# Patient Record
Sex: FEMALE | Race: WHITE | NOT HISPANIC OR LATINO | Employment: OTHER | URBAN - NONMETROPOLITAN AREA
[De-identification: names, ages, dates, MRNs, and addresses within clinical notes are randomized per-mention and may not be internally consistent; named-entity substitution may affect disease eponyms.]

---

## 2017-07-28 ENCOUNTER — TRANSCRIBE ORDERS (OUTPATIENT)
Dept: ADMINISTRATIVE | Facility: HOSPITAL | Age: 57
End: 2017-07-28

## 2017-07-28 DIAGNOSIS — Z12.31 VISIT FOR SCREENING MAMMOGRAM: Primary | ICD-10-CM

## 2017-08-02 ENCOUNTER — TRANSCRIBE ORDERS (OUTPATIENT)
Dept: ADMINISTRATIVE | Facility: HOSPITAL | Age: 57
End: 2017-08-02

## 2017-08-02 DIAGNOSIS — M81.0 OSTEOPOROSIS: Primary | ICD-10-CM

## 2017-10-04 ENCOUNTER — TRANSCRIBE ORDERS (OUTPATIENT)
Dept: ADMINISTRATIVE | Facility: HOSPITAL | Age: 57
End: 2017-10-04

## 2017-10-04 DIAGNOSIS — R06.09 DOE (DYSPNEA ON EXERTION): Primary | ICD-10-CM

## 2017-12-04 ENCOUNTER — HOSPITAL ENCOUNTER (EMERGENCY)
Facility: HOSPITAL | Age: 57
Discharge: HOME | End: 2017-12-05
Attending: EMERGENCY MEDICINE | Admitting: EMERGENCY MEDICINE

## 2017-12-04 ENCOUNTER — APPOINTMENT (OUTPATIENT)
Dept: GENERAL RADIOLOGY | Facility: HOSPITAL | Age: 57
End: 2017-12-04

## 2017-12-04 DIAGNOSIS — J20.9 ACUTE BRONCHITIS, UNSPECIFIED ORGANISM: Primary | ICD-10-CM

## 2017-12-04 DIAGNOSIS — J38.5 LARYNGOSPASM: ICD-10-CM

## 2017-12-04 LAB
BASOPHILS # BLD AUTO: 0.04 10*3/MM3 (ref 0–0.3)
BASOPHILS NFR BLD AUTO: 0.5 % (ref 0–2)
DEPRECATED RDW RBC AUTO: 43.1 FL (ref 37–54)
EOSINOPHIL # BLD AUTO: 0.35 10*3/MM3 (ref 0–0.7)
EOSINOPHIL NFR BLD AUTO: 4.1 % (ref 0–5)
ERYTHROCYTE [DISTWIDTH] IN BLOOD BY AUTOMATED COUNT: 13.2 % (ref 11.5–14.5)
HCT VFR BLD AUTO: 38.6 % (ref 37–47)
HGB BLD-MCNC: 12.5 G/DL (ref 12–16)
IMM GRANULOCYTES # BLD: 0.04 10*3/MM3 (ref 0–0.03)
IMM GRANULOCYTES NFR BLD: 0.5 % (ref 0–0.5)
LYMPHOCYTES # BLD AUTO: 1.91 10*3/MM3 (ref 1–3)
LYMPHOCYTES NFR BLD AUTO: 22.3 % (ref 21–51)
MCH RBC QN AUTO: 30.3 PG (ref 27–33)
MCHC RBC AUTO-ENTMCNC: 32.4 G/DL (ref 33–37)
MCV RBC AUTO: 93.5 FL (ref 80–94)
MONOCYTES # BLD AUTO: 0.62 10*3/MM3 (ref 0.1–0.9)
MONOCYTES NFR BLD AUTO: 7.2 % (ref 0–10)
NEUTROPHILS # BLD AUTO: 5.61 10*3/MM3 (ref 1.4–6.5)
NEUTROPHILS NFR BLD AUTO: 65.4 % (ref 30–70)
PLATELET # BLD AUTO: 292 10*3/MM3 (ref 130–400)
PMV BLD AUTO: 10 FL (ref 6–10)
RBC # BLD AUTO: 4.13 10*6/MM3 (ref 4.2–5.4)
WBC NRBC COR # BLD: 8.57 10*3/MM3 (ref 4.5–12.5)

## 2017-12-04 PROCEDURE — 80053 COMPREHEN METABOLIC PANEL: CPT | Performed by: EMERGENCY MEDICINE

## 2017-12-04 PROCEDURE — 93010 ELECTROCARDIOGRAM REPORT: CPT | Performed by: INTERNAL MEDICINE

## 2017-12-04 PROCEDURE — 36415 COLL VENOUS BLD VENIPUNCTURE: CPT

## 2017-12-04 PROCEDURE — 71010 HC CHEST PA OR AP: CPT

## 2017-12-04 PROCEDURE — 93005 ELECTROCARDIOGRAM TRACING: CPT | Performed by: EMERGENCY MEDICINE

## 2017-12-04 PROCEDURE — 82550 ASSAY OF CK (CPK): CPT | Performed by: EMERGENCY MEDICINE

## 2017-12-04 PROCEDURE — 82553 CREATINE MB FRACTION: CPT | Performed by: EMERGENCY MEDICINE

## 2017-12-04 PROCEDURE — 99283 EMERGENCY DEPT VISIT LOW MDM: CPT

## 2017-12-04 PROCEDURE — 85025 COMPLETE CBC W/AUTO DIFF WBC: CPT | Performed by: EMERGENCY MEDICINE

## 2017-12-04 PROCEDURE — 84484 ASSAY OF TROPONIN QUANT: CPT | Performed by: EMERGENCY MEDICINE

## 2017-12-04 PROCEDURE — 71010 XR CHEST 1 VW: CPT | Performed by: RADIOLOGY

## 2017-12-04 RX ORDER — SODIUM CHLORIDE 9 MG/ML
125 INJECTION, SOLUTION INTRAVENOUS CONTINUOUS
Status: DISCONTINUED | OUTPATIENT
Start: 2017-12-05 | End: 2017-12-05 | Stop reason: HOSPADM

## 2017-12-04 RX ORDER — SODIUM CHLORIDE 0.9 % (FLUSH) 0.9 %
10 SYRINGE (ML) INJECTION AS NEEDED
Status: DISCONTINUED | OUTPATIENT
Start: 2017-12-04 | End: 2017-12-05 | Stop reason: HOSPADM

## 2017-12-05 ENCOUNTER — APPOINTMENT (OUTPATIENT)
Dept: GENERAL RADIOLOGY | Facility: HOSPITAL | Age: 57
End: 2017-12-05

## 2017-12-05 VITALS
HEART RATE: 100 BPM | SYSTOLIC BLOOD PRESSURE: 140 MMHG | BODY MASS INDEX: 47.09 KG/M2 | OXYGEN SATURATION: 99 % | DIASTOLIC BLOOD PRESSURE: 63 MMHG | HEIGHT: 66 IN | TEMPERATURE: 98.7 F | RESPIRATION RATE: 18 BRPM | WEIGHT: 293 LBS

## 2017-12-05 LAB
ALBUMIN SERPL-MCNC: 4 G/DL (ref 3.5–5)
ALBUMIN/GLOB SERPL: 1.5 G/DL (ref 1.5–2.5)
ALP SERPL-CCNC: 132 U/L (ref 35–104)
ALT SERPL W P-5'-P-CCNC: 26 U/L (ref 10–36)
ANION GAP SERPL CALCULATED.3IONS-SCNC: 8.9 MMOL/L (ref 3.6–11.2)
AST SERPL-CCNC: 21 U/L (ref 10–30)
BILIRUB SERPL-MCNC: 0.4 MG/DL (ref 0.2–1.8)
BUN BLD-MCNC: 15 MG/DL (ref 7–21)
BUN/CREAT SERPL: 16 (ref 7–25)
CALCIUM SPEC-SCNC: 9.1 MG/DL (ref 7.7–10)
CHLORIDE SERPL-SCNC: 103 MMOL/L (ref 99–112)
CK MB SERPL-CCNC: 1.02 NG/ML (ref 0–5)
CK MB SERPL-RTO: 1.2 % (ref 0–3)
CK SERPL-CCNC: 87 U/L (ref 24–173)
CO2 SERPL-SCNC: 25.1 MMOL/L (ref 24.3–31.9)
CREAT BLD-MCNC: 0.94 MG/DL (ref 0.43–1.29)
FLUAV AG NPH QL: NEGATIVE
FLUBV AG NPH QL IA: NEGATIVE
GFR SERPL CREATININE-BSD FRML MDRD: 62 ML/MIN/1.73
GLOBULIN UR ELPH-MCNC: 2.7 GM/DL
GLUCOSE BLD-MCNC: 417 MG/DL (ref 70–110)
HOLD SPECIMEN: NORMAL
HOLD SPECIMEN: NORMAL
OSMOLALITY SERPL CALC.SUM OF ELEC: 292.3 MOSM/KG (ref 273–305)
POTASSIUM BLD-SCNC: 4.3 MMOL/L (ref 3.5–5.3)
PROT SERPL-MCNC: 6.7 G/DL (ref 6–8)
SODIUM BLD-SCNC: 137 MMOL/L (ref 135–153)
TROPONIN I SERPL-MCNC: <0.006 NG/ML
WHOLE BLOOD HOLD SPECIMEN: NORMAL
WHOLE BLOOD HOLD SPECIMEN: NORMAL

## 2017-12-05 PROCEDURE — 96360 HYDRATION IV INFUSION INIT: CPT

## 2017-12-05 PROCEDURE — 87804 INFLUENZA ASSAY W/OPTIC: CPT | Performed by: EMERGENCY MEDICINE

## 2017-12-05 PROCEDURE — 71010 HC CHEST PA OR AP: CPT

## 2017-12-05 PROCEDURE — 71010 XR CHEST 1 VW: CPT | Performed by: RADIOLOGY

## 2017-12-05 RX ORDER — BENZONATATE 200 MG/1
200 CAPSULE ORAL 3 TIMES DAILY PRN
Qty: 30 CAPSULE | Refills: 0 | Status: SHIPPED | OUTPATIENT
Start: 2017-12-05 | End: 2019-05-09

## 2017-12-05 RX ORDER — AZITHROMYCIN 250 MG/1
TABLET, FILM COATED ORAL
Qty: 6 TABLET | Refills: 0 | Status: SHIPPED | OUTPATIENT
Start: 2017-12-05 | End: 2019-05-09

## 2017-12-05 RX ORDER — GUAIFENESIN AND DEXTROMETHORPHAN HYDROBROMIDE 600; 30 MG/1; MG/1
1-2 TABLET, EXTENDED RELEASE ORAL 2 TIMES DAILY PRN
Qty: 20 TABLET | Refills: 0 | Status: SHIPPED | OUTPATIENT
Start: 2017-12-05 | End: 2019-05-09

## 2017-12-05 RX ADMIN — SODIUM CHLORIDE 125 ML/HR: 9 INJECTION, SOLUTION INTRAVENOUS at 00:15

## 2017-12-05 NOTE — ED NOTES
Pt reports that she has been short of breath x 3 days. States that she has been taking breathing treatments that belongs to her grandchild for the past two days but it has not helped her. States that tonight after her shower it felt like she was unable to get air in. States that she is having pain with inspiration. Family remains at bedside with the pt. All monitoring remains in place. NADN. Will continue to monitor     Aliyah Lloyd RN  12/05/17 0053

## 2017-12-05 NOTE — ED PROVIDER NOTES
Subjective   HPI Comments: Pt comes in for episode of not being able to breathe.  Pt has had cough, congestion and sore throat for 2-3 days.  Pt felt like she was wheezing today.  She took 2 of her nieces home nebs.  With some relief  Pt took a shower.  After shower she had a brief episode of not being able to breathe.  Pt reports she was awake but no air would move.  She reports squeaking gasping sound.  Spontaneously resolved.    Patient is a 57 y.o. female presenting with cough.   History provided by:  Patient  Cough   Cough characteristics:  Non-productive  Severity:  Moderate  Onset quality:  Gradual  Duration:  2 days  Timing:  Constant  Progression:  Worsening  Chronicity:  New  Smoker: no    Context: upper respiratory infection and weather changes    Context: not animal exposure, not exposure to allergens, not fumes, not occupational exposure and not sick contacts    Relieved by:  Nothing  Exacerbated by: Shower.  Ineffective treatments:  Home nebulizer  Associated symptoms: shortness of breath, sinus congestion, sore throat and wheezing    Associated symptoms: no chest pain, no chills, no diaphoresis, no ear fullness, no ear pain, no eye discharge, no fever, no headaches, no myalgias, no rash, no rhinorrhea and no weight loss    Risk factors: no chemical exposure and no recent travel        Review of Systems   Constitutional: Negative.  Negative for chills, diaphoresis, fever and weight loss.   HENT: Positive for congestion, sinus pressure, sore throat, trouble swallowing and voice change. Negative for ear pain, nosebleeds and rhinorrhea.    Eyes: Positive for redness. Negative for discharge.   Respiratory: Positive for apnea, cough, shortness of breath and wheezing.    Cardiovascular: Negative.  Negative for chest pain.   Gastrointestinal: Negative.    Endocrine: Negative.    Genitourinary: Negative.    Musculoskeletal: Negative.  Negative for myalgias.   Skin: Negative.  Negative for rash.    Allergic/Immunologic: Negative.    Neurological: Negative.  Negative for headaches.   Hematological: Negative.    Psychiatric/Behavioral: Negative.    All other systems reviewed and are negative.      Past Medical History:   Diagnosis Date   • Diabetes mellitus    • Hypertension        Allergies   Allergen Reactions   • Lisinopril    • Mobic [Meloxicam]        Past Surgical History:   Procedure Laterality Date   • BLADDER SURGERY     • DILATATION AND CURETTAGE     • TONSILLECTOMY         History reviewed. No pertinent family history.    Social History     Social History   • Marital status:      Spouse name: N/A   • Number of children: N/A   • Years of education: N/A     Social History Main Topics   • Smoking status: Former Smoker     Years: 10.00   • Smokeless tobacco: None   • Alcohol use No   • Drug use: No   • Sexual activity: Defer     Other Topics Concern   • None     Social History Narrative           Objective   Physical Exam   Constitutional: She is oriented to person, place, and time. She appears well-developed and well-nourished. No distress.   HENT:   Head: Normocephalic and atraumatic.   Nose: Nose normal.   Moist mucus membranes  Airway widely patent  Erythema without edema, exudate   Eyes: EOM are normal. Right eye exhibits no discharge. Left eye exhibits no discharge. No scleral icterus.   Conjunctival injection   Neck: Normal range of motion. No tracheal deviation present.   Cardiovascular: Normal rate, regular rhythm, normal heart sounds and intact distal pulses.  Exam reveals no gallop and no friction rub.    No murmur heard.  Pulmonary/Chest: Effort normal and breath sounds normal. No stridor. No respiratory distress. She has no wheezes. She has no rales. She exhibits tenderness.   Abdominal: Soft. Bowel sounds are normal. She exhibits no distension and no mass. There is no tenderness. There is no guarding.   Genitourinary:   Genitourinary Comments: No CVAT   Musculoskeletal: Normal range  of motion. She exhibits no edema.   Lymphadenopathy:     She has no cervical adenopathy.   Neurological: She is alert and oriented to person, place, and time. She exhibits normal muscle tone. Coordination normal.   Skin: Skin is warm and dry. No pallor.   Psychiatric: She has a normal mood and affect. Her behavior is normal. Judgment and thought content normal.   Nursing note and vitals reviewed.      Procedures         ED Course  ED Course   Value Comment By Time   ECG 12 Lead 12-lead EKG performed at 2304 hrs.  Interpreted by me at 2307 hrs.  Sinus tachycardia.  Ventricular rate 108.  SC interval 156.  QRS duration 84.  .  QTc 466.  No pathologic blocks.  No sustained ectopy or dysrhythmia.  No apparent acute ST segment deviation.  No criteria for STEMI. Mirza English MD 12/05 0044      XR Chest 1 View   ED Interpretation   PA and lateral chest x-ray   My read   No apparent acute infiltrate or injury.      XR Chest 1 View    (Results Pending)     Labs Reviewed   COMPREHENSIVE METABOLIC PANEL - Abnormal; Notable for the following:        Result Value    Glucose 417 (*)     Alkaline Phosphatase 132 (*)     All other components within normal limits   CBC WITH AUTO DIFFERENTIAL - Abnormal; Notable for the following:     RBC 4.13 (*)     MCHC 32.4 (*)     Immature Grans, Absolute 0.04 (*)     All other components within normal limits   INFLUENZA ANTIGEN, RAPID - Normal   TROPONIN (IN-HOUSE) - Normal    Narrative:     Ultra Troponin I Reference Range:         <=0.039 ng/mL: Negative    0.04-0.779 ng/mL: Indeterminate Range. Suspicious of MI.  Clinical correlation required.       >=0.78  ng/mL: Consistent with myocardial injury.  Clinical correlation required.   CK - Normal   CK MB - Normal   OSMOLALITY, CALCULATED - Normal   CKMB INDEX CALCULATION - Normal   RAINBOW DRAW    Narrative:     The following orders were created for panel order [facility] Draw.  Procedure                               Abnormality          Status                     ---------                               -----------         ------                     Light Blue Top[[id number]]                                    Final result               Green Top (Gel)[[id number]]                                   Final result               Lavender Top[[id number]]                                      Final result               Gold Top - SST[[id number]]                                    Final result                 Please view results for these tests on the individual orders.   CBC AND DIFFERENTIAL    Narrative:     The following orders were created for panel order CBC & Differential.  Procedure                               Abnormality         Status                     ---------                               -----------         ------                     CBC Auto Differential[[id number]]         Abnormal            Final result                 Please view results for these tests on the individual orders.   LIGHT BLUE TOP   GREEN TOP   LAVENDER TOP   GOLD TOP - SST        Medication List      START taking these medications          azithromycin 250 MG tablet   Commonly known as:  ZITHROMAX   Take 2 tablets the first day, then 1 tablet daily for 4 days.       benzonatate 200 MG capsule   Commonly known as:  TESSALON   Take 1 capsule by mouth 3 (Three) Times a Day As Needed for Cough.       guaifenesin-dextromethorphan  MG tablet sustained-release 12 hour   tablet   Take 1-2 tablets by mouth 2 (Two) Times a Day As Needed   (Congestion/Cough).         CONTINUE taking these medications          amitriptyline 75 MG tablet   Commonly known as:  ELAVIL       buPROPion  MG 24 hr tablet   Commonly known as:  WELLBUTRIN XL       doxycycline 100 MG enteric coated tablet   Commonly known as:  DORYX   Take 1 tablet by mouth 2 (Two) Times a Day.       hydrochlorothiazide 25 MG tablet   Commonly known as:  HYDRODIURIL       HYDROcodone-acetaminophen 5-325 MG per tablet    Commonly known as:  NORCO   Take 1 tablet by mouth Every 6 (Six) Hours As Needed for moderate pain   (4-6).       metFORMIN  MG 24 hr tablet   Commonly known as:  GLUCOPHAGE-XR       pioglitazone 45 MG tablet   Commonly known as:  ACTOS       simvastatin 40 MG tablet   Commonly known as:  ZOCOR       TOUJEO SOLOSTAR 300 UNIT/ML solution pen-injector   Generic drug:  Insulin Glargine                     MDM  Number of Diagnoses or Management Options  Acute bronchitis, unspecified organism: new and requires workup  Laryngospasm: new and requires workup     Amount and/or Complexity of Data Reviewed  Clinical lab tests: ordered and reviewed  Tests in the radiology section of CPT®: ordered and reviewed  Independent visualization of images, tracings, or specimens: yes    Risk of Complications, Morbidity, and/or Mortality  Presenting problems: high  Diagnostic procedures: high  Management options: moderate    Patient Progress  Patient progress: improved      Final diagnoses:   Acute bronchitis, unspecified organism   Laryngospasm            Mirza English MD  12/05/17 024

## 2018-11-02 ENCOUNTER — TRANSCRIBE ORDERS (OUTPATIENT)
Dept: ADMINISTRATIVE | Facility: HOSPITAL | Age: 58
End: 2018-11-02

## 2018-11-02 DIAGNOSIS — R11.2 NON-INTRACTABLE VOMITING WITH NAUSEA, UNSPECIFIED VOMITING TYPE: Primary | ICD-10-CM

## 2018-11-07 ENCOUNTER — APPOINTMENT (OUTPATIENT)
Dept: ULTRASOUND IMAGING | Facility: HOSPITAL | Age: 58
End: 2018-11-07
Attending: INTERNAL MEDICINE

## 2018-11-08 ENCOUNTER — TRANSCRIBE ORDERS (OUTPATIENT)
Dept: ADMINISTRATIVE | Facility: HOSPITAL | Age: 58
End: 2018-11-08

## 2018-11-13 ENCOUNTER — HOSPITAL ENCOUNTER (OUTPATIENT)
Dept: ULTRASOUND IMAGING | Facility: HOSPITAL | Age: 58
Discharge: HOME | End: 2018-11-13
Attending: INTERNAL MEDICINE

## 2018-11-13 ENCOUNTER — HOSPITAL ENCOUNTER (OUTPATIENT)
Dept: ULTRASOUND IMAGING | Facility: HOSPITAL | Age: 58
Discharge: HOME | End: 2018-11-13
Attending: INTERNAL MEDICINE | Admitting: INTERNAL MEDICINE

## 2018-11-13 DIAGNOSIS — R11.2 NON-INTRACTABLE VOMITING WITH NAUSEA, UNSPECIFIED VOMITING TYPE: ICD-10-CM

## 2018-11-13 PROCEDURE — 76705 ECHO EXAM OF ABDOMEN: CPT | Performed by: RADIOLOGY

## 2018-11-13 PROCEDURE — 76705 ECHO EXAM OF ABDOMEN: CPT

## 2018-11-15 ENCOUNTER — TRANSCRIBE ORDERS (OUTPATIENT)
Dept: ADMINISTRATIVE | Facility: HOSPITAL | Age: 58
End: 2018-11-15

## 2018-11-15 DIAGNOSIS — R11.2 NAUSEA AND VOMITING, INTRACTABILITY OF VOMITING NOT SPECIFIED, UNSPECIFIED VOMITING TYPE: Primary | ICD-10-CM

## 2018-11-28 ENCOUNTER — APPOINTMENT (OUTPATIENT)
Dept: NUCLEAR MEDICINE | Facility: HOSPITAL | Age: 58
End: 2018-11-28
Attending: INTERNAL MEDICINE

## 2018-12-06 ENCOUNTER — HOSPITAL ENCOUNTER (OUTPATIENT)
Dept: NUCLEAR MEDICINE | Facility: HOSPITAL | Age: 58
Discharge: HOME | End: 2018-12-06
Attending: INTERNAL MEDICINE

## 2018-12-06 DIAGNOSIS — R11.2 NAUSEA AND VOMITING, INTRACTABILITY OF VOMITING NOT SPECIFIED, UNSPECIFIED VOMITING TYPE: ICD-10-CM

## 2018-12-06 PROCEDURE — A9537 TC99M MEBROFENIN: HCPCS | Performed by: INTERNAL MEDICINE

## 2018-12-06 PROCEDURE — 78226 HEPATOBILIARY SYSTEM IMAGING: CPT

## 2018-12-06 PROCEDURE — 78226 HEPATOBILIARY SYSTEM IMAGING: CPT | Performed by: RADIOLOGY

## 2018-12-06 PROCEDURE — 0 TECHNETIUM TC 99M MEBROFENIN KIT: Performed by: INTERNAL MEDICINE

## 2018-12-06 RX ORDER — KIT FOR THE PREPARATION OF TECHNETIUM TC 99M MEBROFENIN 45 MG/10ML
1 INJECTION, POWDER, LYOPHILIZED, FOR SOLUTION INTRAVENOUS
Status: COMPLETED | OUTPATIENT
Start: 2018-12-06 | End: 2018-12-06

## 2018-12-06 RX ADMIN — MEBROFENIN 1 DOSE: 45 INJECTION, POWDER, LYOPHILIZED, FOR SOLUTION INTRAVENOUS at 08:56

## 2018-12-21 ENCOUNTER — APPOINTMENT (OUTPATIENT)
Dept: BONE DENSITY | Facility: HOSPITAL | Age: 58
End: 2018-12-21

## 2018-12-21 ENCOUNTER — APPOINTMENT (OUTPATIENT)
Dept: MAMMOGRAPHY | Facility: HOSPITAL | Age: 58
End: 2018-12-21

## 2019-01-24 ENCOUNTER — APPOINTMENT (OUTPATIENT)
Dept: BONE DENSITY | Facility: HOSPITAL | Age: 59
End: 2019-01-24

## 2019-01-24 ENCOUNTER — APPOINTMENT (OUTPATIENT)
Dept: MAMMOGRAPHY | Facility: HOSPITAL | Age: 59
End: 2019-01-24

## 2019-01-28 ENCOUNTER — OFFICE VISIT (OUTPATIENT)
Dept: SURGERY | Facility: CLINIC | Age: 59
End: 2019-01-28

## 2019-01-28 VITALS
SYSTOLIC BLOOD PRESSURE: 136 MMHG | WEIGHT: 293 LBS | HEIGHT: 66 IN | DIASTOLIC BLOOD PRESSURE: 82 MMHG | HEART RATE: 85 BPM | BODY MASS INDEX: 47.09 KG/M2

## 2019-01-28 DIAGNOSIS — K82.8 BILIARY DYSKINESIA: Primary | ICD-10-CM

## 2019-01-28 DIAGNOSIS — E66.01 CLASS 3 SEVERE OBESITY DUE TO EXCESS CALORIES WITH SERIOUS COMORBIDITY AND BODY MASS INDEX (BMI) OF 50.0 TO 59.9 IN ADULT (HCC): ICD-10-CM

## 2019-01-28 PROBLEM — E66.813 CLASS 3 SEVERE OBESITY DUE TO EXCESS CALORIES WITH SERIOUS COMORBIDITY IN ADULT: Status: ACTIVE | Noted: 2019-01-28

## 2019-01-28 PROCEDURE — 99203 OFFICE O/P NEW LOW 30 MIN: CPT | Performed by: SURGERY

## 2019-01-28 NOTE — PROGRESS NOTES
Subjective   Es Olivier is a 59 y.o. female is being seen for consultation today at the request of Delilah Pizarro MD    Es Olivier is a 59 y.o. female With morbid obesity and intermittent right upper quadrant pain.  She has gallstones noted on ultrasound but her body mass index is 50.9.  Symptoms or not life limiting.  She is diabetic and has difficulty with weight loss.        Past Medical History:   Diagnosis Date   • Diabetes mellitus (CMS/HCC)    • Hypertension        History reviewed. No pertinent family history.    Social History     Socioeconomic History   • Marital status:      Spouse name: Not on file   • Number of children: Not on file   • Years of education: Not on file   • Highest education level: Not on file   Social Needs   • Financial resource strain: Not on file   • Food insecurity - worry: Not on file   • Food insecurity - inability: Not on file   • Transportation needs - medical: Not on file   • Transportation needs - non-medical: Not on file   Occupational History   • Not on file   Tobacco Use   • Smoking status: Former Smoker     Years: 10.00   • Smokeless tobacco: Never Used   Substance and Sexual Activity   • Alcohol use: No   • Drug use: No   • Sexual activity: Defer   Other Topics Concern   • Not on file   Social History Narrative   • Not on file       Past Surgical History:   Procedure Laterality Date   • BLADDER SURGERY     • DILATATION AND CURETTAGE     • TONSILLECTOMY         Review of Systems   Constitutional: Negative for activity change, appetite change, chills and fever.   HENT: Negative for sore throat and trouble swallowing.    Eyes: Negative for visual disturbance.   Respiratory: Negative for cough and shortness of breath.    Cardiovascular: Negative for chest pain and palpitations.   Gastrointestinal: Positive for abdominal pain. Negative for abdominal distention, blood in stool, constipation, diarrhea, nausea and vomiting.   Endocrine: Negative for cold  "intolerance and heat intolerance.   Genitourinary: Negative for dysuria.   Musculoskeletal: Negative for joint swelling.   Skin: Negative for color change, rash and wound.   Allergic/Immunologic: Negative for immunocompromised state.   Neurological: Negative for dizziness, seizures, weakness and headaches.   Hematological: Negative for adenopathy. Does not bruise/bleed easily.   Psychiatric/Behavioral: Negative for agitation and confusion.         /82   Pulse 85   Ht 167.6 cm (66\")   Wt (!) 143 kg (315 lb)   LMP  (LMP Unknown)   BMI 50.84 kg/m²   Objective   Physical Exam   Constitutional: She is oriented to person, place, and time. She appears well-developed.   HENT:   Head: Normocephalic and atraumatic.   Mouth/Throat: Mucous membranes are normal.   Eyes: Conjunctivae are normal. Pupils are equal, round, and reactive to light.   Neck: Neck supple. No JVD present. No tracheal deviation present. No thyromegaly present.   Cardiovascular: Normal rate and regular rhythm. Exam reveals no gallop and no friction rub.   No murmur heard.  Pulmonary/Chest: Effort normal and breath sounds normal.   Abdominal: Soft. She exhibits no distension. There is no splenomegaly or hepatomegaly. There is no tenderness. No hernia.   Musculoskeletal: Normal range of motion. She exhibits no deformity.   Neurological: She is alert and oriented to person, place, and time.   Skin: Skin is warm and dry.   Psychiatric: She has a normal mood and affect.             Assessment   Es was seen today for gallbladder dysfunction.    Diagnoses and all orders for this visit:    Biliary dyskinesia    Class 3 severe obesity due to excess calories with serious comorbidity and body mass index (BMI) of 50.0 to 59.9 in adult (CMS/[facility])      Es Olivier is a 59 y.o. female with symptomatically cholelithiasis and morbid obesity.  I think she would benefit from bariatric referral with possible bariatric procedure and concurrent " cholecystectomy.    Patient's Body mass index is 50.84 kg/m². BMI is above normal parameters. Recommendations include: educational material and referral to a weight management program.

## 2019-01-29 DIAGNOSIS — E66.01 MORBID OBESITY (HCC): Primary | ICD-10-CM

## 2019-03-25 ENCOUNTER — APPOINTMENT (OUTPATIENT)
Dept: BONE DENSITY | Facility: HOSPITAL | Age: 59
End: 2019-03-25

## 2019-03-25 ENCOUNTER — APPOINTMENT (OUTPATIENT)
Dept: MAMMOGRAPHY | Facility: HOSPITAL | Age: 59
End: 2019-03-25

## 2019-04-15 ENCOUNTER — APPOINTMENT (OUTPATIENT)
Dept: MAMMOGRAPHY | Facility: HOSPITAL | Age: 59
End: 2019-04-15

## 2019-04-15 ENCOUNTER — APPOINTMENT (OUTPATIENT)
Dept: BONE DENSITY | Facility: HOSPITAL | Age: 59
End: 2019-04-15

## 2019-05-06 ENCOUNTER — HOSPITAL ENCOUNTER (OUTPATIENT)
Dept: BONE DENSITY | Facility: HOSPITAL | Age: 59
Discharge: HOME | End: 2019-05-06

## 2019-05-06 ENCOUNTER — HOSPITAL ENCOUNTER (OUTPATIENT)
Dept: MAMMOGRAPHY | Facility: HOSPITAL | Age: 59
Discharge: HOME | End: 2019-05-06
Admitting: INTERNAL MEDICINE

## 2019-05-06 DIAGNOSIS — Z12.39 SCREENING BREAST EXAMINATION: ICD-10-CM

## 2019-05-06 DIAGNOSIS — M81.0 OSTEOPOROSIS, POST-MENOPAUSAL: ICD-10-CM

## 2019-05-06 PROCEDURE — 77080 DXA BONE DENSITY AXIAL: CPT

## 2019-05-06 PROCEDURE — 77080 DXA BONE DENSITY AXIAL: CPT | Performed by: RADIOLOGY

## 2019-05-06 PROCEDURE — 77063 BREAST TOMOSYNTHESIS BI: CPT

## 2019-05-06 PROCEDURE — 77067 SCR MAMMO BI INCL CAD: CPT

## 2019-05-06 PROCEDURE — 77067 SCR MAMMO BI INCL CAD: CPT | Performed by: RADIOLOGY

## 2019-05-06 PROCEDURE — 77063 BREAST TOMOSYNTHESIS BI: CPT | Performed by: RADIOLOGY

## 2019-05-09 ENCOUNTER — DOCUMENTATION (OUTPATIENT)
Dept: BARIATRICS/WEIGHT MGMT | Facility: CLINIC | Age: 59
End: 2019-05-09

## 2019-05-09 ENCOUNTER — OFFICE VISIT (OUTPATIENT)
Dept: BARIATRICS/WEIGHT MGMT | Facility: CLINIC | Age: 59
End: 2019-05-09

## 2019-05-09 VITALS
HEART RATE: 93 BPM | TEMPERATURE: 96.8 F | BODY MASS INDEX: 48.82 KG/M2 | HEIGHT: 65 IN | WEIGHT: 293 LBS | OXYGEN SATURATION: 98 % | DIASTOLIC BLOOD PRESSURE: 78 MMHG | SYSTOLIC BLOOD PRESSURE: 124 MMHG | RESPIRATION RATE: 18 BRPM

## 2019-05-09 DIAGNOSIS — R53.83 FATIGUE, UNSPECIFIED TYPE: ICD-10-CM

## 2019-05-09 DIAGNOSIS — E78.5 HYPERLIPIDEMIA, UNSPECIFIED HYPERLIPIDEMIA TYPE: ICD-10-CM

## 2019-05-09 DIAGNOSIS — E11.69 DIABETES MELLITUS TYPE 2 IN OBESE (HCC): ICD-10-CM

## 2019-05-09 DIAGNOSIS — E66.01 MORBID OBESITY WITH BMI OF 45.0-49.9, ADULT (HCC): ICD-10-CM

## 2019-05-09 DIAGNOSIS — K21.9 GASTROESOPHAGEAL REFLUX DISEASE, ESOPHAGITIS PRESENCE NOT SPECIFIED: Primary | ICD-10-CM

## 2019-05-09 DIAGNOSIS — E66.9 DIABETES MELLITUS TYPE 2 IN OBESE (HCC): ICD-10-CM

## 2019-05-09 DIAGNOSIS — I10 HYPERTENSION, UNSPECIFIED TYPE: ICD-10-CM

## 2019-05-09 PROCEDURE — 99215 OFFICE O/P EST HI 40 MIN: CPT | Performed by: PHYSICIAN ASSISTANT

## 2019-05-09 RX ORDER — PANTOPRAZOLE SODIUM 40 MG/1
40 TABLET, DELAYED RELEASE ORAL DAILY
COMMUNITY
Start: 2019-04-18 | End: 2021-08-11 | Stop reason: ALTCHOICE

## 2019-05-09 RX ORDER — ERGOCALCIFEROL 1.25 MG/1
50000 CAPSULE ORAL WEEKLY
COMMUNITY
End: 2021-08-11 | Stop reason: ALTCHOICE

## 2019-05-09 RX ORDER — BUPROPION HYDROCHLORIDE 300 MG/1
TABLET ORAL
COMMUNITY
Start: 2019-05-05 | End: 2022-02-25

## 2019-05-09 RX ORDER — DULAGLUTIDE 0.75 MG/.5ML
INJECTION, SOLUTION SUBCUTANEOUS
COMMUNITY
Start: 2019-05-06 | End: 2021-08-11 | Stop reason: ALTCHOICE

## 2019-05-09 RX ORDER — HYDROCHLOROTHIAZIDE 12.5 MG/1
12.5 TABLET ORAL DAILY
COMMUNITY
End: 2022-03-26 | Stop reason: HOSPADM

## 2019-05-09 RX ORDER — OLMESARTAN MEDOXOMIL AND HYDROCHLOROTHIAZIDE 40/25 40; 25 MG/1; MG/1
TABLET ORAL
COMMUNITY
Start: 2019-05-03 | End: 2021-08-11 | Stop reason: ALTCHOICE

## 2019-05-09 NOTE — PROGRESS NOTES
"Weight Loss Surgery  Presurgical Nutrition Assessment     Es Olivier  05/09/2019  [id number]  [id number]  1960  female    Surgery desired: Sleeve Gastrectomy  Ht 165.1 cm (65\"); Wt 135 kg (298 #); BMI 49.6  Past Medical History:   Diagnosis Date   • Biliary dyskinesia     abnormal HIDA 11/2018 w/ EF 31%, symptomatic w/ N/V   • Depression    • DM2 (diabetes mellitus, type 2) (CMS/[facility])     dx 1994, on insulin >5 years, A1c 7, associated neuropathy, no other complications   • Dyspepsia    • Dyspnea on exertion    • Fatigue    • Fatty liver     dx on CT 2016   • GERD (gastroesophageal reflux disease)     controlled w/ daily Protonix   • Heart disease     w/ cardiac clearance 4/2019, negative stress test 10/2017, EF 65%   • Hiatal hernia     \"small\" noted on UGI 2014   • History of Helicobacter pylori infection     treated remotely   • Hyperlipidemia    • Hypertension    • Joint pain    • Morbid obesity with BMI of 45.0-49.9, adult (CMS/[facility])    • Sleep apnea     formerly on a device, no longer using, says just doesn't need it   • Urinary incontinence     no meds   • Vitamin D deficiency      Past Surgical History:   Procedure Laterality Date   • BLADDER SURGERY  2013    \"tack\", uncomplicated, but unsuccessful   • COLONOSCOPY  2015    unremarkable   • DILATATION AND CURETTAGE  1987   • TONSILLECTOMY  1984     Allergies   Allergen Reactions   • Mobic [Meloxicam] GI Intolerance   • Lisinopril Cough       Current Outpatient Medications:   •  buPROPion XL (WELLBUTRIN XL) 300 MG 24 hr tablet, , Disp: , Rfl:   •  hydrochlorothiazide (HYDRODIURIL) 12.5 MG tablet, Take 12.5 mg by mouth Daily., Disp: , Rfl:   •  Insulin Glargine (TOUJEO SOLOSTAR) 300 UNIT/ML solution pen-injector, Inject  under the skin., Disp: , Rfl:   •  metFORMIN XR (GLUCOPHAGE-XR) 500 MG 24 hr tablet, Take 2,000 mg by mouth Every Night., Disp: , Rfl:   •  olmesartan-hydrochlorothiazide (BENICAR HCT) 40-25 MG per tablet, , Disp: , Rfl:   •  " pantoprazole (PROTONIX) 40 MG EC tablet, Take 40 mg by mouth Daily., Disp: , Rfl:   •  pioglitazone (ACTOS) 45 MG tablet, Take 45 mg by mouth Daily., Disp: , Rfl:   •  simvastatin (ZOCOR) 40 MG tablet, Take 40 mg by mouth Every Night., Disp: , Rfl:   •  TRULICITY 0.75 MG/0.5ML solution pen-injector, , Disp: , Rfl:   •  vitamin D (ERGOCALCIFEROL) 64542 units capsule capsule, Take 50,000 Units by mouth 1 (One) Time Per Week., Disp: , Rfl:       Nutrition Assessment    Estimated energy needs:  1930 kcal    Estimated calories for weight loss:  1400 kcal    IBW (Pounds):  150 #        Excess body weight (Pounds):  150 #       Nutrition Recall  24 Hour recall: (B) (L) (D) -  Reviewed and discussed with patient.  Brkfast @ 6:15 am = 1 slice pepper loaf lunchmeat,  More brkfast @ 7:30 am = 1 bowlful of rice krispies /c milk.  Lunch @ 11 am = 1 cheese slice (1 oz) on 2 slices bread.  Dinner @ 4:30 pm = mashed potatoes, gravy & 2 biscuits (no meat, but states she often eats meat for dinner).  Snack @ 9 pm = 2 cups mashed potatoes.  Ingesting minimal protein. Discussed need to eat adeq protein in 3 meals & 2-3 high prot snacks qd /c less processed CHO in diet. Pt agreed to focus on adjusting diet to this end.       Exercise  never      Education    Provided information packet re:  Sleeve Gastrectomy  1. Reviewed guidelines for higher protein, limited carbohydrate diet to promote weight loss.  Encouraged patient to incorporate these principles of healthy eating from now until approximately 2 weeks prior to bariatric surgery date, when an even lower carbohydrate “liver-shrinking” regimen will be followed. (Information sheet re pre-op diet given).  Explained that after recovery from surgery this diet will again be followed to ensure further loss and for weight maintenance.    2. Encouraged patient to choose an acceptable protein supplement powder or shake for post-surgery liquid diet.  Provided product guidelines and examples.     3. Explained importance of goal setting to help in changing eating behaviors that are not conducive to weight loss.  Targeted several on a worksheet which also included spaces for patient to work on issues specific to them.  4. Provided follow-up options for support, including contact information for dietitians here, if desired.  Web-based support information and apps for smart phones and computers given.  Noted that monthly support group is offered at this clinic, and that support is associated with successful weight loss.    Recommend that team proceed with surgery and follow per protocol.      Nutrition Goals   Dietary Guidelines per information packet as described above  Protein goal:  grams per day   Carbohydrate goal:  100-140 grams per day  Eliminate soda, sweet tea, etc.     Exercise Goals  Continue current exercise routine   Add 15-30 minutes of activity per day as tolerated      Deb Lockett RD  05/09/2019  4:39 PM

## 2019-05-09 NOTE — PROGRESS NOTES
[facility] GROUP BARIATRIC SURGERY  [address]  [facility] [address]  [phone]      Patient  Name:  Es Olivier  :  1960      Date of Visit: 2019      Chief Complaint:  weight gain; unable to maintain weight loss    History of Present Illness:  Es Olivier is a 59 y.o. female who presents today for evaluation, education and consultation regarding weight loss surgery. The patient is interested in sleeve gastrectomy with Dr. Hood.    Referred by General Surgeon, Dr. Shore.  Patient was pursuing cholecystectomy, but was advised to consider concommitant WLS.  Recent gallbladder eval reveals gallbladder dysfunction w/ decreased EF 31%.  Reports gallbladder attacks 2-3x/month for the past couple years - bilious vomiting, postprandial nausea, no abd.pain.    Es has been overweight for at least 53 years, has been 35 pounds or more overweight for at least 46 years, has been 100 pounds or more overweight for 46 or more years and started dieting as a child.  Previous diet attempts include: Herbal Life and Calorie Counting; Wellbutrin, Amphetamines and Tenuate.  The most weight Es lost was 83 pounds while on Byetta but was only able to maintain that weight loss for 1 year b/c Byetta was ultimately stopped d/t uncontrolled nausea.  Her maximum lifetime weight is 350 pounds.    As above, patient has been overweight for many years, with numerous failed dietary/weight loss attempts.  She now has obesity related comorbidities and as such has decided to pursue weight loss surgery.  All past medical, surgical, social and family history have been obtained and discussed today, as pertinent to bariatric surgery.     Past Medical History:   Diagnosis Date   • Biliary dyskinesia     abnormal HIDA 2018 w/ EF 31%, symptomatic w/ N/V   • Depression    • DM2 (diabetes mellitus, type 2) (CMS/[facility])     dx , on insulin >5 years, A1c 7, associated neuropathy, no other  "complications   • Dyspepsia    • Dyspnea on exertion    • Fatigue    • Fatty liver     dx on CT 2016   • GERD (gastroesophageal reflux disease)     controlled w/ daily Protonix   • Heart disease     w/ cardiac clearance 4/2019, negative stress test 10/2017, EF 65%   • Hiatal hernia     \"small\" noted on UGI 2014   • History of Helicobacter pylori infection     treated remotely   • Hyperlipidemia    • Hypertension    • Joint pain    • Morbid obesity with BMI of 45.0-49.9, adult (CMS/HCC)    • Sleep apnea     formerly on a device, no longer using, says just doesn't need it   • Urinary incontinence     no meds   • Vitamin D deficiency      Past Surgical History:   Procedure Laterality Date   • BLADDER SURGERY  2013    \"tack\", uncomplicated, but unsuccessful   • COLONOSCOPY  2015    unremarkable   • DILATATION AND CURETTAGE  1987   • TONSILLECTOMY  1984       Allergies   Allergen Reactions   • Mobic [Meloxicam] GI Intolerance   • Lisinopril Cough       Current Outpatient Medications:   •  buPROPion XL (WELLBUTRIN XL) 300 MG 24 hr tablet, , Disp: , Rfl:   •  hydrochlorothiazide (HYDRODIURIL) 12.5 MG tablet, Take 12.5 mg by mouth Daily., Disp: , Rfl:   •  Insulin Glargine (TOUJEO SOLOSTAR) 300 UNIT/ML solution pen-injector, Inject  under the skin., Disp: , Rfl:   •  metFORMIN XR (GLUCOPHAGE-XR) 500 MG 24 hr tablet, Take 2,000 mg by mouth Every Night., Disp: , Rfl:   •  olmesartan-hydrochlorothiazide (BENICAR HCT) 40-25 MG per tablet, , Disp: , Rfl:   •  pantoprazole (PROTONIX) 40 MG EC tablet, Take 40 mg by mouth Daily., Disp: , Rfl:   •  pioglitazone (ACTOS) 45 MG tablet, Take 45 mg by mouth Daily., Disp: , Rfl:   •  simvastatin (ZOCOR) 40 MG tablet, Take 40 mg by mouth Every Night., Disp: , Rfl:   •  TRULICITY 0.75 MG/0.5ML solution pen-injector, , Disp: , Rfl:   •  vitamin D (ERGOCALCIFEROL) 24266 units capsule capsule, Take 50,000 Units by mouth 1 (One) Time Per Week., Disp: , Rfl:     Social History     Socioeconomic " History   • Marital status:      Spouse name: Not on file   • Number of children: Not on file   • Years of education: Not on file   • Highest education level: Not on file   Tobacco Use   • Smoking status: Former Smoker     Years: 10.00     Last attempt to quit: 1984     Years since quittin.3   • Smokeless tobacco: Never Used   Substance and Sexual Activity   • Alcohol use: No   • Drug use: No   • Sexual activity: Defer   Social History Narrative    Living in [place] [place] w/  and 2 grandchildren w/ their parents (who both smoke).  Formerly worked in [employer].  Disabled since 2016 d/t arthritis.     Family History   Problem Relation Age of Onset   • Breast cancer Sister         20s   • Breast cancer Sister         60s   • Bone cancer Mother         60   • Diabetes Father    • Heart attack Father        Review of Systems:  Constitutional:  denies fevers, chills.  HEENT:  denies headache, ear pain or loss of hearing, blurred or double vision, nasal discharge or sore throat.  Cardiovascular: denies hx MI, chest pain, palpitations, hx DVT.  Respiratory:  denies cough , wheezing, asthma, hx PE.  Gastrointestinal:  denies dysphagia, IBS.  Genitourinary:  denies hematuria, dysuria, polyuria, renal insufficiency.    Musculoskeletal:  denies fibromyalgia and autoimmune disease.  Neurological:  denies dizziness, confusion, seizure, stroke.  Psychiatric:  denies bipolar disorder.  Endocrine:  denies thyroid disease.  Hematologic:  denies anemia, bleeding disorder, hx blood transfusion.  Skin: denies rashes, hx MRSA.    Physical Exam:  Vital Signs:  Weight: 135 kg (298 lb)   Body mass index is 49.59 kg/m².  Temp: 96.8 °F (36 °C)   Heart Rate: 93   BP: 124/78     Physical Exam   Constitutional: She is oriented to person, place, and time. She appears well-developed and well-nourished.   HENT:   Head: Normocephalic and atraumatic.   Eyes: Conjunctivae are normal. No scleral icterus.   Neck: Neck supple.  No thyromegaly present.   Cardiovascular: Normal rate and regular rhythm.   No murmur heard.  Pulmonary/Chest: Effort normal and breath sounds normal. No respiratory distress. She has no wheezes. She has no rales.   Abdominal: Soft. Bowel sounds are normal. She exhibits no distension and no mass. There is no tenderness. No hernia.   no abdominal scars   Musculoskeletal: Normal range of motion. She exhibits no edema.   Neurological: She is alert and oriented to person, place, and time. Gait normal.   Skin: Skin is warm and dry. No rash noted.   Psychiatric: She has a normal mood and affect. Judgment normal.   Vitals reviewed.      Patient Active Problem List   Diagnosis   • Biliary dyskinesia   • Heart disease   • Hypertension   • DM2 (diabetes mellitus, type 2) (CMS/[facility])   • Hyperlipidemia   • Fatigue   • Dyspepsia   • Dyspnea on exertion   • Morbid obesity with BMI of 45.0-49.9, adult (CMS/[facility])   • Urinary incontinence   • Depression   • GERD (gastroesophageal reflux disease)   • History of Helicobacter pylori infection   • Vitamin D deficiency   • Joint pain   • Sleep apnea   • Hiatal hernia   • Fatty liver       Assessment:    Es Olivier is a 59 y.o. female with medically complicated obesity pursuing sleeve gastrectomy.    Weight loss surgery is deemed medically necessary given the following obesity related comorbidities including sleep apnea, diabetes, hypertension, dyslipidemia, cardiovascular disease, GERD and fatty liver with current Weight: 135 kg (298 lb) and Body mass index is 49.59 kg/m².     Surgical risk is higher given her BMI + multiple medical problems.  Further input pending Dr. Palacios's eval.       Plan:  The consultation plan and program requirements were reviewed with the patient.  The patient has been advised that a letter of medical support must be obtained from her primary care physician or referring provider. A psychological evaluation will be arranged.  A nutritional evaluation will be  performed.  The patient was advised to start a high protein and low carbohydrate diet.  Necessary lifestyle modifications were discussed.  Instructions on how to access ISAAC was given to the patient.  ISAAC is an internet based educational video that explains the surgical procedure chosen and answers basic questions regarding that procedure.     Preoperative testing will include: CBC, CMP, Lipids, TSH, HgA1C, H.Pylori, CXR, EKG and EGD.    The risks and benefits of the upper endoscopy were discussed with the patient in detail and all questions were answered.  Possibility of perforation, bleeding, aspiration, and anesthesia reaction were reviewed.  Patient agrees to proceed.    Additional preop clearances required prior to surgery: Cardiac.   Prior gallbladder testing obtained/reviewed.     The patient has been educated on expected postoperative lifestyle changes, including commitment to high protein diet, vitamin regimen, and exercise program.  They are aware that support groups are encouraged for optimal weight loss results. Patient understands that bariatric surgery is not cosmetic surgery but rather a tool to help make a lifelong commitment to lifestyle changes including diet, exercise, behavior modifications, and healthy habits. The surgical procedure was discussed with the patient and all questions were answered. The importance of avoiding ASA/ NSAIDS/ steroids/ tobacco/ hormones/ immunomodulators perioperatively was discussed.       STACY Judge

## 2019-05-10 LAB
ALBUMIN SERPL-MCNC: 4.3 G/DL (ref 3.5–5.5)
ALBUMIN/GLOB SERPL: 1.7 {RATIO} (ref 1.2–2.2)
ALP SERPL-CCNC: 46 IU/L (ref 39–117)
ALT SERPL-CCNC: 20 IU/L (ref 0–32)
AST SERPL-CCNC: 19 IU/L (ref 0–40)
BASOPHILS # BLD AUTO: 0.1 X10E3/UL (ref 0–0.2)
BASOPHILS NFR BLD AUTO: 1 %
BILIRUB SERPL-MCNC: 0.4 MG/DL (ref 0–1.2)
BUN SERPL-MCNC: 19 MG/DL (ref 6–24)
BUN/CREAT SERPL: 22 (ref 9–23)
CALCIUM SERPL-MCNC: 9.7 MG/DL (ref 8.7–10.2)
CHLORIDE SERPL-SCNC: 101 MMOL/L (ref 96–106)
CHOLEST SERPL-MCNC: 131 MG/DL (ref 100–199)
CO2 SERPL-SCNC: 25 MMOL/L (ref 20–29)
CREAT SERPL-MCNC: 0.88 MG/DL (ref 0.57–1)
EOSINOPHIL # BLD AUTO: 0.4 X10E3/UL (ref 0–0.4)
EOSINOPHIL NFR BLD AUTO: 4 %
ERYTHROCYTE [DISTWIDTH] IN BLOOD BY AUTOMATED COUNT: 13.5 % (ref 12.3–15.4)
GLOBULIN SER CALC-MCNC: 2.5 G/DL (ref 1.5–4.5)
GLUCOSE SERPL-MCNC: 117 MG/DL (ref 65–99)
HBA1C MFR BLD: 6.9 % (ref 4.8–5.6)
HCT VFR BLD AUTO: 39.5 % (ref 34–46.6)
HDLC SERPL-MCNC: 47 MG/DL
HGB BLD-MCNC: 13 G/DL (ref 11.1–15.9)
IMM GRANULOCYTES # BLD AUTO: 0.1 X10E3/UL (ref 0–0.1)
IMM GRANULOCYTES NFR BLD AUTO: 1 %
LDLC SERPL CALC-MCNC: 58 MG/DL (ref 0–99)
LYMPHOCYTES # BLD AUTO: 2.7 X10E3/UL (ref 0.7–3.1)
LYMPHOCYTES NFR BLD AUTO: 30 %
MCH RBC QN AUTO: 30 PG (ref 26.6–33)
MCHC RBC AUTO-ENTMCNC: 32.9 G/DL (ref 31.5–35.7)
MCV RBC AUTO: 91 FL (ref 79–97)
MONOCYTES # BLD AUTO: 0.7 X10E3/UL (ref 0.1–0.9)
MONOCYTES NFR BLD AUTO: 7 %
NEUTROPHILS # BLD AUTO: 5.2 X10E3/UL (ref 1.4–7)
NEUTROPHILS NFR BLD AUTO: 57 %
PLATELET # BLD AUTO: 411 X10E3/UL (ref 150–379)
POTASSIUM SERPL-SCNC: 4.6 MMOL/L (ref 3.5–5.2)
PROT SERPL-MCNC: 6.8 G/DL (ref 6–8.5)
RBC # BLD AUTO: 4.34 X10E6/UL (ref 3.77–5.28)
SODIUM SERPL-SCNC: 142 MMOL/L (ref 134–144)
TRIGL SERPL-MCNC: 132 MG/DL (ref 0–149)
TSH SERPL DL<=0.005 MIU/L-ACNC: 3.37 UIU/ML (ref 0.45–4.5)
VLDLC SERPL CALC-MCNC: 26 MG/DL (ref 5–40)
WBC # BLD AUTO: 9 X10E3/UL (ref 3.4–10.8)

## 2020-06-09 ENCOUNTER — HOSPITAL ENCOUNTER (OUTPATIENT)
Dept: GENERAL RADIOLOGY | Facility: HOSPITAL | Age: 60
Discharge: HOME | End: 2020-06-09
Admitting: INTERNAL MEDICINE

## 2020-06-09 ENCOUNTER — HOSPITAL ENCOUNTER (OUTPATIENT)
Dept: GENERAL RADIOLOGY | Facility: HOSPITAL | Age: 60
Discharge: HOME | End: 2020-06-09

## 2020-06-09 ENCOUNTER — TRANSCRIBE ORDERS (OUTPATIENT)
Dept: ADMINISTRATIVE | Facility: HOSPITAL | Age: 60
End: 2020-06-09

## 2020-06-09 DIAGNOSIS — M54.2 CERVICALGIA: ICD-10-CM

## 2020-06-09 DIAGNOSIS — M25.511 RIGHT SHOULDER PAIN, UNSPECIFIED CHRONICITY: ICD-10-CM

## 2020-06-09 DIAGNOSIS — M25.511 RIGHT SHOULDER PAIN, UNSPECIFIED CHRONICITY: Primary | ICD-10-CM

## 2020-06-09 PROCEDURE — 73030 X-RAY EXAM OF SHOULDER: CPT

## 2020-06-09 PROCEDURE — 73030 X-RAY EXAM OF SHOULDER: CPT | Performed by: RADIOLOGY

## 2020-06-09 PROCEDURE — 72050 X-RAY EXAM NECK SPINE 4/5VWS: CPT

## 2020-06-09 PROCEDURE — 72052 X-RAY EXAM NECK SPINE 6/>VWS: CPT | Performed by: RADIOLOGY

## 2020-10-01 ENCOUNTER — HOSPITAL ENCOUNTER (OUTPATIENT)
Dept: CARDIOLOGY | Facility: HOSPITAL | Age: 60
Discharge: HOME | End: 2020-10-01
Admitting: INTERNAL MEDICINE

## 2020-10-01 ENCOUNTER — TRANSCRIBE ORDERS (OUTPATIENT)
Dept: ADMINISTRATIVE | Facility: HOSPITAL | Age: 60
End: 2020-10-01

## 2020-10-01 DIAGNOSIS — M79.662 PAIN OF LEFT LOWER LEG: Primary | ICD-10-CM

## 2020-10-01 DIAGNOSIS — M79.662 PAIN OF LEFT LOWER LEG: ICD-10-CM

## 2020-10-01 PROCEDURE — 93971 EXTREMITY STUDY: CPT | Performed by: RADIOLOGY

## 2020-10-01 PROCEDURE — 93971 EXTREMITY STUDY: CPT

## 2020-12-15 ENCOUNTER — TRANSCRIBE ORDERS (OUTPATIENT)
Dept: ADMINISTRATIVE | Facility: HOSPITAL | Age: 60
End: 2020-12-15

## 2020-12-15 DIAGNOSIS — Z01.818 OTHER SPECIFIED PRE-OPERATIVE EXAMINATION: Primary | ICD-10-CM

## 2020-12-18 ENCOUNTER — LAB (OUTPATIENT)
Dept: LAB | Facility: HOSPITAL | Age: 60
End: 2020-12-18

## 2020-12-18 DIAGNOSIS — Z01.818 OTHER SPECIFIED PRE-OPERATIVE EXAMINATION: ICD-10-CM

## 2020-12-18 PROCEDURE — U0004 COV-19 TEST NON-CDC HGH THRU: HCPCS | Performed by: OPHTHALMOLOGY

## 2020-12-18 PROCEDURE — C9803 HOPD COVID-19 SPEC COLLECT: HCPCS

## 2020-12-19 LAB — SARS-COV-2 RNA RESP QL NAA+PROBE: NOT DETECTED

## 2021-01-13 ENCOUNTER — TRANSCRIBE ORDERS (OUTPATIENT)
Dept: ADMINISTRATIVE | Facility: HOSPITAL | Age: 61
End: 2021-01-13

## 2021-01-13 DIAGNOSIS — Z01.818 PRE-OPERATIVE CLEARANCE: Primary | ICD-10-CM

## 2021-01-15 ENCOUNTER — LAB (OUTPATIENT)
Dept: LAB | Facility: HOSPITAL | Age: 61
End: 2021-01-15

## 2021-01-15 DIAGNOSIS — Z01.818 PRE-OPERATIVE CLEARANCE: ICD-10-CM

## 2021-01-15 PROCEDURE — C9803 HOPD COVID-19 SPEC COLLECT: HCPCS

## 2021-01-15 PROCEDURE — U0004 COV-19 TEST NON-CDC HGH THRU: HCPCS | Performed by: OPHTHALMOLOGY

## 2021-01-16 LAB — SARS-COV-2 RNA RESP QL NAA+PROBE: NOT DETECTED

## 2021-02-22 DIAGNOSIS — Z23 IMMUNIZATION DUE: ICD-10-CM

## 2021-03-04 ENCOUNTER — IMMUNIZATION (OUTPATIENT)
Dept: VACCINE CLINIC | Facility: HOSPITAL | Age: 61
End: 2021-03-04

## 2021-03-04 PROCEDURE — 91300 HC SARSCOV02 VAC 30MCG/0.3ML IM: CPT | Performed by: INTERNAL MEDICINE

## 2021-03-04 PROCEDURE — 0001A: CPT | Performed by: INTERNAL MEDICINE

## 2021-03-25 ENCOUNTER — IMMUNIZATION (OUTPATIENT)
Dept: VACCINE CLINIC | Facility: HOSPITAL | Age: 61
End: 2021-03-25

## 2021-03-25 PROCEDURE — 0002A: CPT | Performed by: INTERNAL MEDICINE

## 2021-03-25 PROCEDURE — 91300 HC SARSCOV02 VAC 30MCG/0.3ML IM: CPT | Performed by: INTERNAL MEDICINE

## 2021-06-30 ENCOUNTER — HOSPITAL ENCOUNTER (OUTPATIENT)
Dept: GENERAL RADIOLOGY | Facility: HOSPITAL | Age: 61
Discharge: HOME | End: 2021-06-30
Admitting: INTERNAL MEDICINE

## 2021-06-30 ENCOUNTER — TRANSCRIBE ORDERS (OUTPATIENT)
Dept: ADMINISTRATIVE | Facility: HOSPITAL | Age: 61
End: 2021-06-30

## 2021-06-30 DIAGNOSIS — M25.561 RIGHT KNEE PAIN, UNSPECIFIED CHRONICITY: Primary | ICD-10-CM

## 2021-06-30 DIAGNOSIS — M25.561 RIGHT KNEE PAIN, UNSPECIFIED CHRONICITY: ICD-10-CM

## 2021-06-30 PROCEDURE — 73560 X-RAY EXAM OF KNEE 1 OR 2: CPT

## 2021-06-30 PROCEDURE — 73560 X-RAY EXAM OF KNEE 1 OR 2: CPT | Performed by: RADIOLOGY

## 2021-08-11 ENCOUNTER — OFFICE VISIT (OUTPATIENT)
Dept: ORTHOPEDIC SURGERY | Facility: CLINIC | Age: 61
End: 2021-08-11

## 2021-08-11 VITALS
HEIGHT: 66 IN | SYSTOLIC BLOOD PRESSURE: 127 MMHG | DIASTOLIC BLOOD PRESSURE: 77 MMHG | BODY MASS INDEX: 47.09 KG/M2 | HEART RATE: 86 BPM | TEMPERATURE: 98.4 F | WEIGHT: 293 LBS

## 2021-08-11 DIAGNOSIS — M17.0 PRIMARY OSTEOARTHRITIS OF KNEES, BILATERAL: Primary | ICD-10-CM

## 2021-08-11 PROCEDURE — 99203 OFFICE O/P NEW LOW 30 MIN: CPT | Performed by: ORTHOPAEDIC SURGERY

## 2021-08-11 PROCEDURE — 20610 DRAIN/INJ JOINT/BURSA W/O US: CPT | Performed by: ORTHOPAEDIC SURGERY

## 2021-08-11 RX ORDER — LOSARTAN POTASSIUM 50 MG/1
50 TABLET ORAL DAILY
COMMUNITY
End: 2022-02-25

## 2021-08-11 RX ADMIN — LIDOCAINE HYDROCHLORIDE 5 ML: 10 INJECTION, SOLUTION EPIDURAL; INFILTRATION; INTRACAUDAL; PERINEURAL at 14:10

## 2021-08-11 RX ADMIN — METHYLPREDNISOLONE ACETATE 80 MG: 80 INJECTION, SUSPENSION INTRA-ARTICULAR; INTRALESIONAL; INTRAMUSCULAR; SOFT TISSUE at 14:07

## 2021-08-11 RX ADMIN — METHYLPREDNISOLONE ACETATE 80 MG: 80 INJECTION, SUSPENSION INTRA-ARTICULAR; INTRALESIONAL; INTRAMUSCULAR; SOFT TISSUE at 14:10

## 2021-08-11 RX ADMIN — LIDOCAINE HYDROCHLORIDE 5 ML: 10 INJECTION, SOLUTION EPIDURAL; INFILTRATION; INTRACAUDAL; PERINEURAL at 14:07

## 2021-08-11 NOTE — PROGRESS NOTES
"New Patient Visit      Patient: Es Olivier  YOB: 1960  Date of Encounter: 08/11/2021        Chief Complaint:   Chief Complaint   Patient presents with   • Right Knee - Initial Evaluation, Pain, Edema   • Left Knee - Initial Evaluation, Pain, Edema           HPI:   Es Olivier, 61 y.o. female, referred by Delilah Pizarro MD presents with ongoing bilateral knee pain of about 10 years.  She received intra-articular steroid injection hearts of 2015 with fairly good response.  Over time her knee pain has worsened she struggles with pain on a daily basis describes unsteadiness with her gait.  Currently her left knee is more symptomatic than her right.  She is known to have insulin dependent diabetes.  Her current weight is 297 she has lost significant weight down from 350.    Active Problem List:  Patient Active Problem List   Diagnosis   • Biliary dyskinesia   • Heart disease   • Hypertension   • DM2 (diabetes mellitus, type 2) (CMS/[facility])   • Hyperlipidemia   • Fatigue   • Dyspepsia   • Dyspnea on exertion   • Morbid obesity with BMI of 45.0-49.9, adult (CMS/[facility])   • Urinary incontinence   • Depression   • GERD (gastroesophageal reflux disease)   • History of Helicobacter pylori infection   • Vitamin D deficiency   • Joint pain   • Sleep apnea   • Hiatal hernia   • Fatty liver         Past Medical History:  Past Medical History:   Diagnosis Date   • Biliary dyskinesia     abnormal HIDA 11/2018 w/ EF 31%, symptomatic w/ N/V   • Depression    • DM2 (diabetes mellitus, type 2) (CMS/[facility])     dx 1994, on insulin >5 years, A1c 7, associated neuropathy, no other complications   • Dyspepsia    • Dyspnea on exertion    • Fatigue    • Fatty liver     dx on CT 2016   • GERD (gastroesophageal reflux disease)     controlled w/ daily Protonix   • Heart disease     w/ cardiac clearance 4/2019, negative stress test 10/2017, EF 65%   • Hiatal hernia     \"small\" noted on UGI 2014   • History of Helicobacter pylori " "infection     treated remotely   • Hyperlipidemia    • Hypertension    • Joint pain    • Morbid obesity with BMI of 45.0-49.9, adult (CMS/[facility])    • Sleep apnea     formerly on a device, no longer using, says just doesn't need it   • Urinary incontinence     no meds   • Vitamin D deficiency          Past Surgical History:  Past Surgical History:   Procedure Laterality Date   • BLADDER SURGERY      \"tack\", uncomplicated, but unsuccessful   • CATARACT EXTRACTION     • COLONOSCOPY      unremarkable   • DILATATION AND CURETTAGE     • TONSILLECTOMY           Family History:  Family History   Problem Relation Age of Onset   • Breast cancer Sister         20s   • Cancer Sister    • Breast cancer Sister         60s   • Bone cancer Mother         60   • Osteoarthritis Mother    • Diabetes Father    • Heart attack Father    • Heart disease Father    • Heart disease Brother          Social History:  Social History     Socioeconomic History   • Marital status:      Spouse name: Not on file   • Number of children: Not on file   • Years of education: Not on file   • Highest education level: Not on file   Tobacco Use   • Smoking status: Former Smoker     Years: 10.     Quit date:      Years since quittin.6   • Smokeless tobacco: Never Used   Vaping Use   • Vaping Use: Never used   Substance and Sexual Activity   • Alcohol use: Yes     Comment: SOCIALLY   • Drug use: No   • Sexual activity: Defer     Body mass index is 47.94 kg/m².      Medications:  Current Outpatient Medications   Medication Sig Dispense Refill   • buPROPion XL (WELLBUTRIN XL) 300 MG 24 hr tablet      • hydrochlorothiazide (HYDRODIURIL) 12.5 MG tablet Take 12.5 mg by mouth Daily.     • insulin regular (humuLIN R,novoLIN R) 100 UNIT/ML injection Inject  under the skin into the appropriate area as directed 3 (Three) Times a Day Before Meals.     • losartan (COZAAR) 50 MG tablet Take 50 mg by mouth Daily.     • metFORMIN XR " "(GLUCOPHAGE-XR) 500 MG 24 hr tablet Take 2,000 mg by mouth Every Night.     • pioglitazone (ACTOS) 45 MG tablet Take 45 mg by mouth Daily.     • simvastatin (ZOCOR) 40 MG tablet Take 40 mg by mouth Every Night.       No current facility-administered medications for this visit.         Allergies:  Allergies   Allergen Reactions   • Mobic [Meloxicam] GI Intolerance   • Lisinopril Cough         Review of Systems:   Review of Systems   HENT: Negative.    Eyes: Negative.    Respiratory: Positive for shortness of breath.    Cardiovascular: Negative.    Gastrointestinal: Negative.    Endocrine: Negative.    Genitourinary: Positive for frequency and urgency.   Musculoskeletal: Positive for arthralgias and joint swelling.   Skin: Negative.    Allergic/Immunologic: Negative.    Neurological: Positive for weakness.   Hematological: Bruises/bleeds easily.   Psychiatric/Behavioral: Positive for dysphoric mood. The patient is nervous/anxious.          Physical Exam:   Physical Exam  GENERAL: 61 y.o. female, alert and oriented X 3 in no acute distress.   Visit Vitals  /77   Pulse 86   Temp 98.4 °F (36.9 °C)   Ht 167.6 cm (66\")   Wt 135 kg (297 lb)   LMP  (LMP Unknown)   BMI 47.94 kg/m²         Musculoskeletal:   Examination right knee is identical in appearance to her left knee..  She has no localized swelling demonstrates no significant effusion has moderate medial joint line tenderness bilaterally demonstrates full extension with flexion to approximately 120 degrees.        Radiology/Labs:     EXAM:    XR Bilateral Knees, 1 or 2 Views     EXAM DATE:    6/30/2021 2:38 PM     CLINICAL HISTORY:    ; M25.561-Pain in right knee     TECHNIQUE:    Frontal and/or lateral views of the bilateral knees.     COMPARISON:    11/27/2016     FINDINGS:    BONES/JOINTS:  Advanced right lateral compartment osteoarthritic  change and joint space height loss. Moderate left lateral compartment  osteoarthritic change. Right greater than " left medial compartment  osteoarthritic change.  Patellofemoral joint bilateral osteoarthritic  change with osteophyte formation.  No acute fracture.  No dislocation.    SOFT TISSUES:  Unremarkable.     IMPRESSION:  1.  Advanced right lateral compartment osteoarthritic change and joint  space height loss. Moderate left lateral compartment osteoarthritic  change. Right greater than left medial compartment osteoarthritic  change.  2.  Patellofemoral joint bilateral osteoarthritic change with osteophyte  formation.     This report was finalized on 6/30/2021 3:12 PM by Dr. Abrahan Houser MD.         Radiographs reviewed bilateral knees shows tricompartmental osteoarthritis right mildly worse than the left with percent loss of joint space lateral compartment right knee compared to 20% loss of joint space on the left.    Assessment & Plan:   61 y.o. female presents with moderate osteoarthritis bilateral knees compared to previous radiographs from 2015 there has been only slight interval change.  Discussed her options and also discussed her need to reduce her weight to be a candidate for total knee replacement in the future.  Today after discussion she was provided Depo-Medrol 80 mg intra-articular with lidocaine block bilateral knees.  She will follow-up as needed.        ICD-10-CM ICD-9-CM   1. Primary osteoarthritis of knees, bilateral  M17.0 715.16         Large Joint Arthrocentesis: L knee  Date/Time: 8/11/2021 2:07 PM  Consent given by: patient  Site marked: site marked  Timeout: Immediately prior to procedure a time out was called to verify the correct patient, procedure, equipment, support staff and site/side marked as required   Supporting Documentation  Indications: pain   Procedure Details  Location: knee - L knee  Preparation: Patient was prepped and draped in the usual sterile fashion  Needle size: 25 G  Approach: anterolateral  Medications administered: 80 mg methylPREDNISolone acetate 80 MG/ML; 5 mL lidocaine  PF 1% 1 %  Patient tolerance: patient tolerated the procedure well with no immediate complications    Large Joint Arthrocentesis: R knee  Date/Time: 8/11/2021 2:10 PM  Consent given by: patient  Site marked: site marked  Timeout: Immediately prior to procedure a time out was called to verify the correct patient, procedure, equipment, support staff and site/side marked as required   Supporting Documentation  Indications: pain   Procedure Details  Location: knee - R knee  Preparation: Patient was prepped and draped in the usual sterile fashion  Needle size: 25 G  Approach: anterolateral  Medications administered: 80 mg methylPREDNISolone acetate 80 MG/ML; 5 mL lidocaine PF 1% 1 %  Patient tolerance: patient tolerated the procedure well with no immediate complications            Cc:   Delilah Pizarro MD                This document has been electronically signed by Tono Howell MD   August 13, 2021 10:07 EDT

## 2021-08-16 RX ORDER — METHYLPREDNISOLONE ACETATE 80 MG/ML
80 INJECTION, SUSPENSION INTRA-ARTICULAR; INTRALESIONAL; INTRAMUSCULAR; SOFT TISSUE
Status: COMPLETED | OUTPATIENT
Start: 2021-08-11 | End: 2021-08-11

## 2021-08-16 RX ORDER — LIDOCAINE HYDROCHLORIDE 10 MG/ML
5 INJECTION, SOLUTION EPIDURAL; INFILTRATION; INTRACAUDAL; PERINEURAL
Status: COMPLETED | OUTPATIENT
Start: 2021-08-11 | End: 2021-08-11

## 2022-02-25 ENCOUNTER — APPOINTMENT (OUTPATIENT)
Dept: GENERAL RADIOLOGY | Facility: HOSPITAL | Age: 62
End: 2022-02-25

## 2022-02-25 ENCOUNTER — HOSPITAL ENCOUNTER (INPATIENT)
Facility: HOSPITAL | Age: 62
LOS: 12 days | Discharge: NURSING, REHAB OR LONG-TERM CARE | End: 2022-03-09
Attending: EMERGENCY MEDICINE | Admitting: INTERNAL MEDICINE

## 2022-02-25 DIAGNOSIS — S72.002A CLOSED FRACTURE OF PROXIMAL END OF LEFT FEMUR, INITIAL ENCOUNTER: Primary | ICD-10-CM

## 2022-02-25 LAB
ABO GROUP BLD: NORMAL
ABO GROUP BLD: NORMAL
ALBUMIN SERPL-MCNC: 3.86 G/DL (ref 3.5–5.2)
ALBUMIN/GLOB SERPL: 1.6 G/DL
ALP SERPL-CCNC: 62 U/L (ref 39–117)
ALT SERPL W P-5'-P-CCNC: 12 U/L (ref 1–33)
ANION GAP SERPL CALCULATED.3IONS-SCNC: 10.2 MMOL/L (ref 5–15)
AST SERPL-CCNC: 14 U/L (ref 1–32)
BACTERIA UR QL AUTO: ABNORMAL /HPF
BASOPHILS # BLD AUTO: 0.05 10*3/MM3 (ref 0–0.2)
BASOPHILS NFR BLD AUTO: 0.6 % (ref 0–1.5)
BILIRUB SERPL-MCNC: 0.4 MG/DL (ref 0–1.2)
BILIRUB UR QL STRIP: NEGATIVE
BLD GP AB SCN SERPL QL: NEGATIVE
BUN SERPL-MCNC: 20 MG/DL (ref 8–23)
BUN/CREAT SERPL: 27.4 (ref 7–25)
CALCIUM SPEC-SCNC: 8.9 MG/DL (ref 8.6–10.5)
CHLORIDE SERPL-SCNC: 104 MMOL/L (ref 98–107)
CLARITY UR: CLEAR
CO2 SERPL-SCNC: 24.8 MMOL/L (ref 22–29)
COLOR UR: YELLOW
CREAT SERPL-MCNC: 0.73 MG/DL (ref 0.57–1)
DEPRECATED RDW RBC AUTO: 47.8 FL (ref 37–54)
EOSINOPHIL # BLD AUTO: 0.18 10*3/MM3 (ref 0–0.4)
EOSINOPHIL NFR BLD AUTO: 2 % (ref 0.3–6.2)
ERYTHROCYTE [DISTWIDTH] IN BLOOD BY AUTOMATED COUNT: 13.6 % (ref 12.3–15.4)
FERRITIN SERPL-MCNC: 43.82 NG/ML (ref 13–150)
FLUAV SUBTYP SPEC NAA+PROBE: NOT DETECTED
FLUBV RNA ISLT QL NAA+PROBE: NOT DETECTED
GFR SERPL CREATININE-BSD FRML MDRD: 81 ML/MIN/1.73
GLOBULIN UR ELPH-MCNC: 2.3 GM/DL
GLUCOSE SERPL-MCNC: 192 MG/DL (ref 65–99)
GLUCOSE UR STRIP-MCNC: NEGATIVE MG/DL
HBA1C MFR BLD: 6.8 % (ref 4.8–5.6)
HCT VFR BLD AUTO: 35.8 % (ref 34–46.6)
HGB BLD-MCNC: 11.4 G/DL (ref 12–15.9)
HGB UR QL STRIP.AUTO: ABNORMAL
HOLD SPECIMEN: NORMAL
HOLD SPECIMEN: NORMAL
HYALINE CASTS UR QL AUTO: ABNORMAL /LPF
IMM GRANULOCYTES # BLD AUTO: 0.06 10*3/MM3 (ref 0–0.05)
IMM GRANULOCYTES NFR BLD AUTO: 0.7 % (ref 0–0.5)
IRON 24H UR-MRATE: 72 MCG/DL (ref 37–145)
IRON SATN MFR SERPL: 17 % (ref 20–50)
KETONES UR QL STRIP: NEGATIVE
LEUKOCYTE ESTERASE UR QL STRIP.AUTO: NEGATIVE
LYMPHOCYTES # BLD AUTO: 1.64 10*3/MM3 (ref 0.7–3.1)
LYMPHOCYTES NFR BLD AUTO: 18.4 % (ref 19.6–45.3)
MCH RBC QN AUTO: 30.2 PG (ref 26.6–33)
MCHC RBC AUTO-ENTMCNC: 31.8 G/DL (ref 31.5–35.7)
MCV RBC AUTO: 95 FL (ref 79–97)
MONOCYTES # BLD AUTO: 0.64 10*3/MM3 (ref 0.1–0.9)
MONOCYTES NFR BLD AUTO: 7.2 % (ref 5–12)
NEUTROPHILS NFR BLD AUTO: 6.32 10*3/MM3 (ref 1.7–7)
NEUTROPHILS NFR BLD AUTO: 71.1 % (ref 42.7–76)
NITRITE UR QL STRIP: NEGATIVE
NRBC BLD AUTO-RTO: 0 /100 WBC (ref 0–0.2)
PH UR STRIP.AUTO: <=5 [PH] (ref 5–8)
PLATELET # BLD AUTO: 330 10*3/MM3 (ref 140–450)
PMV BLD AUTO: 10.1 FL (ref 6–12)
POTASSIUM SERPL-SCNC: 3.9 MMOL/L (ref 3.5–5.2)
PROT SERPL-MCNC: 6.2 G/DL (ref 6–8.5)
PROT UR QL STRIP: NEGATIVE
RBC # BLD AUTO: 3.77 10*6/MM3 (ref 3.77–5.28)
RBC # UR STRIP: ABNORMAL /HPF
REF LAB TEST METHOD: ABNORMAL
RH BLD: NEGATIVE
RH BLD: NEGATIVE
SARS-COV-2 RNA PNL SPEC NAA+PROBE: NOT DETECTED
SODIUM SERPL-SCNC: 139 MMOL/L (ref 136–145)
SP GR UR STRIP: 1.02 (ref 1–1.03)
SQUAMOUS #/AREA URNS HPF: ABNORMAL /HPF
T&S EXPIRATION DATE: NORMAL
T4 FREE SERPL-MCNC: 1.13 NG/DL (ref 0.93–1.7)
TIBC SERPL-MCNC: 426 MCG/DL (ref 298–536)
TRANSFERRIN SERPL-MCNC: 286 MG/DL (ref 200–360)
TSH SERPL DL<=0.05 MIU/L-ACNC: 8.59 UIU/ML (ref 0.27–4.2)
UROBILINOGEN UR QL STRIP: ABNORMAL
WBC # UR STRIP: ABNORMAL /HPF
WBC NRBC COR # BLD: 8.89 10*3/MM3 (ref 3.4–10.8)
WHOLE BLOOD HOLD SPECIMEN: NORMAL
WHOLE BLOOD HOLD SPECIMEN: NORMAL

## 2022-02-25 PROCEDURE — 25010000002 MORPHINE PER 10 MG

## 2022-02-25 PROCEDURE — 86901 BLOOD TYPING SEROLOGIC RH(D): CPT | Performed by: EMERGENCY MEDICINE

## 2022-02-25 PROCEDURE — 86900 BLOOD TYPING SEROLOGIC ABO: CPT | Performed by: EMERGENCY MEDICINE

## 2022-02-25 PROCEDURE — 81001 URINALYSIS AUTO W/SCOPE: CPT | Performed by: EMERGENCY MEDICINE

## 2022-02-25 PROCEDURE — 86850 RBC ANTIBODY SCREEN: CPT | Performed by: EMERGENCY MEDICINE

## 2022-02-25 PROCEDURE — 25010000002 MORPHINE PER 10 MG: Performed by: PHYSICIAN ASSISTANT

## 2022-02-25 PROCEDURE — 85025 COMPLETE CBC W/AUTO DIFF WBC: CPT | Performed by: EMERGENCY MEDICINE

## 2022-02-25 PROCEDURE — 83540 ASSAY OF IRON: CPT | Performed by: PHYSICIAN ASSISTANT

## 2022-02-25 PROCEDURE — 25010000002 MORPHINE PER 10 MG: Performed by: EMERGENCY MEDICINE

## 2022-02-25 PROCEDURE — 99285 EMERGENCY DEPT VISIT HI MDM: CPT

## 2022-02-25 PROCEDURE — 99223 1ST HOSP IP/OBS HIGH 75: CPT | Performed by: INTERNAL MEDICINE

## 2022-02-25 PROCEDURE — 87636 SARSCOV2 & INF A&B AMP PRB: CPT | Performed by: EMERGENCY MEDICINE

## 2022-02-25 PROCEDURE — 83036 HEMOGLOBIN GLYCOSYLATED A1C: CPT | Performed by: PHYSICIAN ASSISTANT

## 2022-02-25 PROCEDURE — 25010000002 ONDANSETRON PER 1 MG: Performed by: EMERGENCY MEDICINE

## 2022-02-25 PROCEDURE — 86901 BLOOD TYPING SEROLOGIC RH(D): CPT

## 2022-02-25 PROCEDURE — 93010 ELECTROCARDIOGRAM REPORT: CPT | Performed by: INTERNAL MEDICINE

## 2022-02-25 PROCEDURE — 71045 X-RAY EXAM CHEST 1 VIEW: CPT

## 2022-02-25 PROCEDURE — 25010000002 KETOROLAC TROMETHAMINE PER 15 MG: Performed by: PHYSICIAN ASSISTANT

## 2022-02-25 PROCEDURE — 86900 BLOOD TYPING SEROLOGIC ABO: CPT

## 2022-02-25 PROCEDURE — 71045 X-RAY EXAM CHEST 1 VIEW: CPT | Performed by: RADIOLOGY

## 2022-02-25 PROCEDURE — 82728 ASSAY OF FERRITIN: CPT | Performed by: PHYSICIAN ASSISTANT

## 2022-02-25 PROCEDURE — 84443 ASSAY THYROID STIM HORMONE: CPT | Performed by: PHYSICIAN ASSISTANT

## 2022-02-25 PROCEDURE — 80053 COMPREHEN METABOLIC PANEL: CPT | Performed by: EMERGENCY MEDICINE

## 2022-02-25 PROCEDURE — 73502 X-RAY EXAM HIP UNI 2-3 VIEWS: CPT

## 2022-02-25 PROCEDURE — 73552 X-RAY EXAM OF FEMUR 2/>: CPT | Performed by: RADIOLOGY

## 2022-02-25 PROCEDURE — 93005 ELECTROCARDIOGRAM TRACING: CPT | Performed by: EMERGENCY MEDICINE

## 2022-02-25 PROCEDURE — 84466 ASSAY OF TRANSFERRIN: CPT | Performed by: PHYSICIAN ASSISTANT

## 2022-02-25 PROCEDURE — 0T9B70Z DRAINAGE OF BLADDER WITH DRAINAGE DEVICE, VIA NATURAL OR ARTIFICIAL OPENING: ICD-10-PCS | Performed by: EMERGENCY MEDICINE

## 2022-02-25 PROCEDURE — 73552 X-RAY EXAM OF FEMUR 2/>: CPT

## 2022-02-25 PROCEDURE — 51702 INSERT TEMP BLADDER CATH: CPT

## 2022-02-25 PROCEDURE — 84439 ASSAY OF FREE THYROXINE: CPT | Performed by: PHYSICIAN ASSISTANT

## 2022-02-25 PROCEDURE — 73502 X-RAY EXAM HIP UNI 2-3 VIEWS: CPT | Performed by: RADIOLOGY

## 2022-02-25 PROCEDURE — 25010000002 ONDANSETRON PER 1 MG: Performed by: PHYSICIAN ASSISTANT

## 2022-02-25 RX ORDER — OXYCODONE AND ACETAMINOPHEN 10; 325 MG/1; MG/1
1 TABLET ORAL EVERY 4 HOURS
Status: DISCONTINUED | OUTPATIENT
Start: 2022-02-25 | End: 2022-02-26

## 2022-02-25 RX ORDER — NICOTINE POLACRILEX 4 MG
15 LOZENGE BUCCAL
Status: DISCONTINUED | OUTPATIENT
Start: 2022-02-25 | End: 2022-03-09 | Stop reason: HOSPADM

## 2022-02-25 RX ORDER — BUPROPION HYDROCHLORIDE 200 MG/1
400 TABLET, EXTENDED RELEASE ORAL
COMMUNITY

## 2022-02-25 RX ORDER — KETOROLAC TROMETHAMINE 30 MG/ML
15 INJECTION, SOLUTION INTRAMUSCULAR; INTRAVENOUS ONCE
Status: COMPLETED | OUTPATIENT
Start: 2022-02-25 | End: 2022-02-25

## 2022-02-25 RX ORDER — ONDANSETRON 2 MG/ML
4 INJECTION INTRAMUSCULAR; INTRAVENOUS ONCE
Status: COMPLETED | OUTPATIENT
Start: 2022-02-25 | End: 2022-02-25

## 2022-02-25 RX ORDER — LOSARTAN POTASSIUM AND HYDROCHLOROTHIAZIDE 12.5; 5 MG/1; MG/1
1 TABLET ORAL DAILY
COMMUNITY
End: 2022-03-26 | Stop reason: HOSPADM

## 2022-02-25 RX ORDER — HYDROXYZINE HYDROCHLORIDE 25 MG/1
25 TABLET, FILM COATED ORAL 3 TIMES DAILY PRN
Status: DISCONTINUED | OUTPATIENT
Start: 2022-02-25 | End: 2022-03-09 | Stop reason: HOSPADM

## 2022-02-25 RX ORDER — DEXTROSE MONOHYDRATE 25 G/50ML
25 INJECTION, SOLUTION INTRAVENOUS
Status: DISCONTINUED | OUTPATIENT
Start: 2022-02-25 | End: 2022-02-26

## 2022-02-25 RX ORDER — CHOLECALCIFEROL (VITAMIN D3) 125 MCG
10 CAPSULE ORAL NIGHTLY PRN
Status: DISCONTINUED | OUTPATIENT
Start: 2022-02-25 | End: 2022-03-09 | Stop reason: HOSPADM

## 2022-02-25 RX ORDER — ERGOCALCIFEROL 1.25 MG/1
50000 CAPSULE ORAL WEEKLY
COMMUNITY

## 2022-02-25 RX ORDER — PROCHLORPERAZINE EDISYLATE 5 MG/ML
5 INJECTION INTRAMUSCULAR; INTRAVENOUS EVERY 6 HOURS PRN
Status: DISCONTINUED | OUTPATIENT
Start: 2022-02-25 | End: 2022-03-09 | Stop reason: HOSPADM

## 2022-02-25 RX ORDER — CALCIUM CARBONATE 200(500)MG
2 TABLET,CHEWABLE ORAL 3 TIMES DAILY PRN
Status: DISCONTINUED | OUTPATIENT
Start: 2022-02-25 | End: 2022-02-26

## 2022-02-25 RX ORDER — ACETAMINOPHEN 325 MG/1
650 TABLET ORAL EVERY 6 HOURS PRN
Status: DISCONTINUED | OUTPATIENT
Start: 2022-02-25 | End: 2022-02-26 | Stop reason: SDUPTHER

## 2022-02-25 RX ORDER — SODIUM CHLORIDE 0.9 % (FLUSH) 0.9 %
10 SYRINGE (ML) INJECTION AS NEEDED
Status: DISCONTINUED | OUTPATIENT
Start: 2022-02-25 | End: 2022-03-09 | Stop reason: HOSPADM

## 2022-02-25 RX ORDER — NICOTINE POLACRILEX 4 MG
15 LOZENGE BUCCAL
Status: DISCONTINUED | OUTPATIENT
Start: 2022-02-25 | End: 2022-02-26

## 2022-02-25 RX ORDER — HEPARIN SODIUM 5000 [USP'U]/ML
5000 INJECTION, SOLUTION INTRAVENOUS; SUBCUTANEOUS EVERY 12 HOURS SCHEDULED
Status: DISCONTINUED | OUTPATIENT
Start: 2022-02-25 | End: 2022-02-26

## 2022-02-25 RX ORDER — DEXTROSE MONOHYDRATE 25 G/50ML
25 INJECTION, SOLUTION INTRAVENOUS
Status: DISCONTINUED | OUTPATIENT
Start: 2022-02-25 | End: 2022-03-09 | Stop reason: HOSPADM

## 2022-02-25 RX ORDER — HYDROCHLOROTHIAZIDE 25 MG/1
12.5 TABLET ORAL DAILY
Status: CANCELLED | OUTPATIENT
Start: 2022-02-25

## 2022-02-25 RX ORDER — FLUOXETINE 10 MG/1
10 CAPSULE ORAL DAILY
Status: DISCONTINUED | OUTPATIENT
Start: 2022-02-26 | End: 2022-03-09 | Stop reason: HOSPADM

## 2022-02-25 RX ORDER — PIOGLITAZONEHYDROCHLORIDE 15 MG/1
45 TABLET ORAL NIGHTLY
Status: CANCELLED | OUTPATIENT
Start: 2022-02-25

## 2022-02-25 RX ORDER — MORPHINE SULFATE 2 MG/ML
2 INJECTION, SOLUTION INTRAMUSCULAR; INTRAVENOUS
Status: DISCONTINUED | OUTPATIENT
Start: 2022-02-25 | End: 2022-02-26

## 2022-02-25 RX ORDER — FLUOXETINE 10 MG/1
10 CAPSULE ORAL DAILY
Status: ON HOLD | COMMUNITY
End: 2022-03-25 | Stop reason: SDUPTHER

## 2022-02-25 RX ORDER — PANTOPRAZOLE SODIUM 40 MG/1
40 TABLET, DELAYED RELEASE ORAL
Status: DISCONTINUED | OUTPATIENT
Start: 2022-02-26 | End: 2022-03-09 | Stop reason: HOSPADM

## 2022-02-25 RX ADMIN — MORPHINE SULFATE 4 MG: 4 INJECTION, SOLUTION INTRAMUSCULAR; INTRAVENOUS at 10:22

## 2022-02-25 RX ADMIN — MORPHINE SULFATE 2 MG: 2 INJECTION, SOLUTION INTRAMUSCULAR; INTRAVENOUS at 22:25

## 2022-02-25 RX ADMIN — MORPHINE SULFATE 4 MG: 4 INJECTION, SOLUTION INTRAMUSCULAR; INTRAVENOUS at 13:14

## 2022-02-25 RX ADMIN — ONDANSETRON 4 MG: 2 INJECTION INTRAMUSCULAR; INTRAVENOUS at 10:22

## 2022-02-25 RX ADMIN — KETOROLAC TROMETHAMINE 15 MG: 30 INJECTION, SOLUTION INTRAMUSCULAR; INTRAVENOUS at 23:44

## 2022-02-25 RX ADMIN — Medication 4 MG: at 11:39

## 2022-02-25 RX ADMIN — BUPROPION HYDROCHLORIDE 400 MG: 150 TABLET, EXTENDED RELEASE ORAL at 23:45

## 2022-02-25 RX ADMIN — ONDANSETRON 4 MG: 2 INJECTION INTRAMUSCULAR; INTRAVENOUS at 15:29

## 2022-02-25 RX ADMIN — MORPHINE SULFATE 4 MG: 4 INJECTION, SOLUTION INTRAMUSCULAR; INTRAVENOUS at 16:31

## 2022-02-25 RX ADMIN — OXYCODONE HYDROCHLORIDE AND ACETAMINOPHEN 1 TABLET: 10; 325 TABLET ORAL at 23:45

## 2022-02-25 RX ADMIN — MORPHINE SULFATE 4 MG: 4 INJECTION, SOLUTION INTRAMUSCULAR; INTRAVENOUS at 11:39

## 2022-02-25 RX ADMIN — MORPHINE SULFATE 4 MG: 4 INJECTION, SOLUTION INTRAMUSCULAR; INTRAVENOUS at 18:08

## 2022-02-26 ENCOUNTER — ANESTHESIA (OUTPATIENT)
Dept: PERIOP | Facility: HOSPITAL | Age: 62
End: 2022-02-26

## 2022-02-26 ENCOUNTER — ANESTHESIA EVENT (OUTPATIENT)
Dept: PERIOP | Facility: HOSPITAL | Age: 62
End: 2022-02-26

## 2022-02-26 ENCOUNTER — APPOINTMENT (OUTPATIENT)
Dept: GENERAL RADIOLOGY | Facility: HOSPITAL | Age: 62
End: 2022-02-26

## 2022-02-26 LAB
25(OH)D3 SERPL-MCNC: 26 NG/ML (ref 30–100)
ALBUMIN SERPL-MCNC: 3.51 G/DL (ref 3.5–5.2)
ALBUMIN/GLOB SERPL: 1.4 G/DL
ALP SERPL-CCNC: 53 U/L (ref 39–117)
ALT SERPL W P-5'-P-CCNC: 11 U/L (ref 1–33)
ANION GAP SERPL CALCULATED.3IONS-SCNC: 11.6 MMOL/L (ref 5–15)
AST SERPL-CCNC: 13 U/L (ref 1–32)
BACTERIA UR QL AUTO: ABNORMAL /HPF
BASOPHILS # BLD AUTO: 0.03 10*3/MM3 (ref 0–0.2)
BASOPHILS NFR BLD AUTO: 0.3 % (ref 0–1.5)
BILIRUB SERPL-MCNC: 0.5 MG/DL (ref 0–1.2)
BILIRUB UR QL STRIP: NEGATIVE
BUN SERPL-MCNC: 23 MG/DL (ref 8–23)
BUN/CREAT SERPL: 28.4 (ref 7–25)
CALCIUM SPEC-SCNC: 8.8 MG/DL (ref 8.6–10.5)
CHLORIDE SERPL-SCNC: 100 MMOL/L (ref 98–107)
CLARITY UR: CLEAR
CO2 SERPL-SCNC: 24.4 MMOL/L (ref 22–29)
COLOR UR: YELLOW
CREAT SERPL-MCNC: 0.81 MG/DL (ref 0.57–1)
DEPRECATED RDW RBC AUTO: 47.8 FL (ref 37–54)
EOSINOPHIL # BLD AUTO: 0.03 10*3/MM3 (ref 0–0.4)
EOSINOPHIL NFR BLD AUTO: 0.3 % (ref 0.3–6.2)
ERYTHROCYTE [DISTWIDTH] IN BLOOD BY AUTOMATED COUNT: 13.5 % (ref 12.3–15.4)
GFR SERPL CREATININE-BSD FRML MDRD: 72 ML/MIN/1.73
GLOBULIN UR ELPH-MCNC: 2.5 GM/DL
GLUCOSE BLDC GLUCOMTR-MCNC: 149 MG/DL (ref 70–130)
GLUCOSE BLDC GLUCOMTR-MCNC: 173 MG/DL (ref 70–130)
GLUCOSE BLDC GLUCOMTR-MCNC: 225 MG/DL (ref 70–130)
GLUCOSE BLDC GLUCOMTR-MCNC: 246 MG/DL (ref 70–130)
GLUCOSE SERPL-MCNC: 205 MG/DL (ref 65–99)
GLUCOSE UR STRIP-MCNC: ABNORMAL MG/DL
HCT VFR BLD AUTO: 34.7 % (ref 34–46.6)
HGB BLD-MCNC: 10.8 G/DL (ref 12–15.9)
HGB UR QL STRIP.AUTO: ABNORMAL
HYALINE CASTS UR QL AUTO: ABNORMAL /LPF
IMM GRANULOCYTES # BLD AUTO: 0.04 10*3/MM3 (ref 0–0.05)
IMM GRANULOCYTES NFR BLD AUTO: 0.5 % (ref 0–0.5)
KETONES UR QL STRIP: NEGATIVE
LEUKOCYTE ESTERASE UR QL STRIP.AUTO: NEGATIVE
LYMPHOCYTES # BLD AUTO: 0.95 10*3/MM3 (ref 0.7–3.1)
LYMPHOCYTES NFR BLD AUTO: 10.8 % (ref 19.6–45.3)
MAGNESIUM SERPL-MCNC: 2 MG/DL (ref 1.6–2.4)
MCH RBC QN AUTO: 29.9 PG (ref 26.6–33)
MCHC RBC AUTO-ENTMCNC: 31.1 G/DL (ref 31.5–35.7)
MCV RBC AUTO: 96.1 FL (ref 79–97)
MONOCYTES # BLD AUTO: 0.74 10*3/MM3 (ref 0.1–0.9)
MONOCYTES NFR BLD AUTO: 8.4 % (ref 5–12)
NEUTROPHILS NFR BLD AUTO: 7.04 10*3/MM3 (ref 1.7–7)
NEUTROPHILS NFR BLD AUTO: 79.7 % (ref 42.7–76)
NITRITE UR QL STRIP: NEGATIVE
NRBC BLD AUTO-RTO: 0 /100 WBC (ref 0–0.2)
PH UR STRIP.AUTO: <=5 [PH] (ref 5–8)
PLATELET # BLD AUTO: 298 10*3/MM3 (ref 140–450)
PMV BLD AUTO: 10 FL (ref 6–12)
POTASSIUM SERPL-SCNC: 4.4 MMOL/L (ref 3.5–5.2)
PROT SERPL-MCNC: 6 G/DL (ref 6–8.5)
PROT UR QL STRIP: NEGATIVE
QT INTERVAL: 430 MS
QTC INTERVAL: 467 MS
RBC # BLD AUTO: 3.61 10*6/MM3 (ref 3.77–5.28)
RBC # UR STRIP: ABNORMAL /HPF
REF LAB TEST METHOD: ABNORMAL
SODIUM SERPL-SCNC: 136 MMOL/L (ref 136–145)
SP GR UR STRIP: 1.02 (ref 1–1.03)
SQUAMOUS #/AREA URNS HPF: ABNORMAL /HPF
UROBILINOGEN UR QL STRIP: ABNORMAL
WBC # UR STRIP: ABNORMAL /HPF
WBC NRBC COR # BLD: 8.83 10*3/MM3 (ref 3.4–10.8)

## 2022-02-26 PROCEDURE — C1713 ANCHOR/SCREW BN/BN,TIS/BN: HCPCS | Performed by: ORTHOPAEDIC SURGERY

## 2022-02-26 PROCEDURE — 63710000001 INSULIN ASPART PER 5 UNITS: Performed by: PHYSICIAN ASSISTANT

## 2022-02-26 PROCEDURE — 25010000002 PROPOFOL 10 MG/ML EMULSION: Performed by: NURSE ANESTHETIST, CERTIFIED REGISTERED

## 2022-02-26 PROCEDURE — 81001 URINALYSIS AUTO W/SCOPE: CPT | Performed by: PHYSICIAN ASSISTANT

## 2022-02-26 PROCEDURE — 85025 COMPLETE CBC W/AUTO DIFF WBC: CPT | Performed by: PHYSICIAN ASSISTANT

## 2022-02-26 PROCEDURE — 82306 VITAMIN D 25 HYDROXY: CPT | Performed by: PHYSICIAN ASSISTANT

## 2022-02-26 PROCEDURE — 0QS706Z REPOSITION LEFT UPPER FEMUR WITH INTRAMEDULLARY INTERNAL FIXATION DEVICE, OPEN APPROACH: ICD-10-PCS | Performed by: ORTHOPAEDIC SURGERY

## 2022-02-26 PROCEDURE — 76000 FLUOROSCOPY <1 HR PHYS/QHP: CPT

## 2022-02-26 PROCEDURE — 25010000002 PHENYLEPHRINE 10 MG/ML SOLUTION: Performed by: NURSE ANESTHETIST, CERTIFIED REGISTERED

## 2022-02-26 PROCEDURE — 25010000002 MORPHINE PER 10 MG: Performed by: ORTHOPAEDIC SURGERY

## 2022-02-26 PROCEDURE — C1889 IMPLANT/INSERT DEVICE, NOC: HCPCS | Performed by: ORTHOPAEDIC SURGERY

## 2022-02-26 PROCEDURE — 25010000002 MORPHINE PER 10 MG: Performed by: PHYSICIAN ASSISTANT

## 2022-02-26 PROCEDURE — 63710000001 INSULIN ASPART PER 5 UNITS: Performed by: ORTHOPAEDIC SURGERY

## 2022-02-26 PROCEDURE — 25010000002 CEFAZOLIN PER 500 MG: Performed by: ORTHOPAEDIC SURGERY

## 2022-02-26 PROCEDURE — 25010000002 FENTANYL CITRATE (PF) 50 MCG/ML SOLUTION: Performed by: NURSE ANESTHETIST, CERTIFIED REGISTERED

## 2022-02-26 PROCEDURE — 76000 FLUOROSCOPY <1 HR PHYS/QHP: CPT | Performed by: RADIOLOGY

## 2022-02-26 PROCEDURE — 25010000002 HEPARIN (PORCINE) PER 1000 UNITS: Performed by: ORTHOPAEDIC SURGERY

## 2022-02-26 PROCEDURE — 99232 SBSQ HOSP IP/OBS MODERATE 35: CPT | Performed by: PHYSICIAN ASSISTANT

## 2022-02-26 PROCEDURE — 25010000002 ONDANSETRON PER 1 MG: Performed by: NURSE ANESTHETIST, CERTIFIED REGISTERED

## 2022-02-26 PROCEDURE — C1769 GUIDE WIRE: HCPCS | Performed by: ORTHOPAEDIC SURGERY

## 2022-02-26 PROCEDURE — 25010000002 HYDRALAZINE PER 20 MG: Performed by: NURSE ANESTHETIST, CERTIFIED REGISTERED

## 2022-02-26 PROCEDURE — 0 CEFAZOLIN PER 500 MG: Performed by: NURSE ANESTHETIST, CERTIFIED REGISTERED

## 2022-02-26 PROCEDURE — 83735 ASSAY OF MAGNESIUM: CPT | Performed by: PHYSICIAN ASSISTANT

## 2022-02-26 PROCEDURE — 82962 GLUCOSE BLOOD TEST: CPT

## 2022-02-26 PROCEDURE — 80053 COMPREHEN METABOLIC PANEL: CPT | Performed by: PHYSICIAN ASSISTANT

## 2022-02-26 PROCEDURE — 25010000002 MIDAZOLAM PER 1 MG: Performed by: NURSE ANESTHETIST, CERTIFIED REGISTERED

## 2022-02-26 DEVICE — BLD FEM FIX HELI TFN ADV PERF 90MM STRL: Type: IMPLANTABLE DEVICE | Site: HIP | Status: FUNCTIONAL

## 2022-02-26 DEVICE — SCRW LK STRDRV TI 5X44MM STRL: Type: IMPLANTABLE DEVICE | Site: HIP | Status: FUNCTIONAL

## 2022-02-26 DEVICE — IMPLANTABLE DEVICE: Type: IMPLANTABLE DEVICE | Site: HIP | Status: FUNCTIONAL

## 2022-02-26 DEVICE — SCRW LK STRDRV TI 5X42MM STRL: Type: IMPLANTABLE DEVICE | Site: HIP | Status: FUNCTIONAL

## 2022-02-26 DEVICE — DEV CONTRL TISS STRATAFIXSYMMPDSPLS OS6 SZ1 60CM 24IN VIL: Type: IMPLANTABLE DEVICE | Site: HIP | Status: FUNCTIONAL

## 2022-02-26 RX ORDER — OXYCODONE AND ACETAMINOPHEN 10; 325 MG/1; MG/1
1 TABLET ORAL EVERY 4 HOURS PRN
Status: DISCONTINUED | OUTPATIENT
Start: 2022-02-26 | End: 2022-02-27

## 2022-02-26 RX ORDER — IPRATROPIUM BROMIDE AND ALBUTEROL SULFATE 2.5; .5 MG/3ML; MG/3ML
3 SOLUTION RESPIRATORY (INHALATION) ONCE AS NEEDED
Status: DISCONTINUED | OUTPATIENT
Start: 2022-02-26 | End: 2022-02-26 | Stop reason: HOSPADM

## 2022-02-26 RX ORDER — CEFAZOLIN SODIUM IN 0.9 % NACL 3 G/100 ML
3 INTRAVENOUS SOLUTION, PIGGYBACK (ML) INTRAVENOUS EVERY 8 HOURS
Status: COMPLETED | OUTPATIENT
Start: 2022-02-26 | End: 2022-02-27

## 2022-02-26 RX ORDER — NITROGLYCERIN 0.4 MG/1
0.4 TABLET SUBLINGUAL
Status: DISCONTINUED | OUTPATIENT
Start: 2022-02-26 | End: 2022-03-09 | Stop reason: HOSPADM

## 2022-02-26 RX ORDER — LIDOCAINE HYDROCHLORIDE 20 MG/ML
INJECTION, SOLUTION INFILTRATION; PERINEURAL AS NEEDED
Status: DISCONTINUED | OUTPATIENT
Start: 2022-02-26 | End: 2022-02-26 | Stop reason: SURG

## 2022-02-26 RX ORDER — SODIUM CHLORIDE 9 MG/ML
50 INJECTION, SOLUTION INTRAVENOUS CONTINUOUS
Status: DISCONTINUED | OUTPATIENT
Start: 2022-02-26 | End: 2022-02-28

## 2022-02-26 RX ORDER — SODIUM CHLORIDE 0.9 % (FLUSH) 0.9 %
10 SYRINGE (ML) INJECTION AS NEEDED
Status: DISCONTINUED | OUTPATIENT
Start: 2022-02-26 | End: 2022-02-26 | Stop reason: HOSPADM

## 2022-02-26 RX ORDER — HYDRALAZINE HYDROCHLORIDE 20 MG/ML
INJECTION INTRAMUSCULAR; INTRAVENOUS AS NEEDED
Status: DISCONTINUED | OUTPATIENT
Start: 2022-02-26 | End: 2022-02-26 | Stop reason: SURG

## 2022-02-26 RX ORDER — MAGNESIUM HYDROXIDE 1200 MG/15ML
LIQUID ORAL AS NEEDED
Status: DISCONTINUED | OUTPATIENT
Start: 2022-02-26 | End: 2022-02-26 | Stop reason: HOSPADM

## 2022-02-26 RX ORDER — SODIUM CHLORIDE, SODIUM LACTATE, POTASSIUM CHLORIDE, CALCIUM CHLORIDE 600; 310; 30; 20 MG/100ML; MG/100ML; MG/100ML; MG/100ML
100 INJECTION, SOLUTION INTRAVENOUS ONCE AS NEEDED
Status: DISCONTINUED | OUTPATIENT
Start: 2022-02-26 | End: 2022-02-26 | Stop reason: HOSPADM

## 2022-02-26 RX ORDER — FENTANYL CITRATE 50 UG/ML
INJECTION, SOLUTION INTRAMUSCULAR; INTRAVENOUS AS NEEDED
Status: DISCONTINUED | OUTPATIENT
Start: 2022-02-26 | End: 2022-02-26 | Stop reason: SURG

## 2022-02-26 RX ORDER — OXYCODONE AND ACETAMINOPHEN 10; 325 MG/1; MG/1
1 TABLET ORAL EVERY 6 HOURS PRN
Qty: 40 TABLET | Refills: 0 | Status: ON HOLD | OUTPATIENT
Start: 2022-02-26 | End: 2022-03-09

## 2022-02-26 RX ORDER — FAMOTIDINE 10 MG/ML
INJECTION, SOLUTION INTRAVENOUS AS NEEDED
Status: DISCONTINUED | OUTPATIENT
Start: 2022-02-26 | End: 2022-02-26 | Stop reason: SURG

## 2022-02-26 RX ORDER — KETOROLAC TROMETHAMINE 30 MG/ML
30 INJECTION, SOLUTION INTRAMUSCULAR; INTRAVENOUS EVERY 6 HOURS PRN
Status: DISCONTINUED | OUTPATIENT
Start: 2022-02-26 | End: 2022-02-26 | Stop reason: HOSPADM

## 2022-02-26 RX ORDER — ONDANSETRON 2 MG/ML
4 INJECTION INTRAMUSCULAR; INTRAVENOUS AS NEEDED
Status: DISCONTINUED | OUTPATIENT
Start: 2022-02-26 | End: 2022-02-26 | Stop reason: HOSPADM

## 2022-02-26 RX ORDER — FENTANYL CITRATE 50 UG/ML
50 INJECTION, SOLUTION INTRAMUSCULAR; INTRAVENOUS
Status: DISCONTINUED | OUTPATIENT
Start: 2022-02-26 | End: 2022-02-26 | Stop reason: HOSPADM

## 2022-02-26 RX ORDER — MIDAZOLAM HYDROCHLORIDE 1 MG/ML
INJECTION INTRAMUSCULAR; INTRAVENOUS AS NEEDED
Status: DISCONTINUED | OUTPATIENT
Start: 2022-02-26 | End: 2022-02-26 | Stop reason: SURG

## 2022-02-26 RX ORDER — KETOROLAC TROMETHAMINE 30 MG/ML
30 INJECTION, SOLUTION INTRAMUSCULAR; INTRAVENOUS EVERY 6 HOURS PRN
Status: DISCONTINUED | OUTPATIENT
Start: 2022-02-26 | End: 2022-02-27

## 2022-02-26 RX ORDER — SODIUM CHLORIDE 9 MG/ML
INJECTION, SOLUTION INTRAVENOUS CONTINUOUS PRN
Status: DISCONTINUED | OUTPATIENT
Start: 2022-02-26 | End: 2022-02-26 | Stop reason: SURG

## 2022-02-26 RX ORDER — OXYCODONE HYDROCHLORIDE AND ACETAMINOPHEN 5; 325 MG/1; MG/1
1 TABLET ORAL ONCE AS NEEDED
Status: DISCONTINUED | OUTPATIENT
Start: 2022-02-26 | End: 2022-02-26 | Stop reason: HOSPADM

## 2022-02-26 RX ORDER — CEFAZOLIN SODIUM 1 G/3ML
INJECTION, POWDER, FOR SOLUTION INTRAMUSCULAR; INTRAVENOUS AS NEEDED
Status: DISCONTINUED | OUTPATIENT
Start: 2022-02-26 | End: 2022-02-26 | Stop reason: SURG

## 2022-02-26 RX ORDER — HYDROCODONE BITARTRATE AND ACETAMINOPHEN 7.5; 325 MG/1; MG/1
1 TABLET ORAL EVERY 4 HOURS PRN
Status: DISCONTINUED | OUTPATIENT
Start: 2022-02-26 | End: 2022-02-27

## 2022-02-26 RX ORDER — PROPOFOL 10 MG/ML
VIAL (ML) INTRAVENOUS AS NEEDED
Status: DISCONTINUED | OUTPATIENT
Start: 2022-02-26 | End: 2022-02-26 | Stop reason: SURG

## 2022-02-26 RX ORDER — MEPERIDINE HYDROCHLORIDE 25 MG/ML
12.5 INJECTION INTRAMUSCULAR; INTRAVENOUS; SUBCUTANEOUS
Status: DISCONTINUED | OUTPATIENT
Start: 2022-02-26 | End: 2022-02-26 | Stop reason: HOSPADM

## 2022-02-26 RX ORDER — SODIUM CHLORIDE, SODIUM LACTATE, POTASSIUM CHLORIDE, CALCIUM CHLORIDE 600; 310; 30; 20 MG/100ML; MG/100ML; MG/100ML; MG/100ML
125 INJECTION, SOLUTION INTRAVENOUS ONCE
Status: DISCONTINUED | OUTPATIENT
Start: 2022-02-26 | End: 2022-02-26 | Stop reason: HOSPADM

## 2022-02-26 RX ORDER — ONDANSETRON 2 MG/ML
INJECTION INTRAMUSCULAR; INTRAVENOUS AS NEEDED
Status: DISCONTINUED | OUTPATIENT
Start: 2022-02-26 | End: 2022-02-26 | Stop reason: SURG

## 2022-02-26 RX ORDER — ACETAMINOPHEN 325 MG/1
650 TABLET ORAL EVERY 4 HOURS PRN
Status: DISCONTINUED | OUTPATIENT
Start: 2022-02-26 | End: 2022-03-09 | Stop reason: HOSPADM

## 2022-02-26 RX ORDER — ONDANSETRON 2 MG/ML
4 INJECTION INTRAMUSCULAR; INTRAVENOUS EVERY 6 HOURS PRN
Status: DISCONTINUED | OUTPATIENT
Start: 2022-02-26 | End: 2022-03-09 | Stop reason: HOSPADM

## 2022-02-26 RX ORDER — PHENYLEPHRINE HYDROCHLORIDE 10 MG/ML
INJECTION INTRAVENOUS AS NEEDED
Status: DISCONTINUED | OUTPATIENT
Start: 2022-02-26 | End: 2022-02-26 | Stop reason: SURG

## 2022-02-26 RX ORDER — TRANEXAMIC ACID 100 MG/ML
INJECTION, SOLUTION INTRAVENOUS AS NEEDED
Status: DISCONTINUED | OUTPATIENT
Start: 2022-02-26 | End: 2022-02-26 | Stop reason: SURG

## 2022-02-26 RX ORDER — OXYCODONE AND ACETAMINOPHEN 10; 325 MG/1; MG/1
1 TABLET ORAL EVERY 6 HOURS PRN
Status: DISCONTINUED | OUTPATIENT
Start: 2022-02-26 | End: 2022-02-26

## 2022-02-26 RX ORDER — NALOXONE HCL 0.4 MG/ML
0.4 VIAL (ML) INJECTION
Status: DISCONTINUED | OUTPATIENT
Start: 2022-02-26 | End: 2022-03-09 | Stop reason: HOSPADM

## 2022-02-26 RX ORDER — MIDAZOLAM HYDROCHLORIDE 1 MG/ML
1 INJECTION INTRAMUSCULAR; INTRAVENOUS
Status: DISCONTINUED | OUTPATIENT
Start: 2022-02-26 | End: 2022-02-26 | Stop reason: HOSPADM

## 2022-02-26 RX ORDER — SODIUM CHLORIDE 0.9 % (FLUSH) 0.9 %
10 SYRINGE (ML) INJECTION EVERY 12 HOURS SCHEDULED
Status: DISCONTINUED | OUTPATIENT
Start: 2022-02-26 | End: 2022-02-26 | Stop reason: HOSPADM

## 2022-02-26 RX ORDER — ACETAMINOPHEN 650 MG/1
650 SUPPOSITORY RECTAL EVERY 4 HOURS PRN
Status: DISCONTINUED | OUTPATIENT
Start: 2022-02-26 | End: 2022-03-09 | Stop reason: HOSPADM

## 2022-02-26 RX ADMIN — PHENYLEPHRINE HYDROCHLORIDE 100 MCG: 10 INJECTION INTRAVENOUS at 14:15

## 2022-02-26 RX ADMIN — PHENYLEPHRINE HYDROCHLORIDE 100 MCG: 10 INJECTION INTRAVENOUS at 14:43

## 2022-02-26 RX ADMIN — PHENYLEPHRINE HYDROCHLORIDE 100 MCG: 10 INJECTION INTRAVENOUS at 14:04

## 2022-02-26 RX ADMIN — SODIUM CHLORIDE: 9 INJECTION, SOLUTION INTRAVENOUS at 12:02

## 2022-02-26 RX ADMIN — FENTANYL CITRATE 100 MCG: 50 INJECTION INTRAMUSCULAR; INTRAVENOUS at 12:02

## 2022-02-26 RX ADMIN — FENTANYL CITRATE 50 MCG: 50 INJECTION INTRAMUSCULAR; INTRAVENOUS at 12:36

## 2022-02-26 RX ADMIN — HYDROCODONE BITARTRATE AND ACETAMINOPHEN 1 TABLET: 7.5; 325 TABLET ORAL at 21:16

## 2022-02-26 RX ADMIN — PROPOFOL 200 MG: 10 INJECTION, EMULSION INTRAVENOUS at 12:09

## 2022-02-26 RX ADMIN — FAMOTIDINE 20 MG: 10 INJECTION INTRAVENOUS at 12:09

## 2022-02-26 RX ADMIN — PHENYLEPHRINE HYDROCHLORIDE 100 MCG: 10 INJECTION INTRAVENOUS at 13:48

## 2022-02-26 RX ADMIN — ONDANSETRON 4 MG: 2 INJECTION INTRAMUSCULAR; INTRAVENOUS at 15:21

## 2022-02-26 RX ADMIN — CEFAZOLIN 3 G: 10 INJECTION, POWDER, FOR SOLUTION INTRAVENOUS at 21:17

## 2022-02-26 RX ADMIN — TRANEXAMIC ACID 1000 MG: 100 INJECTION, SOLUTION INTRAVENOUS at 12:02

## 2022-02-26 RX ADMIN — PHENYLEPHRINE HYDROCHLORIDE 100 MCG: 10 INJECTION INTRAVENOUS at 13:51

## 2022-02-26 RX ADMIN — PHENYLEPHRINE HYDROCHLORIDE 100 MCG: 10 INJECTION INTRAVENOUS at 13:42

## 2022-02-26 RX ADMIN — Medication 10 ML: at 08:22

## 2022-02-26 RX ADMIN — SODIUM CHLORIDE: 9 INJECTION, SOLUTION INTRAVENOUS at 13:01

## 2022-02-26 RX ADMIN — SODIUM CHLORIDE 100 ML/HR: 9 INJECTION, SOLUTION INTRAVENOUS at 16:10

## 2022-02-26 RX ADMIN — INSULIN ASPART 2 UNITS: 100 INJECTION, SOLUTION INTRAVENOUS; SUBCUTANEOUS at 08:22

## 2022-02-26 RX ADMIN — PHENYLEPHRINE HYDROCHLORIDE 100 MCG: 10 INJECTION INTRAVENOUS at 14:02

## 2022-02-26 RX ADMIN — LIDOCAINE HYDROCHLORIDE 60 MG: 20 INJECTION, SOLUTION INFILTRATION; PERINEURAL at 12:09

## 2022-02-26 RX ADMIN — PHENYLEPHRINE HYDROCHLORIDE 100 MCG: 10 INJECTION INTRAVENOUS at 13:33

## 2022-02-26 RX ADMIN — PHENYLEPHRINE HYDROCHLORIDE 100 MCG: 10 INJECTION INTRAVENOUS at 14:28

## 2022-02-26 RX ADMIN — MORPHINE SULFATE 4 MG: 4 INJECTION, SOLUTION INTRAMUSCULAR; INTRAVENOUS at 16:10

## 2022-02-26 RX ADMIN — BUPROPION HYDROCHLORIDE 400 MG: 150 TABLET, EXTENDED RELEASE ORAL at 21:17

## 2022-02-26 RX ADMIN — ONDANSETRON 4 MG: 2 INJECTION INTRAMUSCULAR; INTRAVENOUS at 12:09

## 2022-02-26 RX ADMIN — TRANEXAMIC ACID 1000 MG: 100 INJECTION, SOLUTION INTRAVENOUS at 14:19

## 2022-02-26 RX ADMIN — CEFAZOLIN 3 G: 1 INJECTION, POWDER, FOR SOLUTION INTRAVENOUS at 12:02

## 2022-02-26 RX ADMIN — HYDRALAZINE HYDROCHLORIDE 5 MG: 20 INJECTION INTRAMUSCULAR; INTRAVENOUS at 12:27

## 2022-02-26 RX ADMIN — MORPHINE SULFATE 2 MG: 2 INJECTION, SOLUTION INTRAMUSCULAR; INTRAVENOUS at 06:51

## 2022-02-26 RX ADMIN — FENTANYL CITRATE 50 MCG: 50 INJECTION INTRAMUSCULAR; INTRAVENOUS at 13:10

## 2022-02-26 RX ADMIN — INSULIN ASPART 3 UNITS: 100 INJECTION, SOLUTION INTRAVENOUS; SUBCUTANEOUS at 17:10

## 2022-02-26 RX ADMIN — MIDAZOLAM 2 MG: 1 INJECTION INTRAMUSCULAR; INTRAVENOUS at 12:02

## 2022-02-26 NOTE — ANESTHESIA PROCEDURE NOTES
Airway  Urgency: elective    Date/Time: 2/26/2022 12:09 PM  End Time:2/26/2022 12:09 PM  Airway not difficult    General Information and Staff    Patient location during procedure: OR  Anesthesiologist: Manuel Montalvo MD  CRNA: Akash López CRNA    Indications and Patient Condition  Indications for airway management: airway protection    Preoxygenated: yes  MILS maintained throughout  Mask difficulty assessment: 0 - not attempted    Final Airway Details  Final airway type: supraglottic airway      Successful airway: unique  Size 4    Number of attempts at approach: 1  Assessment: lips, teeth, and gum same as pre-op and atraumatic intubation    Additional Comments  Dentition & lips unchanged from preop

## 2022-02-26 NOTE — ANESTHESIA POSTPROCEDURE EVALUATION
Patient: Es Olivier    Procedure Summary     Date: 02/26/22 Room / Location: [facility] OR 03 /  [facility] OR    Anesthesia Start: 1202 Anesthesia Stop: 1452    Procedure: Left hip Open reduction and internal fixation. (Left Hip) Diagnosis:       Closed fracture of proximal end of left femur, initial encounter ([facility])      (Closed fracture of proximal end of left femur, initial encounter ([facility]) [S72.002A])    Surgeons: Jarrod Leung MD Provider: Manuel Montalvo MD    Anesthesia Type: general ASA Status: 3          Anesthesia Type: general    Vitals  Vitals Value Taken Time   BP 98/60 02/26/22 1453   Temp 98 °F (36.7 °C) 02/26/22 1453   Pulse 89 02/26/22 1453   Resp 16 02/26/22 1453   SpO2 100 % 02/26/22 1453           Post Anesthesia Care and Evaluation    Patient location during evaluation: PACU  Patient participation: complete - patient participated  Level of consciousness: awake and alert  Pain score: 1  Pain management: adequate  Airway patency: patent  Anesthetic complications: No anesthetic complications  PONV Status: controlled  Cardiovascular status: acceptable  Respiratory status: acceptable  Hydration status: acceptable

## 2022-02-26 NOTE — ANESTHESIA PREPROCEDURE EVALUATION
Anesthesia Evaluation     no history of anesthetic complications:  NPO Solid Status: > 8 hours  NPO Liquid Status: > 8 hours           Airway   Mallampati: II  TM distance: >3 FB  Neck ROM: full  No difficulty expected  Dental    (+) poor dentition and partials    Pulmonary - normal exam   (+) shortness of breath, sleep apnea,   Cardiovascular - normal exam    (+) hypertension, hyperlipidemia,       Neuro/Psych  GI/Hepatic/Renal/Endo    (+) obesity,  hiatal hernia, GERD,  liver disease, diabetes mellitus,     Musculoskeletal     Abdominal  - normal exam   Substance History      OB/GYN          Other                        Anesthesia Plan    ASA 3     general       Anesthetic plan, all risks, benefits, and alternatives have been provided, discussed and informed consent has been obtained with: patient.        CODE STATUS:    Level Of Support Discussed With: Patient  Code Status (Patient has no pulse and is not breathing): CPR (Attempt to Resuscitate)  Medical Interventions (Patient has pulse or is breathing): Full Support

## 2022-02-27 LAB
ANION GAP SERPL CALCULATED.3IONS-SCNC: 12.7 MMOL/L (ref 5–15)
BUN SERPL-MCNC: 28 MG/DL (ref 8–23)
BUN/CREAT SERPL: 33.3 (ref 7–25)
CALCIUM SPEC-SCNC: 7.5 MG/DL (ref 8.6–10.5)
CHLORIDE SERPL-SCNC: 101 MMOL/L (ref 98–107)
CO2 SERPL-SCNC: 18.3 MMOL/L (ref 22–29)
CREAT SERPL-MCNC: 0.84 MG/DL (ref 0.57–1)
DEPRECATED RDW RBC AUTO: 47.9 FL (ref 37–54)
ERYTHROCYTE [DISTWIDTH] IN BLOOD BY AUTOMATED COUNT: 13.5 % (ref 12.3–15.4)
GFR SERPL CREATININE-BSD FRML MDRD: 69 ML/MIN/1.73
GLUCOSE BLDC GLUCOMTR-MCNC: 236 MG/DL (ref 70–130)
GLUCOSE BLDC GLUCOMTR-MCNC: 250 MG/DL (ref 70–130)
GLUCOSE BLDC GLUCOMTR-MCNC: 250 MG/DL (ref 70–130)
GLUCOSE SERPL-MCNC: 251 MG/DL (ref 65–99)
HCT VFR BLD AUTO: 27.8 % (ref 34–46.6)
HGB BLD-MCNC: 8.8 G/DL (ref 12–15.9)
MAGNESIUM SERPL-MCNC: 1.9 MG/DL (ref 1.6–2.4)
MCH RBC QN AUTO: 30.3 PG (ref 26.6–33)
MCHC RBC AUTO-ENTMCNC: 31.7 G/DL (ref 31.5–35.7)
MCV RBC AUTO: 95.9 FL (ref 79–97)
PHOSPHATE SERPL-MCNC: 3.9 MG/DL (ref 2.5–4.5)
PLATELET # BLD AUTO: 258 10*3/MM3 (ref 140–450)
PMV BLD AUTO: 10.3 FL (ref 6–12)
POTASSIUM SERPL-SCNC: 4 MMOL/L (ref 3.5–5.2)
RBC # BLD AUTO: 2.9 10*6/MM3 (ref 3.77–5.28)
SODIUM SERPL-SCNC: 132 MMOL/L (ref 136–145)
WBC NRBC COR # BLD: 12.22 10*3/MM3 (ref 3.4–10.8)

## 2022-02-27 PROCEDURE — 82962 GLUCOSE BLOOD TEST: CPT

## 2022-02-27 PROCEDURE — 63710000001 INSULIN ASPART PER 5 UNITS: Performed by: PHYSICIAN ASSISTANT

## 2022-02-27 PROCEDURE — 99233 SBSQ HOSP IP/OBS HIGH 50: CPT | Performed by: PHYSICIAN ASSISTANT

## 2022-02-27 PROCEDURE — 85027 COMPLETE CBC AUTOMATED: CPT | Performed by: ORTHOPAEDIC SURGERY

## 2022-02-27 PROCEDURE — 63710000001 INSULIN ASPART PER 5 UNITS: Performed by: ORTHOPAEDIC SURGERY

## 2022-02-27 PROCEDURE — 25010000002 HYDROMORPHONE PER 4 MG: Performed by: PHYSICIAN ASSISTANT

## 2022-02-27 PROCEDURE — 94799 UNLISTED PULMONARY SVC/PX: CPT

## 2022-02-27 PROCEDURE — 84100 ASSAY OF PHOSPHORUS: CPT | Performed by: ORTHOPAEDIC SURGERY

## 2022-02-27 PROCEDURE — 80048 BASIC METABOLIC PNL TOTAL CA: CPT | Performed by: ORTHOPAEDIC SURGERY

## 2022-02-27 PROCEDURE — 25010000002 MORPHINE PER 10 MG: Performed by: ORTHOPAEDIC SURGERY

## 2022-02-27 PROCEDURE — 25010000002 ENOXAPARIN PER 10 MG: Performed by: ORTHOPAEDIC SURGERY

## 2022-02-27 PROCEDURE — 25010000002 CEFAZOLIN PER 500 MG: Performed by: ORTHOPAEDIC SURGERY

## 2022-02-27 PROCEDURE — 83735 ASSAY OF MAGNESIUM: CPT | Performed by: ORTHOPAEDIC SURGERY

## 2022-02-27 RX ORDER — HYDROCODONE BITARTRATE AND ACETAMINOPHEN 7.5; 325 MG/1; MG/1
1 TABLET ORAL EVERY 6 HOURS
Status: DISCONTINUED | OUTPATIENT
Start: 2022-02-27 | End: 2022-02-27

## 2022-02-27 RX ORDER — OXYCODONE AND ACETAMINOPHEN 10; 325 MG/1; MG/1
1 TABLET ORAL EVERY 6 HOURS
Status: DISCONTINUED | OUTPATIENT
Start: 2022-02-27 | End: 2022-03-07

## 2022-02-27 RX ORDER — HYDROMORPHONE HYDROCHLORIDE 1 MG/ML
0.5 INJECTION, SOLUTION INTRAMUSCULAR; INTRAVENOUS; SUBCUTANEOUS
Status: DISPENSED | OUTPATIENT
Start: 2022-02-27 | End: 2022-03-06

## 2022-02-27 RX ORDER — IRON POLYSACCHARIDE COMPLEX 150 MG
150 CAPSULE ORAL DAILY
Status: DISCONTINUED | OUTPATIENT
Start: 2022-02-27 | End: 2022-03-01

## 2022-02-27 RX ADMIN — INSULIN ASPART 3 UNITS: 100 INJECTION, SOLUTION INTRAVENOUS; SUBCUTANEOUS at 08:01

## 2022-02-27 RX ADMIN — PANTOPRAZOLE SODIUM 40 MG: 40 TABLET, DELAYED RELEASE ORAL at 05:41

## 2022-02-27 RX ADMIN — OXYCODONE HYDROCHLORIDE AND ACETAMINOPHEN 1 TABLET: 10; 325 TABLET ORAL at 17:31

## 2022-02-27 RX ADMIN — ENOXAPARIN SODIUM 30 MG: 30 INJECTION SUBCUTANEOUS at 08:02

## 2022-02-27 RX ADMIN — CEFAZOLIN 3 G: 10 INJECTION, POWDER, FOR SOLUTION INTRAVENOUS at 05:41

## 2022-02-27 RX ADMIN — OFLOXACIN 50000 UNITS: 300 TABLET, COATED ORAL at 08:03

## 2022-02-27 RX ADMIN — OXYCODONE HYDROCHLORIDE AND ACETAMINOPHEN 1 TABLET: 10; 325 TABLET ORAL at 22:40

## 2022-02-27 RX ADMIN — MORPHINE SULFATE 4 MG: 4 INJECTION, SOLUTION INTRAMUSCULAR; INTRAVENOUS at 04:31

## 2022-02-27 RX ADMIN — OXYCODONE HYDROCHLORIDE AND ACETAMINOPHEN 1 TABLET: 10; 325 TABLET ORAL at 12:12

## 2022-02-27 RX ADMIN — Medication 150 MG: at 12:12

## 2022-02-27 RX ADMIN — INSULIN ASPART 8 UNITS: 100 INJECTION, SOLUTION INTRAVENOUS; SUBCUTANEOUS at 12:12

## 2022-02-27 RX ADMIN — BUPROPION HYDROCHLORIDE 400 MG: 150 TABLET, EXTENDED RELEASE ORAL at 21:17

## 2022-02-27 RX ADMIN — CEFAZOLIN 3 G: 10 INJECTION, POWDER, FOR SOLUTION INTRAVENOUS at 13:06

## 2022-02-27 RX ADMIN — HYDROMORPHONE HYDROCHLORIDE 0.5 MG: 1 INJECTION, SOLUTION INTRAMUSCULAR; INTRAVENOUS; SUBCUTANEOUS at 13:06

## 2022-02-27 RX ADMIN — ENOXAPARIN SODIUM 30 MG: 30 INJECTION SUBCUTANEOUS at 21:17

## 2022-02-27 RX ADMIN — FLUOXETINE HYDROCHLORIDE 10 MG: 10 CAPSULE ORAL at 08:03

## 2022-02-27 RX ADMIN — INSULIN ASPART 8 UNITS: 100 INJECTION, SOLUTION INTRAVENOUS; SUBCUTANEOUS at 17:31

## 2022-02-28 LAB
ANION GAP SERPL CALCULATED.3IONS-SCNC: 12.3 MMOL/L (ref 5–15)
BASOPHILS # BLD AUTO: 0.04 10*3/MM3 (ref 0–0.2)
BASOPHILS NFR BLD AUTO: 0.4 % (ref 0–1.5)
BUN SERPL-MCNC: 22 MG/DL (ref 8–23)
BUN/CREAT SERPL: 30.1 (ref 7–25)
CALCIUM SPEC-SCNC: 7.5 MG/DL (ref 8.6–10.5)
CHLORIDE SERPL-SCNC: 95 MMOL/L (ref 98–107)
CO2 SERPL-SCNC: 19.7 MMOL/L (ref 22–29)
CREAT SERPL-MCNC: 0.73 MG/DL (ref 0.57–1)
DEPRECATED RDW RBC AUTO: 47.8 FL (ref 37–54)
EOSINOPHIL # BLD AUTO: 0.13 10*3/MM3 (ref 0–0.4)
EOSINOPHIL NFR BLD AUTO: 1.4 % (ref 0.3–6.2)
ERYTHROCYTE [DISTWIDTH] IN BLOOD BY AUTOMATED COUNT: 13.6 % (ref 12.3–15.4)
GFR SERPL CREATININE-BSD FRML MDRD: 81 ML/MIN/1.73
GLUCOSE BLDC GLUCOMTR-MCNC: 226 MG/DL (ref 70–130)
GLUCOSE BLDC GLUCOMTR-MCNC: 236 MG/DL (ref 70–130)
GLUCOSE BLDC GLUCOMTR-MCNC: 301 MG/DL (ref 70–130)
GLUCOSE BLDC GLUCOMTR-MCNC: 322 MG/DL (ref 70–130)
GLUCOSE SERPL-MCNC: 211 MG/DL (ref 65–99)
HCT VFR BLD AUTO: 21.7 % (ref 34–46.6)
HGB BLD-MCNC: 6.8 G/DL (ref 12–15.9)
HGB BLD-MCNC: 7 G/DL (ref 12–15.9)
IMM GRANULOCYTES # BLD AUTO: 0.18 10*3/MM3 (ref 0–0.05)
IMM GRANULOCYTES NFR BLD AUTO: 1.9 % (ref 0–0.5)
LYMPHOCYTES # BLD AUTO: 1.49 10*3/MM3 (ref 0.7–3.1)
LYMPHOCYTES NFR BLD AUTO: 15.9 % (ref 19.6–45.3)
MAGNESIUM SERPL-MCNC: 1.8 MG/DL (ref 1.6–2.4)
MCH RBC QN AUTO: 30.2 PG (ref 26.6–33)
MCHC RBC AUTO-ENTMCNC: 31.3 G/DL (ref 31.5–35.7)
MCV RBC AUTO: 96.4 FL (ref 79–97)
MONOCYTES # BLD AUTO: 1.1 10*3/MM3 (ref 0.1–0.9)
MONOCYTES NFR BLD AUTO: 11.7 % (ref 5–12)
NEUTROPHILS NFR BLD AUTO: 6.44 10*3/MM3 (ref 1.7–7)
NEUTROPHILS NFR BLD AUTO: 68.7 % (ref 42.7–76)
NRBC BLD AUTO-RTO: 0 /100 WBC (ref 0–0.2)
PHOSPHATE SERPL-MCNC: 2.2 MG/DL (ref 2.5–4.5)
PLATELET # BLD AUTO: 231 10*3/MM3 (ref 140–450)
PMV BLD AUTO: 10.5 FL (ref 6–12)
POTASSIUM SERPL-SCNC: 3.7 MMOL/L (ref 3.5–5.2)
RBC # BLD AUTO: 2.25 10*6/MM3 (ref 3.77–5.28)
SODIUM SERPL-SCNC: 127 MMOL/L (ref 136–145)
SODIUM SERPL-SCNC: 127 MMOL/L (ref 136–145)
WBC NRBC COR # BLD: 9.38 10*3/MM3 (ref 3.4–10.8)

## 2022-02-28 PROCEDURE — 84100 ASSAY OF PHOSPHORUS: CPT | Performed by: PHYSICIAN ASSISTANT

## 2022-02-28 PROCEDURE — 97530 THERAPEUTIC ACTIVITIES: CPT

## 2022-02-28 PROCEDURE — 25010000002 HYDROMORPHONE PER 4 MG: Performed by: PHYSICIAN ASSISTANT

## 2022-02-28 PROCEDURE — 63710000001 INSULIN DETEMIR PER 5 UNITS: Performed by: PHYSICIAN ASSISTANT

## 2022-02-28 PROCEDURE — 82962 GLUCOSE BLOOD TEST: CPT

## 2022-02-28 PROCEDURE — 63710000001 INSULIN ASPART PER 5 UNITS: Performed by: PHYSICIAN ASSISTANT

## 2022-02-28 PROCEDURE — 85018 HEMOGLOBIN: CPT | Performed by: PHYSICIAN ASSISTANT

## 2022-02-28 PROCEDURE — 25010000002 ENOXAPARIN PER 10 MG: Performed by: ORTHOPAEDIC SURGERY

## 2022-02-28 PROCEDURE — 25010000002 MAGNESIUM SULFATE IN D5W 1G/100ML (PREMIX) 1-5 GM/100ML-% SOLUTION: Performed by: PHYSICIAN ASSISTANT

## 2022-02-28 PROCEDURE — 63710000001 INSULIN ASPART PER 5 UNITS: Performed by: INTERNAL MEDICINE

## 2022-02-28 PROCEDURE — 97167 OT EVAL HIGH COMPLEX 60 MIN: CPT

## 2022-02-28 PROCEDURE — 84295 ASSAY OF SERUM SODIUM: CPT | Performed by: PHYSICIAN ASSISTANT

## 2022-02-28 PROCEDURE — 85025 COMPLETE CBC W/AUTO DIFF WBC: CPT | Performed by: PHYSICIAN ASSISTANT

## 2022-02-28 PROCEDURE — 83735 ASSAY OF MAGNESIUM: CPT | Performed by: PHYSICIAN ASSISTANT

## 2022-02-28 PROCEDURE — 97162 PT EVAL MOD COMPLEX 30 MIN: CPT

## 2022-02-28 PROCEDURE — 80048 BASIC METABOLIC PNL TOTAL CA: CPT | Performed by: PHYSICIAN ASSISTANT

## 2022-02-28 PROCEDURE — 99232 SBSQ HOSP IP/OBS MODERATE 35: CPT | Performed by: PHYSICIAN ASSISTANT

## 2022-02-28 RX ORDER — MAGNESIUM SULFATE HEPTAHYDRATE 40 MG/ML
2 INJECTION, SOLUTION INTRAVENOUS AS NEEDED
Status: DISCONTINUED | OUTPATIENT
Start: 2022-02-28 | End: 2022-03-09 | Stop reason: HOSPADM

## 2022-02-28 RX ORDER — MAGNESIUM SULFATE 1 G/100ML
1 INJECTION INTRAVENOUS AS NEEDED
Status: DISCONTINUED | OUTPATIENT
Start: 2022-02-28 | End: 2022-03-09 | Stop reason: HOSPADM

## 2022-02-28 RX ORDER — DOCUSATE SODIUM 100 MG/1
100 CAPSULE, LIQUID FILLED ORAL 2 TIMES DAILY
Status: DISCONTINUED | OUTPATIENT
Start: 2022-02-28 | End: 2022-03-07

## 2022-02-28 RX ORDER — POLYETHYLENE GLYCOL 3350 17 G/17G
17 POWDER, FOR SOLUTION ORAL DAILY
Status: DISCONTINUED | OUTPATIENT
Start: 2022-02-28 | End: 2022-03-09 | Stop reason: HOSPADM

## 2022-02-28 RX ORDER — MAGNESIUM SULFATE 1 G/100ML
1 INJECTION INTRAVENOUS ONCE
Status: COMPLETED | OUTPATIENT
Start: 2022-02-28 | End: 2022-02-28

## 2022-02-28 RX ADMIN — INSULIN ASPART 10 UNITS: 100 INJECTION, SOLUTION INTRAVENOUS; SUBCUTANEOUS at 17:21

## 2022-02-28 RX ADMIN — INSULIN ASPART 5 UNITS: 100 INJECTION, SOLUTION INTRAVENOUS; SUBCUTANEOUS at 08:48

## 2022-02-28 RX ADMIN — MAGNESIUM SULFATE HEPTAHYDRATE 1 G: 1 INJECTION, SOLUTION INTRAVENOUS at 10:33

## 2022-02-28 RX ADMIN — OXYCODONE HYDROCHLORIDE AND ACETAMINOPHEN 1 TABLET: 10; 325 TABLET ORAL at 17:21

## 2022-02-28 RX ADMIN — DOCUSATE SODIUM 100 MG: 100 CAPSULE, LIQUID FILLED ORAL at 10:41

## 2022-02-28 RX ADMIN — POTASSIUM & SODIUM PHOSPHATES POWDER PACK 280-160-250 MG 2 PACKET: 280-160-250 PACK at 14:25

## 2022-02-28 RX ADMIN — OXYCODONE HYDROCHLORIDE AND ACETAMINOPHEN 1 TABLET: 10; 325 TABLET ORAL at 10:41

## 2022-02-28 RX ADMIN — OXYCODONE HYDROCHLORIDE AND ACETAMINOPHEN 1 TABLET: 10; 325 TABLET ORAL at 05:48

## 2022-02-28 RX ADMIN — HYDROMORPHONE HYDROCHLORIDE 0.5 MG: 1 INJECTION, SOLUTION INTRAMUSCULAR; INTRAVENOUS; SUBCUTANEOUS at 04:37

## 2022-02-28 RX ADMIN — PANTOPRAZOLE SODIUM 40 MG: 40 TABLET, DELAYED RELEASE ORAL at 05:48

## 2022-02-28 RX ADMIN — POLYETHYLENE GLYCOL 3350 17 G: 17 POWDER, FOR SOLUTION ORAL at 12:25

## 2022-02-28 RX ADMIN — ENOXAPARIN SODIUM 30 MG: 30 INJECTION SUBCUTANEOUS at 21:06

## 2022-02-28 RX ADMIN — FLUOXETINE HYDROCHLORIDE 10 MG: 10 CAPSULE ORAL at 08:45

## 2022-02-28 RX ADMIN — BUPROPION HYDROCHLORIDE 400 MG: 150 TABLET, EXTENDED RELEASE ORAL at 21:06

## 2022-02-28 RX ADMIN — INSULIN ASPART 5 UNITS: 100 INJECTION, SOLUTION INTRAVENOUS; SUBCUTANEOUS at 21:25

## 2022-02-28 RX ADMIN — INSULIN ASPART 10 UNITS: 100 INJECTION, SOLUTION INTRAVENOUS; SUBCUTANEOUS at 12:23

## 2022-02-28 RX ADMIN — INSULIN DETEMIR 10 UNITS: 100 INJECTION, SOLUTION SUBCUTANEOUS at 21:20

## 2022-02-28 RX ADMIN — ENOXAPARIN SODIUM 30 MG: 30 INJECTION SUBCUTANEOUS at 08:46

## 2022-02-28 RX ADMIN — Medication 150 MG: at 08:45

## 2022-02-28 RX ADMIN — METFORMIN HYDROCHLORIDE 500 MG: 500 TABLET ORAL at 17:22

## 2022-03-01 LAB
ANION GAP SERPL CALCULATED.3IONS-SCNC: 10.6 MMOL/L (ref 5–15)
BUN SERPL-MCNC: 17 MG/DL (ref 8–23)
BUN/CREAT SERPL: 26.2 (ref 7–25)
CALCIUM SPEC-SCNC: 7.6 MG/DL (ref 8.6–10.5)
CHLORIDE SERPL-SCNC: 96 MMOL/L (ref 98–107)
CHLORIDE UR-SCNC: 23 MMOL/L
CO2 SERPL-SCNC: 21.4 MMOL/L (ref 22–29)
CREAT SERPL-MCNC: 0.65 MG/DL (ref 0.57–1)
DEPRECATED RDW RBC AUTO: 46.8 FL (ref 37–54)
EGFRCR SERPLBLD CKD-EPI 2021: 100.3 ML/MIN/1.73
ERYTHROCYTE [DISTWIDTH] IN BLOOD BY AUTOMATED COUNT: 13.6 % (ref 12.3–15.4)
GLUCOSE BLDC GLUCOMTR-MCNC: 202 MG/DL (ref 70–130)
GLUCOSE BLDC GLUCOMTR-MCNC: 212 MG/DL (ref 70–130)
GLUCOSE BLDC GLUCOMTR-MCNC: 233 MG/DL (ref 70–130)
GLUCOSE BLDC GLUCOMTR-MCNC: 296 MG/DL (ref 70–130)
GLUCOSE SERPL-MCNC: 189 MG/DL (ref 65–99)
HCT VFR BLD AUTO: 19 % (ref 34–46.6)
HCT VFR BLD AUTO: 19.4 % (ref 34–46.6)
HGB BLD-MCNC: 6.1 G/DL (ref 12–15.9)
HGB BLD-MCNC: 6.3 G/DL (ref 12–15.9)
MAGNESIUM SERPL-MCNC: 2.2 MG/DL (ref 1.6–2.4)
MCH RBC QN AUTO: 30.7 PG (ref 26.6–33)
MCHC RBC AUTO-ENTMCNC: 32.1 G/DL (ref 31.5–35.7)
MCV RBC AUTO: 95.5 FL (ref 79–97)
PHOSPHATE SERPL-MCNC: 2.3 MG/DL (ref 2.5–4.5)
PLATELET # BLD AUTO: 278 10*3/MM3 (ref 140–450)
PMV BLD AUTO: 9.6 FL (ref 6–12)
POTASSIUM SERPL-SCNC: 4.1 MMOL/L (ref 3.5–5.2)
RBC # BLD AUTO: 1.99 10*6/MM3 (ref 3.77–5.28)
SODIUM SERPL-SCNC: 128 MMOL/L (ref 136–145)
SODIUM UR-SCNC: <20 MMOL/L
WBC NRBC COR # BLD: 8.98 10*3/MM3 (ref 3.4–10.8)

## 2022-03-01 PROCEDURE — 84100 ASSAY OF PHOSPHORUS: CPT | Performed by: PHYSICIAN ASSISTANT

## 2022-03-01 PROCEDURE — 97530 THERAPEUTIC ACTIVITIES: CPT

## 2022-03-01 PROCEDURE — 84300 ASSAY OF URINE SODIUM: CPT | Performed by: PHYSICIAN ASSISTANT

## 2022-03-01 PROCEDURE — 63710000001 INSULIN DETEMIR PER 5 UNITS: Performed by: PHYSICIAN ASSISTANT

## 2022-03-01 PROCEDURE — 97110 THERAPEUTIC EXERCISES: CPT

## 2022-03-01 PROCEDURE — 85018 HEMOGLOBIN: CPT | Performed by: PHYSICIAN ASSISTANT

## 2022-03-01 PROCEDURE — 63710000001 INSULIN ASPART PER 5 UNITS: Performed by: PHYSICIAN ASSISTANT

## 2022-03-01 PROCEDURE — 85027 COMPLETE CBC AUTOMATED: CPT | Performed by: PHYSICIAN ASSISTANT

## 2022-03-01 PROCEDURE — 82962 GLUCOSE BLOOD TEST: CPT

## 2022-03-01 PROCEDURE — 85014 HEMATOCRIT: CPT | Performed by: PHYSICIAN ASSISTANT

## 2022-03-01 PROCEDURE — 83735 ASSAY OF MAGNESIUM: CPT | Performed by: PHYSICIAN ASSISTANT

## 2022-03-01 PROCEDURE — 82436 ASSAY OF URINE CHLORIDE: CPT | Performed by: PHYSICIAN ASSISTANT

## 2022-03-01 PROCEDURE — 80048 BASIC METABOLIC PNL TOTAL CA: CPT | Performed by: PHYSICIAN ASSISTANT

## 2022-03-01 PROCEDURE — 99232 SBSQ HOSP IP/OBS MODERATE 35: CPT | Performed by: INTERNAL MEDICINE

## 2022-03-01 RX ORDER — IRON POLYSACCHARIDE COMPLEX 150 MG
150 CAPSULE ORAL 2 TIMES DAILY
Status: DISCONTINUED | OUTPATIENT
Start: 2022-03-01 | End: 2022-03-09 | Stop reason: HOSPADM

## 2022-03-01 RX ADMIN — INSULIN ASPART 8 UNITS: 100 INJECTION, SOLUTION INTRAVENOUS; SUBCUTANEOUS at 21:30

## 2022-03-01 RX ADMIN — PANTOPRAZOLE SODIUM 40 MG: 40 TABLET, DELAYED RELEASE ORAL at 05:25

## 2022-03-01 RX ADMIN — POLYETHYLENE GLYCOL 3350 17 G: 17 POWDER, FOR SOLUTION ORAL at 08:39

## 2022-03-01 RX ADMIN — OXYCODONE HYDROCHLORIDE AND ACETAMINOPHEN 1 TABLET: 10; 325 TABLET ORAL at 05:25

## 2022-03-01 RX ADMIN — OXYCODONE HYDROCHLORIDE AND ACETAMINOPHEN 1 TABLET: 10; 325 TABLET ORAL at 00:05

## 2022-03-01 RX ADMIN — Medication 10 ML: at 21:15

## 2022-03-01 RX ADMIN — FLUOXETINE HYDROCHLORIDE 10 MG: 10 CAPSULE ORAL at 08:39

## 2022-03-01 RX ADMIN — METFORMIN HYDROCHLORIDE 500 MG: 500 TABLET ORAL at 08:39

## 2022-03-01 RX ADMIN — POTASSIUM & SODIUM PHOSPHATES POWDER PACK 280-160-250 MG 2 PACKET: 280-160-250 PACK at 08:39

## 2022-03-01 RX ADMIN — OXYCODONE HYDROCHLORIDE AND ACETAMINOPHEN 1 TABLET: 10; 325 TABLET ORAL at 23:38

## 2022-03-01 RX ADMIN — Medication 150 MG: at 08:39

## 2022-03-01 RX ADMIN — OXYCODONE HYDROCHLORIDE AND ACETAMINOPHEN 1 TABLET: 10; 325 TABLET ORAL at 17:27

## 2022-03-01 RX ADMIN — DOCUSATE SODIUM 100 MG: 100 CAPSULE, LIQUID FILLED ORAL at 08:39

## 2022-03-01 RX ADMIN — INSULIN DETEMIR 10 UNITS: 100 INJECTION, SOLUTION SUBCUTANEOUS at 21:30

## 2022-03-01 RX ADMIN — METFORMIN HYDROCHLORIDE 500 MG: 500 TABLET ORAL at 17:26

## 2022-03-01 RX ADMIN — OXYCODONE HYDROCHLORIDE AND ACETAMINOPHEN 1 TABLET: 10; 325 TABLET ORAL at 10:42

## 2022-03-01 RX ADMIN — Medication 150 MG: at 23:22

## 2022-03-01 RX ADMIN — INSULIN ASPART 5 UNITS: 100 INJECTION, SOLUTION INTRAVENOUS; SUBCUTANEOUS at 11:34

## 2022-03-01 RX ADMIN — INSULIN ASPART 5 UNITS: 100 INJECTION, SOLUTION INTRAVENOUS; SUBCUTANEOUS at 08:39

## 2022-03-01 RX ADMIN — INSULIN ASPART 5 UNITS: 100 INJECTION, SOLUTION INTRAVENOUS; SUBCUTANEOUS at 17:26

## 2022-03-01 RX ADMIN — BUPROPION HYDROCHLORIDE 400 MG: 150 TABLET, EXTENDED RELEASE ORAL at 21:14

## 2022-03-01 NOTE — NURSING NOTE
Rehab consult noted, will see how she does with therapy services today before making rehab recommendations.

## 2022-03-01 NOTE — CASE MANAGEMENT/SOCIAL WORK
Discharge Planning Assessment   Ji     Patient Name: Es Olivier  MRN: [id number]  Today's Date: 3/1/2022    Admit Date: 2/25/2022       Discharge Plan     Row Name 03/01/22 1507       Plan    Plan Pt has an inpatient rehab consult. SS discussed pt with inpatient rehab per Sonya. SS to follow up tomorrow. SS to follow.              Continued Care and Services - Admitted Since 2/25/2022     Destination     Service Provider Request Status Selected Services Address Phone Fax Patient Preferred     [facility]  Pending - No Request Sent N/A [address] [place] [address] [phone] --              ARIEL Bradford

## 2022-03-01 NOTE — THERAPY TREATMENT NOTE
"Acute Care - Physical Therapy Treatment Note   Ji     Patient Name: Es Olivier  : 1960  MRN: [id number]  Today's Date: 3/1/2022      Visit Dx:     ICD-10-CM ICD-9-CM   1. Closed fracture of proximal end of left femur, initial encounter ([facility])  S72.002A 820.8     Patient Active Problem List   Diagnosis   • Biliary dyskinesia   • Heart disease   • Hypertension   • DM2 (diabetes mellitus, type 2) ([facility])   • Hyperlipidemia   • Fatigue   • Dyspepsia   • Dyspnea on exertion   • Morbid obesity with BMI of 45.0-49.9, adult ([facility])   • Urinary incontinence   • Depression   • GERD (gastroesophageal reflux disease)   • History of Helicobacter pylori infection   • Vitamin D deficiency   • Joint pain   • Sleep apnea   • Hiatal hernia   • Fatty liver   • Closed fracture of proximal end of left femur ([facility])     Past Medical History:   Diagnosis Date   • Biliary dyskinesia     abnormal HIDA 2018 w/ EF 31%, symptomatic w/ N/V   • Closed fracture of proximal end of left femur ([facility]) 2022   • Depression    • DM2 (diabetes mellitus, type 2) ([facility])     dx , on insulin >5 years, A1c 7, associated neuropathy, no other complications   • Dyspepsia    • Dyspnea on exertion    • Fatigue    • Fatty liver     dx on CT    • GERD (gastroesophageal reflux disease)     controlled w/ daily Protonix   • Heart disease     w/ cardiac clearance 2019, negative stress test 10/2017, EF 65%   • Hiatal hernia     \"small\" noted on UGI    • History of Helicobacter pylori infection     treated remotely   • Hyperlipidemia    • Hypertension    • Joint pain    • Morbid obesity with BMI of 45.0-49.9, adult ([facility])    • Sleep apnea     formerly on a device, no longer using, says just doesn't need it   • Urinary incontinence     no meds   • Vitamin D deficiency      Past Surgical History:   Procedure Laterality Date   • BLADDER SURGERY      \"tack\", uncomplicated, but unsuccessful   • CATARACT EXTRACTION     • COLONOSCOPY      " unremarkable   • DILATATION AND CURETTAGE  1987   • ORIF HIP FRACTURE Left 2/26/2022    Procedure: Left hip Open reduction and internal fixation.;  Surgeon: Jarrod Leung MD;  Location: [facility];  Service: Orthopedics;  Laterality: Left;   • TONSILLECTOMY  1984     PT Assessment (last 12 hours)     PT Evaluation and Treatment     Row Name 03/01/22 1348          Physical Therapy Time and Intention    Document Type therapy note (daily note)  -     Mode of Treatment physical therapy  -     Patient Effort fair  -     Comment Pt tolerated supine LE TE and attempt to sit EOB depx1 A without complete success.  -     Row Name 03/01/22 1348          Bed Mobility    Bed Mobility supine-sit; sit-supine; scooting/bridging  -     Scooting/Bridging [place] (Bed Mobility) dependent (less than 25% patient effort); 2 person assist  -     Supine-Sit [place] (Bed Mobility) dependent (less than 25% patient effort); 1 person assist  -     Sit-Supine [place] (Bed Mobility) dependent (less than 25% patient effort); 1 person assist; 2 person assist  -     Assistive Device (Bed Mobility) head of bed elevated; bed rails  -     Comment (Bed Mobility) Pt attempted sitting EOB with depA x1, unable to perform completely upright.  -     Row Name 03/01/22 1348          Motor Skills    Therapeutic Exercise hip; knee  L LE AA/PROM SLR, hip abd, quad sets, knee bending.  -     Row Name             Wound 02/26/22 1245 Left anterior hip Incision    Wound - Properties Group Placement Date: 02/26/22  - Placement Time: 1245  -MC Side: Left  -MC Orientation: anterior  -MC Location: hip  -MC Primary Wound Type: Incision  -MC     Retired Wound - Properties Group Date first assessed: 02/26/22  - Time first assessed: 1245  -MC Side: Left  -MC Location: hip  -MC Primary Wound Type: Incision  -MC     Row Name 03/01/22 1348          Positioning and Restraints    Pre-Treatment Position in bed  -     Post Treatment  Position bed  -KH     In Bed supine; with other staff; with family/caregiver  -           User Key  (r) = Recorded By, (t) = Taken By, (c) = Cosigned By    Initials Name Provider Type    Edelmira King, RN Registered Nurse    Jessica Rodriguez, PT Physical Therapist                  PT Recommendation and Plan  Anticipated Discharge Disposition (PT): skilled nursing facility  Planned Therapy Interventions (PT): bed mobility training, gait training, strengthening, transfer training  Therapy Frequency (PT): 6 times/wk (update)  Outcome Summary: Pt evaluated this date with grossly dependent assist with bed mobility this date. D/C rec for SNF for rehabilitation as pt was independent with PLOF.       Time Calculation:    PT Charges     Row Name 03/01/22 1353             Time Calculation    PT Received On 03/01/22  -ALEKSANDRA      PT Goal Re-Cert Due Date 03/14/22  -ALEKSANDRA              Time Calculation- PT    Total Timed Code Minutes- PT 23 minute(s)  -            User Key  (r) = Recorded By, (t) = Taken By, (c) = Cosigned By    Initials Name Provider Type    Jessica Rodriguez PT Physical Therapist              Therapy Charges for Today     Code Description Service Date Service Provider Modifiers Qty    34622620520 HC PT EVAL MOD COMPLEXITY 4 2/28/2022 Jessica Aquino, PT GP 1    71970846957 HC PT THERAPEUTIC ACT EA 15 MIN 3/1/2022 Jessica Aquino, PT GP 1    70244015204 HC PT THER PROC EA 15 MIN 3/1/2022 Jessica Aquino PT GP 1               Jessica Aquino PT  3/1/2022

## 2022-03-01 NOTE — PROGRESS NOTES
"    [facility] Medicine Services  CROSS COVER NOTE    AM CBC with hemoglobin 6.1, previous 7.0. Vitals are unremarkable. Patient is a Mosque and refusing blood products. RN reports \"brown tinged\" drainage from the wound. I evaluated the patient at beside and she appears to be in no acute distress; skin is some what pale. There is a significant amount serosanguinous drainage from the left hip but no overt signs of bleeding. She is currently being anticoagulated with Lovenox for DVT prophylaxis. Instructed RN to hold AM dose. I have also ordered serial hemoglobins q4 hours.       Charlene Finney PA-C  Hospitalist Service -- [facility]   Pager: [phone]    03/01/22  06:37 EST      "

## 2022-03-01 NOTE — PROGRESS NOTES
Inpatient Progress Note  Es Olivier  Date: 03/01/22  MRN: [id number]      Subjective:   Patient is postop day #3 status post left hip open reduction internal fixation.  The patient's hemoglobin did trend down to 6.3.  Lovenox has been held temporarily by hospitalist service, due to hemoglobin trending down.  Patient refuses blood products.  Patient has had limited mobility postoperatively.  No chest pain or shortness of breath is noted.  She is afebrile on exam.        Objective:    Vitals:    02/28/22 1904 03/01/22 0219 03/01/22 0615 03/01/22 1400   BP: 115/52 112/53 128/64 111/53   BP Location: Left arm Left arm Left arm    Patient Position: Lying Lying Lying    Pulse: 113 99 110 102   Resp: 18 18 18 18   Temp: 98.1 °F (36.7 °C) 98 °F (36.7 °C) 97.6 °F (36.4 °C) 97.9 °F (36.6 °C)   TempSrc: Oral Oral Oral    SpO2: 96% 92% 93% 96%   Weight:       Height:              Physical Exam:  Constitutional: Morbidly obese female.  No respiratory distress.        Musculoskeletal: Upon examination of the surgical incision there is no active drainage noted.  There is no active bleed noted.  Neurovascularly intact to the left lower extremity.  Dorsiflexion plantarflexion intact left ankle.  No foot drop abnormality is noted.  Positive pedal pulses are noted.  There is no cellulitis or erythema of the left hip.  No infectious process noted.      Labs:    Results from last 7 days   Lab Units 03/01/22  0952 03/01/22  0533 02/28/22  0447 02/28/22  0330 02/28/22  0330 02/27/22  0137 02/27/22  0137   WBC 10*3/mm3  --  8.98  --   --  9.38  --  12.22*   HEMOGLOBIN g/dL 6.3* 6.1* 7.0*   < > 6.8*   < > 8.8*   HEMATOCRIT % 19.4* 19.0*  --   --  21.7*   < > 27.8*   MCV fL  --  95.5  --   --  96.4  --  95.9   MCHC g/dL  --  32.1  --   --  31.3*  --  31.7   PLATELETS 10*3/mm3  --  278  --   --  231  --  258    < > = values in this interval not displayed.         Results from last 7 days   Lab Units 03/01/22  0533 02/28/22  1603  02/28/22  0330 02/27/22  0137 02/27/22  0137 02/26/22  0122 02/26/22  0122 02/25/22  1016 02/25/22  1016   SODIUM mmol/L 128* 127* 127*   < > 132*   < > 136   < > 139   POTASSIUM mmol/L 4.1  --  3.7  --  4.0   < > 4.4   < > 3.9   MAGNESIUM mg/dL 2.2  --  1.8  --  1.9   < > 2.0  --   --    CHLORIDE mmol/L 96*  --  95*  --  101   < > 100   < > 104   CO2 mmol/L 21.4*  --  19.7*  --  18.3*   < > 24.4   < > 24.8   BUN mg/dL 17  --  22  --  28*   < > 23   < > 20   CREATININE mg/dL 0.65  --  0.73  --  0.84   < > 0.81   < > 0.73   EGFR IF NONAFRICN AM mL/min/1.73  --   --  81  --  69  --  72   < > 81   CALCIUM mg/dL 7.6*  --  7.5*  --  7.5*   < > 8.8   < > 8.9   GLUCOSE mg/dL 189*  --  211*  --  251*   < > 205*   < > 192*   ALBUMIN g/dL  --   --   --   --   --   --  3.51  --  3.86   BILIRUBIN mg/dL  --   --   --   --   --   --  0.5  --  0.4   ALK PHOS U/L  --   --   --   --   --   --  53  --  62   AST (SGOT) U/L  --   --   --   --   --   --  13  --  14   ALT (SGPT) U/L  --   --   --   --   --   --  11  --  12    < > = values in this interval not displayed.   Estimated Creatinine Clearance: 132.6 mL/min (by C-G formula based on SCr of 0.65 mg/dL).  No results found for: AMMONIA          No results found for: HGBA1C  Lab Results   Component Value Date    TSH 8.590 (H) 02/25/2022    FREET4 1.13 02/25/2022         Pain Management Panel     Pain Management Panel Latest Ref Rng & Units 4/6/2016 4/7/2014    AMPHETAMINES SCREEN, URINE Negative Negative Negative    BARBITURATES SCREEN Negative Negative Negative    BENZODIAZEPINE SCREEN, URINE Negative Negative Negative    COCAINE SCREEN, URINE Negative Negative Negative    METHADONE SCREEN, URINE Negative Negative Negative          No results found for: BLOODCX  No results found for: URINECX  No results found for: WOUNDCX  No results found for: STOOLCX              Radiology:  Imaging Results (Last 72 Hours)     ** No results found for the last 72 hours. **            Assessment:    #1 status post left hip open reduction internal fixation          Plan:   The patient's Lovenox has been held due to continuing trending down of hemoglobin.  There is no active bleed noted at the surgical incision.  The patient refuses blood products.  Close trending H&H recommended, and resuming the Lovenox when okay with hospitalist service and hemoglobin stabilizes.  Recommend continuation of nonweightbearing to left lower extremity.  She has had limited mobility postoperatively.  Physical therapy for mobilization when the patient can tolerate.  Orthopedic surgery will continue to follow.        Ariel Frazier, APRN March 1, 2022 17:14 EST

## 2022-03-01 NOTE — PROGRESS NOTES
[facility] Medicine Services  PROGRESS NOTE     Patient Identification:  Name:  Es Olivier  Age:  62 y.o.  Sex:  female  :  1960  MRN:  [id number]  Visit Number:  [id number]  Primary Care Provider:  Delilah Pizarro MD    Length of stay:  4    ----------------------------------------------------------------------------------------------------------------------  Subjective     Chief Complaint:  Follow up for left femur fracture, DM/HTN    History of Presenting Illness/Hospital Course:  Patient is a 63 yo female with past medical history significant for essential hypertension, insulin dependent type II diabetes mellitus, FRANCINE not on CPAP, GERD, anxiety/depression, and morbid obesity that presented to the [facility] emergency department on 2022 for evaluation of left hip pain s/p mechanical fall prior to presentation. Please see admitting history and physical for further and complete details. Patient was found to have acute, traumatic, closed comminuted left proximal femur fracture. Patient was admitted to the medical surgical floor for further evaluation and treatment. Patient held NPO.  Orthopedic surgery has evaluated patient, patient was ultimately taken to the OR on 2022 where she underwent ORIF with dynamic hip screw by Dr. Leung. Patient tolerated procedure well. It is of note that the patient is a Mosque, no blood or blood products to be utilized during hospitalization/surgical intervention. She did have EBL of 1700 cc per operative note, cell saver was utilized with patient's consent, with estimated 750 cc blood return to patient. PO iron initiated. PRN pain medications available. Supportive care provided. PT/OT on board, patient is to be NWB to LLE for min 6 weeks per ortho recommendations. Pending possible inpatient rehab admission for prolonged therapy.     Subjective:  Today, the patient was lying in bed per my  evaluation this morning.  No acute distress noted.  No family present at bedside.  No issues reported over night.  Patient's hemoglobin did drop to 6.1 this morning, no significant bleeding noted.  Her postoperative dressing is currently clean and dry, overnight did have some serosanguineous drainage but no bloody drainage.  No bleeding noted.  Patient states that overall, she has no complaints other than pain on movement of her left lower extremity.  She does ask if we use blood expanders at our facility.    Present during exam: N/A   ----------------------------------------------------------------------------------------------------------------------  Objective     Consults:  Orthopedic surgery     Procedures:  2/25/2022: Meyer catheter insertion d/t activity restriction in setting of acute left hip fracture-- inserted in ED  2/26/2022: Left hip ORIF with dynamic hip screw -- Dr. Leung -- orthopedic surgery     [facility] Meds:  buPROPion SR, 400 mg, Oral, Nightly  cholecalciferol, 50,000 Units, Oral, Weekly  docusate sodium, 100 mg, Oral, BID  FLUoxetine, 10 mg, Oral, Daily  insulin aspart, 0-14 Units, Subcutaneous, 4x Daily AC & at Bedtime  insulin detemir, 10 Units, Subcutaneous, Nightly  iron polysaccharides, 150 mg, Oral, Daily  metFORMIN, 500 mg, Oral, BID With Meals  oxyCODONE-acetaminophen, 1 tablet, Oral, Q6H  pantoprazole, 40 mg, Oral, Q AM  polyethylene glycol, 17 g, Oral, Daily  potassium & sodium phosphates, 2 packet, Oral, Once       ----------------------------------------------------------------------------------------------------------------------  Vital Signs:  Temp:  [97.6 °F (36.4 °C)-98.3 °F (36.8 °C)] 97.6 °F (36.4 °C)  Heart Rate:  [] 110  Resp:  [18] 18  BP: (112-128)/(46-64) 128/64  Mean Arterial Pressure (Non-Invasive) for the past 24 hrs (Last 3 readings):   Noninvasive MAP (mmHg)   03/01/22 0615 86     SpO2 Percentage    02/28/22 1904 03/01/22 0219 03/01/22 0615   SpO2:  96% 92% 93%     SpO2:  [92 %-96 %] 93 %  on   ;   Device (Oxygen Therapy): room air    Body mass index is 50.68 kg/m².  Wt Readings from Last 3 Encounters:   02/25/22 (!) 142 kg (314 lb)   08/11/21 135 kg (297 lb)   05/09/19 135 kg (298 lb)        Intake/Output Summary (Last 24 hours) at 3/1/2022 0829  Last data filed at 3/1/2022 0630  Gross per 24 hour   Intake 1400 ml   Output 1650 ml   Net -250 ml     Diet Regular; Consistent Carbohydrate  ----------------------------------------------------------------------------------------------------------------------  Physical exam:  Physical Exam  Nursing note reviewed.   Constitutional:       General: She is awake. She is not in acute distress.     Appearance: She is well-developed. She is morbidly obese. She is not toxic-appearing.      Comments: Lying in bed.  No acute distress noted.  On room air.  No family present at bedside.   HENT:      Head: Normocephalic and atraumatic.      Mouth/Throat:      Mouth: Mucous membranes are moist.   Eyes:      Conjunctiva/sclera: Conjunctivae normal.      Pupils: Pupils are equal, round, and reactive to light.   Cardiovascular:      Rate and Rhythm: Regular rhythm. Tachycardia present.      Pulses:           Dorsalis pedis pulses are 2+ on the right side and 2+ on the left side.      Heart sounds: Normal heart sounds. No murmur heard.  No friction rub. No gallop.       Comments: Mild tachycardia noted, patient is not currently telemetry monitoring  Pulmonary:      Effort: Pulmonary effort is normal.      Breath sounds: Normal breath sounds and air entry. No wheezing, rhonchi or rales.      Comments: On room air, speaks in full sentences without dyspnea.  Abdominal:      General: Bowel sounds are normal. There is no distension.      Palpations: Abdomen is soft.      Tenderness: There is no abdominal tenderness. There is no guarding or rebound.   Genitourinary:     Comments: Meyer catheter in place. Yellow urine noted in collection.  No hematuria or sediment appreciated.  Musculoskeletal:      Cervical back: Neck supple.      Left hip: Deformity (Large occlusive post operative dressing noted to lateral proximal LLE. No sig saturation of dressing appreciated.) and tenderness present. Decreased range of motion (2/2 pain).      Right lower leg: No edema.      Left lower leg: No edema.      Comments: Distal pulses intact. Cap refill brisk. Sensation intact. Patient with active plantar/dorsiflexion.    Skin:     General: Skin is warm and dry.      Capillary Refill: Capillary refill takes less than 2 seconds.      Coloration: Skin is pale.   Neurological:      General: No focal deficit present.      Mental Status: She is alert and oriented to person, place, and time.      GCS: GCS eye subscore is 4. GCS verbal subscore is 5. GCS motor subscore is 6.      Cranial Nerves: Cranial nerves are intact.      Sensory: Sensation is intact.      Motor: Motor function is intact.      Comments: Awake and alert. Follows commands. Answers questions appropriately. Moves all extremities equally. Strength and sensation intact. No focal neuro deficit on exam.   Psychiatric:         Mood and Affect: Mood and affect normal.         Speech: Speech normal.         Behavior: Behavior is cooperative.        ----------------------------------------------------------------------------------------------------------------------  Tele:    Patient is not currently on telemetry monitoring    I have personally reviewed the EKG/Telemetry strips   ----------------------------------------------------------------------------------------------------------------------            Results from last 7 days   Lab Units 03/01/22  0533 02/28/22  0447 02/28/22  0330 02/27/22  0137 02/27/22 0137   WBC 10*3/mm3 8.98  --  9.38  --  12.22*   HEMOGLOBIN g/dL 6.1* 7.0* 6.8*   < > 8.8*   HEMATOCRIT % 19.0*  --  21.7*  --  27.8*   MCV fL 95.5  --  96.4  --  95.9   MCHC g/dL 32.1  --  31.3*  --  31.7    PLATELETS 10*3/mm3 278  --  231  --  258    < > = values in this interval not displayed.         Results from last 7 days   Lab Units 03/01/22  0533 02/28/22  1604 02/28/22  0330 02/27/22  0137 02/27/22  0137 02/26/22  0122 02/26/22  0122 02/25/22  1016 02/25/22  1016   SODIUM mmol/L 128* 127* 127*   < > 132*   < > 136   < > 139   POTASSIUM mmol/L 4.1  --  3.7  --  4.0   < > 4.4   < > 3.9   MAGNESIUM mg/dL 2.2  --  1.8  --  1.9   < > 2.0  --   --    CHLORIDE mmol/L 96*  --  95*  --  101   < > 100   < > 104   CO2 mmol/L 21.4*  --  19.7*  --  18.3*   < > 24.4   < > 24.8   BUN mg/dL 17  --  22  --  28*   < > 23   < > 20   CREATININE mg/dL 0.65  --  0.73  --  0.84   < > 0.81   < > 0.73   EGFR IF NONAFRICN AM mL/min/1.73  --   --  81  --  69  --  72   < > 81   CALCIUM mg/dL 7.6*  --  7.5*  --  7.5*   < > 8.8   < > 8.9   GLUCOSE mg/dL 189*  --  211*  --  251*   < > 205*   < > 192*   ALBUMIN g/dL  --   --   --   --   --   --  3.51  --  3.86   BILIRUBIN mg/dL  --   --   --   --   --   --  0.5  --  0.4   ALK PHOS U/L  --   --   --   --   --   --  53  --  62   AST (SGOT) U/L  --   --   --   --   --   --  13  --  14   ALT (SGPT) U/L  --   --   --   --   --   --  11  --  12    < > = values in this interval not displayed.   Estimated Creatinine Clearance: 132.6 mL/min (by C-G formula based on SCr of 0.65 mg/dL).    Glucose   Date/Time Value Ref Range Status   03/01/2022 0618 202 (H) 70 - 130 mg/dL Final     Comment:     Meter: [id number] : [id number] YASH ESCOBEDO   02/28/2022 2119 226 (H) 70 - 130 mg/dL Final     Comment:     Meter: [id number] : [id number] rachele tyler   02/28/2022 1650 301 (H) 70 - 130 mg/dL Final     Comment:     Meter: [id number] : [id number] Corey GROSS   02/28/2022 1121 322 (H) 70 - 130 mg/dL Final     Comment:     Meter: [id number] : [id number] Corey GROSS   02/28/2022 0612 236 (H) 70 - 130 mg/dL Final     Comment:     Meter: [id number] : [id number] YASH ESCOBEDO    02/27/2022 1645 250 (H) 70 - 130 mg/dL Final     Comment:     Meter: [id number] : [id number] Corey GROSS   02/27/2022 1132 250 (H) 70 - 130 mg/dL Final     Comment:     Meter: [id number] : [id number] Corey GROSS   02/27/2022 0623 236 (H) 70 - 130 mg/dL Final     Comment:     Meter: [id number] : [id number] ARIANNA LO     Lab Results   Component Value Date    TSH 8.590 (H) 02/25/2022    FREET4 1.13 02/25/2022     Microbiology Results (last 10 days)     Procedure Component Value - Date/Time    COVID-19 and FLU A/B PCR - Swab, Nasopharynx [[id number]]  (Normal) Collected: 02/25/22 1053    Lab Status: Final result Specimen: Swab from Nasopharynx Updated: 02/25/22 1134     COVID19 Not Detected     Influenza A PCR Not Detected     Influenza B PCR Not Detected    Narrative:      Fact sheet for providers: https://www.fda.gov/media/092791/download    Fact sheet for patients: https://www.fda.gov/media/983303/download    Test performed by PCR.         I have personally reviewed the above laboratory results.   ----------------------------------------------------------------------------------------------------------------------  Imaging Results (Last 24 Hours)     ** No results found for the last 24 hours. **        I have personally reviewed the above radiology results.   ----------------------------------------------------------------------------------------------------------------------  Assessment/Plan     ACUTE HOSPITAL PROBLEMS    -Acute, traumatic, closed comminuted left proximal femur fracture s/p mechanical fall present on admission   Imaging reviewed  Continue to provide supportive care  Neurovascular checks  As needed pain control  Orthopedic surgery on board, s/p ORIF 2/26/2022.  NWB to LLE per ortho recommendations, min 6 weeks    Lovenox on hold due to anemia   PT/OT operatively per protocol  DC calero catheter, utilize external catheter if necessary. Bladder training. Bladder scan as  necessary.   Vitamin D level low, continue cholecalciferol supplementation   Continue as needed IV pain medication, continued scheduled oral pain medication regimen   No BM documented, continue bowel regimen     -Mild normocytic anemia with superimposed acute blood loss anemia post operatively   She had EBL 1700 cc intraoperatively, cell saver was utilized with patient consent and estimated 750 cc returned to patient in surgery per operative report.   Hemoglobin previously stable on admit preop, was 8.8 post op. Hemoglobin did drop further on AM labs yesterday to 6.8 but redraw was 7.0. This AM, hgb dropped to 6.1, redraw pending. Lovenox held.   No bleeding reported or noted, post operative dressing without significant saturation. Serosanguinous drainage noted overnight, dressing dry this AM without signs of bleeding.    Cotninue oral iron polysaccharide, consider IV iron if hemoglobin not improved.  Monitor H&H closely   Patient is [facility], she declines any blood product or blood transfusions  Repeat CBC in AM    -Hyponatremia   Likely dilutional d/t IV fluid administration   IV fluids discontinued yesterday.   Sodium stable but remains low   Obtain urine sodium and chloride  Cont to hold home HCTZ and Losartan at this time, she has not received a dose since hospitalization.  Repeat chemistry panel in AM   Monitor closely     -Hypophosphatemia, resolved with supplementation   -Borderline hypomagnesemia   Replace electrolytes per protocol   Telemetry monitoring   Repeat mag and phos levels in AM     CHRONIC MEDICAL PROBLEMS    -Essential hypertension: Blood pressure appears well controlled.  Her home antihypertensive regimen was held on admission due to coadministration with narcotic analgesia.  Plan to resume home antihypertensive regimen as BP allows. BP meds remain on hold at this time. Closely monitor blood pressure Yanick protocol and titrate medication as necessary  -FRANCINE, not on CPAP: Continuous  pulse oximetry monitoring.  -Type II diabetes mellitus, insulin dependent  HgbA1C 6.80 indicating overall well controlled  Blood glucose levels with some improvement overnight.  Continue increased dose of SSI for now, titrate dosage as necessary   Continue 10 units nightly levemir   Home metformin resumed yesterday per attending  Closely monitor blood glucose levels with accuchecks QAC and QHS  Hypoglycemia protocol in place should it be necessary  -GERD: PPI  -Anxiety/depression: Supportive care. Cont home Wellbutrin and Prozac.  -Morbid obesity by BMI, Body mass index is 50.68 kg/m².: Affecting all aspects of care  --------------------------------------------------  DVT Prophylaxis: SCD to RLE  GI Prophylaxis: PPI  FEN: Gentle IV fluids. Replace electrolytes per protocol as necessary. Consistent carbohydrate diet.   Activity: Up with assist, NWB to LLE, turn Q2H, fall precautions   --------------------------------------------------  Disposition:  Patient agreeable to inpatient rehab, consult pending  SS/CM on board aiding in disposition planning    --------------------------------------------------  I have discussed the patient's assessment and plan with the patient, Doreen MONTILLA, and attending physician Abner Mays MD Allyson O'Kuma, PA-C  Hospitalist Service -- [facility]   Pager: [phone]    03/01/22  08:29 EST    Attending Physician: Abner Gomez MD    ----------------------------------------------------------------------------------------------------------------------

## 2022-03-01 NOTE — PLAN OF CARE
Goal Outcome Evaluation:  Plan of Care Reviewed With: patient        Progress: no change  Outcome Summary: pt c/o pain in left leg, gave prn meds, changed isl dsg, pt refused to take docusate or wear SCUD on right leg, pt has been tachycardic, will continue to monitor

## 2022-03-01 NOTE — PLAN OF CARE
Goal Outcome Evaluation:           Progress: no change  Outcome Summary: Patient resting in bed.  Purewick in placed, patient unable to ambulate to bedside commode. Dressing changed to hip.

## 2022-03-02 LAB
ALBUMIN SERPL-MCNC: 2.12 G/DL (ref 3.5–5.2)
ALBUMIN/GLOB SERPL: 0.7 G/DL
ALP SERPL-CCNC: 46 U/L (ref 39–117)
ALT SERPL W P-5'-P-CCNC: 7 U/L (ref 1–33)
ANION GAP SERPL CALCULATED.3IONS-SCNC: 8.1 MMOL/L (ref 5–15)
AST SERPL-CCNC: 18 U/L (ref 1–32)
BILIRUB SERPL-MCNC: 0.4 MG/DL (ref 0–1.2)
BUN SERPL-MCNC: 18 MG/DL (ref 8–23)
BUN/CREAT SERPL: 22 (ref 7–25)
CALCIUM SPEC-SCNC: 7.6 MG/DL (ref 8.6–10.5)
CHLORIDE SERPL-SCNC: 97 MMOL/L (ref 98–107)
CO2 SERPL-SCNC: 22.9 MMOL/L (ref 22–29)
CREAT SERPL-MCNC: 0.82 MG/DL (ref 0.57–1)
DEPRECATED RDW RBC AUTO: 48.7 FL (ref 37–54)
EGFRCR SERPLBLD CKD-EPI 2021: 81.5 ML/MIN/1.73
EOSINOPHIL # BLD MANUAL: 0.4 10*3/MM3 (ref 0–0.4)
EOSINOPHIL NFR BLD MANUAL: 4 % (ref 0.3–6.2)
ERYTHROCYTE [DISTWIDTH] IN BLOOD BY AUTOMATED COUNT: 13.9 % (ref 12.3–15.4)
FOLATE SERPL-MCNC: 8.27 NG/ML (ref 4.78–24.2)
GLOBULIN UR ELPH-MCNC: 3 GM/DL
GLUCOSE BLDC GLUCOMTR-MCNC: 210 MG/DL (ref 70–130)
GLUCOSE BLDC GLUCOMTR-MCNC: 220 MG/DL (ref 70–130)
GLUCOSE BLDC GLUCOMTR-MCNC: 224 MG/DL (ref 70–130)
GLUCOSE BLDC GLUCOMTR-MCNC: 236 MG/DL (ref 70–130)
GLUCOSE SERPL-MCNC: 182 MG/DL (ref 65–99)
HCT VFR BLD AUTO: 18.4 % (ref 34–46.6)
HGB BLD-MCNC: 5.8 G/DL (ref 12–15.9)
HYPOCHROMIA BLD QL: ABNORMAL
LYMPHOCYTES # BLD MANUAL: 0.89 10*3/MM3 (ref 0.7–3.1)
LYMPHOCYTES NFR BLD MANUAL: 14 % (ref 5–12)
MACROCYTES BLD QL SMEAR: ABNORMAL
MAGNESIUM SERPL-MCNC: 2.2 MG/DL (ref 1.6–2.4)
MCH RBC QN AUTO: 30.4 PG (ref 26.6–33)
MCHC RBC AUTO-ENTMCNC: 31.5 G/DL (ref 31.5–35.7)
MCV RBC AUTO: 96.3 FL (ref 79–97)
METAMYELOCYTES NFR BLD MANUAL: 3 % (ref 0–0)
MONOCYTES # BLD: 1.39 10*3/MM3 (ref 0.1–0.9)
MYELOCYTES NFR BLD MANUAL: 1 % (ref 0–0)
NEUTROPHILS # BLD AUTO: 6.86 10*3/MM3 (ref 1.7–7)
NEUTROPHILS NFR BLD MANUAL: 64 % (ref 42.7–76)
NEUTS BAND NFR BLD MANUAL: 5 % (ref 0–5)
PHOSPHATE SERPL-MCNC: 2.3 MG/DL (ref 2.5–4.5)
PHOSPHATE SERPL-MCNC: 2.6 MG/DL (ref 2.5–4.5)
PLAT MORPH BLD: NORMAL
PLATELET # BLD AUTO: 359 10*3/MM3 (ref 140–450)
PMV BLD AUTO: 9.5 FL (ref 6–12)
POTASSIUM SERPL-SCNC: 4.4 MMOL/L (ref 3.5–5.2)
PROT SERPL-MCNC: 5.1 G/DL (ref 6–8.5)
RBC # BLD AUTO: 1.91 10*6/MM3 (ref 3.77–5.28)
SCAN SLIDE: NORMAL
SODIUM SERPL-SCNC: 128 MMOL/L (ref 136–145)
VARIANT LYMPHS NFR BLD MANUAL: 9 % (ref 19.6–45.3)
VIT B12 BLD-MCNC: 192 PG/ML (ref 211–946)
WBC NRBC COR # BLD: 9.94 10*3/MM3 (ref 3.4–10.8)

## 2022-03-02 PROCEDURE — 97530 THERAPEUTIC ACTIVITIES: CPT

## 2022-03-02 PROCEDURE — 85007 BL SMEAR W/DIFF WBC COUNT: CPT | Performed by: INTERNAL MEDICINE

## 2022-03-02 PROCEDURE — 25010000002 EPOETIN ALFA-EPBX 20000 UNIT/ML SOLUTION: Performed by: INTERNAL MEDICINE

## 2022-03-02 PROCEDURE — 83735 ASSAY OF MAGNESIUM: CPT | Performed by: PHYSICIAN ASSISTANT

## 2022-03-02 PROCEDURE — 80053 COMPREHEN METABOLIC PANEL: CPT | Performed by: INTERNAL MEDICINE

## 2022-03-02 PROCEDURE — 63710000001 INSULIN DETEMIR PER 5 UNITS: Performed by: INTERNAL MEDICINE

## 2022-03-02 PROCEDURE — 82962 GLUCOSE BLOOD TEST: CPT

## 2022-03-02 PROCEDURE — 84100 ASSAY OF PHOSPHORUS: CPT | Performed by: PHYSICIAN ASSISTANT

## 2022-03-02 PROCEDURE — 85025 COMPLETE CBC W/AUTO DIFF WBC: CPT | Performed by: INTERNAL MEDICINE

## 2022-03-02 PROCEDURE — 63710000001 INSULIN ASPART PER 5 UNITS: Performed by: PHYSICIAN ASSISTANT

## 2022-03-02 PROCEDURE — 84100 ASSAY OF PHOSPHORUS: CPT | Performed by: INTERNAL MEDICINE

## 2022-03-02 PROCEDURE — 97535 SELF CARE MNGMENT TRAINING: CPT

## 2022-03-02 PROCEDURE — 99232 SBSQ HOSP IP/OBS MODERATE 35: CPT | Performed by: INTERNAL MEDICINE

## 2022-03-02 PROCEDURE — 82746 ASSAY OF FOLIC ACID SERUM: CPT | Performed by: INTERNAL MEDICINE

## 2022-03-02 PROCEDURE — 82607 VITAMIN B-12: CPT | Performed by: INTERNAL MEDICINE

## 2022-03-02 RX ORDER — SODIUM CHLORIDE 9 MG/ML
75 INJECTION, SOLUTION INTRAVENOUS CONTINUOUS
Status: DISCONTINUED | OUTPATIENT
Start: 2022-03-02 | End: 2022-03-03

## 2022-03-02 RX ADMIN — FLUOXETINE HYDROCHLORIDE 10 MG: 10 CAPSULE ORAL at 08:59

## 2022-03-02 RX ADMIN — OXYCODONE HYDROCHLORIDE AND ACETAMINOPHEN 1 TABLET: 10; 325 TABLET ORAL at 05:42

## 2022-03-02 RX ADMIN — SODIUM CHLORIDE 75 ML/HR: 9 INJECTION, SOLUTION INTRAVENOUS at 12:03

## 2022-03-02 RX ADMIN — BUPROPION HYDROCHLORIDE 400 MG: 150 TABLET, EXTENDED RELEASE ORAL at 22:48

## 2022-03-02 RX ADMIN — OXYCODONE HYDROCHLORIDE AND ACETAMINOPHEN 1 TABLET: 10; 325 TABLET ORAL at 17:05

## 2022-03-02 RX ADMIN — Medication 150 MG: at 22:48

## 2022-03-02 RX ADMIN — Medication 150 MG: at 09:00

## 2022-03-02 RX ADMIN — METFORMIN HYDROCHLORIDE 500 MG: 500 TABLET ORAL at 08:59

## 2022-03-02 RX ADMIN — EPOETIN ALFA-EPBX 40000 UNITS: 20000 INJECTION, SOLUTION INTRAVENOUS; SUBCUTANEOUS at 12:04

## 2022-03-02 RX ADMIN — INSULIN ASPART 5 UNITS: 100 INJECTION, SOLUTION INTRAVENOUS; SUBCUTANEOUS at 22:50

## 2022-03-02 RX ADMIN — METFORMIN HYDROCHLORIDE 500 MG: 500 TABLET ORAL at 17:09

## 2022-03-02 RX ADMIN — PANTOPRAZOLE SODIUM 40 MG: 40 TABLET, DELAYED RELEASE ORAL at 05:42

## 2022-03-02 RX ADMIN — Medication 10 ML: at 09:00

## 2022-03-02 RX ADMIN — INSULIN ASPART 5 UNITS: 100 INJECTION, SOLUTION INTRAVENOUS; SUBCUTANEOUS at 17:05

## 2022-03-02 RX ADMIN — OXYCODONE HYDROCHLORIDE AND ACETAMINOPHEN 1 TABLET: 10; 325 TABLET ORAL at 22:49

## 2022-03-02 RX ADMIN — DOCUSATE SODIUM 100 MG: 100 CAPSULE, LIQUID FILLED ORAL at 22:48

## 2022-03-02 RX ADMIN — DOCUSATE SODIUM 100 MG: 100 CAPSULE, LIQUID FILLED ORAL at 09:00

## 2022-03-02 RX ADMIN — POTASSIUM & SODIUM PHOSPHATES POWDER PACK 280-160-250 MG 2 PACKET: 280-160-250 PACK at 05:48

## 2022-03-02 RX ADMIN — INSULIN ASPART 5 UNITS: 100 INJECTION, SOLUTION INTRAVENOUS; SUBCUTANEOUS at 12:03

## 2022-03-02 RX ADMIN — INSULIN DETEMIR 15 UNITS: 100 INJECTION, SOLUTION SUBCUTANEOUS at 22:51

## 2022-03-02 RX ADMIN — INSULIN ASPART 5 UNITS: 100 INJECTION, SOLUTION INTRAVENOUS; SUBCUTANEOUS at 08:59

## 2022-03-02 RX ADMIN — OXYCODONE HYDROCHLORIDE AND ACETAMINOPHEN 1 TABLET: 10; 325 TABLET ORAL at 12:03

## 2022-03-02 NOTE — THERAPY TREATMENT NOTE
"Acute Care - Physical Therapy Treatment Note   Ji     Patient Name: Es Oliiver  : 1960  MRN: [id number]  Today's Date: 3/2/2022      Visit Dx:     ICD-10-CM ICD-9-CM   1. Closed fracture of proximal end of left femur, initial encounter ([facility])  S72.002A 820.8     Patient Active Problem List   Diagnosis   • Biliary dyskinesia   • Heart disease   • Hypertension   • DM2 (diabetes mellitus, type 2) ([facility])   • Hyperlipidemia   • Fatigue   • Dyspepsia   • Dyspnea on exertion   • Morbid obesity with BMI of 45.0-49.9, adult ([facility])   • Urinary incontinence   • Depression   • GERD (gastroesophageal reflux disease)   • History of Helicobacter pylori infection   • Vitamin D deficiency   • Joint pain   • Sleep apnea   • Hiatal hernia   • Fatty liver   • Closed fracture of proximal end of left femur ([facility])     Past Medical History:   Diagnosis Date   • Biliary dyskinesia     abnormal HIDA 2018 w/ EF 31%, symptomatic w/ N/V   • Closed fracture of proximal end of left femur ([facility]) 2022   • Depression    • DM2 (diabetes mellitus, type 2) ([facility])     dx , on insulin >5 years, A1c 7, associated neuropathy, no other complications   • Dyspepsia    • Dyspnea on exertion    • Fatigue    • Fatty liver     dx on CT    • GERD (gastroesophageal reflux disease)     controlled w/ daily Protonix   • Heart disease     w/ cardiac clearance 2019, negative stress test 10/2017, EF 65%   • Hiatal hernia     \"small\" noted on UGI    • History of Helicobacter pylori infection     treated remotely   • Hyperlipidemia    • Hypertension    • Joint pain    • Morbid obesity with BMI of 45.0-49.9, adult ([facility])    • Sleep apnea     formerly on a device, no longer using, says just doesn't need it   • Urinary incontinence     no meds   • Vitamin D deficiency      Past Surgical History:   Procedure Laterality Date   • BLADDER SURGERY      \"tack\", uncomplicated, but unsuccessful   • CATARACT EXTRACTION     • COLONOSCOPY      " unremarkable   • DILATATION AND CURETTAGE  1987   • ORIF HIP FRACTURE Left 2/26/2022    Procedure: Left hip Open reduction and internal fixation.;  Surgeon: Jarrod Leung MD;  Location: [facility];  Service: Orthopedics;  Laterality: Left;   • TONSILLECTOMY  1984     PT Assessment (last 12 hours)     PT Evaluation and Treatment     Row Name 03/02/22 1550          Physical Therapy Time and Intention    Document Type therapy note (daily note)  -     Mode of Treatment physical therapy  -     Patient Effort adequate  -     Comment Pt able to sit EOB this date, attempt to stand however unsuccessful ultimately. Pt also had supine P/AA ROM with knee flexion/extension and hip abd/add  -     Row Name 03/02/22 1550          Bed Mobility    Bed Mobility supine-sit; sit-supine; scooting/bridging  -     Scooting/Bridging Logan (Bed Mobility) 2 person assist; maximum assist (25% patient effort); dependent (less than 25% patient effort)  -     Supine-Sit Logan (Bed Mobility) maximum assist (25% patient effort); dependent (less than 25% patient effort); 2 person assist  -     Sit-Supine Logan (Bed Mobility) dependent (less than 25% patient effort); 1 person assist; 2 person assist  -     Assistive Device (Bed Mobility) draw sheet  -     Row Name 03/02/22 1550          Transfers    Comment (Transfers) STS attempted, however unsuccessful this date.  -     Row Name 03/02/22 1550          Motor Skills    Therapeutic Exercise hip; knee  heel slides, hip abd/add AA/PROM  -     Row Name             Wound 02/26/22 1245 Left anterior hip Incision    Wound - Properties Group Placement Date: 02/26/22  - Placement Time: 1245  -MC Side: Left  -MC Orientation: anterior  -MC Location: hip  -MC Primary Wound Type: Incision  -MC     Retired Wound - Properties Group Date first assessed: 02/26/22  - Time first assessed: 1245  -MC Side: Left  -MC Location: hip  -MC Primary Wound Type: Incision  -MC      Row Name 03/02/22 1550          Positioning and Restraints    Pre-Treatment Position in bed  -     Post Treatment Position bed  -KH     In Bed supine  -           User Key  (r) = Recorded By, (t) = Taken By, (c) = Cosigned By    Initials Name Provider Type    Edelmira King, RN Registered Nurse    Jessica Rodriguez PT Physical Therapist                  PT Recommendation and Plan  Anticipated Discharge Disposition (PT): skilled nursing facility  Planned Therapy Interventions (PT): bed mobility training, gait training, strengthening, transfer training  Therapy Frequency (PT): 6 times/wk (update)  Outcome Summary: Pt evaluated this date with grossly dependent assist with bed mobility this date. D/C rec for SNF for rehabilitation as pt was independent with PLOF.       Time Calculation:    PT Charges     Row Name 03/02/22 1557             Time Calculation    PT Received On 03/02/22  -KH      PT Goal Re-Cert Due Date 03/14/22 -KH            User Key  (r) = Recorded By, (t) = Taken By, (c) = Cosigned By    Initials Name Provider Type    Jessica Rodriguez PT Physical Therapist              Therapy Charges for Today     Code Description Service Date Service Provider Modifiers Qty    81265660338 HC PT THERAPEUTIC ACT EA 15 MIN 3/1/2022 Jessica Aquino, PT GP 1    59499710887 HC PT THER PROC EA 15 MIN 3/1/2022 Jessica Aquino, PT GP 1    90088112227 HC PT THERAPEUTIC ACT EA 15 MIN 3/2/2022 Jessica Aquino, PT GP 1               Jessica Aquino PT  3/2/2022

## 2022-03-02 NOTE — PLAN OF CARE
Goal Outcome Evaluation:  Plan of Care Reviewed With: patient        Progress: no change  Outcome Summary: pt resting, pt has refused docusate and has not had a BM since surgery (per pt), also has refused to ambulate, pt states that has no pain unless her left leg is moved, will continue to monitor

## 2022-03-02 NOTE — PROGRESS NOTES
Inpatient Progress Note  Es Olivier  Date: 03/02/22  MRN: [id number]      Subjective:   Patient is postop day #4 status post left hip open reduction internal fixation.  Patient's hemoglobin continues to trend down.  Hemoglobin is noted at 5.8 today.  No bleeding is noted from the surgical incision by nursing staff.  There has been some serous drainage noted.  The patient is afebrile.  Patient has had limited mobility postoperatively.  Notes no chest pain or shortness of breath.  Does feel fatigued.  Patient refuses blood products.  Hospitalist has ordered Procrit.  DVT prophylaxis currently on hold secondary to a decrease in hemoglobin.        Objective:    Vitals:    03/01/22 1811 03/02/22 0256 03/02/22 0600 03/02/22 1100   BP: 124/57 128/76 120/60 125/58   BP Location: Left arm Left arm Left arm Left arm   Patient Position: Lying Lying Lying Lying   Pulse: 103 98 97 96   Resp: 20 20 18 18   Temp: 97.8 °F (36.6 °C) 98 °F (36.7 °C) 97.8 °F (36.6 °C) 98 °F (36.7 °C)   TempSrc: Oral Oral Oral Oral   SpO2: 97% 96%  94%   Weight:       Height:              Physical Exam:  Constitutional:  Well-developed and well-nourished.  No respiratory distress.        Musculoskeletal: Upon examination left hip the surgical incision is healing well.  There is no active bleeding noted.  Neurovascularly intact left lower extremity.  Dorsiflexion plantarflexion intact left ankle.  Positive pedal pulses are noted.  There is some serous drainage noted to the surgical dressing.  No dehiscence of the surgical incision noted.      Labs:    Results from last 7 days   Lab Units 03/02/22  0754 03/01/22  0952 03/01/22  0533 02/28/22  0447 02/28/22  0330   WBC 10*3/mm3 9.94  --  8.98  --  9.38   HEMOGLOBIN g/dL 5.8* 6.3* 6.1*   < > 6.8*   HEMATOCRIT % 18.4* 19.4* 19.0*   < > 21.7*   MCV fL 96.3  --  95.5  --  96.4   MCHC g/dL 31.5  --  32.1  --  31.3*   PLATELETS 10*3/mm3 359  --  278  --  231    < > = values in this interval not displayed.          Results from last 7 days   Lab Units 03/02/22  0252 03/01/22  0533 02/28/22  1604 02/28/22  0330 02/28/22  0330 02/27/22  0137 02/27/22  0137 02/26/22  0122 02/26/22  0122 02/25/22  1016 02/25/22  1016   SODIUM mmol/L 128* 128* 127*   < > 127*   < > 132*   < > 136   < > 139   POTASSIUM mmol/L 4.4 4.1  --   --  3.7   < > 4.0   < > 4.4   < > 3.9   MAGNESIUM mg/dL 2.2 2.2  --   --  1.8   < > 1.9   < > 2.0  --   --    CHLORIDE mmol/L 97* 96*  --   --  95*   < > 101   < > 100   < > 104   CO2 mmol/L 22.9 21.4*  --   --  19.7*   < > 18.3*   < > 24.4   < > 24.8   BUN mg/dL 18 17  --   --  22   < > 28*   < > 23   < > 20   CREATININE mg/dL 0.82 0.65  --   --  0.73   < > 0.84   < > 0.81   < > 0.73   EGFR IF NONAFRICN AM mL/min/1.73  --   --   --   --  81  --  69  --  72   < > 81   CALCIUM mg/dL 7.6* 7.6*  --   --  7.5*   < > 7.5*   < > 8.8   < > 8.9   GLUCOSE mg/dL 182* 189*  --   --  211*   < > 251*   < > 205*   < > 192*   ALBUMIN g/dL 2.12*  --   --   --   --   --   --   --  3.51  --  3.86   BILIRUBIN mg/dL 0.4  --   --   --   --   --   --   --  0.5  --  0.4   ALK PHOS U/L 46  --   --   --   --   --   --   --  53  --  62   AST (SGOT) U/L 18  --   --   --   --   --   --   --  13  --  14   ALT (SGPT) U/L 7  --   --   --   --   --   --   --  11  --  12    < > = values in this interval not displayed.   Estimated Creatinine Clearance: 105.1 mL/min (by C-G formula based on SCr of 0.82 mg/dL).  No results found for: AMMONIA          No results found for: HGBA1C  Lab Results   Component Value Date    TSH 8.590 (H) 02/25/2022    FREET4 1.13 02/25/2022         Pain Management Panel     Pain Management Panel Latest Ref Rng & Units 4/6/2016 4/7/2014    AMPHETAMINES SCREEN, URINE Negative Negative Negative    BARBITURATES SCREEN Negative Negative Negative    BENZODIAZEPINE SCREEN, URINE Negative Negative Negative    COCAINE SCREEN, URINE Negative Negative Negative    METHADONE SCREEN, URINE Negative Negative Negative          No  results found for: BLOODCX  No results found for: URINECX  No results found for: WOUNDCX  No results found for: STOOLCX              Radiology:  Imaging Results (Last 72 Hours)     ** No results found for the last 72 hours. **            Assessment:   #1 status post left hip open reduction internal fixation.          Plan:   Patient's hemoglobin continues to trend down.  Hemoglobin is 5.8.  Certainly will respect her wishes.  She continues to refuse blood transfusion.  Anemia does present problems with her recovery.  She has had limited mobility.  Hospitalist has ordered Procrit.  DVT prophylaxis currently on hold secondary to hemoglobin trending down.  There is no active bleeding noted from the surgical incision.  Orthopedic surgery will continue to follow the patient.        Ariel Frazier, APRN March 2, 2022 17:45 EST

## 2022-03-02 NOTE — THERAPY TREATMENT NOTE
"Acute Care - Occupational Therapy Treatment Note  RICKEY Workman     Patient Name: Es Olivier  : 1960  MRN: [id number]  Today's Date: 3/2/2022             Admit Date: 2022       ICD-10-CM ICD-9-CM   1. Closed fracture of proximal end of left femur, initial encounter (Formerly [facility])  S72.002A 820.8     Patient Active Problem List   Diagnosis   • Biliary dyskinesia   • Heart disease   • Hypertension   • DM2 (diabetes mellitus, type 2) (Formerly [facility])   • Hyperlipidemia   • Fatigue   • Dyspepsia   • Dyspnea on exertion   • Morbid obesity with BMI of 45.0-49.9, adult (Formerly [facility])   • Urinary incontinence   • Depression   • GERD (gastroesophageal reflux disease)   • History of Helicobacter pylori infection   • Vitamin D deficiency   • Joint pain   • Sleep apnea   • Hiatal hernia   • Fatty liver   • Closed fracture of proximal end of left femur (Formerly [facility])     Past Medical History:   Diagnosis Date   • Biliary dyskinesia     abnormal HIDA 2018 w/ EF 31%, symptomatic w/ N/V   • Closed fracture of proximal end of left femur (Formerly [facility]) 2022   • Depression    • DM2 (diabetes mellitus, type 2) (Formerly [facility])     dx , on insulin >5 years, A1c 7, associated neuropathy, no other complications   • Dyspepsia    • Dyspnea on exertion    • Fatigue    • Fatty liver     dx on CT    • GERD (gastroesophageal reflux disease)     controlled w/ daily Protonix   • Heart disease     w/ cardiac clearance 2019, negative stress test 10/2017, EF 65%   • Hiatal hernia     \"small\" noted on UGI    • History of Helicobacter pylori infection     treated remotely   • Hyperlipidemia    • Hypertension    • Joint pain    • Morbid obesity with BMI of 45.0-49.9, adult (Formerly [facility])    • Sleep apnea     formerly on a device, no longer using, says just doesn't need it   • Urinary incontinence     no meds   • Vitamin D deficiency      Past Surgical History:   Procedure Laterality Date   • BLADDER SURGERY      \"tack\", uncomplicated, but unsuccessful   • CATARACT EXTRACTION   "   • COLONOSCOPY  2015    unremarkable   • DILATATION AND CURETTAGE  1987   • ORIF HIP FRACTURE Left 2/26/2022    Procedure: Left hip Open reduction and internal fixation.;  Surgeon: Jarrod Leung MD;  Location: [facility];  Service: Orthopedics;  Laterality: Left;   • TONSILLECTOMY  1984         OT ASSESSMENT FLOWSHEET (last 12 hours)     OT Evaluation and Treatment     Row Name 03/02/22 1628                   OT Time and Intention    Document Type therapy note (daily note)  -KR        Mode of Treatment occupational therapy  -KR        Patient Effort adequate  -KR                  Bed Mobility    Bed Mobility bed mobility (all) activities  -KR        All Activities, [place] (Bed Mobility) dependent (less than 25% patient effort)  -KR        Comment (Bed Mobility) Pt continues to be dependent with all bed mobility at this time but has improved from initial evaluation, tolerating movement, able to achieve EOB sitting, reporting less pain  -KR                  Balance    Balance Assessment sitting static balance  -KR        Static Sitting Balance unsupported; mild impairment  -KR                  Wound 02/26/22 1245 Left anterior hip Incision    Wound - Properties Group Placement Date: 02/26/22  - Placement Time: 1245  -MC Side: Left  - Orientation: anterior  -MC Location: hip  -MC Primary Wound Type: Incision  -MC        Retired Wound - Properties Group Date first assessed: 02/26/22  - Time first assessed: 1245  -MC Side: Left  -MC Location: hip  -MC Primary Wound Type: Incision  -MC                  Progress Summary (OT)    Progress Toward Functional Goals (OT) progress toward functional goals as expected  -KR              User Key  (r) = Recorded By, (t) = Taken By, (c) = Cosigned By    Initials Name Effective Dates    Adarsh Jimenez OT 06/16/21 -     [facility], Edelmira Clark, RN 06/16/21 -                  Occupational Therapy Education                 Title: PT OT SLP Therapies (Done)     Topic:  Occupational Therapy (Done)     Point: ADL training (Done)     Description:   Instruct learner(s) on proper safety adaptation and remediation techniques during self care or transfers.   Instruct in proper use of assistive devices.              Learning Progress Summary           Patient Acceptance, E,TB, VU by DM at 2/28/2022 1649      Show all documentation for this point (1)                 Point: Precautions (Done)     Description:   Instruct learner(s) on prescribed precautions during self-care and functional transfers.              Learning Progress Summary           Patient Acceptance, E,TB, VU by DM at 2/28/2022 1649      Show all documentation for this point (1)                             User Key     Initials Effective Dates Name Provider Type Discipline    TROY 06/16/21 -  Rachael Nicole, RN Registered Nurse Nurse                  OT Recommendation and Plan     Progress Toward Functional Goals (OT): progress toward functional goals as expected        Time Calculation:     Therapy Charges for Today     Code Description Service Date Service Provider Modifiers Qty    01271905461 HC OT SELF CARE/MGMT/TRAIN EA 15 MIN 3/2/2022 Adarsh Palacios OT GO 1    65829616901 HC OT THERAPEUTIC ACT EA 15 MIN 3/2/2022 Adarsh Palacios OT GO 1               Adarsh Palacios OT  3/2/2022

## 2022-03-02 NOTE — PROGRESS NOTES
Assisted By: Natalie RHOADES and     CC: Follow-up on left fractured hip    Interview History/HPI: Patient states the pain is less in the hip, still worse when she tries to move, she states she is breathing okay, she just feels little tired.  No chest pain.  Dressing has been having to be changed intermittently for drainage but this really has not been bloody drainage.    ROS:     Vitals:    03/02/22 0600   BP: 120/60   Pulse: 97   Resp: 18   Temp: 97.8 °F (36.6 °C)   SpO2:          Intake/Output Summary (Last 24 hours) at 3/2/2022 1029  Last data filed at 3/2/2022 0900  Gross per 24 hour   Intake 960 ml   Output 300 ml   Net 660 ml       EXAM: Mucous membranes appear pale, she is morbidly obese by BMI of 51, she is in no distress however, lungs are clear, heart regular rate and rhythm without murmur gallop, abdomen is soft, benign, there is some yellowish drainage on the dressing but there is no bloody drainage noted, extremities are without edema      EKG:     Tele: Sinus and sinus tachycardia    LABS:     Lab Results (last 48 hours)     Procedure Component Value Units Date/Time    Manual Differential [[id number]]  (Abnormal) Collected: 03/02/22 0754    Specimen: Blood Updated: 03/02/22 0843     Neutrophil % 64.0 %      Lymphocyte % 9.0 %      Monocyte % 14.0 %      Eosinophil % 4.0 %      Bands %  5.0 %      Metamyelocyte % 3.0 %      Myelocyte % 1.0 %      Neutrophils Absolute 6.86 10*3/mm3      Lymphocytes Absolute 0.89 10*3/mm3      Monocytes Absolute 1.39 10*3/mm3      Eosinophils Absolute 0.40 10*3/mm3      Hypochromia Slight/1+     Macrocytes Slight/1+     Platelet Morphology Normal    CBC & Differential [[id number]]  (Abnormal) Collected: 03/02/22 0754    Specimen: Blood Updated: 03/02/22 0843    Narrative:      The following orders were created for panel order CBC & Differential.  Procedure                               Abnormality         Status                     ---------                                -----------         ------                     CBC Auto Differential[[id number]]        Abnormal            Final result               Scan Slide[[id number]]                                       Final result                 Please view results for these tests on the individual orders.    Scan Slide [[id number]] Collected: 03/02/22 0754    Specimen: Blood Updated: 03/02/22 0843     Scan Slide --     Comment: See Manual Differential Results       CBC Auto Differential [[id number]]  (Abnormal) Collected: 03/02/22 0754    Specimen: Blood Updated: 03/02/22 0819     WBC 9.94 10*3/mm3      RBC 1.91 10*6/mm3      Hemoglobin 5.8 g/dL      Hematocrit 18.4 %      MCV 96.3 fL      MCH 30.4 pg      MCHC 31.5 g/dL      RDW 13.9 %      RDW-SD 48.7 fl      MPV 9.5 fL      Platelets 359 10*3/mm3     POC Glucose Once [[id number]]  (Abnormal) Collected: 03/02/22 0647    Specimen: Blood Updated: 03/02/22 0653     Glucose 210 mg/dL      Comment: Meter: [id number] : [id number] RIN GONZALEZ       Comprehensive Metabolic Panel [[id number]]  (Abnormal) Collected: 03/02/22 0252    Specimen: Blood Updated: 03/02/22 0332     Glucose 182 mg/dL      BUN 18 mg/dL      Creatinine 0.82 mg/dL      Sodium 128 mmol/L      Potassium 4.4 mmol/L      Chloride 97 mmol/L      CO2 22.9 mmol/L      Calcium 7.6 mg/dL      Total Protein 5.1 g/dL      Albumin 2.12 g/dL      ALT (SGPT) 7 U/L      AST (SGOT) 18 U/L      Alkaline Phosphatase 46 U/L      Total Bilirubin 0.4 mg/dL      Globulin 3.0 gm/dL      A/G Ratio 0.7 g/dL      BUN/Creatinine Ratio 22.0     Anion Gap 8.1 mmol/L      eGFR 81.5 mL/min/1.73      Comment: National Kidney Foundation and American Society of Nephrology (ASN) Task Force recommended calculation based on the Chronic Kidney Disease Epidemiology Collaboration (CKD-EPI) equation refit without adjustment for race.       Narrative:      GFR Normal >60  Chronic Kidney Disease <60  Kidney Failure <15      Phosphorus [[id number]]  (Abnormal)  Collected: 03/02/22 0252    Specimen: Blood Updated: 03/02/22 0332     Phosphorus 2.3 mg/dL     Magnesium [[id number]]  (Normal) Collected: 03/02/22 0252    Specimen: Blood Updated: 03/02/22 0332     Magnesium 2.2 mg/dL     Vitamin B12 [[id number]] Collected: 03/02/22 0252    Specimen: Blood Updated: 03/02/22 0304    Folate [[id number]] Collected: 03/02/22 0252    Specimen: Blood Updated: 03/02/22 0304    POC Glucose Once [[id number]]  (Abnormal) Collected: 03/01/22 2117    Specimen: Blood Updated: 03/01/22 2142     Glucose 296 mg/dL      Comment: Meter: [id number] : [id number] rachele tyler       POC Glucose Once [[id number]]  (Abnormal) Collected: 03/01/22 1607    Specimen: Blood Updated: 03/01/22 1615     Glucose 233 mg/dL      Comment: Meter: [id number] : [id number] [employer]       Chloride, Urine, Random - Urine, Clean Catch [[id number]] Collected: 03/01/22 1047    Specimen: Urine, Clean Catch Updated: 03/01/22 1122     Chloride, Urine 23 mmol/L     Narrative:      Reference intervals for random urine have not been established.  Clinical usage is dependent upon physician's interpretation in combination with other laboratory tests.       Sodium, Urine, Random - Urine, Clean Catch [[id number]] Collected: 03/01/22 1047    Specimen: Urine, Clean Catch Updated: 03/01/22 1121     Sodium, Urine <20 mmol/L     Narrative:      Reference intervals for random urine have not been established.  Clinical usage is dependent upon physician's interpretation in combination with other laboratory tests.       Hemoglobin & Hematocrit, Blood [[id number]]  (Abnormal) Collected: 03/01/22 0952    Specimen: Blood Updated: 03/01/22 1030     Hemoglobin 6.3 g/dL      Hematocrit 19.4 %     POC Glucose Once [[id number]]  (Abnormal) Collected: 03/01/22 1007    Specimen: Blood Updated: 03/01/22 1013     Glucose 212 mg/dL      Comment: Meter: [id number] : [id number] [employer]       POC Glucose Once [[id number]]  (Abnormal)  Collected: 03/01/22 0618    Specimen: Blood Updated: 03/01/22 0624     Glucose 202 mg/dL      Comment: Meter: [id number] : [id number] YASH ESCOBEDO       Basic Metabolic Panel [[id number]]  (Abnormal) Collected: 03/01/22 0533    Specimen: Blood Updated: 03/01/22 0608     Glucose 189 mg/dL      BUN 17 mg/dL      Creatinine 0.65 mg/dL      Sodium 128 mmol/L      Potassium 4.1 mmol/L      Chloride 96 mmol/L      CO2 21.4 mmol/L      Calcium 7.6 mg/dL      BUN/Creatinine Ratio 26.2     Anion Gap 10.6 mmol/L      eGFR 100.3 mL/min/1.73      Comment: National Kidney Foundation and American Society of Nephrology (ASN) Task Force recommended calculation based on the Chronic Kidney Disease Epidemiology Collaboration (CKD-EPI) equation refit without adjustment for race.       Narrative:      GFR Normal >60  Chronic Kidney Disease <60  Kidney Failure <15      Phosphorus [[id number]]  (Abnormal) Collected: 03/01/22 0533    Specimen: Blood Updated: 03/01/22 0608     Phosphorus 2.3 mg/dL     Magnesium [[id number]]  (Normal) Collected: 03/01/22 0533    Specimen: Blood Updated: 03/01/22 0608     Magnesium 2.2 mg/dL     CBC (No Diff) [[id number]]  (Abnormal) Collected: 03/01/22 0533    Specimen: Blood Updated: 03/01/22 0558     WBC 8.98 10*3/mm3      RBC 1.99 10*6/mm3      Hemoglobin 6.1 g/dL      Hematocrit 19.0 %      MCV 95.5 fL      MCH 30.7 pg      MCHC 32.1 g/dL      RDW 13.6 %      RDW-SD 46.8 fl      MPV 9.6 fL      Platelets 278 10*3/mm3     POC Glucose Once [[id number]]  (Abnormal) Collected: 02/28/22 2119    Specimen: Blood Updated: 02/28/22 2135     Glucose 226 mg/dL      Comment: Meter: [id number] : [id number] rachele tyler       POC Glucose Once [[id number]]  (Abnormal) Collected: 02/28/22 1650    Specimen: Blood Updated: 02/28/22 1657     Glucose 301 mg/dL      Comment: Meter: [id number] : [id number] Corey GROSS       Sodium [[id number]]  (Abnormal) Collected: 02/28/22 1606    Specimen: Blood Updated:  02/28/22 1647     Sodium 127 mmol/L     POC Glucose Once [[id number]]  (Abnormal) Collected: 02/28/22 1121    Specimen: Blood Updated: 02/28/22 1134     Glucose 322 mg/dL      Comment: Meter: [id number] : [id number] Nicole Rachael GINNY                  Radiology:    Imaging Results (Last 72 Hours)     ** No results found for the last 72 hours. **        Chest x-ray negative on admission      Assessment/Plan:   Acute blood loss anemia, patient came in with a hemoglobin of 11.4, she is now at 5.3 and refuses blood products.  Certainly we respect her wishes but somewhat problematic, she is on iron twice daily.  I had discussed with hematology yesterday, they recommended we could try Procrit, they recommended pharmacy to dose this and I have contacted Maria L Pharm.D. and she is going to research this and dose accordingly however I did tell the patient would take probably 4 to 7 days for this to take any effect.  Because of the continued hemoglobin drift I am going to continue to hold Lovenox.  She is at high risk for DVT but higher risk of problems from her anemia at this time, SCUDs are in place for DVT prophylaxis.  She is still poorly mobile from her hip fracture.  Her blood pressure appears to be at least at this point tolerating her low hemoglobin although the tachycardia I feel is related to this.    Hip fracture, poorly mobile, therapy as tolerated.  Her and her  are in agreement with SNF.    Diabetes, appears to have improving control, Metformin added yesterday, increase basal insulin, continue sliding scale and follow.  She is on a constant carbohydrate diet    DVT prophylaxis, SCUDs as above    Hyponatremia, urine studies noted, more suggestive possibly of hypovolemia, I am going to hydrate for 12 hours, recheck in the a.m.    High risk due to the acute blood loss anemia that we cannot transfuse    Disposition SNF    Abner Gomez MD

## 2022-03-02 NOTE — CASE MANAGEMENT/SOCIAL WORK
Discharge Planning Assessment   Ji     Patient Name: Es Olivier  MRN: [id number]  Today's Date: 3/2/2022    Admit Date: 2/25/2022       Discharge Plan     Row Name 03/02/22 1651       Plan    Plan SS discussed pt with Dr. Gomez and he recommends SNF placement. Dr. Gomez spoke to pt and pt's spouse about SNF placement and they are agreeable. SS notified inpatient rehab per Sonya. SS to follow.            ARIEL Bradford

## 2022-03-02 NOTE — PLAN OF CARE
Goal Outcome Evaluation:  Plan of Care Reviewed With: patient        Progress: improving  Outcome Summary: Pt has been resting in bed. Pt worked with PT. Pt has no new complaints. Hb is low and tx ordered by MD. Will continue to monitor.

## 2022-03-02 NOTE — CASE MANAGEMENT/SOCIAL WORK
Discharge Planning Assessment   Ji     Patient Name: Es Olivier  MRN: [id number]  Today's Date: 3/2/2022    Admit Date: 2/25/2022     Discharge Needs Assessment    No documentation.                Discharge Plan     Row Name 03/02/22 1715       Plan    Plan SS spoke with Pt on this date. Pt is agreeable to SNF placement and requested SS to send referrals to [facility] to review. SS faxed referrals to both facilities. SS to follow.                                 Karlee Borjas     ---

## 2022-03-02 NOTE — DISCHARGE PLACEMENT REQUEST
"Charline Jenn GINNY (62 y.o. Female)             Date of Birth Social Security Number Address Home Phone MRN    1960  [address] [address] [phone] [id number]    Yarsani Marital Status             Gnosticism        Admission Date Admission Type Admitting Provider Attending Provider Department, Room/Bed    2/25/22 Emergency Mima Randall MD Heinss, Karl F, MD [address], 3346/2S    Discharge Date Discharge Disposition Discharge Destination                         Attending Provider: Abner Gomez MD    Allergies: Mobic [Meloxicam], Lisinopril    Isolation: None   Infection: None   Code Status: CPR   Advance Care Planning Activity    Ht: 167.6 cm (66\")   Wt: 142 kg (314 lb)    Admission Cmt: None   Principal Problem: Closed fracture of proximal end of left femur (HCC) [S72.002A]                 Active Insurance as of 2/25/2022     Primary Coverage     Payor Plan Insurance Group Employer/Plan Group    [facility] WELL[employer] MEDICARE REPLACEMENT      Payor Plan Address Payor Plan Phone Number Payor Plan Fax Number Effective Dates    [address] [address] [phone]  10/1/2020 - None Entered    [facility] [id number]       Subscriber Name Subscriber Birth Date Member ID       JENN MURO 1960 [id number]                 Emergency Contacts      (Rel.) Home Phone Work Phone Mobile Phone    Brynn Mane (Daughter) [phone] -- --    CharlineMikki (Daughter) [phone] -- [phone]            Emergency Contact Information     Name Relation Home Work Mobile    Brynn Mane Daughter [phone]      CharlineMikki Daughter [phone]          Insurance Information                WELLCARE [facility] MEDICARE REPLACEMENT/WELLCARE MEDICARE REPLACEMENT Phone: [phone]    Subscriber: Jenn Muro Subscriber#: [id number]    Group#: -- Precert#: --             History & Physical      Karey Finney PA-C " at 22     Attestation signed by Marley Hoffmann DO at 22 0713    I have reviewed this documentation and agree.  Patient also briefly seen and examined at bedside.  She continues to report some intermittent left hip pain with radiation to the groin.  Patient states she has been in her usual state of health recently and denies any dyspnea on exertion or chest pains.  Patient denies any previous adverse effect with sedation.    On exam, his heart is regular rate and rhythm without obvious murmur.  Lungs are slightly diminished likely due to body habitus without any wheezing, rhonchi and/or rales.  Left lower extremity is mildly externally rotated.    Continue nothing by mouth status while awaiting orthopedic surgery evaluation.  Continue with as needed pain control.  At this point, patient felt acceptable risk to proceed with hip repair.  Patient's postoperative respiratory failure risk calculator is estimated to be between 1 and 4%.                         [facility] Medicine Services  HISTORY & PHYSICAL    Patient Identification:  Name:  Es Olivier  Age:  62 y.o.  Sex:  female  :  1960  MRN:  [id number]   Visit Number:  [id number]  Admit Date: 2022   Primary Care Physician:  Delilah Pizarro MD     Subjective     Chief complaint:   Chief Complaint   Patient presents with   • Hip Injury     pt reports was walking and tripped and fell landing on left hip. pt now reports left hip pain     History of presenting illness:   Patient is a 62 y.o. female with past medical history significant for insulin-dependent type 2 diabetes mellitus, essential hypertension, obstructive sleep apnea, GERD, depression, that presented to the [facility] emergency department for evaluation of fall.     The patient states that she tripped over her grandchild's feet and fell earlier this evening.  She reports landing on her left side and hitting the left temporal region of  "her head on the baseboard.  She denies any loss of consciousness, bruising, or wounds.  She reports she immediately felt pain in the left hip and was unable to get up without assistance.  She admits to frequent falls here recently and states she feels \"unsteady on her feet.\"  She denies any dizziness, lightheadedness, or numbness/tingling in her bilateral lower extremities.  She denies using a cane or walker at home she also denies any recent illness, fever, chills, diaphoresis, coughing, wheezing, shortness of breath, chest pain, abdominal pain, nausea, vomiting, diarrhea, dysuria, or malodorous urine.  She denies any current smoking tobacco use but admits to smoking in the past (quit in 1984).  She admits to having surgery in the past and denies any reactions to anesthesia.    Upon arrival to the ED, vitals were temperature 97.2 °F, pulse 62, respirations 18, /59, SPO2 97% on room air.  CMP with glucose 192, BUN/creatinine ratio 27.4, otherwise unremarkable.  CBC with hemoglobin 11.4.  UA with 1+ blood, 6-12 RBC, otherwise unremarkable.  COVID-19 negative.  Chest x-ray with no acute cardiopulmonary findings.  X-ray of left femur shows comminuted fracture of the proximal left femur.  X-ray of hip with and without pelvis shows same findings.  EKG with normal sinus rhythm, poor R wave progression, possible left atrial enlargement, heart rate 71, QTc 467 MS.    In the emergency department the patient received IV morphine 8 mg, IV Zofran 8 mg.    Patient has been admitted to the medical/surgical floor for further evaluation and treatment  ---------------------------------------------------------------------------------------------------------------------   Review of Systems   Constitutional: Negative for chills, diaphoresis and fever.   HENT: Negative for congestion and sore throat.    Respiratory: Negative for cough, shortness of breath and wheezing.    Cardiovascular: Negative for chest pain, palpitations and " "leg swelling.   Gastrointestinal: Negative for abdominal pain, constipation, diarrhea, nausea and vomiting.   Genitourinary: Negative for dysuria and frequency.   Musculoskeletal: Positive for arthralgias (Left hip) and gait problem (Frequent falls).   Skin: Negative for wound.   Neurological: Positive for weakness (Occasionally feels weak in bilateral lower extremities). Negative for dizziness, syncope, light-headedness and numbness.   Psychiatric/Behavioral: Negative for confusion.      ---------------------------------------------------------------------------------------------------------------------   Past Medical History:   Diagnosis Date   • Biliary dyskinesia     abnormal HIDA 11/2018 w/ EF 31%, symptomatic w/ N/V   • Depression    • DM2 (diabetes mellitus, type 2) ([facility])     dx 1994, on insulin >5 years, A1c 7, associated neuropathy, no other complications   • Dyspepsia    • Dyspnea on exertion    • Fatigue    • Fatty liver     dx on CT 2016   • GERD (gastroesophageal reflux disease)     controlled w/ daily Protonix   • Heart disease     w/ cardiac clearance 4/2019, negative stress test 10/2017, EF 65%   • Hiatal hernia     \"small\" noted on UGI 2014   • History of Helicobacter pylori infection     treated remotely   • Hyperlipidemia    • Hypertension    • Joint pain    • Morbid obesity with BMI of 45.0-49.9, adult ([facility])    • Sleep apnea     formerly on a device, no longer using, says just doesn't need it   • Urinary incontinence     no meds   • Vitamin D deficiency      Past Surgical History:   Procedure Laterality Date   • BLADDER SURGERY  2013    \"tack\", uncomplicated, but unsuccessful   • CATARACT EXTRACTION     • COLONOSCOPY  2015    unremarkable   • DILATATION AND CURETTAGE  1987   • TONSILLECTOMY  1984     Family History   Problem Relation Age of Onset   • Breast cancer Sister         20s   • Cancer Sister    • Breast cancer Sister         60s   • Bone cancer Mother         60   • Osteoarthritis Mother "    • Diabetes Father    • Heart attack Father    • Heart disease Father    • Heart disease Brother      Social History     Socioeconomic History   • Marital status:    Tobacco Use   • Smoking status: Former Smoker     Years: 10.00     Quit date:      Years since quittin.1   • Smokeless tobacco: Never Used   Vaping Use   • Vaping Use: Never used   Substance and Sexual Activity   • Alcohol use: Yes     Comment: SOCIALLY   • Drug use: No   • Sexual activity: Defer     ---------------------------------------------------------------------------------------------------------------------   Allergies:  Mobic [meloxicam] and Lisinopril  ---------------------------------------------------------------------------------------------------------------------   Medications below are reported home medications pulling from within the system; at this time, these medications have not been reconciled unless otherwise specified and are in the verification process for further verifcation as current home medications.    Prior to Admission Medications     Prescriptions Last Dose Informant Patient Reported? Taking?    buPROPion SR (WELLBUTRIN SR) 200 MG 12 hr tablet 2022 Medication Bottle Yes Yes    Take 400 mg by mouth every night at bedtime. Prior to [facility] Admission, Patient was on:   Script is wrote 1 BID but pt takes 2 QHS    FLUoxetine (PROzac) 10 MG capsule 2022 Medication Bottle Yes Yes    Take 10 mg by mouth Daily.    hydrochlorothiazide (HYDRODIURIL) 12.5 MG tablet 2022 Medication Bottle Yes Yes    Take 12.5 mg by mouth Daily.    insulin regular (humuLIN R,novoLIN R) 100 UNIT/ML injection 2022 Medication Bottle Yes Yes    Inject 4 Units under the skin into the appropriate area as directed 3 (Three) Times a Day Before Meals.    losartan-hydrochlorothiazide (HYZAAR) 50-12.5 MG per tablet 2022 Medication Bottle Yes Yes    Take 1 tablet by mouth Daily.    metFORMIN XR (GLUCOPHAGE-XR) 500 MG 24  hr tablet 2/24/2022 Pharmacy Yes Yes    Take 2,000 mg by mouth Every Night.    pioglitazone (ACTOS) 45 MG tablet 2/24/2022 Medication Bottle Yes Yes    Take 45 mg by mouth every night at bedtime.    vitamin D (ERGOCALCIFEROL) 1.25 MG (58308 UT) capsule capsule 2/20/2022 Medication Bottle Yes Yes    Take 50,000 Units by mouth 1 (One) Time Per Week.        ---------------------------------------------------------------------------------------------------------------------    Objective     Hospital Scheduled Meds:          Current listed hospital scheduled medications may not yet reflect those currently placed in orders that are signed and held, awaiting patient's arrival to floor/unit.    ---------------------------------------------------------------------------------------------------------------------   Vital Signs:  Temp:  [97.2 °F (36.2 °C)] 97.2 °F (36.2 °C)  Heart Rate:  [61-83] 79  Resp:  [18] 18  BP: (105-151)/(58-97) 105/89  Mean Arterial Pressure (Non-Invasive) for the past 24 hrs (Last 3 readings):   Noninvasive MAP (mmHg)   02/25/22 1833 95   02/25/22 1803 75   02/25/22 1703 97     SpO2 Percentage    02/25/22 1803 02/25/22 1833 02/25/22 1853   SpO2: 99% 98% 94%     SpO2:  [93 %-100 %] 94 %  on   ;   Device (Oxygen Therapy): room air    Body mass index is 50.84 kg/m².  Wt Readings from Last 3 Encounters:   02/25/22 (!) 143 kg (315 lb)   08/11/21 135 kg (297 lb)   05/09/19 135 kg (298 lb)     ---------------------------------------------------------------------------------------------------------------------   Physical Exam:  Physical Exam  Constitutional:       General: She is awake.      Appearance: Normal appearance. She is well-developed. She is morbidly obese.   HENT:      Head: Normocephalic and atraumatic.   Eyes:      General: Lids are normal.   Cardiovascular:      Rate and Rhythm: Normal rate and regular rhythm.      Pulses: Normal pulses.           Dorsalis pedis pulses are 2+ on the right side  and 2+ on the left side.        Posterior tibial pulses are 2+ on the right side and 2+ on the left side.      Heart sounds: Normal heart sounds. No murmur heard.  No friction rub. No gallop.    Pulmonary:      Effort: Pulmonary effort is normal. No tachypnea.      Breath sounds: Normal breath sounds. No decreased breath sounds or wheezing.   Abdominal:      Palpations: Abdomen is soft.      Tenderness: There is no abdominal tenderness.   Musculoskeletal:      Right hip: No tenderness. Normal range of motion.      Left hip: No tenderness. Decreased range of motion.      Right lower leg: No edema.      Left lower leg: No edema.   Feet:      Right foot:      Skin integrity: Skin integrity normal.      Left foot:      Skin integrity: Skin integrity normal.   Skin:     Findings: No ecchymosis or erythema.   Neurological:      General: No focal deficit present.      Mental Status: She is alert and oriented to person, place, and time. Mental status is at baseline.      Sensory: Sensation is intact. No sensory deficit.   Psychiatric:         Speech: Speech normal.         Behavior: Behavior is cooperative.         Cognition and Memory: Cognition normal.       ---------------------------------------------------------------------------------------------------------------------  EKG:    Pending cardiology read.  Per my evaluation, normal sinus rhythm with poor R wave progression and possible left atrial enlargement, heart rate 71, QTc 467 MS.    Telemetry:    Normal sinus rhythm, heart rate 89, SPO2 91% on room air    I have personally reviewed the EKG/Telemetry strip  ---------------------------------------------------------------------------------------------------------------------             Results from last 7 days   Lab Units 02/25/22  1016   WBC 10*3/mm3 8.89   HEMOGLOBIN g/dL 11.4*   HEMATOCRIT % 35.8   MCV fL 95.0   MCHC g/dL 31.8   PLATELETS 10*3/mm3 330     Results from last 7 days   Lab Units 02/25/22  1016    SODIUM mmol/L 139   POTASSIUM mmol/L 3.9   CHLORIDE mmol/L 104   CO2 mmol/L 24.8   BUN mg/dL 20   CREATININE mg/dL 0.73   EGFR IF NONAFRICN AM mL/min/1.73 81   CALCIUM mg/dL 8.9   GLUCOSE mg/dL 192*   ALBUMIN g/dL 3.86   BILIRUBIN mg/dL 0.4   ALK PHOS U/L 62   AST (SGOT) U/L 14   ALT (SGPT) U/L 12   Estimated Creatinine Clearance: 118.6 mL/min (by C-G formula based on SCr of 0.73 mg/dL).  No results found for: AMMONIA    No results found for: HGBA1C, POCGLU  Lab Results   Component Value Date    HGBA1C 6.9 (H) 05/09/2019     Lab Results   Component Value Date    TSH 3.370 05/09/2019         Pain Management Panel     Pain Management Panel Latest Ref Rng & Units 4/6/2016 4/7/2014    AMPHETAMINES SCREEN, URINE Negative Negative Negative    BARBITURATES SCREEN Negative Negative Negative    BENZODIAZEPINE SCREEN, URINE Negative Negative Negative    COCAINE SCREEN, URINE Negative Negative Negative    METHADONE SCREEN, URINE Negative Negative Negative        I have personally reviewed the above laboratory results.   ---------------------------------------------------------------------------------------------------------------------  Imaging Results (Last 7 Days)     Procedure Component Value Units Date/Time    XR Hip With or Without Pelvis 2 - 3 View Left [[id number]] Collected: 02/25/22 1056     Updated: 02/25/22 1159    Narrative:      EXAMINATION: XR HIP W OR WO PELVIS 2-3 VIEW LEFT-      CLINICAL INDICATION: fall with hip pain        COMPARISON: None available     FINDINGS:  One view of the pelvis and 2 views of the left hip show comminuted  fracture of the proximal left femur     No other fracture       Impression:      Comminuted fracture of the proximal left femur      This report was finalized on 2/25/2022 10:56 AM by Dr. Denis Wright MD.       XR Femur 2 View Left [[id number]] Collected: 02/25/22 1056     Updated: 02/25/22 1158    Narrative:      EXAMINATION: XR FEMUR 2 VW LEFT-      CLINICAL INDICATION: fall  with hip pain        COMPARISON: None available     FINDINGS: 4 views of the left femur show comminuted fracture of the  proximal left femur extending from the intertrochanteric region.       Impression:      Comminuted fracture of the proximal left femur      This report was finalized on 2/25/2022 10:56 AM by Dr. Denis Wright MD.       XR Chest 1 View [[id number]] Collected: 02/25/22 1055     Updated: 02/25/22 1158    Narrative:      XR CHEST 1 VW-     CLINICAL INDICATION: fall with hip pain        COMPARISON: 12/05/2017      TECHNIQUE: Single frontal view of the chest.     FINDINGS:     There is no focal alveolar infiltrate or effusion.  The cardiac silhouette is normal. The pulmonary vasculature is  unremarkable.  There is no evidence of an acute osseous abnormality.   There are no suspicious-appearing parenchymal soft tissue nodules.          Impression:      No evidence of active or acute cardiopulmonary disease on today's chest  radiograph.     This report was finalized on 2/25/2022 10:56 AM by Dr. Denis Wright MD.           I have personally reviewed the above radiology results.     ---------------------------------------------------------------------------------------------------------------------    Assessment & Plan      Active Hospital Problems    Diagnosis  POA   • **Closed fracture of proximal end of left femur (HCC) [S72.002A]  Yes     #Acute comminuted fx of the left proximal femur 2/2 to mechanical   -Imaging reviewed; x-ray of left femur shows a comminuted fracture of the proximal femur  -Orthopedic surgery has been consulted, input/assistance is much appreciated  -IV morphine 2mg q 2h PRN for pain; PO Percocet 10 mg q4h; Tylenol PRN  -Neurovascular checks every 4 hours  -Subcutaneous heparin for VTE prophylaxis  -Obtain vitamin D level   -Meyer catheter in place   -PT/OT consulted for assistance with rehabilitation in the postoperative state    #Mild acute normocytic anemia   -Baseline hemoglobin  appears to be around 12-13; hemoglobin on admission 11.4  -Obtain vitamin B12, folate, ferritin, iron, iron panel; replace as necessary  -If hemoglobin less than 7 or platelets less than 50,000 with active bleeding, or less than 20,000, will transfuse    #Essential hypertension   -BP has been within normal limits  -Review home medications once available; plan to continue antihypertensive agents with appropriate holding parameters    #GERD  -PPI    #FRANCINE, non compliant with CPAP  -Supplemental oxygen as needed, titrate to maintain SPO2 greater than or equal to 90%    #Insulin dependent type II diabetes mellitus   -Obtain hemoglobin A1c  -Hold home Metformin  -Sliding scale insulin ordered; Accu-Cheks before every meal and nightly; titrate insulin therapy as necessary    #Depression   -Review home medications once available    #Morbid obesity   -BMI 50.84 kg/m²  -Complicates all aspects of patient care      F/E/N: No IV fluids. Replace electrolytes as necessary. Consistent carb diet, NPO after midnight   ---------------------------------------------------  DVT Prophylaxis: Heparin   GI Prophylaxis: Protonix   Activity: Strict bed rest     The patient is considered to be a high risk patient due to: acute left femur fx 2/2 to mechanical fall     INPATIENT status due to the need for care which can only be reasonably provided in an hospital setting such as aggressive/expedited ancillary services and/or consultation services, the necessity for IV medications, close physician monitoring and/or the possible need for procedures.  In such, I feel patient’s risk for adverse outcomes and need for care warrant INPATIENT evaluation and predict the patient’s care encounter to likely last beyond 2 midnights.    Code Status: FULL CODE     Disposition/Discharge planning: Plan for home at discharge. Pending clinical course     I have discussed the patient's assessment and plan with the patient, nursing staff, and attending physician        Karey Finney PA-C  Hospitalist Service -- [facility]       02/25/22  20:43 EST    Attending Physician: Marley Hoffmann DO       Electronically signed by Marley Hoffmann DO at 02/26/22 0713       Vital Signs (last day)     Date/Time Temp Temp src Pulse Resp BP Patient Position SpO2    03/02/22 1100 98 (36.7) Oral 96 18 125/58 Lying 94    03/02/22 0600 97.8 (36.6) Oral 97 18 120/60 Lying --    03/02/22 0256 98 (36.7) Oral 98 20 128/76 Lying 96    03/01/22 1811 97.8 (36.6) Oral 103 20 124/57 Lying 97    03/01/22 1400 97.9 (36.6) -- 102 18 111/53 -- 96    03/01/22 0615 97.6 (36.4) Oral 110 18 128/64 Lying 93    03/01/22 0219 98 (36.7) Oral 99 18 112/53 Lying 92          Lines, Drains & Airways     Active LDAs     Name Placement date Placement time Site Days    Peripheral IV 02/25/22 1022 Left Antecubital 02/25/22  1022  Antecubital  5    Peripheral IV 02/26/22 1110 Posterior; Right Forearm 02/26/22  1110  Forearm  4    External Urinary Catheter 03/01/22  1800  --  less than 1                  Current Facility-Administered Medications   Medication Dose Route Frequency Provider Last Rate Last Admin   • acetaminophen (TYLENOL) tablet 650 mg  650 mg Oral Q4H PRN Jarrod Leung MD        Or   • acetaminophen (TYLENOL) suppository 650 mg  650 mg Rectal Q4H PRN Jarrod Leung MD       • buPROPion SR (WELLBUTRIN SR) 12 hr tablet 400 mg  400 mg Oral Nightly Jarrod Leung MD   400 mg at 03/01/22 2114   • cholecalciferol (VITAMIN D3) capsule 50,000 Units  50,000 Units Oral Weekly Jarrod Leung MD   50,000 Units at 02/27/22 0803   • dextrose (D50W) (25 g/50 mL) IV injection 25 g  25 g Intravenous Q15 Min PRN Jarrod Leung MD       • dextrose (GLUTOSE) oral gel 15 g  15 g Oral Q15 Min Jarrod Juarez MD       • docusate sodium (COLACE) capsule 100 mg  100 mg Oral BID Yvette Cruz PA   100 mg at 03/02/22 0900   • Epoetin Fadi-epbx (RETACRIT) injection 40,000 Units   40,000 Units Subcutaneous Q48H Abner Gomez MD   40,000 Units at 03/02/22 1204   • FLUoxetine (PROzac) capsule 10 mg  10 mg Oral Daily Jarrod Leung MD   10 mg at 03/02/22 0859   • glucagon (human recombinant) (GLUCAGEN DIAGNOSTIC) injection 1 mg  1 mg Intramuscular Q15 Min PRN Jarrod Leung MD       • HYDROmorphone (DILAUDID) injection 0.5 mg  0.5 mg Intravenous Q3H PRN Yvette Cruz PA   0.5 mg at 02/28/22 0437   • hydrOXYzine (ATARAX) tablet 25 mg  25 mg Oral TID PRN Jarrod Leung MD       • insulin aspart (novoLOG) injection 0-14 Units  0-14 Units Subcutaneous 4x Daily AC & at Bedtime Yvette Cruz PA   5 Units at 03/02/22 1705   • insulin detemir (LEVEMIR) injection 15 Units  15 Units Subcutaneous Nightly Abner Gomez MD       • iron polysaccharides (NIFEREX) capsule 150 mg  150 mg Oral BID Abner Gomez MD   150 mg at 03/02/22 0900   • Magnesium Sulfate 2 gram infusion - Mg less than or equal to 1.5 mg/dL  2 g Intravenous PRN Yvette Cruz PA        Or   • Magnesium Sulfate 1 gram infusion - Mg 1.6-1.9 mg/dL  1 g Intravenous PRN Yvette Cruz PA       • melatonin tablet 10 mg  10 mg Oral Nightly PRN Jarrod Leung MD       • metFORMIN (GLUCOPHAGE) tablet 500 mg  500 mg Oral BID With Meals Abner Gomez MD   500 mg at 03/02/22 1709   • naloxone (NARCAN) injection 0.4 mg  0.4 mg Intravenous Q5 Min PRN Jarrod Leung MD       • nitroglycerin (NITROSTAT) SL tablet 0.4 mg  0.4 mg Sublingual Q5 Min PRN Jarrod Leung MD       • ondansetron (ZOFRAN) injection 4 mg  4 mg Intravenous Q6H PRN Jarrod Leung MD       • oxyCODONE-acetaminophen (PERCOCET)  MG per tablet 1 tablet  1 tablet Oral Q6H OYvette Truong PA   1 tablet at 03/02/22 1705   • pantoprazole (PROTONIX) EC tablet 40 mg  40 mg Oral Q AM Jarrod Leung MD   40 mg at 03/02/22 0542   • Pharmacy Consult - Pharmacy to dose   Does not apply Continuous JASVIRN Abner Gomez MD       •  polyethylene glycol (MIRALAX) packet 17 g  17 g Oral Daily Yvette Cruz PA   17 g at 03/01/22 0839   • potassium & sodium phosphates (PHOS-NAK) 280-160-250 MG packet - for Phosphorus less than 1.25 mg/dL  2 packet Oral Q6H PRN Yvette Cruz PA        Or   • potassium & sodium phosphates (PHOS-NAK) 280-160-250 MG packet - for Phosphorus 1.25 - 2.5 mg/dL  2 packet Oral Q6H PRN Yvette Cruz PA       • prochlorperazine (COMPAZINE) injection 5 mg  5 mg Intravenous Q6H PRN Jarrod Leung MD       • sodium chloride 0.9 % flush 10 mL  10 mL Intravenous PRN Jarrod Leung MD   10 mL at 03/02/22 0900   • sodium chloride 0.9 % infusion  75 mL/hr Intravenous Continuous Abner Gomez MD 75 mL/hr at 03/02/22 1203 75 mL/hr at 03/02/22 1203        Operative/Procedure Notes (most recent note)      Jarrod Leung MD at 02/26/22 1245        Operative report  Preoperative diagnosis left femur subtrochanteric fracture  And morbid obesity BMI 56  Surgical procedure left femur open reduction internal fixation using Synthes intramedullary nail TFN a size of 360 x 11 mm 125 degree proximal locking with blade and 90 mm distal locking with 2 screws  Increased difficulty because of morbid obesity  After proper patient and limb identification bring to the operating room with general anesthesia dorsal decubitus on the Cantil table  Overall increased difficulty because of bodily habitus BMI 56 just for preoperatively positioning on the table during the case for retracting tissue and positioning of instruments for nailing as well as overall closing.  Patient is BMI 56 overall procedure was increased time of surgery by double.  Just positioning on the table need special traction for the large abdominal pannus positioning on the table with a very large leg.  So under C arm longitudinal incision regarding the lateral portion of the femur proximal hip and proximally.  Large fatty layer as deep as mid forearm.  Then  incision of fascia of the gluteus and fascia michael insertion of guidepin on the trochanteric area had to open the fracture site put to large clamp to mobilize the fragment into good general alignment passing the trocar through fracture site and then the guidepin.  The overall handle had to be completely buried under the fat under the incision.  Guidepin was successfully passed through fracture site then reaming with the different reamers and then insertion of the fletcher progressively in good positioning on both AP and lateral view.  Insertion of guidepin along the femoral neck with good positioning on the AP and lateral view reaming and then insertion of the blade which fingertip locking proximally.  Then under C arm locking the fletcher the distally with 2 screw from lateral to medial.  Irrigating thoroughly with sterile fluid the closing fascia with a strata fix and fatty layer with Vicryl 1 skin Vicryl 1 Monocryl 2 oh and metal clip.  Large dressing was applied and patient returned to recovery in stable condition blood loss overall 1700 cc Cell Saver was used during the case and were able to return to 750 cc of blood.  The blood loss though was measured with some air irrigation.  No specimen    Electronically signed by Jarrod Leung MD at 22 1446          Physician Progress Notes (most recent note)      Abner Gomez MD at 22 1207        Discussed with daughter and entertained questions.    Electronically signed by Abner Gomez MD at 22 1207          Consult Notes (most recent note)      Abdiaziz Eldridge APRN at 22 0835     Attestation signed by Jarrod Leung MD at 22 1207    I have reviewed this documentation and agree.               Summary:Left hip subtrochanteric fracture.                 Consults    Patient Identification:  Name:  Es Olivier  Age:  62 y.o.  Sex:  female  :  1960  MRN:  [id number]  Visit Number:  [id number]  Primary care provider:  Moira  Delilah JUAREZ MD    History of presenting illness:  This is a 62 year old female who presented to the emergency room yesterday after sustaining a fall at home, landing on her left side.  Patient notes she tripped over her grandchilds feet, lost her balance, and fell.  She does note a recent history of frequent falls.  She denies episodes of dizziness and loss of consciousness.  She has been very active and does not use an assist device with mobility.  Today, the patient notes pain to the hip with movement.  She notes the pain is fairly controlled with her pain medication when resting.  Patient also notes that she is a Confucianist and does not want any blood products, if her injury requires surgical intervention.  ---------------------------------------------------------------------------------------------------------------------    Review of Systems   Constitutional: Negative.    HENT: Negative.    Eyes: Negative.    Respiratory: Negative.    Cardiovascular: Negative.    Gastrointestinal: Negative.    Endocrine: Negative.    Genitourinary: Negative.    Musculoskeletal: Positive for arthralgias and gait problem.        Left hip, since fall.  Frequent falls at home.   Skin: Negative.    Allergic/Immunologic: Negative.    Hematological: Negative.    Psychiatric/Behavioral: Negative.       ---------------------------------------------------------------------------------------------------------------------   Past History:  Family History   Problem Relation Age of Onset   • Breast cancer Sister         20s   • Cancer Sister    • Breast cancer Sister         60s   • Bone cancer Mother         60   • Osteoarthritis Mother    • Diabetes Father    • Heart attack Father    • Heart disease Father    • Heart disease Brother      Past Medical History:   Diagnosis Date   • Biliary dyskinesia     abnormal HIDA 11/2018 w/ EF 31%, symptomatic w/ N/V   • Depression    • DM2 (diabetes mellitus, type 2) (HCC)     dx 1994, on insulin  ">5 years, A1c 7, associated neuropathy, no other complications   • Dyspepsia    • Dyspnea on exertion    • Fatigue    • Fatty liver     dx on CT    • GERD (gastroesophageal reflux disease)     controlled w/ daily Protonix   • Heart disease     w/ cardiac clearance 2019, negative stress test 10/2017, EF 65%   • Hiatal hernia     \"small\" noted on UGI    • History of Helicobacter pylori infection     treated remotely   • Hyperlipidemia    • Hypertension    • Joint pain    • Morbid obesity with BMI of 45.0-49.9, adult (HCC)    • Sleep apnea     formerly on a device, no longer using, says just doesn't need it   • Urinary incontinence     no meds   • Vitamin D deficiency      Past Surgical History:   Procedure Laterality Date   • BLADDER SURGERY      \"tack\", uncomplicated, but unsuccessful   • CATARACT EXTRACTION     • COLONOSCOPY      unremarkable   • DILATATION AND CURETTAGE     • TONSILLECTOMY       Social History     Socioeconomic History   • Marital status:    Tobacco Use   • Smoking status: Former Smoker     Years: 10.00     Quit date:      Years since quittin.1   • Smokeless tobacco: Never Used   Vaping Use   • Vaping Use: Never used   Substance and Sexual Activity   • Alcohol use: Not Currently   • Drug use: No   • Sexual activity: Defer     ---------------------------------------------------------------------------------------------------------------------   Allergies:  Mobic [meloxicam] and Lisinopril  ---------------------------------------------------------------------------------------------------------------------   Prior to Admission Medications     Prescriptions Last Dose Informant Patient Reported? Taking?    buPROPion SR (WELLBUTRIN SR) 200 MG 12 hr tablet 2022 Medication Bottle Yes Yes    Take 400 mg by mouth every night at bedtime. Prior to [facility] Admission, Patient was on:   Script is wrote 1 BID but pt takes 2 QHS    FLUoxetine (PROzac) 10 MG capsule " 2/24/2022 Medication Bottle Yes Yes    Take 10 mg by mouth Daily.    hydrochlorothiazide (HYDRODIURIL) 12.5 MG tablet 2/24/2022 Medication Bottle Yes Yes    Take 12.5 mg by mouth Daily.    insulin regular (humuLIN R,novoLIN R) 100 UNIT/ML injection 2/25/2022 Medication Bottle Yes Yes    Inject 4 Units under the skin into the appropriate area as directed 3 (Three) Times a Day Before Meals.    losartan-hydrochlorothiazide (HYZAAR) 50-12.5 MG per tablet 2/24/2022 Medication Bottle Yes Yes    Take 1 tablet by mouth Daily.    metFORMIN XR (GLUCOPHAGE-XR) 500 MG 24 hr tablet 2/24/2022 Pharmacy Yes Yes    Take 2,000 mg by mouth Every Night.    pioglitazone (ACTOS) 45 MG tablet 2/24/2022 Medication Bottle Yes Yes    Take 45 mg by mouth every night at bedtime.    vitamin D (ERGOCALCIFEROL) 1.25 MG (21382 UT) capsule capsule 2/20/2022 Medication Bottle Yes Yes    Take 50,000 Units by mouth 1 (One) Time Per Week.        Hospital Meds:  buPROPion SR, 400 mg, Oral, Nightly  [START ON 2/27/2022] cholecalciferol, 50,000 Units, Oral, Weekly  FLUoxetine, 10 mg, Oral, Daily  heparin (porcine), 5,000 Units, Subcutaneous, Q12H  insulin aspart, 0-7 Units, Subcutaneous, TID AC  pantoprazole, 40 mg, Oral, Q AM         ---------------------------------------------------------------------------------------------------------------------   Vital Signs:  Temp:  [97.2 °F (36.2 °C)-98.2 °F (36.8 °C)] 98.2 °F (36.8 °C)  Heart Rate:  [61-94] 90  Resp:  [14-18] 18  BP: (105-151)/(57-97) 123/59      02/25/22  0951 02/25/22  2130   Weight: (!) 143 kg (315 lb) (!) 142 kg (314 lb)     Body mass index is 50.68 kg/m².  ---------------------------------------------------------------------------------------------------------------------     Physical exam:  Constitutional:  Well-developed and well-nourished.  No respiratory distress.  Morbidly obese.    HENT:  Head: Normocephalic and atraumatic.  Mouth:  Moist mucous membranes.    Eyes:  Conjunctivae and  EOM are normal.  No scleral icterus.  Neck:  Neck supple.    Cardiovascular:  Normal rate, regular rhythm.  Pulmonary/Chest:  No respiratory distress, no wheezes, and good air movement.  Abdominal:  Soft.  No distension and no tenderness.   Musculoskeletal: Upon examination of the left hip and lower extremity: There is no edema, erythema, ecchymosis, or open wounds.  Femoral pulse is 3/4.  Skin is pink, warm, and dry.  Able to dorsiflex and plantar flex the ankle.  Able to flex and extend the digits.  DP pulse 2/4.  Capillary refill brisk and light touch sensation is intact, distally.    Neurological:  Alert and oriented to person, place, and time.  No facial droop.  No slurred speech.   Skin:  Skin is warm and dry.  No rash noted.  No pallor.   Psychiatric:  Normal mood and affect.  Behavior is normal.  Judgment and thought content normal.   Peripheral vascular:  Strong pulses on all 4 extremities.    ---------------------------------------------------------------------------------------------------------------------   .  ---------------------------------------------------------------------------------------------------------------------   Results from last 7 days   Lab Units 02/26/22  0122 02/25/22  1016   WBC 10*3/mm3 8.83 8.89   HEMOGLOBIN g/dL 10.8* 11.4*   HEMATOCRIT % 34.7 35.8   MCV fL 96.1 95.0   MCHC g/dL 31.1* 31.8   PLATELETS 10*3/mm3 298 330         Results from last 7 days   Lab Units 02/26/22  0122 02/25/22  1016   SODIUM mmol/L 136 139   POTASSIUM mmol/L 4.4 3.9   MAGNESIUM mg/dL 2.0  --    CHLORIDE mmol/L 100 104   CO2 mmol/L 24.4 24.8   BUN mg/dL 23 20   CREATININE mg/dL 0.81 0.73   EGFR IF NONAFRICN AM mL/min/1.73 72 81   CALCIUM mg/dL 8.8 8.9   GLUCOSE mg/dL 205* 192*   ALBUMIN g/dL 3.51 3.86   BILIRUBIN mg/dL 0.5 0.4   ALK PHOS U/L 53 62   AST (SGOT) U/L 13 14   ALT (SGPT) U/L 11 12   Estimated Creatinine Clearance: 106.4 mL/min (by C-G formula based on SCr of 0.81 mg/dL).  No results found for:  AMMONIA          Lab Results   Component Value Date    HGBA1C 6.80 (H) 02/25/2022     Lab Results   Component Value Date    TSH 8.590 (H) 02/25/2022    FREET4 1.13 02/25/2022     No results found for: PREGTESTUR, PREGSERUM, HCG, HCGQUANT  Pain Management Panel     Pain Management Panel Latest Ref Rng & Units 4/6/2016 4/7/2014    AMPHETAMINES SCREEN, URINE Negative Negative Negative    BARBITURATES SCREEN Negative Negative Negative    BENZODIAZEPINE SCREEN, URINE Negative Negative Negative    COCAINE SCREEN, URINE Negative Negative Negative    METHADONE SCREEN, URINE Negative Negative Negative        No results found for: BLOODCX  No results found for: URINECX  No results found for: WOUNDCX  No results found for: STOOLCX      ---------------------------------------------------------------------------------------------------------------------   Imaging Results (Last 7 Days)     Procedure Component Value Units Date/Time    XR Hip With or Without Pelvis 2 - 3 View Left [[id number]] Collected: 02/25/22 1056     Updated: 02/25/22 1159    Narrative:      EXAMINATION: XR HIP W OR WO PELVIS 2-3 VIEW LEFT-      CLINICAL INDICATION: fall with hip pain        COMPARISON: None available     FINDINGS:  One view of the pelvis and 2 views of the left hip show comminuted  fracture of the proximal left femur     No other fracture       Impression:      Comminuted fracture of the proximal left femur      This report was finalized on 2/25/2022 10:56 AM by Dr. Denis Wright MD.       XR Femur 2 View Left [[id number]] Collected: 02/25/22 1056     Updated: 02/25/22 1158    Narrative:      EXAMINATION: XR FEMUR 2 VW LEFT-      CLINICAL INDICATION: fall with hip pain        COMPARISON: None available     FINDINGS: 4 views of the left femur show comminuted fracture of the  proximal left femur extending from the intertrochanteric region.       Impression:      Comminuted fracture of the proximal left femur      This report was finalized on  2022 10:56 AM by Dr. Denis Wright MD.       XR Chest 1 View [[id number]] Collected: 22 1055     Updated: 22 1158    Narrative:      XR CHEST 1 VW-     CLINICAL INDICATION: fall with hip pain        COMPARISON: 2017      TECHNIQUE: Single frontal view of the chest.     FINDINGS:     There is no focal alveolar infiltrate or effusion.  The cardiac silhouette is normal. The pulmonary vasculature is  unremarkable.  There is no evidence of an acute osseous abnormality.   There are no suspicious-appearing parenchymal soft tissue nodules.          Impression:      No evidence of active or acute cardiopulmonary disease on today's chest  radiograph.     This report was finalized on 2022 10:56 AM by Dr. Denis Wright MD.           ----------------------------------------------------------------------------------------------------------------------  Assessment and Plan:   Left hip, comminuted, subtrochanteric fracture.    Discussed the patient with Dr. Leung.  He has reviewed the xrays.  He recommends a left hip open reduction and internal fixation today.  The patient is a Mosque and does not want to have a blood transfusion.  We are able to have a cell saver arranged to use during surgery.  This has been discussed with the patient as well as the high risk of bleeding during surgery with this type of fracture repair.  The patient is agreeable to using the cell saver only.  NPO for surgery.  Consent for left hip open reduction and internal fixation.  Patient is COVID 19 negative and has had a type and screen.        Thank you for the consult.    JAMMIE Nguyen  22  08:36 EST    Electronically signed by Jarrod Leung MD at 22 6095          Physical Therapy Notes (most recent note)      Jessica Aquino, PT at 22 7972  Version 1 of 1         Acute Care - Physical Therapy Treatment Note  RICKEY Workman     Patient Name: Es Olivier  : 1960  MRN:  [id number]  Today's Date: 3/2/2022      Visit Dx:     ICD-10-CM ICD-9-CM   1. Closed fracture of proximal end of left femur, initial encounter ([facility])  S72.002A 820.8     Patient Active Problem List   Diagnosis   • Biliary dyskinesia   • Heart disease   • Hypertension   • DM2 (diabetes mellitus, type 2) ([facility])   • Hyperlipidemia   • Fatigue   • Dyspepsia   • Dyspnea on exertion   • Morbid obesity with BMI of 45.0-49.9, adult ([facility])   • Urinary incontinence   • Depression   • GERD (gastroesophageal reflux disease)   • History of Helicobacter pylori infection   • Vitamin D deficiency   • Joint pain   • Sleep apnea   • Hiatal hernia   • Fatty liver   • Closed fracture of proximal end of left femur ([facility])     Past Medical History:   Diagnosis Date   • Biliary dyskinesia     abnormal HIDA 11/2018 w/ EF 31%, symptomatic w/ N/V   • Closed fracture of proximal end of left femur ([facility]) 2/25/2022   • Depression    • DM2 (diabetes mellitus, type 2) ([facility])     dx 1994, on insulin >5 years, A1c 7, associated neuropathy, no other complications   • Dyspepsia    • Dyspnea on exertion    • Fatigue    • Fatty liver     dx on CT 2016   • GERD (gastroesophageal reflux disease)     controlled w/ daily Protonix   • Heart disease     w/ cardiac clearance 4/2019, negative stress test 10/2017, EF 65%   • Hiatal hernia     \"small\" noted on UGI 2014   • History of Helicobacter pylori infection     treated remotely   • Hyperlipidemia    • Hypertension    • Joint pain    • Morbid obesity with BMI of 45.0-49.9, adult ([facility])    • Sleep apnea     formerly on a device, no longer using, says just doesn't need it   • Urinary incontinence     no meds   • Vitamin D deficiency      Past Surgical History:   Procedure Laterality Date   • BLADDER SURGERY  2013    \"tack\", uncomplicated, but unsuccessful   • CATARACT EXTRACTION     • COLONOSCOPY  2015    unremarkable   • DILATATION AND CURETTAGE  1987   • ORIF HIP FRACTURE Left 2/26/2022    Procedure: Left hip Open " reduction and internal fixation.;  Surgeon: Jarrod Leung MD;  Location: [facility];  Service: Orthopedics;  Laterality: Left;   • TONSILLECTOMY  1984     PT Assessment (last 12 hours)     PT Evaluation and Treatment     Row Name 03/02/22 1550          Physical Therapy Time and Intention    Document Type therapy note (daily note)  -     Mode of Treatment physical therapy  -     Patient Effort adequate  -     Comment Pt able to sit EOB this date, attempt to stand however unsuccessful ultimately. Pt also had supine P/AA ROM with knee flexion/extension and hip abd/add  -     Row Name 03/02/22 1550          Bed Mobility    Bed Mobility supine-sit; sit-supine; scooting/bridging  -     Scooting/Bridging [place] (Bed Mobility) 2 person assist; maximum assist (25% patient effort); dependent (less than 25% patient effort)  -     Supine-Sit [place] (Bed Mobility) maximum assist (25% patient effort); dependent (less than 25% patient effort); 2 person assist  -     Sit-Supine [place] (Bed Mobility) dependent (less than 25% patient effort); 1 person assist; 2 person assist  -     Assistive Device (Bed Mobility) draw sheet  -     Row Name 03/02/22 1550          Transfers    Comment (Transfers) STS attempted, however unsuccessful this date.  -     Row Name 03/02/22 1550          Motor Skills    Therapeutic Exercise hip; knee  heel slides, hip abd/add AA/PROM  -     Row Name             Wound 02/26/22 1245 Left anterior hip Incision    Wound - Properties Group Placement Date: 02/26/22  - Placement Time: 1245  -MC Side: Left  -MC Orientation: anterior  -MC Location: hip  -MC Primary Wound Type: Incision  -MC     Retired Wound - Properties Group Date first assessed: 02/26/22  - Time first assessed: 1245  -MC Side: Left  -MC Location: hip  -MC Primary Wound Type: Incision  -MC     Row Name 03/02/22 1550          Positioning and Restraints    Pre-Treatment Position in bed  -     Post  Treatment Position bed  -     In Bed supine  -           User Key  (r) = Recorded By, (t) = Taken By, (c) = Cosigned By    Initials Name Provider Type    Edelmira King RN Registered Nurse    Jessica Rodriguez PT Physical Therapist                  PT Recommendation and Plan  Anticipated Discharge Disposition (PT): skilled nursing facility  Planned Therapy Interventions (PT): bed mobility training, gait training, strengthening, transfer training  Therapy Frequency (PT): 6 times/wk (update)  Outcome Summary: Pt evaluated this date with grossly dependent assist with bed mobility this date. D/C rec for SNF for rehabilitation as pt was independent with PLOF.       Time Calculation:    PT Charges     Row Name 22 1557             Time Calculation    PT Received On 22  -      PT Goal Re-Cert Due Date 22  -            User Key  (r) = Recorded By, (t) = Taken By, (c) = Cosigned By    Initials Name Provider Type    Jessica Rodriguez PT Physical Therapist              Therapy Charges for Today     Code Description Service Date Service Provider Modifiers Qty    26363159970 HC PT THERAPEUTIC ACT EA 15 MIN 3/1/2022 Jessica Aquino, PT GP 1    15724288971 HC PT THER PROC EA 15 MIN 3/1/2022 Jessica Aquino, PT GP 1    20898393933 HC PT THERAPEUTIC ACT EA 15 MIN 3/2/2022 Jessica Aquino, PT GP 1               Jessica Aquino PT  3/2/2022      Electronically signed by Jessica Aquino PT at 22 1554          Occupational Therapy Notes (most recent note)      Adarsh Palacios, OT at 22 1633          Acute Care - Occupational Therapy Treatment Note  RICKEY Workman     Patient Name: Es Olivier  : 1960  MRN: [id number]  Today's Date: 3/2/2022             Admit Date: 2022       ICD-10-CM ICD-9-CM   1. Closed fracture of proximal end of left femur, initial encounter ([facility])  S72.002A 820.8     Patient Active Problem List   Diagnosis   • Biliary dyskinesia   • Heart disease  "  • Hypertension   • DM2 (diabetes mellitus, type 2) ([facility])   • Hyperlipidemia   • Fatigue   • Dyspepsia   • Dyspnea on exertion   • Morbid obesity with BMI of 45.0-49.9, adult ([facility])   • Urinary incontinence   • Depression   • GERD (gastroesophageal reflux disease)   • History of Helicobacter pylori infection   • Vitamin D deficiency   • Joint pain   • Sleep apnea   • Hiatal hernia   • Fatty liver   • Closed fracture of proximal end of left femur ([facility])     Past Medical History:   Diagnosis Date   • Biliary dyskinesia     abnormal HIDA 11/2018 w/ EF 31%, symptomatic w/ N/V   • Closed fracture of proximal end of left femur ([facility]) 2/25/2022   • Depression    • DM2 (diabetes mellitus, type 2) ([facility])     dx 1994, on insulin >5 years, A1c 7, associated neuropathy, no other complications   • Dyspepsia    • Dyspnea on exertion    • Fatigue    • Fatty liver     dx on CT 2016   • GERD (gastroesophageal reflux disease)     controlled w/ daily Protonix   • Heart disease     w/ cardiac clearance 4/2019, negative stress test 10/2017, EF 65%   • Hiatal hernia     \"small\" noted on UGI 2014   • History of Helicobacter pylori infection     treated remotely   • Hyperlipidemia    • Hypertension    • Joint pain    • Morbid obesity with BMI of 45.0-49.9, adult ([facility])    • Sleep apnea     formerly on a device, no longer using, says just doesn't need it   • Urinary incontinence     no meds   • Vitamin D deficiency      Past Surgical History:   Procedure Laterality Date   • BLADDER SURGERY  2013    \"tack\", uncomplicated, but unsuccessful   • CATARACT EXTRACTION     • COLONOSCOPY  2015    unremarkable   • DILATATION AND CURETTAGE  1987   • ORIF HIP FRACTURE Left 2/26/2022    Procedure: Left hip Open reduction and internal fixation.;  Surgeon: Jarrod Leung MD;  Location: [facility];  Service: Orthopedics;  Laterality: Left;   • TONSILLECTOMY  1984         OT ASSESSMENT FLOWSHEET (last 12 hours)     OT Evaluation and Treatment     Row Name " 03/02/22 1628                   OT Time and Intention    Document Type therapy note (daily note)  -KR        Mode of Treatment occupational therapy  -KR        Patient Effort adequate  -KR                  Bed Mobility    Bed Mobility bed mobility (all) activities  -KR        All Activities, [place] (Bed Mobility) dependent (less than 25% patient effort)  -KR        Comment (Bed Mobility) Pt continues to be dependent with all bed mobility at this time but has improved from initial evaluation, tolerating movement, able to achieve EOB sitting, reporting less pain  -KR                  Balance    Balance Assessment sitting static balance  -KR        Static Sitting Balance unsupported; mild impairment  -KR                  Wound 02/26/22 1245 Left anterior hip Incision    Wound - Properties Group Placement Date: 02/26/22  - Placement Time: 1245  -MC Side: Left  - Orientation: anterior  -MC Location: hip  -MC Primary Wound Type: Incision  -MC        Retired Wound - Properties Group Date first assessed: 02/26/22  - Time first assessed: 1245  -MC Side: Left  - Location: hip  -MC Primary Wound Type: Incision  -MC                  Progress Summary (OT)    Progress Toward Functional Goals (OT) progress toward functional goals as expected  -KR              User Key  (r) = Recorded By, (t) = Taken By, (c) = Cosigned By    Initials Name Effective Dates    Adarsh Jimenez OT 06/16/21 -     [facility], Edelmira Clark RN 06/16/21 -                  Occupational Therapy Education                 Title: PT OT SLP Therapies (Done)     Topic: Occupational Therapy (Done)     Point: ADL training (Done)     Description:   Instruct learner(s) on proper safety adaptation and remediation techniques during self care or transfers.   Instruct in proper use of assistive devices.              Learning Progress Summary           Patient Acceptance, E,TB, VU by DM at 2/28/2022 0831      Show all documentation for this point (1)                  Point: Precautions (Done)     Description:   Instruct learner(s) on prescribed precautions during self-care and functional transfers.              Learning Progress Summary           Patient Acceptance, E,TB, VU by DM at 2/28/2022 1649      Show all documentation for this point (1)                             User Key     Initials Effective Dates Name Provider Type Discipline    TROY 06/16/21 -  Rachael Nicole, RN Registered Nurse Nurse                  OT Recommendation and Plan     Progress Toward Functional Goals (OT): progress toward functional goals as expected        Time Calculation:     Therapy Charges for Today     Code Description Service Date Service Provider Modifiers Qty    45598067380 HC OT SELF CARE/MGMT/TRAIN EA 15 MIN 3/2/2022 Adarsh Palacios OT GO 1    85911357174 HC OT THERAPEUTIC ACT EA 15 MIN 3/2/2022 Adarsh Palacios OT GO 1               Adarsh Palacios OT  3/2/2022    Electronically signed by Adarsh Palacios OT at 03/02/22 9644

## 2022-03-03 LAB
ALBUMIN SERPL-MCNC: 2.17 G/DL (ref 3.5–5.2)
ALBUMIN/GLOB SERPL: 0.7 G/DL
ALP SERPL-CCNC: 59 U/L (ref 39–117)
ALT SERPL W P-5'-P-CCNC: 9 U/L (ref 1–33)
ANION GAP SERPL CALCULATED.3IONS-SCNC: 10 MMOL/L (ref 5–15)
ANION GAP SERPL CALCULATED.3IONS-SCNC: 9.6 MMOL/L (ref 5–15)
AST SERPL-CCNC: 18 U/L (ref 1–32)
BASOPHILS # BLD MANUAL: 0.1 10*3/MM3 (ref 0–0.2)
BASOPHILS NFR BLD MANUAL: 1 % (ref 0–1.5)
BILIRUB SERPL-MCNC: 0.4 MG/DL (ref 0–1.2)
BUN SERPL-MCNC: 16 MG/DL (ref 8–23)
BUN SERPL-MCNC: 18 MG/DL (ref 8–23)
BUN/CREAT SERPL: 25 (ref 7–25)
BUN/CREAT SERPL: 27.3 (ref 7–25)
CALCIUM SPEC-SCNC: 7.8 MG/DL (ref 8.6–10.5)
CALCIUM SPEC-SCNC: 8.1 MG/DL (ref 8.6–10.5)
CHLORIDE SERPL-SCNC: 96 MMOL/L (ref 98–107)
CHLORIDE SERPL-SCNC: 97 MMOL/L (ref 98–107)
CO2 SERPL-SCNC: 23 MMOL/L (ref 22–29)
CO2 SERPL-SCNC: 24.4 MMOL/L (ref 22–29)
CREAT SERPL-MCNC: 0.64 MG/DL (ref 0.57–1)
CREAT SERPL-MCNC: 0.66 MG/DL (ref 0.57–1)
DEPRECATED RDW RBC AUTO: 52 FL (ref 37–54)
EGFRCR SERPLBLD CKD-EPI 2021: 100.7 ML/MIN/1.73
EGFRCR SERPLBLD CKD-EPI 2021: 99.9 ML/MIN/1.73
EOSINOPHIL # BLD MANUAL: 0.19 10*3/MM3 (ref 0–0.4)
EOSINOPHIL NFR BLD MANUAL: 2 % (ref 0.3–6.2)
ERYTHROCYTE [DISTWIDTH] IN BLOOD BY AUTOMATED COUNT: 14.2 % (ref 12.3–15.4)
GLOBULIN UR ELPH-MCNC: 3.1 GM/DL
GLUCOSE BLDC GLUCOMTR-MCNC: 186 MG/DL (ref 70–130)
GLUCOSE BLDC GLUCOMTR-MCNC: 190 MG/DL (ref 70–130)
GLUCOSE BLDC GLUCOMTR-MCNC: 203 MG/DL (ref 70–130)
GLUCOSE BLDC GLUCOMTR-MCNC: 251 MG/DL (ref 70–130)
GLUCOSE SERPL-MCNC: 165 MG/DL (ref 65–99)
GLUCOSE SERPL-MCNC: 190 MG/DL (ref 65–99)
HCT VFR BLD AUTO: 20.2 % (ref 34–46.6)
HGB BLD-MCNC: 6.1 G/DL (ref 12–15.9)
HYPOCHROMIA BLD QL: ABNORMAL
LYMPHOCYTES # BLD MANUAL: 2.1 10*3/MM3 (ref 0.7–3.1)
LYMPHOCYTES NFR BLD MANUAL: 8 % (ref 5–12)
MACROCYTES BLD QL SMEAR: ABNORMAL
MAGNESIUM SERPL-MCNC: 2.2 MG/DL (ref 1.6–2.4)
MCH RBC QN AUTO: 30.7 PG (ref 26.6–33)
MCHC RBC AUTO-ENTMCNC: 30.2 G/DL (ref 31.5–35.7)
MCV RBC AUTO: 101.5 FL (ref 79–97)
METAMYELOCYTES NFR BLD MANUAL: 2 % (ref 0–0)
MONOCYTES # BLD: 0.76 10*3/MM3 (ref 0.1–0.9)
MYELOCYTES NFR BLD MANUAL: 2 % (ref 0–0)
NEUTROPHILS # BLD AUTO: 6.01 10*3/MM3 (ref 1.7–7)
NEUTROPHILS NFR BLD MANUAL: 60 % (ref 42.7–76)
NEUTS BAND NFR BLD MANUAL: 3 % (ref 0–5)
NRBC SPEC MANUAL: 2 /100 WBC (ref 0–0.2)
PHOSPHATE SERPL-MCNC: 2.6 MG/DL (ref 2.5–4.5)
PLAT MORPH BLD: NORMAL
PLATELET # BLD AUTO: 377 10*3/MM3 (ref 140–450)
PMV BLD AUTO: 9.4 FL (ref 6–12)
POTASSIUM SERPL-SCNC: 4.6 MMOL/L (ref 3.5–5.2)
POTASSIUM SERPL-SCNC: 4.9 MMOL/L (ref 3.5–5.2)
PROT SERPL-MCNC: 5.3 G/DL (ref 6–8.5)
RBC # BLD AUTO: 1.99 10*6/MM3 (ref 3.77–5.28)
SCAN SLIDE: NORMAL
SODIUM SERPL-SCNC: 129 MMOL/L (ref 136–145)
SODIUM SERPL-SCNC: 131 MMOL/L (ref 136–145)
VARIANT LYMPHS NFR BLD MANUAL: 22 % (ref 19.6–45.3)
WBC NRBC COR # BLD: 9.54 10*3/MM3 (ref 3.4–10.8)

## 2022-03-03 PROCEDURE — 80053 COMPREHEN METABOLIC PANEL: CPT | Performed by: INTERNAL MEDICINE

## 2022-03-03 PROCEDURE — 25010000002 ENOXAPARIN PER 10 MG: Performed by: INTERNAL MEDICINE

## 2022-03-03 PROCEDURE — 63710000001 INSULIN ASPART PER 5 UNITS: Performed by: PHYSICIAN ASSISTANT

## 2022-03-03 PROCEDURE — 82962 GLUCOSE BLOOD TEST: CPT

## 2022-03-03 PROCEDURE — 83735 ASSAY OF MAGNESIUM: CPT | Performed by: INTERNAL MEDICINE

## 2022-03-03 PROCEDURE — 63710000001 INSULIN DETEMIR PER 5 UNITS: Performed by: INTERNAL MEDICINE

## 2022-03-03 PROCEDURE — 85025 COMPLETE CBC W/AUTO DIFF WBC: CPT | Performed by: INTERNAL MEDICINE

## 2022-03-03 PROCEDURE — 99232 SBSQ HOSP IP/OBS MODERATE 35: CPT | Performed by: INTERNAL MEDICINE

## 2022-03-03 PROCEDURE — 84100 ASSAY OF PHOSPHORUS: CPT | Performed by: INTERNAL MEDICINE

## 2022-03-03 PROCEDURE — 85007 BL SMEAR W/DIFF WBC COUNT: CPT | Performed by: INTERNAL MEDICINE

## 2022-03-03 RX ADMIN — METFORMIN HYDROCHLORIDE 500 MG: 500 TABLET ORAL at 09:07

## 2022-03-03 RX ADMIN — INSULIN ASPART 3 UNITS: 100 INJECTION, SOLUTION INTRAVENOUS; SUBCUTANEOUS at 17:06

## 2022-03-03 RX ADMIN — DOCUSATE SODIUM 100 MG: 100 CAPSULE, LIQUID FILLED ORAL at 20:41

## 2022-03-03 RX ADMIN — OXYCODONE HYDROCHLORIDE AND ACETAMINOPHEN 1 TABLET: 10; 325 TABLET ORAL at 05:35

## 2022-03-03 RX ADMIN — METFORMIN HYDROCHLORIDE 1000 MG: 500 TABLET ORAL at 17:06

## 2022-03-03 RX ADMIN — DOCUSATE SODIUM 100 MG: 100 CAPSULE, LIQUID FILLED ORAL at 09:07

## 2022-03-03 RX ADMIN — OXYCODONE HYDROCHLORIDE AND ACETAMINOPHEN 1 TABLET: 10; 325 TABLET ORAL at 17:06

## 2022-03-03 RX ADMIN — Medication 150 MG: at 09:08

## 2022-03-03 RX ADMIN — BUPROPION HYDROCHLORIDE 400 MG: 150 TABLET, EXTENDED RELEASE ORAL at 20:41

## 2022-03-03 RX ADMIN — INSULIN ASPART 8 UNITS: 100 INJECTION, SOLUTION INTRAVENOUS; SUBCUTANEOUS at 20:42

## 2022-03-03 RX ADMIN — POLYETHYLENE GLYCOL 3350 17 G: 17 POWDER, FOR SOLUTION ORAL at 09:08

## 2022-03-03 RX ADMIN — OXYCODONE HYDROCHLORIDE AND ACETAMINOPHEN 1 TABLET: 10; 325 TABLET ORAL at 23:44

## 2022-03-03 RX ADMIN — INSULIN ASPART 5 UNITS: 100 INJECTION, SOLUTION INTRAVENOUS; SUBCUTANEOUS at 11:29

## 2022-03-03 RX ADMIN — FLUOXETINE HYDROCHLORIDE 10 MG: 10 CAPSULE ORAL at 09:07

## 2022-03-03 RX ADMIN — INSULIN ASPART 3 UNITS: 100 INJECTION, SOLUTION INTRAVENOUS; SUBCUTANEOUS at 09:07

## 2022-03-03 RX ADMIN — ENOXAPARIN SODIUM 30 MG: 30 INJECTION SUBCUTANEOUS at 17:06

## 2022-03-03 RX ADMIN — PANTOPRAZOLE SODIUM 40 MG: 40 TABLET, DELAYED RELEASE ORAL at 05:35

## 2022-03-03 RX ADMIN — INSULIN DETEMIR 15 UNITS: 100 INJECTION, SOLUTION SUBCUTANEOUS at 20:43

## 2022-03-03 RX ADMIN — OXYCODONE HYDROCHLORIDE AND ACETAMINOPHEN 1 TABLET: 10; 325 TABLET ORAL at 11:29

## 2022-03-03 RX ADMIN — Medication 150 MG: at 20:41

## 2022-03-03 NOTE — CASE MANAGEMENT/SOCIAL WORK
Discharge Planning Assessment  [facility]     Patient Name: Es Olivier  MRN: [id number]  Today's Date: 3/3/2022    Admit Date: 2/25/2022       Discharge Plan     Row Name 03/03/22 1102       Plan    Plan SS contacted [facility] and [facility] per Karlee regarding referrals. SS to follow.    11:25: The [facility] per [facility] does not have a bed available. SS to follow.    17:39: SS was notified by [facility] per Karlee that they do not have a bed available. SS contacted pt's daughter, Brynn. Pt's daughter request SNF rehab placement at [facility] and [facility]. Pt's daughter voices plans to discuss this with pt. [facility]. SS to follow up with pt's daughter tomorrow. SS to follow.              Continued Care and Services - Admitted Since 2/25/2022     Destination     Service Provider Request Status Selected Services Address Phone Fax Patient Preferred    [facility]  Pending - Request Sent N/A [address] [place] [address] [phone] --    [facility]  Pending - Request Sent N/A [address] [place] [address] [phone] --    [facility]  Declined N/A [address] [facility] [place] [address] [phone] --              ARIEL Bradford

## 2022-03-03 NOTE — PLAN OF CARE
Goal Outcome Evaluation:  Plan of Care Reviewed With: patient        Progress: improving  Outcome Summary: Pt has been resting in bed. Pt worked with PT and has no new complaints. IV fluid stopped per MD, drs changed and incision looks good. Will continue to monitor.

## 2022-03-03 NOTE — PROGRESS NOTES
Inpatient Progress Note  Es Olivier  Date: 03/03/22  MRN: [id number]      Subjective:   She is postop day #5 status post left hip open reduction internal fixation.  Hemoglobin slightly improved on today's exam at 6.1.  Patient's pain controlled.  Has not ambulated with physical therapy.  Afebrile today.  Anticoagulation continued to be held by hospitalist service due to low hemoglobin, patient refuses blood products.  No Chest pain or shortness of breath is noted today.        Objective:    Vitals:    03/02/22 1100 03/02/22 1812 03/03/22 0100 03/03/22 0604   BP: 125/58 125/56 131/55 150/59   BP Location: Left arm Left arm Left arm Right arm   Patient Position: Lying Lying Lying Lying   Pulse: 96 105 94 98   Resp: 18 18 20 18   Temp: 98 °F (36.7 °C) 98.2 °F (36.8 °C) 98 °F (36.7 °C) 98.3 °F (36.8 °C)   TempSrc: Oral Oral Oral Oral   SpO2: 94% 95% 96% 92%   Weight:       Height:              Physical Exam:  Constitutional: Morbidly obese female.  No respiratory distress.        Musculoskeletal: Upon examination of the left hip the surgical incision is healing well.  Serous drainage noted.  No bloody drainage is noted.  Neurovascularly intact left lower extremity.  Dorsiflexion plantarflexion intact left ankle.  No foot drop abnormality is noted.      Labs:    Results from last 7 days   Lab Units 03/03/22  0136 03/02/22  0754 03/01/22  0952 03/01/22  0533 03/01/22  0533   WBC 10*3/mm3 9.54 9.94  --   --  8.98   HEMOGLOBIN g/dL 6.1* 5.8* 6.3*   < > 6.1*   HEMATOCRIT % 20.2* 18.4* 19.4*   < > 19.0*   MCV fL 101.5* 96.3  --   --  95.5   MCHC g/dL 30.2* 31.5  --   --  32.1   PLATELETS 10*3/mm3 377 359  --   --  278    < > = values in this interval not displayed.         Results from last 7 days   Lab Units 03/03/22  0136 03/02/22  0252 03/01/22  0533 02/28/22  1604 02/28/22  0330 02/27/22  0137 02/27/22  0137 02/26/22  0122 02/26/22  0122   SODIUM mmol/L 129* 128* 128*   < > 127*   < > 132*   < > 136   POTASSIUM mmol/L  4.9 4.4 4.1  --  3.7   < > 4.0   < > 4.4   MAGNESIUM mg/dL 2.2 2.2 2.2  --  1.8   < > 1.9   < > 2.0   CHLORIDE mmol/L 96* 97* 96*  --  95*   < > 101   < > 100   CO2 mmol/L 23.0 22.9 21.4*  --  19.7*   < > 18.3*   < > 24.4   BUN mg/dL 18 18 17  --  22   < > 28*   < > 23   CREATININE mg/dL 0.66 0.82 0.65  --  0.73   < > 0.84   < > 0.81   EGFR IF NONAFRICN AM mL/min/1.73  --   --   --   --  81  --  69  --  72   CALCIUM mg/dL 7.8* 7.6* 7.6*  --  7.5*   < > 7.5*   < > 8.8   GLUCOSE mg/dL 190* 182* 189*  --  211*   < > 251*   < > 205*   ALBUMIN g/dL 2.17* 2.12*  --   --   --   --   --   --  3.51   BILIRUBIN mg/dL 0.4 0.4  --   --   --   --   --   --  0.5   ALK PHOS U/L 59 46  --   --   --   --   --   --  53   AST (SGOT) U/L 18 18  --   --   --   --   --   --  13   ALT (SGPT) U/L 9 7  --   --   --   --   --   --  11    < > = values in this interval not displayed.   Estimated Creatinine Clearance: 130.6 mL/min (by C-G formula based on SCr of 0.66 mg/dL).  No results found for: AMMONIA          No results found for: HGBA1C  Lab Results   Component Value Date    TSH 8.590 (H) 02/25/2022    FREET4 1.13 02/25/2022         Pain Management Panel     Pain Management Panel Latest Ref Rng & Units 4/6/2016 4/7/2014    AMPHETAMINES SCREEN, URINE Negative Negative Negative    BARBITURATES SCREEN Negative Negative Negative    BENZODIAZEPINE SCREEN, URINE Negative Negative Negative    COCAINE SCREEN, URINE Negative Negative Negative    METHADONE SCREEN, URINE Negative Negative Negative          No results found for: BLOODCX  No results found for: URINECX  No results found for: WOUNDCX  No results found for: STOOLCX              Radiology:  Imaging Results (Last 72 Hours)     ** No results found for the last 72 hours. **            Assessment:  #1 status post left hip open reduction internal fixation          Plan:   Anticoagulation on hold by hospitalist due to low hemoglobin.  Patient does refuse blood products.  Patient is pending  rehab placement.  Orthopedic surgery will continue to follow the patient.  Recommend continued evaluation by physical therapy for mobilization.  No bloody drainage is noted from the incision.  Change dressing as needed saturation left hip.        Ariel Frazier, APRN March 3, 2022 13:55 EST

## 2022-03-03 NOTE — SIGNIFICANT NOTE
03/03/22 1638   OTHER   Discipline physical therapist   Rehab Time/Intention   Session Not Performed patient/family declined treatment   Recommendation   PT - Next Appointment   (Pt reports feeling sick, declined treatment.)

## 2022-03-03 NOTE — PROGRESS NOTES
Assisted By: Carol RHOADES    CC: Follow-up on fractured hip and acute blood loss anemia    Interview History/HPI: Patient states she is feeling better less pain in the hip but still some pain with mobilization.  No chest pain no shortness of breath she is tolerating her diet.    ROS:     Vitals:    03/03/22 0604   BP: 150/59   Pulse: 98   Resp: 18   Temp: 98.3 °F (36.8 °C)   SpO2: 92%         Intake/Output Summary (Last 24 hours) at 3/3/2022 1644  Last data filed at 3/3/2022 1500  Gross per 24 hour   Intake 3743.92 ml   Output 1575 ml   Net 2168.92 ml       EXAM: Morbidly obese by BMI of 51, no distress mood good mucous membranes appear somewhat pale heart is a regular rate and rhythm without murmur rub or gallop, abdomen soft benign bowel sounds active lungs are clear hip wound just some serous type drainage on the dressing      EKG:     Tele: Sinus rhythm    LABS:     Lab Results (last 48 hours)     Procedure Component Value Units Date/Time    Basic Metabolic Panel [[id number]]  (Abnormal) Collected: 03/03/22 1530    Specimen: Blood Updated: 03/03/22 1606     Glucose 165 mg/dL      BUN 16 mg/dL      Creatinine 0.64 mg/dL      Sodium 131 mmol/L      Potassium 4.6 mmol/L      Chloride 97 mmol/L      CO2 24.4 mmol/L      Calcium 8.1 mg/dL      BUN/Creatinine Ratio 25.0     Anion Gap 9.6 mmol/L      eGFR 100.7 mL/min/1.73      Comment: National Kidney Foundation and American Society of Nephrology (ASN) Task Force recommended calculation based on the Chronic Kidney Disease Epidemiology Collaboration (CKD-EPI) equation refit without adjustment for race.       Narrative:      GFR Normal >60  Chronic Kidney Disease <60  Kidney Failure <15      POC Glucose Once [[id number]]  (Abnormal) Collected: 03/03/22 1116    Specimen: Blood Updated: 03/03/22 1124     Glucose 203 mg/dL      Comment: Meter: [id number] : [id number] akosua matamoros       POC Glucose Once [[id number]]  (Abnormal) Collected: 03/03/22 0607    Specimen: Blood  Updated: 03/03/22 0613     Glucose 190 mg/dL      Comment: Meter: [id number] : [id number] RIN GONZALEZ       Manual Differential [[id number]]  (Abnormal) Collected: 03/03/22 0136    Specimen: Blood Updated: 03/03/22 0301     Neutrophil % 60.0 %      Lymphocyte % 22.0 %      Monocyte % 8.0 %      Eosinophil % 2.0 %      Basophil % 1.0 %      Bands %  3.0 %      Metamyelocyte % 2.0 %      Myelocyte % 2.0 %      Neutrophils Absolute 6.01 10*3/mm3      Lymphocytes Absolute 2.10 10*3/mm3      Monocytes Absolute 0.76 10*3/mm3      Eosinophils Absolute 0.19 10*3/mm3      Basophils Absolute 0.10 10*3/mm3      nRBC 2.0 /100 WBC      Hypochromia Slight/1+     Macrocytes Slight/1+     Platelet Morphology Normal    CBC & Differential [[id number]]  (Abnormal) Collected: 03/03/22 0136    Specimen: Blood Updated: 03/03/22 0301    Narrative:      The following orders were created for panel order CBC & Differential.  Procedure                               Abnormality         Status                     ---------                               -----------         ------                     CBC Auto Differential[[id number]]        Abnormal            Final result               Scan Slide[[id number]]                                       Final result                 Please view results for these tests on the individual orders.    Scan Slide [[id number]] Collected: 03/03/22 0136    Specimen: Blood Updated: 03/03/22 0301     Scan Slide --     Comment: See Manual Differential Results       CBC Auto Differential [[id number]]  (Abnormal) Collected: 03/03/22 0136    Specimen: Blood Updated: 03/03/22 0301     WBC 9.54 10*3/mm3      RBC 1.99 10*6/mm3      Hemoglobin 6.1 g/dL      Hematocrit 20.2 %      .5 fL      MCH 30.7 pg      MCHC 30.2 g/dL      RDW 14.2 %      RDW-SD 52.0 fl      MPV 9.4 fL      Platelets 377 10*3/mm3     Comprehensive Metabolic Panel [[id number]]  (Abnormal) Collected: 03/03/22 0136    Specimen: Blood Updated:  03/03/22 0256     Glucose 190 mg/dL      BUN 18 mg/dL      Creatinine 0.66 mg/dL      Sodium 129 mmol/L      Potassium 4.9 mmol/L      Chloride 96 mmol/L      CO2 23.0 mmol/L      Calcium 7.8 mg/dL      Total Protein 5.3 g/dL      Albumin 2.17 g/dL      ALT (SGPT) 9 U/L      AST (SGOT) 18 U/L      Alkaline Phosphatase 59 U/L      Total Bilirubin 0.4 mg/dL      Globulin 3.1 gm/dL      A/G Ratio 0.7 g/dL      BUN/Creatinine Ratio 27.3     Anion Gap 10.0 mmol/L      eGFR 99.9 mL/min/1.73      Comment: National Kidney Foundation and American Society of Nephrology (ASN) Task Force recommended calculation based on the Chronic Kidney Disease Epidemiology Collaboration (CKD-EPI) equation refit without adjustment for race.       Narrative:      GFR Normal >60  Chronic Kidney Disease <60  Kidney Failure <15      Phosphorus [[id number]]  (Normal) Collected: 03/03/22 0136    Specimen: Blood Updated: 03/03/22 0256     Phosphorus 2.6 mg/dL     Magnesium [[id number]]  (Normal) Collected: 03/03/22 0136    Specimen: Blood Updated: 03/03/22 0256     Magnesium 2.2 mg/dL     POC Glucose Once [[id number]]  (Abnormal) Collected: 03/02/22 2236    Specimen: Blood Updated: 03/02/22 2301     Glucose 236 mg/dL      Comment: Meter: [id number] : [id number] rachele tyler       POC Glucose Once [[id number]]  (Abnormal) Collected: 03/02/22 1632    Specimen: Blood Updated: 03/02/22 1640     Glucose 224 mg/dL      Comment: Meter: [id number] : [id number] akosua matamoros       Folate [[id number]]  (Normal) Collected: 03/02/22 0252    Specimen: Blood Updated: 03/02/22 1246     Folate 8.27 ng/mL     Narrative:      Results may be falsely increased if patient taking Biotin.      Vitamin B12 [[id number]]  (Abnormal) Collected: 03/02/22 0252    Specimen: Blood Updated: 03/02/22 1246     Vitamin B-12 192 pg/mL     Narrative:      Results may be falsely increased if patient taking Biotin.      Phosphorus [[id number]]  (Normal) Collected: 03/02/22 1109     Specimen: Blood Updated: 03/02/22 1145     Phosphorus 2.6 mg/dL     POC Glucose Once [[id number]]  (Abnormal) Collected: 03/02/22 1115    Specimen: Blood Updated: 03/02/22 1122     Glucose 220 mg/dL      Comment: Meter: [id number] : [id number] REJI ESTELLA       Manual Differential [[id number]]  (Abnormal) Collected: 03/02/22 0754    Specimen: Blood Updated: 03/02/22 0843     Neutrophil % 64.0 %      Lymphocyte % 9.0 %      Monocyte % 14.0 %      Eosinophil % 4.0 %      Bands %  5.0 %      Metamyelocyte % 3.0 %      Myelocyte % 1.0 %      Neutrophils Absolute 6.86 10*3/mm3      Lymphocytes Absolute 0.89 10*3/mm3      Monocytes Absolute 1.39 10*3/mm3      Eosinophils Absolute 0.40 10*3/mm3      Hypochromia Slight/1+     Macrocytes Slight/1+     Platelet Morphology Normal    CBC & Differential [[id number]]  (Abnormal) Collected: 03/02/22 0754    Specimen: Blood Updated: 03/02/22 0843    Narrative:      The following orders were created for panel order CBC & Differential.  Procedure                               Abnormality         Status                     ---------                               -----------         ------                     CBC Auto Differential[[id number]]        Abnormal            Final result               Scan Slide[[id number]]                                       Final result                 Please view results for these tests on the individual orders.    Scan Slide [[id number]] Collected: 03/02/22 0754    Specimen: Blood Updated: 03/02/22 0843     Scan Slide --     Comment: See Manual Differential Results       CBC Auto Differential [[id number]]  (Abnormal) Collected: 03/02/22 0754    Specimen: Blood Updated: 03/02/22 0819     WBC 9.94 10*3/mm3      RBC 1.91 10*6/mm3      Hemoglobin 5.8 g/dL      Hematocrit 18.4 %      MCV 96.3 fL      MCH 30.4 pg      MCHC 31.5 g/dL      RDW 13.9 %      RDW-SD 48.7 fl      MPV 9.5 fL      Platelets 359 10*3/mm3     POC Glucose Once [[id number]]  (Abnormal)  Collected: 03/02/22 0647    Specimen: Blood Updated: 03/02/22 0653     Glucose 210 mg/dL      Comment: Meter: [id number] : [id number] RIN GONZALEZ       Comprehensive Metabolic Panel [[id number]]  (Abnormal) Collected: 03/02/22 0252    Specimen: Blood Updated: 03/02/22 0332     Glucose 182 mg/dL      BUN 18 mg/dL      Creatinine 0.82 mg/dL      Sodium 128 mmol/L      Potassium 4.4 mmol/L      Chloride 97 mmol/L      CO2 22.9 mmol/L      Calcium 7.6 mg/dL      Total Protein 5.1 g/dL      Albumin 2.12 g/dL      ALT (SGPT) 7 U/L      AST (SGOT) 18 U/L      Alkaline Phosphatase 46 U/L      Total Bilirubin 0.4 mg/dL      Globulin 3.0 gm/dL      A/G Ratio 0.7 g/dL      BUN/Creatinine Ratio 22.0     Anion Gap 8.1 mmol/L      eGFR 81.5 mL/min/1.73      Comment: National Kidney Foundation and American Society of Nephrology (ASN) Task Force recommended calculation based on the Chronic Kidney Disease Epidemiology Collaboration (CKD-EPI) equation refit without adjustment for race.       Narrative:      GFR Normal >60  Chronic Kidney Disease <60  Kidney Failure <15      Phosphorus [[id number]]  (Abnormal) Collected: 03/02/22 0252    Specimen: Blood Updated: 03/02/22 0332     Phosphorus 2.3 mg/dL     Magnesium [[id number]]  (Normal) Collected: 03/02/22 0252    Specimen: Blood Updated: 03/02/22 0332     Magnesium 2.2 mg/dL     POC Glucose Once [[id number]]  (Abnormal) Collected: 03/01/22 2117    Specimen: Blood Updated: 03/01/22 2142     Glucose 296 mg/dL      Comment: Meter: [id number] : [id number] rachele tyler                  Radiology:    Imaging Results (Last 72 Hours)     ** No results found for the last 72 hours. **        Femur x-ray on admission showed comminuted fracture of the proximal left femur      Assessment/Plan:   Acute blood loss anemia, patient came in with hemoglobin 11.4, it dropped as low as 5.3, she is refused transfusion due to [employer] convictions therefore she had been started on iron once a  day, I doubled this and now she has been started on Procrit.  Hemoglobin this morning is up to 6.1, I do not think she is actively bleeding at the current time.  We will continue to monitor closely, hemodynamically she is at this time stable.  I did give her IV fluids, heart rate has come down.    Hyponatremia, she appeared hypovolemic by urine studies therefore she was given saline, sodium is rising now, stopped IV fluids, I have encouraged her p.o. intake and will recheck in a.m.    DVT prophylaxis, she is at very high risk with risk fracture and poor mobilization, since I do not think she is actively bleeding, I am going to restart Lovenox at previous dosing, if hemoglobin drop again could rethink this but I do not think she is actively bleeding.      Diabetes, coming under better control, continue basal bolus insulin with Metformin, I have increased Metformin to 1000 twice daily, follow.    Remains at high risk of acute blood loss anemia with inability to transfuse.    Hip fracture, she has been very hesitant to mobilize complicating her recovery course, definitely will need some kind of SNF for rehabilitation.      Disposition SNF    Abner Gomez MD

## 2022-03-04 ENCOUNTER — APPOINTMENT (OUTPATIENT)
Dept: CARDIOLOGY | Facility: HOSPITAL | Age: 62
End: 2022-03-04

## 2022-03-04 LAB
ANION GAP SERPL CALCULATED.3IONS-SCNC: 9.8 MMOL/L (ref 5–15)
ANISOCYTOSIS BLD QL: ABNORMAL
BASOPHILS # BLD MANUAL: 0.09 10*3/MM3 (ref 0–0.2)
BASOPHILS NFR BLD MANUAL: 1 % (ref 0–1.5)
BH CV ECHO MEAS - AO MAX PG: 19.7 MMHG
BH CV ECHO MEAS - AO MEAN PG: 12 MMHG
BH CV ECHO MEAS - AO ROOT AREA (BSA CORRECTED): 1.2
BH CV ECHO MEAS - AO ROOT AREA: 7.1 CM^2
BH CV ECHO MEAS - AO ROOT DIAM: 3 CM
BH CV ECHO MEAS - AO V2 MAX: 222 CM/SEC
BH CV ECHO MEAS - AO V2 MEAN: 162 CM/SEC
BH CV ECHO MEAS - AO V2 VTI: 47.3 CM
BH CV ECHO MEAS - BSA(HAYCOCK): 2.7 M^2
BH CV ECHO MEAS - BSA: 2.4 M^2
BH CV ECHO MEAS - BZI_BMI: 50.7 KILOGRAMS/M^2
BH CV ECHO MEAS - BZI_METRIC_HEIGHT: 167.6 CM
BH CV ECHO MEAS - BZI_METRIC_WEIGHT: 142.4 KG
BH CV ECHO MEAS - EDV(CUBED): 34 ML
BH CV ECHO MEAS - EDV(MOD-SP4): 71 ML
BH CV ECHO MEAS - EDV(TEICH): 42.2 ML
BH CV ECHO MEAS - EF(CUBED): 82.3 %
BH CV ECHO MEAS - EF(MOD-SP4): 70.7 %
BH CV ECHO MEAS - EF(TEICH): 76.3 %
BH CV ECHO MEAS - ESV(CUBED): 6 ML
BH CV ECHO MEAS - ESV(MOD-SP4): 20.8 ML
BH CV ECHO MEAS - ESV(TEICH): 10 ML
BH CV ECHO MEAS - FS: 43.8 %
BH CV ECHO MEAS - IVS/LVPW: 0.83
BH CV ECHO MEAS - IVSD: 1.1 CM
BH CV ECHO MEAS - LA DIMENSION: 3.6 CM
BH CV ECHO MEAS - LA/AO: 1.2
BH CV ECHO MEAS - LV DIASTOLIC VOL/BSA (35-75): 29.3 ML/M^2
BH CV ECHO MEAS - LV MASS(C)D: 122.4 GRAMS
BH CV ECHO MEAS - LV MASS(C)DI: 50.5 GRAMS/M^2
BH CV ECHO MEAS - LV SYSTOLIC VOL/BSA (12-30): 8.6 ML/M^2
BH CV ECHO MEAS - LVIDD: 3.2 CM
BH CV ECHO MEAS - LVIDS: 1.8 CM
BH CV ECHO MEAS - LVLD AP4: 7.8 CM
BH CV ECHO MEAS - LVLS AP4: 6.1 CM
BH CV ECHO MEAS - LVOT AREA (M): 3.1 CM^2
BH CV ECHO MEAS - LVOT AREA: 3.1 CM^2
BH CV ECHO MEAS - LVOT DIAM: 2 CM
BH CV ECHO MEAS - LVPWD: 1.3 CM
BH CV ECHO MEAS - MV A MAX VEL: 125 CM/SEC
BH CV ECHO MEAS - MV E MAX VEL: 125 CM/SEC
BH CV ECHO MEAS - MV E/A: 1
BH CV ECHO MEAS - PA ACC TIME: 0.1 SEC
BH CV ECHO MEAS - PA PR(ACCEL): 34.5 MMHG
BH CV ECHO MEAS - RAP SYSTOLE: 10 MMHG
BH CV ECHO MEAS - RVSP: 33.4 MMHG
BH CV ECHO MEAS - SI(AO): 138 ML/M^2
BH CV ECHO MEAS - SI(CUBED): 11.6 ML/M^2
BH CV ECHO MEAS - SI(MOD-SP4): 20.7 ML/M^2
BH CV ECHO MEAS - SI(TEICH): 13.3 ML/M^2
BH CV ECHO MEAS - SV(AO): 334.3 ML
BH CV ECHO MEAS - SV(CUBED): 28 ML
BH CV ECHO MEAS - SV(MOD-SP4): 50.2 ML
BH CV ECHO MEAS - SV(TEICH): 32.2 ML
BH CV ECHO MEAS - TR MAX VEL: 242 CM/SEC
BUN SERPL-MCNC: 16 MG/DL (ref 8–23)
BUN/CREAT SERPL: 24.2 (ref 7–25)
CALCIUM SPEC-SCNC: 8 MG/DL (ref 8.6–10.5)
CHLORIDE SERPL-SCNC: 96 MMOL/L (ref 98–107)
CO2 SERPL-SCNC: 24.2 MMOL/L (ref 22–29)
CREAT SERPL-MCNC: 0.66 MG/DL (ref 0.57–1)
DEPRECATED RDW RBC AUTO: 49.6 FL (ref 37–54)
EGFRCR SERPLBLD CKD-EPI 2021: 99.9 ML/MIN/1.73
EOSINOPHIL # BLD MANUAL: 0.38 10*3/MM3 (ref 0–0.4)
EOSINOPHIL NFR BLD MANUAL: 4 % (ref 0.3–6.2)
ERYTHROCYTE [DISTWIDTH] IN BLOOD BY AUTOMATED COUNT: 14.4 % (ref 12.3–15.4)
GLUCOSE BLDC GLUCOMTR-MCNC: 194 MG/DL (ref 70–130)
GLUCOSE BLDC GLUCOMTR-MCNC: 203 MG/DL (ref 70–130)
GLUCOSE BLDC GLUCOMTR-MCNC: 214 MG/DL (ref 70–130)
GLUCOSE BLDC GLUCOMTR-MCNC: 223 MG/DL (ref 70–130)
GLUCOSE SERPL-MCNC: 162 MG/DL (ref 65–99)
HCT VFR BLD AUTO: 18.9 % (ref 34–46.6)
HGB BLD-MCNC: 5.9 G/DL (ref 12–15.9)
HYPOCHROMIA BLD QL: ABNORMAL
LV EF 2D ECHO EST: 65 %
LYMPHOCYTES # BLD MANUAL: 1.33 10*3/MM3 (ref 0.7–3.1)
LYMPHOCYTES NFR BLD MANUAL: 11 % (ref 5–12)
MAXIMAL PREDICTED HEART RATE: 159 BPM
MCH RBC QN AUTO: 29.9 PG (ref 26.6–33)
MCHC RBC AUTO-ENTMCNC: 31.2 G/DL (ref 31.5–35.7)
MCV RBC AUTO: 95.9 FL (ref 79–97)
MONOCYTES # BLD: 1.04 10*3/MM3 (ref 0.1–0.9)
NEUTROPHILS # BLD AUTO: 6.63 10*3/MM3 (ref 1.7–7)
NEUTROPHILS NFR BLD MANUAL: 69 % (ref 42.7–76)
NEUTS BAND NFR BLD MANUAL: 1 % (ref 0–5)
NRBC SPEC MANUAL: 1 /100 WBC (ref 0–0.2)
PLAT MORPH BLD: NORMAL
PLATELET # BLD AUTO: 468 10*3/MM3 (ref 140–450)
PMV BLD AUTO: 9.5 FL (ref 6–12)
POLYCHROMASIA BLD QL SMEAR: ABNORMAL
POTASSIUM SERPL-SCNC: 4.7 MMOL/L (ref 3.5–5.2)
RBC # BLD AUTO: 1.97 10*6/MM3 (ref 3.77–5.28)
SODIUM SERPL-SCNC: 130 MMOL/L (ref 136–145)
STRESS TARGET HR: 135 BPM
VARIANT LYMPHS NFR BLD MANUAL: 14 % (ref 19.6–45.3)
WBC NRBC COR # BLD: 9.47 10*3/MM3 (ref 3.4–10.8)

## 2022-03-04 PROCEDURE — 63710000001 INSULIN DETEMIR PER 5 UNITS: Performed by: INTERNAL MEDICINE

## 2022-03-04 PROCEDURE — 85007 BL SMEAR W/DIFF WBC COUNT: CPT | Performed by: INTERNAL MEDICINE

## 2022-03-04 PROCEDURE — 25010000002 ENOXAPARIN PER 10 MG: Performed by: INTERNAL MEDICINE

## 2022-03-04 PROCEDURE — 82962 GLUCOSE BLOOD TEST: CPT

## 2022-03-04 PROCEDURE — 63710000001 INSULIN ASPART PER 5 UNITS: Performed by: PHYSICIAN ASSISTANT

## 2022-03-04 PROCEDURE — 93306 TTE W/DOPPLER COMPLETE: CPT

## 2022-03-04 PROCEDURE — 93306 TTE W/DOPPLER COMPLETE: CPT | Performed by: INTERNAL MEDICINE

## 2022-03-04 PROCEDURE — 25010000002 EPOETIN ALFA-EPBX 20000 UNIT/ML SOLUTION: Performed by: INTERNAL MEDICINE

## 2022-03-04 PROCEDURE — 99231 SBSQ HOSP IP/OBS SF/LOW 25: CPT | Performed by: INTERNAL MEDICINE

## 2022-03-04 PROCEDURE — 80048 BASIC METABOLIC PNL TOTAL CA: CPT | Performed by: INTERNAL MEDICINE

## 2022-03-04 PROCEDURE — 85025 COMPLETE CBC W/AUTO DIFF WBC: CPT | Performed by: INTERNAL MEDICINE

## 2022-03-04 PROCEDURE — 97530 THERAPEUTIC ACTIVITIES: CPT

## 2022-03-04 RX ORDER — LACTULOSE 10 G/15ML
20 SOLUTION ORAL ONCE
Status: COMPLETED | OUTPATIENT
Start: 2022-03-04 | End: 2022-03-04

## 2022-03-04 RX ADMIN — INSULIN ASPART 5 UNITS: 100 INJECTION, SOLUTION INTRAVENOUS; SUBCUTANEOUS at 17:36

## 2022-03-04 RX ADMIN — DOCUSATE SODIUM 100 MG: 100 CAPSULE, LIQUID FILLED ORAL at 08:46

## 2022-03-04 RX ADMIN — METFORMIN HYDROCHLORIDE 1000 MG: 500 TABLET ORAL at 08:46

## 2022-03-04 RX ADMIN — ENOXAPARIN SODIUM 30 MG: 30 INJECTION SUBCUTANEOUS at 05:04

## 2022-03-04 RX ADMIN — LACTULOSE 20 G: 10 SOLUTION ORAL at 17:37

## 2022-03-04 RX ADMIN — INSULIN ASPART 5 UNITS: 100 INJECTION, SOLUTION INTRAVENOUS; SUBCUTANEOUS at 20:28

## 2022-03-04 RX ADMIN — INSULIN ASPART 5 UNITS: 100 INJECTION, SOLUTION INTRAVENOUS; SUBCUTANEOUS at 08:45

## 2022-03-04 RX ADMIN — POLYETHYLENE GLYCOL 3350 17 G: 17 POWDER, FOR SOLUTION ORAL at 08:52

## 2022-03-04 RX ADMIN — OXYCODONE HYDROCHLORIDE AND ACETAMINOPHEN 1 TABLET: 10; 325 TABLET ORAL at 23:45

## 2022-03-04 RX ADMIN — EPOETIN ALFA-EPBX 40000 UNITS: 20000 INJECTION, SOLUTION INTRAVENOUS; SUBCUTANEOUS at 11:34

## 2022-03-04 RX ADMIN — METFORMIN HYDROCHLORIDE 1000 MG: 500 TABLET ORAL at 17:36

## 2022-03-04 RX ADMIN — INSULIN DETEMIR 20 UNITS: 100 INJECTION, SOLUTION SUBCUTANEOUS at 20:27

## 2022-03-04 RX ADMIN — ENOXAPARIN SODIUM 30 MG: 30 INJECTION SUBCUTANEOUS at 17:36

## 2022-03-04 RX ADMIN — FLUOXETINE HYDROCHLORIDE 10 MG: 10 CAPSULE ORAL at 08:46

## 2022-03-04 RX ADMIN — Medication 150 MG: at 20:14

## 2022-03-04 RX ADMIN — Medication 10 ML: at 08:46

## 2022-03-04 RX ADMIN — OXYCODONE HYDROCHLORIDE AND ACETAMINOPHEN 1 TABLET: 10; 325 TABLET ORAL at 05:04

## 2022-03-04 RX ADMIN — OXYCODONE HYDROCHLORIDE AND ACETAMINOPHEN 1 TABLET: 10; 325 TABLET ORAL at 11:34

## 2022-03-04 RX ADMIN — DOCUSATE SODIUM 100 MG: 100 CAPSULE, LIQUID FILLED ORAL at 20:14

## 2022-03-04 RX ADMIN — Medication 150 MG: at 08:46

## 2022-03-04 RX ADMIN — PANTOPRAZOLE SODIUM 40 MG: 40 TABLET, DELAYED RELEASE ORAL at 05:04

## 2022-03-04 RX ADMIN — OXYCODONE HYDROCHLORIDE AND ACETAMINOPHEN 1 TABLET: 10; 325 TABLET ORAL at 17:36

## 2022-03-04 RX ADMIN — BUPROPION HYDROCHLORIDE 400 MG: 150 TABLET, EXTENDED RELEASE ORAL at 20:14

## 2022-03-04 RX ADMIN — Medication 10 ML: at 20:14

## 2022-03-04 RX ADMIN — INSULIN ASPART 3 UNITS: 100 INJECTION, SOLUTION INTRAVENOUS; SUBCUTANEOUS at 11:34

## 2022-03-04 NOTE — THERAPY TREATMENT NOTE
"Acute Care - Physical Therapy Treatment Note   iJ     Patient Name: Es Olivier  : 1960  MRN: [id number]  Today's Date: 3/4/2022      Visit Dx:     ICD-10-CM ICD-9-CM   1. Closed fracture of proximal end of left femur, initial encounter ([facility])  S72.002A 820.8     Patient Active Problem List   Diagnosis   • Biliary dyskinesia   • Heart disease   • Hypertension   • DM2 (diabetes mellitus, type 2) ([facility])   • Hyperlipidemia   • Fatigue   • Dyspepsia   • Dyspnea on exertion   • Morbid obesity with BMI of 45.0-49.9, adult ([facility])   • Urinary incontinence   • Depression   • GERD (gastroesophageal reflux disease)   • History of Helicobacter pylori infection   • Vitamin D deficiency   • Joint pain   • Sleep apnea   • Hiatal hernia   • Fatty liver   • Closed fracture of proximal end of left femur ([facility])     Past Medical History:   Diagnosis Date   • Biliary dyskinesia     abnormal HIDA 2018 w/ EF 31%, symptomatic w/ N/V   • Closed fracture of proximal end of left femur ([facility]) 2022   • Depression    • DM2 (diabetes mellitus, type 2) ([facility])     dx , on insulin >5 years, A1c 7, associated neuropathy, no other complications   • Dyspepsia    • Dyspnea on exertion    • Fatigue    • Fatty liver     dx on CT    • GERD (gastroesophageal reflux disease)     controlled w/ daily Protonix   • Heart disease     w/ cardiac clearance 2019, negative stress test 10/2017, EF 65%   • Hiatal hernia     \"small\" noted on UGI    • History of Helicobacter pylori infection     treated remotely   • Hyperlipidemia    • Hypertension    • Joint pain    • Morbid obesity with BMI of 45.0-49.9, adult ([facility])    • Sleep apnea     formerly on a device, no longer using, says just doesn't need it   • Urinary incontinence     no meds   • Vitamin D deficiency      Past Surgical History:   Procedure Laterality Date   • BLADDER SURGERY      \"tack\", uncomplicated, but unsuccessful   • CATARACT EXTRACTION     • COLONOSCOPY      " unremarkable   • DILATATION AND CURETTAGE  1987   • ORIF HIP FRACTURE Left 2/26/2022    Procedure: Left hip Open reduction and internal fixation.;  Surgeon: Jarrod Leung MD;  Location: [facility];  Service: Orthopedics;  Laterality: Left;   • TONSILLECTOMY  1984     PT Assessment (last 12 hours)     PT Evaluation and Treatment     Row Name 03/04/22 1723          Physical Therapy Time and Intention    Document Type therapy note (daily note)  -     Mode of Treatment physical therapy  -     Patient Effort fair  -     Comment Pt able to sit EOB again this date, attempted stand transfer, ultimately unable with assist x3  -     Row Name 03/04/22 1723          Bed Mobility    Bed Mobility supine-sit; sit-supine; scooting/bridging  -     Scooting/Bridging [place] (Bed Mobility) moderate assist (50% patient effort); maximum assist (25% patient effort); 1 person assist; 2 person assist  -     Supine-Sit [place] (Bed Mobility) maximum assist (25% patient effort); dependent (less than 25% patient effort); 2 person assist  HOB elevated,  -     Sit-Supine [place] (Bed Mobility) dependent (less than 25% patient effort); 2 person assist  3 person assist  -     Assistive Device (Bed Mobility) draw sheet  -     Row Name 03/04/22 1723          Transfers    Comment (Transfers) STS attempted with education on how to better perform transfer, modifications with bed height made for easier transfer. Pt unable with max/depAx3  -     Row Name             Wound 02/26/22 1245 Left anterior hip Incision    Wound - Properties Group Placement Date: 02/26/22  - Placement Time: 1245  -MC Side: Left  -MC Orientation: anterior  -MC Location: hip  -MC Primary Wound Type: Incision  -MC     Retired Wound - Properties Group Date first assessed: 02/26/22  - Time first assessed: 1245  -MC Side: Left  -MC Location: hip  -MC Primary Wound Type: Incision  -MC     Row Name 03/04/22 1723          Coping    Observed  Emotional State other (see comments)  Pt emotional, upset, in pain, frustrated with herself per report  -     Row Name 03/04/22 1721          Positioning and Restraints    Pre-Treatment Position in bed  -     Post Treatment Position bed  -     In Bed supine; call light within reach  -           User Key  (r) = Recorded By, (t) = Taken By, (c) = Cosigned By    Initials Name Provider Type    Edelmira King, RN Registered Nurse    Jessica Rodriguez, PT Physical Therapist                  PT Recommendation and Plan  Anticipated Discharge Disposition (PT): skilled nursing facility  Planned Therapy Interventions (PT): bed mobility training, gait training, strengthening, transfer training  Therapy Frequency (PT): 6 times/wk (update)  Outcome Summary: Pt evaluated this date with grossly dependent assist with bed mobility this date. D/C rec for SNF for rehabilitation as pt was independent with PLOF.       Time Calculation:    PT Charges     Row Name 03/04/22 1727             Time Calculation    PT Received On 03/04/22  -      PT Goal Re-Cert Due Date 03/14/22  -              Time Calculation- PT    Total Timed Code Minutes- PT 25 minute(s)  -            User Key  (r) = Recorded By, (t) = Taken By, (c) = Cosigned By    Initials Name Provider Type    Jessica Rodriguez, PT Physical Therapist              Therapy Charges for Today     Code Description Service Date Service Provider Modifiers Qty    [id number]  PT THERAPEUTIC ACT EA 15 MIN 3/4/2022 Jessica Aquino, PT GP 2               Jessica Aquino, PT  3/4/2022

## 2022-03-04 NOTE — PLAN OF CARE
Goal Outcome Evaluation:  Plan of Care Reviewed With: patient        Progress: improving  Outcome Summary: Pt has been resting in bed. Pt worked with PT, set at bedside. Drs changed and incision looks good. Con't pain control/BS check. Will continue to monitor.

## 2022-03-04 NOTE — PROGRESS NOTES
Inpatient Progress Note  Es Olivier  Date: 03/04/22  MRN: [id number]      Subjective:   Patient is postop day #6 status post left hip open reduction internal fixation.  Patient's hemoglobin is 5.9 today.  The patient has been restarted on anticoagulation secondary to stability of hemoglobin.  The patient notes no chest pain or shortness of breath.  Is slow to mobilize.  The patient does note fatigue today.  The patient has not ambulated with physical therapy.  She is nonweightbearing left lower extremity.  Afebrile today.  Patient refuses blood products.        Objective:    Vitals:    03/03/22 0604 03/03/22 1900 03/04/22 0300 03/04/22 0608   BP: 150/59 135/65 123/55 137/59   BP Location: Right arm Right arm Right arm Right arm   Patient Position: Lying Lying Lying Lying   Pulse: 98 106 100 95   Resp: 18 22 14 16   Temp: 98.3 °F (36.8 °C) 98.1 °F (36.7 °C) 97.8 °F (36.6 °C) 98.3 °F (36.8 °C)   TempSrc: Oral Oral Oral Oral   SpO2: 92% 94%  92%   Weight:       Height:              Physical Exam:  Constitutional: Morbidly obese female.  No respiratory distress.        Musculoskeletal: Upon examination of the left hip the surgical incision is healing well.  Serous drainage noted.  No bloody drainage is noted.  Neurovascularly intact left lower extremity.  No purulent drainage noted.  No infectious process noted. Dorsiflexion plantarflexion intact left ankle.  No foot drop abnormality is noted.         Labs:    Results from last 7 days   Lab Units 03/04/22  0240 03/03/22  0136 03/02/22  0754   WBC 10*3/mm3 9.47 9.54 9.94   HEMOGLOBIN g/dL 5.9* 6.1* 5.8*   HEMATOCRIT % 18.9* 20.2* 18.4*   MCV fL 95.9 101.5* 96.3   MCHC g/dL 31.2* 30.2* 31.5   PLATELETS 10*3/mm3 468* 377 359         Results from last 7 days   Lab Units 03/04/22  0240 03/03/22  1530 03/03/22  0136 03/02/22  0252 03/02/22  0252 03/01/22  0533 03/01/22  0533 02/28/22  1604 02/28/22  0330 02/27/22  0137 02/27/22  0137 02/26/22  0122 02/26/22  0122   SODIUM  mmol/L 130* 131* 129*   < > 128*   < > 128*   < > 127*   < > 132*   < > 136   POTASSIUM mmol/L 4.7 4.6 4.9   < > 4.4   < > 4.1  --  3.7   < > 4.0   < > 4.4   MAGNESIUM mg/dL  --   --  2.2  --  2.2  --  2.2  --  1.8   < > 1.9   < > 2.0   CHLORIDE mmol/L 96* 97* 96*   < > 97*   < > 96*  --  95*   < > 101   < > 100   CO2 mmol/L 24.2 24.4 23.0   < > 22.9   < > 21.4*  --  19.7*   < > 18.3*   < > 24.4   BUN mg/dL 16 16 18   < > 18   < > 17  --  22   < > 28*   < > 23   CREATININE mg/dL 0.66 0.64 0.66   < > 0.82   < > 0.65  --  0.73   < > 0.84   < > 0.81   EGFR IF NONAFRICN AM mL/min/1.73  --   --   --   --   --   --   --   --  81  --  69  --  72   CALCIUM mg/dL 8.0* 8.1* 7.8*   < > 7.6*   < > 7.6*  --  7.5*   < > 7.5*   < > 8.8   GLUCOSE mg/dL 162* 165* 190*   < > 182*   < > 189*  --  211*   < > 251*   < > 205*   ALBUMIN g/dL  --   --  2.17*  --  2.12*  --   --   --   --   --   --   --  3.51   BILIRUBIN mg/dL  --   --  0.4  --  0.4  --   --   --   --   --   --   --  0.5   ALK PHOS U/L  --   --  59  --  46  --   --   --   --   --   --   --  53   AST (SGOT) U/L  --   --  18  --  18  --   --   --   --   --   --   --  13   ALT (SGPT) U/L  --   --  9  --  7  --   --   --   --   --   --   --  11    < > = values in this interval not displayed.   Estimated Creatinine Clearance: 130.6 mL/min (by C-G formula based on SCr of 0.66 mg/dL).  No results found for: AMMONIA          No results found for: HGBA1C  Lab Results   Component Value Date    TSH 8.590 (H) 02/25/2022    FREET4 1.13 02/25/2022         Pain Management Panel     Pain Management Panel Latest Ref Rng & Units 4/6/2016 4/7/2014    AMPHETAMINES SCREEN, URINE Negative Negative Negative    BARBITURATES SCREEN Negative Negative Negative    BENZODIAZEPINE SCREEN, URINE Negative Negative Negative    COCAINE SCREEN, URINE Negative Negative Negative    METHADONE SCREEN, URINE Negative Negative Negative          No results found for: BLOODCX  No results found for: URINECX  No  results found for: WOUNDCX  No results found for: STOOLCX              Radiology:  Imaging Results (Last 72 Hours)     ** No results found for the last 72 hours. **            Assessment:   #1 status post left hip open reduction internal fixation          Plan:   #1 nonweightbearing left lower extremity  #2 DVT prophylaxis per hospitalist recommendations  #3 trend H&H closely  #4 physical therapy for mobilization  #5 change dressing as needed saturation, utilize Betadine to clean incision.  #6 follow-up with Dr. Leung in 10 days for repeat x-rays and possible staple removal.            Ariel Frazier, APRN March 4, 2022 09:26 EST

## 2022-03-04 NOTE — DISCHARGE PLACEMENT REQUEST
"Jenn Muro (62 y.o. Female)             Date of Birth Social Security Number Address Home Phone MRN    1960  [address] [place] [address] [phone] [id number]    Episcopalian Marital Status             Moravian        Admission Date Admission Type Admitting Provider Attending Provider Department, Room/Bed    2/25/22 Emergency Mima Randall MD Heinss, Karl F, MD [address], 3346/2S    Discharge Date Discharge Disposition Discharge Destination                         Attending Provider: Abner Gomez MD    Allergies: Mobic [Meloxicam], Lisinopril    Isolation: None   Infection: None   Code Status: CPR   Advance Care Planning Activity    Ht: 167.6 cm (66\")   Wt: 142 kg (314 lb)    Admission Cmt: None   Principal Problem: Closed fracture of proximal end of left femur (HCC) [S72.002A]                 Active Insurance as of 2/25/2022     Primary Coverage     Payor Plan Insurance Group Employer/Plan Group    [facility] MEDICARE REPLACEMENT [employer] MEDICARE REPLACEMENT      Payor Plan Address Payor Plan Phone Number Payor Plan Fax Number Effective Dates    [address] [address] [phone]  10/1/2020 - None Entered    [facility] [id number]       Subscriber Name Subscriber Birth Date Member ID       JENN MURO 1960 [id number]                 Emergency Contacts      (Rel.) Home Phone Work Phone Mobile Phone    Brynn Mane (Daughter) [phone] -- --    Mikki Muro (Daughter) [phone] -- [phone]               History & Physical      Karey Finney PA-C at 02/25/22 2043     Attestation signed by Marley Hoffmann DO at 02/26/22 0713    I have reviewed this documentation and agree.  Patient also briefly seen and examined at bedside.  She continues to report some intermittent left hip pain with radiation to the groin.  Patient states she has been in her usual state of health recently and denies any dyspnea on exertion or " "chest pains.  Patient denies any previous adverse effect with sedation.    On exam, his heart is regular rate and rhythm without obvious murmur.  Lungs are slightly diminished likely due to body habitus without any wheezing, rhonchi and/or rales.  Left lower extremity is mildly externally rotated.    Continue nothing by mouth status while awaiting orthopedic surgery evaluation.  Continue with as needed pain control.  At this point, patient felt acceptable risk to proceed with hip repair.  Patient's postoperative respiratory failure risk calculator is estimated to be between 1 and 4%.                         [facility] Medicine Services  HISTORY & PHYSICAL    Patient Identification:  Name:  Es Olivier  Age:  62 y.o.  Sex:  female  :  1960  MRN:  [id number]   Visit Number:  [id number]  Admit Date: 2022   Primary Care Physician:  Delilah Pizarro MD     Subjective     Chief complaint:   Chief Complaint   Patient presents with   • Hip Injury     pt reports was walking and tripped and fell landing on left hip. pt now reports left hip pain     History of presenting illness:   Patient is a 62 y.o. female with past medical history significant for insulin-dependent type 2 diabetes mellitus, essential hypertension, obstructive sleep apnea, GERD, depression, that presented to the [facility] emergency department for evaluation of fall.     The patient states that she tripped over her grandchild's feet and fell earlier this evening.  She reports landing on her left side and hitting the left temporal region of her head on the baseboard.  She denies any loss of consciousness, bruising, or wounds.  She reports she immediately felt pain in the left hip and was unable to get up without assistance.  She admits to frequent falls here recently and states she feels \"unsteady on her feet.\"  She denies any dizziness, lightheadedness, or numbness/tingling in her bilateral lower extremities. "  She denies using a cane or walker at home she also denies any recent illness, fever, chills, diaphoresis, coughing, wheezing, shortness of breath, chest pain, abdominal pain, nausea, vomiting, diarrhea, dysuria, or malodorous urine.  She denies any current smoking tobacco use but admits to smoking in the past (quit in 1984).  She admits to having surgery in the past and denies any reactions to anesthesia.    Upon arrival to the ED, vitals were temperature 97.2 °F, pulse 62, respirations 18, /59, SPO2 97% on room air.  CMP with glucose 192, BUN/creatinine ratio 27.4, otherwise unremarkable.  CBC with hemoglobin 11.4.  UA with 1+ blood, 6-12 RBC, otherwise unremarkable.  COVID-19 negative.  Chest x-ray with no acute cardiopulmonary findings.  X-ray of left femur shows comminuted fracture of the proximal left femur.  X-ray of hip with and without pelvis shows same findings.  EKG with normal sinus rhythm, poor R wave progression, possible left atrial enlargement, heart rate 71, QTc 467 MS.    In the emergency department the patient received IV morphine 8 mg, IV Zofran 8 mg.    Patient has been admitted to the medical/surgical floor for further evaluation and treatment  ---------------------------------------------------------------------------------------------------------------------   Review of Systems   Constitutional: Negative for chills, diaphoresis and fever.   HENT: Negative for congestion and sore throat.    Respiratory: Negative for cough, shortness of breath and wheezing.    Cardiovascular: Negative for chest pain, palpitations and leg swelling.   Gastrointestinal: Negative for abdominal pain, constipation, diarrhea, nausea and vomiting.   Genitourinary: Negative for dysuria and frequency.   Musculoskeletal: Positive for arthralgias (Left hip) and gait problem (Frequent falls).   Skin: Negative for wound.   Neurological: Positive for weakness (Occasionally feels weak in bilateral lower extremities).  "Negative for dizziness, syncope, light-headedness and numbness.   Psychiatric/Behavioral: Negative for confusion.      ---------------------------------------------------------------------------------------------------------------------   Past Medical History:   Diagnosis Date   • Biliary dyskinesia     abnormal HIDA 2018 w/ EF 31%, symptomatic w/ N/V   • Depression    • DM2 (diabetes mellitus, type 2) ([facility])     dx , on insulin >5 years, A1c 7, associated neuropathy, no other complications   • Dyspepsia    • Dyspnea on exertion    • Fatigue    • Fatty liver     dx on CT    • GERD (gastroesophageal reflux disease)     controlled w/ daily Protonix   • Heart disease     w/ cardiac clearance 2019, negative stress test 10/2017, EF 65%   • Hiatal hernia     \"small\" noted on UGI    • History of Helicobacter pylori infection     treated remotely   • Hyperlipidemia    • Hypertension    • Joint pain    • Morbid obesity with BMI of 45.0-49.9, adult ([facility])    • Sleep apnea     formerly on a device, no longer using, says just doesn't need it   • Urinary incontinence     no meds   • Vitamin D deficiency      Past Surgical History:   Procedure Laterality Date   • BLADDER SURGERY      \"tack\", uncomplicated, but unsuccessful   • CATARACT EXTRACTION     • COLONOSCOPY      unremarkable   • DILATATION AND CURETTAGE     • TONSILLECTOMY       Family History   Problem Relation Age of Onset   • Breast cancer Sister         20s   • Cancer Sister    • Breast cancer Sister         60s   • Bone cancer Mother         60   • Osteoarthritis Mother    • Diabetes Father    • Heart attack Father    • Heart disease Father    • Heart disease Brother      Social History     Socioeconomic History   • Marital status:    Tobacco Use   • Smoking status: Former Smoker     Years: 10.00     Quit date:      Years since quittin.1   • Smokeless tobacco: Never Used   Vaping Use   • Vaping Use: Never used   Substance " and Sexual Activity   • Alcohol use: Yes     Comment: SOCIALLY   • Drug use: No   • Sexual activity: Defer     ---------------------------------------------------------------------------------------------------------------------   Allergies:  Mobic [meloxicam] and Lisinopril  ---------------------------------------------------------------------------------------------------------------------   Medications below are reported home medications pulling from within the system; at this time, these medications have not been reconciled unless otherwise specified and are in the verification process for further verifcation as current home medications.    Prior to Admission Medications     Prescriptions Last Dose Informant Patient Reported? Taking?    buPROPion SR (WELLBUTRIN SR) 200 MG 12 hr tablet 2/24/2022 Medication Bottle Yes Yes    Take 400 mg by mouth every night at bedtime. Prior to [facility] Admission, Patient was on:   Script is wrote 1 BID but pt takes 2 QHS    FLUoxetine (PROzac) 10 MG capsule 2/24/2022 Medication Bottle Yes Yes    Take 10 mg by mouth Daily.    hydrochlorothiazide (HYDRODIURIL) 12.5 MG tablet 2/24/2022 Medication Bottle Yes Yes    Take 12.5 mg by mouth Daily.    insulin regular (humuLIN R,novoLIN R) 100 UNIT/ML injection 2/25/2022 Medication Bottle Yes Yes    Inject 4 Units under the skin into the appropriate area as directed 3 (Three) Times a Day Before Meals.    losartan-hydrochlorothiazide (HYZAAR) 50-12.5 MG per tablet 2/24/2022 Medication Bottle Yes Yes    Take 1 tablet by mouth Daily.    metFORMIN XR (GLUCOPHAGE-XR) 500 MG 24 hr tablet 2/24/2022 Pharmacy Yes Yes    Take 2,000 mg by mouth Every Night.    pioglitazone (ACTOS) 45 MG tablet 2/24/2022 Medication Bottle Yes Yes    Take 45 mg by mouth every night at bedtime.    vitamin D (ERGOCALCIFEROL) 1.25 MG (72575 UT) capsule capsule 2/20/2022 Medication Bottle Yes Yes    Take 50,000 Units by mouth 1 (One) Time Per Week.         ---------------------------------------------------------------------------------------------------------------------    Objective     Hospital Scheduled Meds:          Current listed hospital scheduled medications may not yet reflect those currently placed in orders that are signed and held, awaiting patient's arrival to floor/unit.    ---------------------------------------------------------------------------------------------------------------------   Vital Signs:  Temp:  [97.2 °F (36.2 °C)] 97.2 °F (36.2 °C)  Heart Rate:  [61-83] 79  Resp:  [18] 18  BP: (105-151)/(58-97) 105/89  Mean Arterial Pressure (Non-Invasive) for the past 24 hrs (Last 3 readings):   Noninvasive MAP (mmHg)   02/25/22 1833 95   02/25/22 1803 75   02/25/22 1703 97     SpO2 Percentage    02/25/22 1803 02/25/22 1833 02/25/22 1853   SpO2: 99% 98% 94%     SpO2:  [93 %-100 %] 94 %  on   ;   Device (Oxygen Therapy): room air    Body mass index is 50.84 kg/m².  Wt Readings from Last 3 Encounters:   02/25/22 (!) 143 kg (315 lb)   08/11/21 135 kg (297 lb)   05/09/19 135 kg (298 lb)     ---------------------------------------------------------------------------------------------------------------------   Physical Exam:  Physical Exam  Constitutional:       General: She is awake.      Appearance: Normal appearance. She is well-developed. She is morbidly obese.   HENT:      Head: Normocephalic and atraumatic.   Eyes:      General: Lids are normal.   Cardiovascular:      Rate and Rhythm: Normal rate and regular rhythm.      Pulses: Normal pulses.           Dorsalis pedis pulses are 2+ on the right side and 2+ on the left side.        Posterior tibial pulses are 2+ on the right side and 2+ on the left side.      Heart sounds: Normal heart sounds. No murmur heard.  No friction rub. No gallop.    Pulmonary:      Effort: Pulmonary effort is normal. No tachypnea.      Breath sounds: Normal breath sounds. No decreased breath sounds or wheezing.    Abdominal:      Palpations: Abdomen is soft.      Tenderness: There is no abdominal tenderness.   Musculoskeletal:      Right hip: No tenderness. Normal range of motion.      Left hip: No tenderness. Decreased range of motion.      Right lower leg: No edema.      Left lower leg: No edema.   Feet:      Right foot:      Skin integrity: Skin integrity normal.      Left foot:      Skin integrity: Skin integrity normal.   Skin:     Findings: No ecchymosis or erythema.   Neurological:      General: No focal deficit present.      Mental Status: She is alert and oriented to person, place, and time. Mental status is at baseline.      Sensory: Sensation is intact. No sensory deficit.   Psychiatric:         Speech: Speech normal.         Behavior: Behavior is cooperative.         Cognition and Memory: Cognition normal.       ---------------------------------------------------------------------------------------------------------------------  EKG:    Pending cardiology read.  Per my evaluation, normal sinus rhythm with poor R wave progression and possible left atrial enlargement, heart rate 71, QTc 467 MS.    Telemetry:    Normal sinus rhythm, heart rate 89, SPO2 91% on room air    I have personally reviewed the EKG/Telemetry strip  ---------------------------------------------------------------------------------------------------------------------             Results from last 7 days   Lab Units 02/25/22  1016   WBC 10*3/mm3 8.89   HEMOGLOBIN g/dL 11.4*   HEMATOCRIT % 35.8   MCV fL 95.0   MCHC g/dL 31.8   PLATELETS 10*3/mm3 330     Results from last 7 days   Lab Units 02/25/22  1016   SODIUM mmol/L 139   POTASSIUM mmol/L 3.9   CHLORIDE mmol/L 104   CO2 mmol/L 24.8   BUN mg/dL 20   CREATININE mg/dL 0.73   EGFR IF NONAFRICN AM mL/min/1.73 81   CALCIUM mg/dL 8.9   GLUCOSE mg/dL 192*   ALBUMIN g/dL 3.86   BILIRUBIN mg/dL 0.4   ALK PHOS U/L 62   AST (SGOT) U/L 14   ALT (SGPT) U/L 12   Estimated Creatinine Clearance: 118.6 mL/min  (by C-G formula based on SCr of 0.73 mg/dL).  No results found for: AMMONIA    No results found for: HGBA1C, POCGLU  Lab Results   Component Value Date    HGBA1C 6.9 (H) 05/09/2019     Lab Results   Component Value Date    TSH 3.370 05/09/2019         Pain Management Panel     Pain Management Panel Latest Ref Rng & Units 4/6/2016 4/7/2014    AMPHETAMINES SCREEN, URINE Negative Negative Negative    BARBITURATES SCREEN Negative Negative Negative    BENZODIAZEPINE SCREEN, URINE Negative Negative Negative    COCAINE SCREEN, URINE Negative Negative Negative    METHADONE SCREEN, URINE Negative Negative Negative        I have personally reviewed the above laboratory results.   ---------------------------------------------------------------------------------------------------------------------  Imaging Results (Last 7 Days)     Procedure Component Value Units Date/Time    XR Hip With or Without Pelvis 2 - 3 View Left [[id number]] Collected: 02/25/22 1056     Updated: 02/25/22 1159    Narrative:      EXAMINATION: XR HIP W OR WO PELVIS 2-3 VIEW LEFT-      CLINICAL INDICATION: fall with hip pain        COMPARISON: None available     FINDINGS:  One view of the pelvis and 2 views of the left hip show comminuted  fracture of the proximal left femur     No other fracture       Impression:      Comminuted fracture of the proximal left femur      This report was finalized on 2/25/2022 10:56 AM by Dr. Denis Wright MD.       XR Femur 2 View Left [[id number]] Collected: 02/25/22 1056     Updated: 02/25/22 1158    Narrative:      EXAMINATION: XR FEMUR 2 VW LEFT-      CLINICAL INDICATION: fall with hip pain        COMPARISON: None available     FINDINGS: 4 views of the left femur show comminuted fracture of the  proximal left femur extending from the intertrochanteric region.       Impression:      Comminuted fracture of the proximal left femur      This report was finalized on 2/25/2022 10:56 AM by Dr. Denis Wright MD.       XR Chest 1  View [[id number]] Collected: 02/25/22 1055     Updated: 02/25/22 1158    Narrative:      XR CHEST 1 VW-     CLINICAL INDICATION: fall with hip pain        COMPARISON: 12/05/2017      TECHNIQUE: Single frontal view of the chest.     FINDINGS:     There is no focal alveolar infiltrate or effusion.  The cardiac silhouette is normal. The pulmonary vasculature is  unremarkable.  There is no evidence of an acute osseous abnormality.   There are no suspicious-appearing parenchymal soft tissue nodules.          Impression:      No evidence of active or acute cardiopulmonary disease on today's chest  radiograph.     This report was finalized on 2/25/2022 10:56 AM by Dr. Denis Wright MD.           I have personally reviewed the above radiology results.     ---------------------------------------------------------------------------------------------------------------------    Assessment & Plan      Active Hospital Problems    Diagnosis  POA   • **Closed fracture of proximal end of left femur (HCC) [S72.002A]  Yes     #Acute comminuted fx of the left proximal femur 2/2 to mechanical   -Imaging reviewed; x-ray of left femur shows a comminuted fracture of the proximal femur  -Orthopedic surgery has been consulted, input/assistance is much appreciated  -IV morphine 2mg q 2h PRN for pain; PO Percocet 10 mg q4h; Tylenol PRN  -Neurovascular checks every 4 hours  -Subcutaneous heparin for VTE prophylaxis  -Obtain vitamin D level   -Meyer catheter in place   -PT/OT consulted for assistance with rehabilitation in the postoperative state    #Mild acute normocytic anemia   -Baseline hemoglobin appears to be around 12-13; hemoglobin on admission 11.4  -Obtain vitamin B12, folate, ferritin, iron, iron panel; replace as necessary  -If hemoglobin less than 7 or platelets less than 50,000 with active bleeding, or less than 20,000, will transfuse    #Essential hypertension   -BP has been within normal limits  -Review home medications once  available; plan to continue antihypertensive agents with appropriate holding parameters    #GERD  -PPI    #FRANCINE, non compliant with CPAP  -Supplemental oxygen as needed, titrate to maintain SPO2 greater than or equal to 90%    #Insulin dependent type II diabetes mellitus   -Obtain hemoglobin A1c  -Hold home Metformin  -Sliding scale insulin ordered; Accu-Cheks before every meal and nightly; titrate insulin therapy as necessary    #Depression   -Review home medications once available    #Morbid obesity   -BMI 50.84 kg/m²  -Complicates all aspects of patient care      F/E/N: No IV fluids. Replace electrolytes as necessary. Consistent carb diet, NPO after midnight   ---------------------------------------------------  DVT Prophylaxis: Heparin   GI Prophylaxis: Protonix   Activity: Strict bed rest     The patient is considered to be a high risk patient due to: acute left femur fx 2/2 to mechanical fall     INPATIENT status due to the need for care which can only be reasonably provided in an hospital setting such as aggressive/expedited ancillary services and/or consultation services, the necessity for IV medications, close physician monitoring and/or the possible need for procedures.  In such, I feel patient’s risk for adverse outcomes and need for care warrant INPATIENT evaluation and predict the patient’s care encounter to likely last beyond 2 midnights.    Code Status: FULL CODE     Disposition/Discharge planning: Plan for home at discharge. Pending clinical course     I have discussed the patient's assessment and plan with the patient, nursing staff, and attending physician       Karey Finney PA-C  Hospitalist Service -- [facility]       02/25/22  20:43 EST    Attending Physician: Marley Hoffmann DO       Electronically signed by Marley Hoffmann DO at 02/26/22 0713       Vital Signs (last day)     Date/Time Temp Temp src Pulse Resp BP Patient Position SpO2    03/04/22 0608 98.3 (36.8) Oral 95 16  137/59 Lying 92    03/04/22 0300 97.8 (36.6) Oral 100 14 123/55 Lying --    03/03/22 1900 98.1 (36.7) Oral 106 22 135/65 Lying 94    03/03/22 0604 98.3 (36.8) Oral 98 18 150/59 Lying 92    03/03/22 0100 98 (36.7) Oral 94 20 131/55 Lying 96          Intake & Output (last day)       03/03 0701  03/04 0700 03/04 0701  03/05 0700    P.O. 1080     I.V. (mL/kg)      Total Intake(mL/kg) 1080 (7.6)     Urine (mL/kg/hr) 1125 (0.3)     Stool 0     Total Output 1125     Net -45           Stool Unmeasured Occurrence 0 x         Lines, Drains & Airways     Active LDAs     Name Placement date Placement time Site Days    Peripheral IV 03/03/22 0716 Distal; Left Wrist 03/03/22  0716  Wrist  1    External Urinary Catheter 03/01/22  1800  --  2                  Current Facility-Administered Medications   Medication Dose Route Frequency Provider Last Rate Last Admin   • acetaminophen (TYLENOL) tablet 650 mg  650 mg Oral Q4H PRN Jarrod Leung MD        Or   • acetaminophen (TYLENOL) suppository 650 mg  650 mg Rectal Q4H PRN Jarrod Leung MD       • buPROPion SR (WELLBUTRIN SR) 12 hr tablet 400 mg  400 mg Oral Nightly Jarrod Leung MD   400 mg at 03/03/22 2041   • cholecalciferol (VITAMIN D3) capsule 50,000 Units  50,000 Units Oral Weekly Jarrod Leung MD   50,000 Units at 02/27/22 0803   • dextrose (D50W) (25 g/50 mL) IV injection 25 g  25 g Intravenous Q15 Min PRN Jarrod Leung MD       • dextrose (GLUTOSE) oral gel 15 g  15 g Oral Q15 Min PRN Jarrod Leung MD       • docusate sodium (COLACE) capsule 100 mg  100 mg Oral BID Yvette Cruz PA   100 mg at 03/04/22 0846   • enoxaparin (LOVENOX) syringe 30 mg  30 mg Subcutaneous Q12H Abner Gomez MD   30 mg at 03/04/22 0504   • Epoetin Fadi-epbx (RETACRIT) injection 40,000 Units  40,000 Units Subcutaneous Q48H Abner Gomez MD   40,000 Units at 03/02/22 1204   • FLUoxetine (PROzac) capsule 10 mg  10 mg Oral Daily Jarrod Leung MD   10 mg at  03/04/22 0846   • glucagon (human recombinant) (GLUCAGEN DIAGNOSTIC) injection 1 mg  1 mg Intramuscular Q15 Min PRN Jarrod Leung MD       • HYDROmorphone (DILAUDID) injection 0.5 mg  0.5 mg Intravenous Q3H PRN Yvette Cruz PA   0.5 mg at 02/28/22 0437   • hydrOXYzine (ATARAX) tablet 25 mg  25 mg Oral TID PRN Jarrod Leung MD       • insulin aspart (novoLOG) injection 0-14 Units  0-14 Units Subcutaneous 4x Daily AC & at Bedtime Yvette Cruz PA   5 Units at 03/04/22 0845   • insulin detemir (LEVEMIR) injection 15 Units  15 Units Subcutaneous Nightly Abner Gomez MD   15 Units at 03/03/22 2043   • iron polysaccharides (NIFEREX) capsule 150 mg  150 mg Oral BID Abner Gomez MD   150 mg at 03/04/22 0846   • Magnesium Sulfate 2 gram infusion - Mg less than or equal to 1.5 mg/dL  2 g Intravenous PRN Yvette Cruz PA        Or   • Magnesium Sulfate 1 gram infusion - Mg 1.6-1.9 mg/dL  1 g Intravenous PRN Yvette Cruz PA       • melatonin tablet 10 mg  10 mg Oral Nightly PRN Jarrod Leung MD       • metFORMIN (GLUCOPHAGE) tablet 1,000 mg  1,000 mg Oral BID With Meals Abner Gomez MD   1,000 mg at 03/04/22 0846   • naloxone (NARCAN) injection 0.4 mg  0.4 mg Intravenous Q5 Min PRN Jarrod Leung MD       • nitroglycerin (NITROSTAT) SL tablet 0.4 mg  0.4 mg Sublingual Q5 Min PRN Jarrod Leung MD       • ondansetron (ZOFRAN) injection 4 mg  4 mg Intravenous Q6H PRN Jarrod Leung MD       • oxyCODONE-acetaminophen (PERCOCET)  MG per tablet 1 tablet  1 tablet Oral Q6H Yvette Cruz PA   1 tablet at 03/04/22 0504   • pantoprazole (PROTONIX) EC tablet 40 mg  40 mg Oral Q AM Jarrod Leung MD   40 mg at 03/04/22 0504   • polyethylene glycol (MIRALAX) packet 17 g  17 g Oral Daily Yvette Cruz PA   17 g at 03/04/22 0852   • potassium & sodium phosphates (PHOS-NAK) 280-160-250 MG packet - for Phosphorus less than 1.25 mg/dL  2 packet Oral Q6H PRN Anthony,  STACY Mata        Or   • potassium & sodium phosphates (PHOS-NAK) 280-160-250 MG packet - for Phosphorus 1.25 - 2.5 mg/dL  2 packet Oral Q6H PRN Yvette Cruz PA       • prochlorperazine (COMPAZINE) injection 5 mg  5 mg Intravenous Q6H PRN Jarrod Leung MD       • sodium chloride 0.9 % flush 10 mL  10 mL Intravenous PRN Jarrod Leung MD   10 mL at 03/04/22 0846        Operative/Procedure Notes (most recent note)      Jarrod Leung MD at 02/26/22 1245        Operative report  Preoperative diagnosis left femur subtrochanteric fracture  And morbid obesity BMI 56  Surgical procedure left femur open reduction internal fixation using Synthes intramedullary nail TFN a size of 360 x 11 mm 125 degree proximal locking with blade and 90 mm distal locking with 2 screws  Increased difficulty because of morbid obesity  After proper patient and limb identification bring to the operating room with general anesthesia dorsal decubitus on the [place] table  Overall increased difficulty because of bodily habitus BMI 56 just for preoperatively positioning on the table during the case for retracting tissue and positioning of instruments for nailing as well as overall closing.  Patient is BMI 56 overall procedure was increased time of surgery by double.  Just positioning on the table need special traction for the large abdominal pannus positioning on the table with a very large leg.  So under C arm longitudinal incision regarding the lateral portion of the femur proximal hip and proximally.  Large fatty layer as deep as mid forearm.  Then incision of fascia of the gluteus and fascia michael insertion of guidepin on the trochanteric area had to open the fracture site put to large clamp to mobilize the fragment into good general alignment passing the trocar through fracture site and then the guidepin.  The overall handle had to be completely buried under the fat under the incision.  Guidepin was successfully passed through  fracture site then reaming with the different reamers and then insertion of the fletcher progressively in good positioning on both AP and lateral view.  Insertion of guidepin along the femoral neck with good positioning on the AP and lateral view reaming and then insertion of the blade which fingertip locking proximally.  Then under C arm locking the lfetcher the distally with 2 screw from lateral to medial.  Irrigating thoroughly with sterile fluid the closing fascia with a strata fix and fatty layer with Vicryl 1 skin Vicryl 1 Monocryl 2 oh and metal clip.  Large dressing was applied and patient returned to recovery in stable condition blood loss overall 1700 cc Cell Saver was used during the case and were able to return to 750 cc of blood.  The blood loss though was measured with some air irrigation.  No specimen    Electronically signed by Jarrod Leung MD at 02/26/22 1446          Physician Progress Notes (most recent note)      Ariel Frazier APRN at 03/04/22 0926          Inpatient Progress Note  Es GROSS Charline  Date: 03/04/22  MRN: [id number]      Subjective:   Patient is postop day #6 status post left hip open reduction internal fixation.  Patient's hemoglobin is 5.9 today.  The patient has been restarted on anticoagulation secondary to stability of hemoglobin.  The patient notes no chest pain or shortness of breath.  Is slow to mobilize.  The patient does note fatigue today.  The patient has not ambulated with physical therapy.  She is nonweightbearing left lower extremity.  Afebrile today.  Patient refuses blood products.        Objective:    Vitals:    03/03/22 0604 03/03/22 1900 03/04/22 0300 03/04/22 0608   BP: 150/59 135/65 123/55 137/59   BP Location: Right arm Right arm Right arm Right arm   Patient Position: Lying Lying Lying Lying   Pulse: 98 106 100 95   Resp: 18 22 14 16   Temp: 98.3 °F (36.8 °C) 98.1 °F (36.7 °C) 97.8 °F (36.6 °C) 98.3 °F (36.8 °C)   TempSrc: Oral Oral Oral Oral   SpO2: 92% 94%   92%   Weight:       Height:              Physical Exam:  Constitutional: Morbidly obese female.  No respiratory distress.        Musculoskeletal: Upon examination of the left hip the surgical incision is healing well.  Serous drainage noted.  No bloody drainage is noted.  Neurovascularly intact left lower extremity.  No purulent drainage noted.  No infectious process noted. Dorsiflexion plantarflexion intact left ankle.  No foot drop abnormality is noted.         Labs:    Results from last 7 days   Lab Units 03/04/22  0240 03/03/22  0136 03/02/22  0754   WBC 10*3/mm3 9.47 9.54 9.94   HEMOGLOBIN g/dL 5.9* 6.1* 5.8*   HEMATOCRIT % 18.9* 20.2* 18.4*   MCV fL 95.9 101.5* 96.3   MCHC g/dL 31.2* 30.2* 31.5   PLATELETS 10*3/mm3 468* 377 359         Results from last 7 days   Lab Units 03/04/22  0240 03/03/22  1530 03/03/22  0136 03/02/22  0252 03/02/22  0252 03/01/22  0533 03/01/22  0533 02/28/22  1604 02/28/22  0330 02/27/22  0137 02/27/22  0137 02/26/22  0122 02/26/22  0122   SODIUM mmol/L 130* 131* 129*   < > 128*   < > 128*   < > 127*   < > 132*   < > 136   POTASSIUM mmol/L 4.7 4.6 4.9   < > 4.4   < > 4.1  --  3.7   < > 4.0   < > 4.4   MAGNESIUM mg/dL  --   --  2.2  --  2.2  --  2.2  --  1.8   < > 1.9   < > 2.0   CHLORIDE mmol/L 96* 97* 96*   < > 97*   < > 96*  --  95*   < > 101   < > 100   CO2 mmol/L 24.2 24.4 23.0   < > 22.9   < > 21.4*  --  19.7*   < > 18.3*   < > 24.4   BUN mg/dL 16 16 18   < > 18   < > 17  --  22   < > 28*   < > 23   CREATININE mg/dL 0.66 0.64 0.66   < > 0.82   < > 0.65  --  0.73   < > 0.84   < > 0.81   EGFR IF NONAFRICN AM mL/min/1.73  --   --   --   --   --   --   --   --  81  --  69  --  72   CALCIUM mg/dL 8.0* 8.1* 7.8*   < > 7.6*   < > 7.6*  --  7.5*   < > 7.5*   < > 8.8   GLUCOSE mg/dL 162* 165* 190*   < > 182*   < > 189*  --  211*   < > 251*   < > 205*   ALBUMIN g/dL  --   --  2.17*  --  2.12*  --   --   --   --   --   --   --  3.51   BILIRUBIN mg/dL  --   --  0.4  --  0.4  --   --   --    --   --   --   --  0.5   ALK PHOS U/L  --   --  59  --  46  --   --   --   --   --   --   --  53   AST (SGOT) U/L  --   --  18  --  18  --   --   --   --   --   --   --  13   ALT (SGPT) U/L  --   --  9  --  7  --   --   --   --   --   --   --  11    < > = values in this interval not displayed.   Estimated Creatinine Clearance: 130.6 mL/min (by C-G formula based on SCr of 0.66 mg/dL).  No results found for: AMMONIA          No results found for: HGBA1C  Lab Results   Component Value Date    TSH 8.590 (H) 02/25/2022    FREET4 1.13 02/25/2022         Pain Management Panel     Pain Management Panel Latest Ref Rng & Units 4/6/2016 4/7/2014    AMPHETAMINES SCREEN, URINE Negative Negative Negative    BARBITURATES SCREEN Negative Negative Negative    BENZODIAZEPINE SCREEN, URINE Negative Negative Negative    COCAINE SCREEN, URINE Negative Negative Negative    METHADONE SCREEN, URINE Negative Negative Negative          No results found for: BLOODCX  No results found for: URINECX  No results found for: WOUNDCX  No results found for: STOOLCX              Radiology:  Imaging Results (Last 72 Hours)     ** No results found for the last 72 hours. **            Assessment:   #1 status post left hip open reduction internal fixation          Plan:   #1 nonweightbearing left lower extremity  #2 DVT prophylaxis per hospitalist recommendations  #3 trend H&H closely  #4 physical therapy for mobilization  #5 change dressing as needed saturation, utilize Betadine to clean incision.  #6 follow-up with Dr. Leung in 10 days for repeat x-rays and possible staple removal.            JAMMIE Craft March 4, 2022 09:26 EST    Electronically signed by Ariel Frazier APRN at 03/04/22 0931          Consult Notes (most recent note)      Abdiaziz Eldridge APRN at 02/26/22 0835     Attestation signed by Jarrod Leung MD at 02/26/22 1207    I have reviewed this documentation and agree.               Summary:Left hip subtrochanteric  fracture.                 Consults    Patient Identification:  Name:  Es Olivier  Age:  62 y.o.  Sex:  female  :  1960  MRN:  [id number]  Visit Number:  [id number]  Primary care provider:  Delilah Pizarro MD    History of presenting illness:  This is a 62 year old female who presented to the emergency room yesterday after sustaining a fall at home, landing on her left side.  Patient notes she tripped over her grandchilds feet, lost her balance, and fell.  She does note a recent history of frequent falls.  She denies episodes of dizziness and loss of consciousness.  She has been very active and does not use an assist device with mobility.  Today, the patient notes pain to the hip with movement.  She notes the pain is fairly controlled with her pain medication when resting.  Patient also notes that she is a [employer] and does not want any blood products, if her injury requires surgical intervention.  ---------------------------------------------------------------------------------------------------------------------    Review of Systems   Constitutional: Negative.    HENT: Negative.    Eyes: Negative.    Respiratory: Negative.    Cardiovascular: Negative.    Gastrointestinal: Negative.    Endocrine: Negative.    Genitourinary: Negative.    Musculoskeletal: Positive for arthralgias and gait problem.        Left hip, since fall.  Frequent falls at home.   Skin: Negative.    Allergic/Immunologic: Negative.    Hematological: Negative.    Psychiatric/Behavioral: Negative.       ---------------------------------------------------------------------------------------------------------------------   Past History:  Family History   Problem Relation Age of Onset   • Breast cancer Sister         20s   • Cancer Sister    • Breast cancer Sister         60s   • Bone cancer Mother         60   • Osteoarthritis Mother    • Diabetes Father    • Heart attack Father    • Heart disease Father    • Heart disease  "Brother      Past Medical History:   Diagnosis Date   • Biliary dyskinesia     abnormal HIDA 2018 w/ EF 31%, symptomatic w/ N/V   • Depression    • DM2 (diabetes mellitus, type 2) ([facility])     dx , on insulin >5 years, A1c 7, associated neuropathy, no other complications   • Dyspepsia    • Dyspnea on exertion    • Fatigue    • Fatty liver     dx on CT    • GERD (gastroesophageal reflux disease)     controlled w/ daily Protonix   • Heart disease     w/ cardiac clearance 2019, negative stress test 10/2017, EF 65%   • Hiatal hernia     \"small\" noted on UGI    • History of Helicobacter pylori infection     treated remotely   • Hyperlipidemia    • Hypertension    • Joint pain    • Morbid obesity with BMI of 45.0-49.9, adult ([facility])    • Sleep apnea     formerly on a device, no longer using, says just doesn't need it   • Urinary incontinence     no meds   • Vitamin D deficiency      Past Surgical History:   Procedure Laterality Date   • BLADDER SURGERY      \"tack\", uncomplicated, but unsuccessful   • CATARACT EXTRACTION     • COLONOSCOPY      unremarkable   • DILATATION AND CURETTAGE     • TONSILLECTOMY       Social History     Socioeconomic History   • Marital status:    Tobacco Use   • Smoking status: Former Smoker     Years: 10.00     Quit date:      Years since quittin.1   • Smokeless tobacco: Never Used   Vaping Use   • Vaping Use: Never used   Substance and Sexual Activity   • Alcohol use: Not Currently   • Drug use: No   • Sexual activity: Defer     ---------------------------------------------------------------------------------------------------------------------   Allergies:  Mobic [meloxicam] and Lisinopril  ---------------------------------------------------------------------------------------------------------------------   Prior to Admission Medications     Prescriptions Last Dose Informant Patient Reported? Taking?    buPROPion SR (WELLBUTRIN SR) 200 MG 12 hr " tablet 2/24/2022 Medication Bottle Yes Yes    Take 400 mg by mouth every night at bedtime. Prior to [facility] Admission, Patient was on:   Script is wrote 1 BID but pt takes 2 QHS    FLUoxetine (PROzac) 10 MG capsule 2/24/2022 Medication Bottle Yes Yes    Take 10 mg by mouth Daily.    hydrochlorothiazide (HYDRODIURIL) 12.5 MG tablet 2/24/2022 Medication Bottle Yes Yes    Take 12.5 mg by mouth Daily.    insulin regular (humuLIN R,novoLIN R) 100 UNIT/ML injection 2/25/2022 Medication Bottle Yes Yes    Inject 4 Units under the skin into the appropriate area as directed 3 (Three) Times a Day Before Meals.    losartan-hydrochlorothiazide (HYZAAR) 50-12.5 MG per tablet 2/24/2022 Medication Bottle Yes Yes    Take 1 tablet by mouth Daily.    metFORMIN XR (GLUCOPHAGE-XR) 500 MG 24 hr tablet 2/24/2022 Pharmacy Yes Yes    Take 2,000 mg by mouth Every Night.    pioglitazone (ACTOS) 45 MG tablet 2/24/2022 Medication Bottle Yes Yes    Take 45 mg by mouth every night at bedtime.    vitamin D (ERGOCALCIFEROL) 1.25 MG (22053 UT) capsule capsule 2/20/2022 Medication Bottle Yes Yes    Take 50,000 Units by mouth 1 (One) Time Per Week.        Hospital Meds:  buPROPion SR, 400 mg, Oral, Nightly  [START ON 2/27/2022] cholecalciferol, 50,000 Units, Oral, Weekly  FLUoxetine, 10 mg, Oral, Daily  heparin (porcine), 5,000 Units, Subcutaneous, Q12H  insulin aspart, 0-7 Units, Subcutaneous, TID AC  pantoprazole, 40 mg, Oral, Q AM         ---------------------------------------------------------------------------------------------------------------------   Vital Signs:  Temp:  [97.2 °F (36.2 °C)-98.2 °F (36.8 °C)] 98.2 °F (36.8 °C)  Heart Rate:  [61-94] 90  Resp:  [14-18] 18  BP: (105-151)/(57-97) 123/59      02/25/22  0951 02/25/22  2130   Weight: (!) 143 kg (315 lb) (!) 142 kg (314 lb)     Body mass index is 50.68 kg/m².  ---------------------------------------------------------------------------------------------------------------------      Physical exam:  Constitutional:  Well-developed and well-nourished.  No respiratory distress.  Morbidly obese.    HENT:  Head: Normocephalic and atraumatic.  Mouth:  Moist mucous membranes.    Eyes:  Conjunctivae and EOM are normal.  No scleral icterus.  Neck:  Neck supple.    Cardiovascular:  Normal rate, regular rhythm.  Pulmonary/Chest:  No respiratory distress, no wheezes, and good air movement.  Abdominal:  Soft.  No distension and no tenderness.   Musculoskeletal: Upon examination of the left hip and lower extremity: There is no edema, erythema, ecchymosis, or open wounds.  Femoral pulse is 3/4.  Skin is pink, warm, and dry.  Able to dorsiflex and plantar flex the ankle.  Able to flex and extend the digits.  DP pulse 2/4.  Capillary refill brisk and light touch sensation is intact, distally.    Neurological:  Alert and oriented to person, place, and time.  No facial droop.  No slurred speech.   Skin:  Skin is warm and dry.  No rash noted.  No pallor.   Psychiatric:  Normal mood and affect.  Behavior is normal.  Judgment and thought content normal.   Peripheral vascular:  Strong pulses on all 4 extremities.    ---------------------------------------------------------------------------------------------------------------------   .  ---------------------------------------------------------------------------------------------------------------------   Results from last 7 days   Lab Units 02/26/22  0122 02/25/22  1016   WBC 10*3/mm3 8.83 8.89   HEMOGLOBIN g/dL 10.8* 11.4*   HEMATOCRIT % 34.7 35.8   MCV fL 96.1 95.0   MCHC g/dL 31.1* 31.8   PLATELETS 10*3/mm3 298 330         Results from last 7 days   Lab Units 02/26/22  0122 02/25/22  1016   SODIUM mmol/L 136 139   POTASSIUM mmol/L 4.4 3.9   MAGNESIUM mg/dL 2.0  --    CHLORIDE mmol/L 100 104   CO2 mmol/L 24.4 24.8   BUN mg/dL 23 20   CREATININE mg/dL 0.81 0.73   EGFR IF NONAFRICN AM mL/min/1.73 72 81   CALCIUM mg/dL 8.8 8.9   GLUCOSE mg/dL 205* 192*   ALBUMIN  g/dL 3.51 3.86   BILIRUBIN mg/dL 0.5 0.4   ALK PHOS U/L 53 62   AST (SGOT) U/L 13 14   ALT (SGPT) U/L 11 12   Estimated Creatinine Clearance: 106.4 mL/min (by C-G formula based on SCr of 0.81 mg/dL).  No results found for: AMMONIA          Lab Results   Component Value Date    HGBA1C 6.80 (H) 02/25/2022     Lab Results   Component Value Date    TSH 8.590 (H) 02/25/2022    FREET4 1.13 02/25/2022     No results found for: PREGTESTUR, PREGSERUM, HCG, HCGQUANT  Pain Management Panel     Pain Management Panel Latest Ref Rng & Units 4/6/2016 4/7/2014    AMPHETAMINES SCREEN, URINE Negative Negative Negative    BARBITURATES SCREEN Negative Negative Negative    BENZODIAZEPINE SCREEN, URINE Negative Negative Negative    COCAINE SCREEN, URINE Negative Negative Negative    METHADONE SCREEN, URINE Negative Negative Negative        No results found for: BLOODCX  No results found for: URINECX  No results found for: WOUNDCX  No results found for: STOOLCX      ---------------------------------------------------------------------------------------------------------------------   Imaging Results (Last 7 Days)     Procedure Component Value Units Date/Time    XR Hip With or Without Pelvis 2 - 3 View Left [[id number]] Collected: 02/25/22 1056     Updated: 02/25/22 1159    Narrative:      EXAMINATION: XR HIP W OR WO PELVIS 2-3 VIEW LEFT-      CLINICAL INDICATION: fall with hip pain        COMPARISON: None available     FINDINGS:  One view of the pelvis and 2 views of the left hip show comminuted  fracture of the proximal left femur     No other fracture       Impression:      Comminuted fracture of the proximal left femur      This report was finalized on 2/25/2022 10:56 AM by Dr. Denis Wright MD.       XR Femur 2 View Left [[id number]] Collected: 02/25/22 1056     Updated: 02/25/22 1158    Narrative:      EXAMINATION: XR FEMUR 2 VW LEFT-      CLINICAL INDICATION: fall with hip pain        COMPARISON: None available     FINDINGS: 4 views  of the left femur show comminuted fracture of the  proximal left femur extending from the intertrochanteric region.       Impression:      Comminuted fracture of the proximal left femur      This report was finalized on 2/25/2022 10:56 AM by Dr. Denis Wright MD.       XR Chest 1 View [[id number]] Collected: 02/25/22 1055     Updated: 02/25/22 1158    Narrative:      XR CHEST 1 VW-     CLINICAL INDICATION: fall with hip pain        COMPARISON: 12/05/2017      TECHNIQUE: Single frontal view of the chest.     FINDINGS:     There is no focal alveolar infiltrate or effusion.  The cardiac silhouette is normal. The pulmonary vasculature is  unremarkable.  There is no evidence of an acute osseous abnormality.   There are no suspicious-appearing parenchymal soft tissue nodules.          Impression:      No evidence of active or acute cardiopulmonary disease on today's chest  radiograph.     This report was finalized on 2/25/2022 10:56 AM by Dr. Denis Wright MD.           ----------------------------------------------------------------------------------------------------------------------  Assessment and Plan:   Left hip, comminuted, subtrochanteric fracture.    Discussed the patient with Dr. Leung.  He has reviewed the xrays.  He recommends a left hip open reduction and internal fixation today.  The patient is a [place]anda[place] and does not want to have a blood transfusion.  We are able to have a cell saver arranged to use during surgery.  This has been discussed with the patient as well as the high risk of bleeding during surgery with this type of fracture repair.  The patient is agreeable to using the cell saver only.  NPO for surgery.  Consent for left hip open reduction and internal fixation.  Patient is COVID 19 negative and has had a type and screen.        Thank you for the consult.    JAMMIE Nguyen  02/26/22  08:36 EST    Electronically signed by Jarrod Leung MD at 02/26/22 120       Nutrition  Notes (most recent note)    No notes exist for this encounter.            Physical Therapy Notes (most recent note)      Jessica Aquino, PT at 22 1638  Version 1 of 22 1638   OTHER   Discipline physical therapist   Rehab Time/Intention   Session Not Performed patient/family declined treatment   Recommendation   PT - Next Appointment   (Pt reports feeling sick, declined treatment.)       Electronically signed by Jessica Aquino, PT at 22 1638          Occupational Therapy Notes (most recent note)      Adarsh Palacios, OT at 22 1633          Acute Care - Occupational Therapy Treatment Note  RICKEY Workman     Patient Name: Es Olivier  : 1960  MRN: [id number]  Today's Date: 3/2/2022             Admit Date: 2022       ICD-10-CM ICD-9-CM   1. Closed fracture of proximal end of left femur, initial encounter ([facility])  S72.002A 820.8     Patient Active Problem List   Diagnosis   • Biliary dyskinesia   • Heart disease   • Hypertension   • DM2 (diabetes mellitus, type 2) ([facility])   • Hyperlipidemia   • Fatigue   • Dyspepsia   • Dyspnea on exertion   • Morbid obesity with BMI of 45.0-49.9, adult ([facility])   • Urinary incontinence   • Depression   • GERD (gastroesophageal reflux disease)   • History of Helicobacter pylori infection   • Vitamin D deficiency   • Joint pain   • Sleep apnea   • Hiatal hernia   • Fatty liver   • Closed fracture of proximal end of left femur ([facility])     Past Medical History:   Diagnosis Date   • Biliary dyskinesia     abnormal HIDA 2018 w/ EF 31%, symptomatic w/ N/V   • Closed fracture of proximal end of left femur ([facility]) 2022   • Depression    • DM2 (diabetes mellitus, type 2) ([facility])     dx , on insulin >5 years, A1c 7, associated neuropathy, no other complications   • Dyspepsia    • Dyspnea on exertion    • Fatigue    • Fatty liver     dx on CT    • GERD (gastroesophageal reflux disease)     controlled w/ daily Protonix   • Heart disease     w/  "cardiac clearance 4/2019, negative stress test 10/2017, EF 65%   • Hiatal hernia     \"small\" noted on UGI 2014   • History of Helicobacter pylori infection     treated remotely   • Hyperlipidemia    • Hypertension    • Joint pain    • Morbid obesity with BMI of 45.0-49.9, adult ([facility])    • Sleep apnea     formerly on a device, no longer using, says just doesn't need it   • Urinary incontinence     no meds   • Vitamin D deficiency      Past Surgical History:   Procedure Laterality Date   • BLADDER SURGERY  2013    \"tack\", uncomplicated, but unsuccessful   • CATARACT EXTRACTION     • COLONOSCOPY  2015    unremarkable   • DILATATION AND CURETTAGE  1987   • ORIF HIP FRACTURE Left 2/26/2022    Procedure: Left hip Open reduction and internal fixation.;  Surgeon: Jarrod Leung MD;  Location: [facility];  Service: Orthopedics;  Laterality: Left;   • TONSILLECTOMY  1984         OT ASSESSMENT FLOWSHEET (last 12 hours)     OT Evaluation and Treatment     Row Name 03/02/22 1628                   OT Time and Intention    Document Type therapy note (daily note)  -KR        Mode of Treatment occupational therapy  -KR        Patient Effort adequate  -KR                  Bed Mobility    Bed Mobility bed mobility (all) activities  -KR        All Activities, [place] (Bed Mobility) dependent (less than 25% patient effort)  -KR        Comment (Bed Mobility) Pt continues to be dependent with all bed mobility at this time but has improved from initial evaluation, tolerating movement, able to achieve EOB sitting, reporting less pain  -KR                  Balance    Balance Assessment sitting static balance  -KR        Static Sitting Balance unsupported; mild impairment  -KR                  Wound 02/26/22 1245 Left anterior hip Incision    Wound - Properties Group Placement Date: 02/26/22  - Placement Time: 1245  -MC Side: Left  - Orientation: anterior  - Location: hip  -MC Primary Wound Type: Incision  -MC        Retired " Wound - Properties Group Date first assessed: 02/26/22  - Time first assessed: 1245  -MC Side: Left  - Location: hip  -MC Primary Wound Type: Incision  -MC                  Progress Summary (OT)    Progress Toward Functional Goals (OT) progress toward functional goals as expected  -KR              User Key  (r) = Recorded By, (t) = Taken By, (c) = Cosigned By    Initials Name Effective Dates    KR Adarsh Palacios OT 06/16/21 -      Edelmira Carrillo RN 06/16/21 -                  Occupational Therapy Education                 Title: PT OT SLP Therapies (Done)     Topic: Occupational Therapy (Done)     Point: ADL training (Done)     Description:   Instruct learner(s) on proper safety adaptation and remediation techniques during self care or transfers.   Instruct in proper use of assistive devices.              Learning Progress Summary           Patient Acceptance, E,TB, VU by DM at 2/28/2022 1649      Show all documentation for this point (1)                 Point: Precautions (Done)     Description:   Instruct learner(s) on prescribed precautions during self-care and functional transfers.              Learning Progress Summary           Patient Acceptance, E,TB, VU by DM at 2/28/2022 1649      Show all documentation for this point (1)                             User Key     Initials Effective Dates Name Provider Type Discipline     06/16/21 -  Rachael Nicole, RN Registered Nurse Nurse                  OT Recommendation and Plan     Progress Toward Functional Goals (OT): progress toward functional goals as expected        Time Calculation:     Therapy Charges for Today     Code Description Service Date Service Provider Modifiers Qty    68081665720 HC OT SELF CARE/MGMT/TRAIN EA 15 MIN 3/2/2022 Adarsh Palacios OT GO 1    85585756828 HC OT THERAPEUTIC ACT EA 15 MIN 3/2/2022 Adarsh Palacios OT GO 1               Adarsh Palacios OT  3/2/2022    Electronically signed by Adarsh Palacios OT at 03/02/22 5973          ADL Documentation (most recent)      Most Recent Value   Transferring 3 - assistive equipment and person   Toileting 3 - assistive equipment and person   Bathing 3 - assistive equipment and person   Dressing 3 - assistive equipment and person   Eating 0 - independent   Communication 0 - understands/communicates without difficulty   Swallowing 0 - swallows foods/liquids without difficulty   Equipment Currently Used at Home none

## 2022-03-04 NOTE — CASE MANAGEMENT/SOCIAL WORK
Discharge Planning Assessment  [facility]     Patient Name: Es Olivier  MRN: [id number]  Today's Date: 3/4/2022    Admit Date: 2/25/2022     Discharge Plan     Row Name 03/04/22 0936       Plan    Plan SS contacted [facility] per Mandi Munoz and faxed referral. SS to follow.              Continued Care and Services - Admitted Since 2/25/2022     Destination     Service Provider Request Status Selected Services Address Phone Fax Patient Preferred    [facility]  Pending - No Request Sent N/A [address] [facility] [address] [phone] --    [facility]  Declined N/A [address] [facility] [place] [address] [phone] --    [facility]  Declined  No Bed Available N/A [address] [facility] [address] [phone] --    [facility]  Declined  No Bed Available N/A [address] [facility] [address] [phone] --              ARIEL Bradford

## 2022-03-04 NOTE — PROGRESS NOTES
Assisted By: Natalie RHOADES    CC: Follow-up on acute blood loss anemia    Interview History/HPI: Patient states that she is doing okay, pain is lessening and she seems fairly eager to get up with therapy today.  She states she had not had a bowel movement in over a week, she would like to sit on the bedside commode.  She denies any chest pain, appetite is adequate.    ROS:     Vitals:    03/04/22 0608   BP: 137/59   Pulse: 95   Resp: 16   Temp: 98.3 °F (36.8 °C)   SpO2: 92%         Intake/Output Summary (Last 24 hours) at 3/4/2022 1329  Last data filed at 3/4/2022 1019  Gross per 24 hour   Intake 1200 ml   Output 1125 ml   Net 75 ml       EXAM: Lungs clear heart regular rate and rhythm mixed membranes are somewhat pale bandage on the incision has some serous type discharge, trace to 1+ edema of the left lower extremity, no edema right lower extremity, strength overall symmetric in her upper extremities.      EKG:     Tele: Sinus and mild sinus tachycardia    LABS:     Lab Results (last 48 hours)     Procedure Component Value Units Date/Time    POC Glucose Once [[id number]]  (Abnormal) Collected: 03/04/22 1113    Specimen: Blood Updated: 03/04/22 1122     Glucose 194 mg/dL      Comment: Meter: [id number] : [id number] akosua matamoros       POC Glucose Once [[id number]]  (Abnormal) Collected: 03/04/22 0613    Specimen: Blood Updated: 03/04/22 0619     Glucose 203 mg/dL      Comment: Meter: [id number] : [id number] RIN GONZALEZ       Manual Differential [[id number]]  (Abnormal) Collected: 03/04/22 0240    Specimen: Blood Updated: 03/04/22 0533     Neutrophil % 69.0 %      Lymphocyte % 14.0 %      Monocyte % 11.0 %      Eosinophil % 4.0 %      Basophil % 1.0 %      Bands %  1.0 %      Neutrophils Absolute 6.63 10*3/mm3      Lymphocytes Absolute 1.33 10*3/mm3      Monocytes Absolute 1.04 10*3/mm3      Eosinophils Absolute 0.38 10*3/mm3      Basophils Absolute 0.09 10*3/mm3      nRBC 1.0 /100 WBC      Anisocytosis  Slight/1+     Hypochromia Slight/1+     Polychromasia Slight/1+     Platelet Morphology Normal    CBC & Differential [[id number]]  (Abnormal) Collected: 03/04/22 0240    Specimen: Blood Updated: 03/04/22 0417    Narrative:      The following orders were created for panel order CBC & Differential.  Procedure                               Abnormality         Status                     ---------                               -----------         ------                     CBC Auto Differential[[id number]]        Abnormal            Final result               Scan Slide[[id number]]                                                                    Please view results for these tests on the individual orders.    CBC Auto Differential [[id number]]  (Abnormal) Collected: 03/04/22 0240    Specimen: Blood Updated: 03/04/22 0417     WBC 9.47 10*3/mm3      RBC 1.97 10*6/mm3      Hemoglobin 5.9 g/dL      Hematocrit 18.9 %      MCV 95.9 fL      MCH 29.9 pg      MCHC 31.2 g/dL      RDW 14.4 %      RDW-SD 49.6 fl      MPV 9.5 fL      Platelets 468 10*3/mm3     Basic Metabolic Panel [[id number]]  (Abnormal) Collected: 03/04/22 0240    Specimen: Blood Updated: 03/04/22 0400     Glucose 162 mg/dL      BUN 16 mg/dL      Creatinine 0.66 mg/dL      Sodium 130 mmol/L      Potassium 4.7 mmol/L      Chloride 96 mmol/L      CO2 24.2 mmol/L      Calcium 8.0 mg/dL      BUN/Creatinine Ratio 24.2     Anion Gap 9.8 mmol/L      eGFR 99.9 mL/min/1.73      Comment: National Kidney Foundation and American Society of Nephrology (ASN) Task Force recommended calculation based on the Chronic Kidney Disease Epidemiology Collaboration (CKD-EPI) equation refit without adjustment for race.       Narrative:      GFR Normal >60  Chronic Kidney Disease <60  Kidney Failure <15      POC Glucose Once [[id number]]  (Abnormal) Collected: 03/03/22 2032    Specimen: Blood Updated: 03/03/22 2038     Glucose 251 mg/dL      Comment: Meter: [id number] : [id number] GERMAN  EDWARDS       POC Glucose Once [[id number]]  (Abnormal) Collected: 03/03/22 1657    Specimen: Blood Updated: 03/03/22 1704     Glucose 186 mg/dL      Comment: Meter: [id number] : [id number] akosua matamoros       Basic Metabolic Panel [[id number]]  (Abnormal) Collected: 03/03/22 1530    Specimen: Blood Updated: 03/03/22 1606     Glucose 165 mg/dL      BUN 16 mg/dL      Creatinine 0.64 mg/dL      Sodium 131 mmol/L      Potassium 4.6 mmol/L      Chloride 97 mmol/L      CO2 24.4 mmol/L      Calcium 8.1 mg/dL      BUN/Creatinine Ratio 25.0     Anion Gap 9.6 mmol/L      eGFR 100.7 mL/min/1.73      Comment: National Kidney Foundation and American Society of Nephrology (ASN) Task Force recommended calculation based on the Chronic Kidney Disease Epidemiology Collaboration (CKD-EPI) equation refit without adjustment for race.       Narrative:      GFR Normal >60  Chronic Kidney Disease <60  Kidney Failure <15      POC Glucose Once [[id number]]  (Abnormal) Collected: 03/03/22 1116    Specimen: Blood Updated: 03/03/22 1124     Glucose 203 mg/dL      Comment: Meter: [id number] : [id number] akosua matamoros       POC Glucose Once [[id number]]  (Abnormal) Collected: 03/03/22 0607    Specimen: Blood Updated: 03/03/22 0613     Glucose 190 mg/dL      Comment: Meter: [id number] : [id number] RIN GONZALEZ       Manual Differential [[id number]]  (Abnormal) Collected: 03/03/22 0136    Specimen: Blood Updated: 03/03/22 0301     Neutrophil % 60.0 %      Lymphocyte % 22.0 %      Monocyte % 8.0 %      Eosinophil % 2.0 %      Basophil % 1.0 %      Bands %  3.0 %      Metamyelocyte % 2.0 %      Myelocyte % 2.0 %      Neutrophils Absolute 6.01 10*3/mm3      Lymphocytes Absolute 2.10 10*3/mm3      Monocytes Absolute 0.76 10*3/mm3      Eosinophils Absolute 0.19 10*3/mm3      Basophils Absolute 0.10 10*3/mm3      nRBC 2.0 /100 WBC      Hypochromia Slight/1+     Macrocytes Slight/1+     Platelet Morphology Normal    CBC & Differential  [[id number]]  (Abnormal) Collected: 03/03/22 0136    Specimen: Blood Updated: 03/03/22 0301    Narrative:      The following orders were created for panel order CBC & Differential.  Procedure                               Abnormality         Status                     ---------                               -----------         ------                     CBC Auto Differential[[id number]]        Abnormal            Final result               Scan Slide[[id number]]                                       Final result                 Please view results for these tests on the individual orders.    Scan Slide [[id number]] Collected: 03/03/22 0136    Specimen: Blood Updated: 03/03/22 0301     Scan Slide --     Comment: See Manual Differential Results       CBC Auto Differential [[id number]]  (Abnormal) Collected: 03/03/22 0136    Specimen: Blood Updated: 03/03/22 0301     WBC 9.54 10*3/mm3      RBC 1.99 10*6/mm3      Hemoglobin 6.1 g/dL      Hematocrit 20.2 %      .5 fL      MCH 30.7 pg      MCHC 30.2 g/dL      RDW 14.2 %      RDW-SD 52.0 fl      MPV 9.4 fL      Platelets 377 10*3/mm3     Comprehensive Metabolic Panel [[id number]]  (Abnormal) Collected: 03/03/22 0136    Specimen: Blood Updated: 03/03/22 0256     Glucose 190 mg/dL      BUN 18 mg/dL      Creatinine 0.66 mg/dL      Sodium 129 mmol/L      Potassium 4.9 mmol/L      Chloride 96 mmol/L      CO2 23.0 mmol/L      Calcium 7.8 mg/dL      Total Protein 5.3 g/dL      Albumin 2.17 g/dL      ALT (SGPT) 9 U/L      AST (SGOT) 18 U/L      Alkaline Phosphatase 59 U/L      Total Bilirubin 0.4 mg/dL      Globulin 3.1 gm/dL      A/G Ratio 0.7 g/dL      BUN/Creatinine Ratio 27.3     Anion Gap 10.0 mmol/L      eGFR 99.9 mL/min/1.73      Comment: National Kidney Foundation and American Society of Nephrology (ASN) Task Force recommended calculation based on the Chronic Kidney Disease Epidemiology Collaboration (CKD-EPI) equation refit without adjustment for race.       Narrative:       GFR Normal >60  Chronic Kidney Disease <60  Kidney Failure <15      Phosphorus [[id number]]  (Normal) Collected: 03/03/22 0136    Specimen: Blood Updated: 03/03/22 0256     Phosphorus 2.6 mg/dL     Magnesium [[id number]]  (Normal) Collected: 03/03/22 0136    Specimen: Blood Updated: 03/03/22 0256     Magnesium 2.2 mg/dL     POC Glucose Once [[id number]]  (Abnormal) Collected: 03/02/22 2236    Specimen: Blood Updated: 03/02/22 2301     Glucose 236 mg/dL      Comment: Meter: [id number] : [id number] rachele tyler       POC Glucose Once [[id number]]  (Abnormal) Collected: 03/02/22 1632    Specimen: Blood Updated: 03/02/22 1640     Glucose 224 mg/dL      Comment: Meter: [id number] : [id number] akosua saturnino                  Radiology:    Imaging Results (Last 72 Hours)     ** No results found for the last 72 hours. **          Echo pending    Assessment/Plan:  Left hip fracture, discussed with Dr. Leung, he does not feel the patient is active bleeding nor do I, he states this is going to have drainage there is going to be serous for quite some time.  Continue to try to mobilize per therapy.    Acute blood loss anemia, although hemoglobin is lower than yesterday, I feel like allowing for day-to-day variation hemoglobin is stable.  We are continuing on iron and Procrit.  As per above do not think she is actively bleeding.  Patient is continuing on Lovenox because of high risk of thromboembolic phenomenon without it.    Diabetes, not well controlled, continue Metformin at thousand twice daily, increase basal insulin, continue sliding scale.    Morbid obesity adversely affecting her overall health especially her mobility.    Hyponatremia, mild but slight drift, follow, patient had low urine sodium and chloride, if continues to drift may consider fluids again tomorrow.    Disposition SNF    Abner Gomez MD

## 2022-03-04 NOTE — PLAN OF CARE
Goal Outcome Evaluation:           Progress: improving  Outcome Summary: Pt rested well overnight. Pain well controlled with scheduled Oxycodone. Wound drsg CDI. No new complaints or symptoms.

## 2022-03-05 LAB
ANION GAP SERPL CALCULATED.3IONS-SCNC: 9.2 MMOL/L (ref 5–15)
BUN SERPL-MCNC: 12 MG/DL (ref 8–23)
BUN/CREAT SERPL: 19 (ref 7–25)
CALCIUM SPEC-SCNC: 8.4 MG/DL (ref 8.6–10.5)
CHLORIDE SERPL-SCNC: 98 MMOL/L (ref 98–107)
CO2 SERPL-SCNC: 25.8 MMOL/L (ref 22–29)
CREAT SERPL-MCNC: 0.63 MG/DL (ref 0.57–1)
DEPRECATED RDW RBC AUTO: 49.3 FL (ref 37–54)
EGFRCR SERPLBLD CKD-EPI 2021: 101.1 ML/MIN/1.73
EOSINOPHIL # BLD MANUAL: 0.35 10*3/MM3 (ref 0–0.4)
EOSINOPHIL NFR BLD MANUAL: 4 % (ref 0.3–6.2)
ERYTHROCYTE [DISTWIDTH] IN BLOOD BY AUTOMATED COUNT: 14.4 % (ref 12.3–15.4)
GLUCOSE BLDC GLUCOMTR-MCNC: 148 MG/DL (ref 70–130)
GLUCOSE BLDC GLUCOMTR-MCNC: 151 MG/DL (ref 70–130)
GLUCOSE BLDC GLUCOMTR-MCNC: 159 MG/DL (ref 70–130)
GLUCOSE BLDC GLUCOMTR-MCNC: 201 MG/DL (ref 70–130)
GLUCOSE SERPL-MCNC: 149 MG/DL (ref 65–99)
HCT VFR BLD AUTO: 19.3 % (ref 34–46.6)
HGB BLD-MCNC: 6 G/DL (ref 12–15.9)
HYPOCHROMIA BLD QL: ABNORMAL
LARGE PLATELETS: ABNORMAL
LYMPHOCYTES # BLD MANUAL: 1.59 10*3/MM3 (ref 0.7–3.1)
LYMPHOCYTES NFR BLD MANUAL: 6 % (ref 5–12)
MCH RBC QN AUTO: 29.6 PG (ref 26.6–33)
MCHC RBC AUTO-ENTMCNC: 31.1 G/DL (ref 31.5–35.7)
MCV RBC AUTO: 95.1 FL (ref 79–97)
METAMYELOCYTES NFR BLD MANUAL: 3 % (ref 0–0)
MONOCYTES # BLD: 0.53 10*3/MM3 (ref 0.1–0.9)
MYELOCYTES NFR BLD MANUAL: 4 % (ref 0–0)
NEUTROPHILS # BLD AUTO: 5.74 10*3/MM3 (ref 1.7–7)
NEUTROPHILS NFR BLD MANUAL: 62 % (ref 42.7–76)
NEUTS BAND NFR BLD MANUAL: 3 % (ref 0–5)
NRBC SPEC MANUAL: 6 /100 WBC (ref 0–0.2)
PLATELET # BLD AUTO: 494 10*3/MM3 (ref 140–450)
PMV BLD AUTO: 9.1 FL (ref 6–12)
POLYCHROMASIA BLD QL SMEAR: ABNORMAL
POTASSIUM SERPL-SCNC: 4.5 MMOL/L (ref 3.5–5.2)
RBC # BLD AUTO: 2.03 10*6/MM3 (ref 3.77–5.28)
SODIUM SERPL-SCNC: 133 MMOL/L (ref 136–145)
VARIANT LYMPHS NFR BLD MANUAL: 18 % (ref 19.6–45.3)
WBC NRBC COR # BLD: 8.83 10*3/MM3 (ref 3.4–10.8)

## 2022-03-05 PROCEDURE — 85007 BL SMEAR W/DIFF WBC COUNT: CPT | Performed by: INTERNAL MEDICINE

## 2022-03-05 PROCEDURE — 63710000001 INSULIN DETEMIR PER 5 UNITS: Performed by: INTERNAL MEDICINE

## 2022-03-05 PROCEDURE — 82962 GLUCOSE BLOOD TEST: CPT

## 2022-03-05 PROCEDURE — 94799 UNLISTED PULMONARY SVC/PX: CPT

## 2022-03-05 PROCEDURE — 99232 SBSQ HOSP IP/OBS MODERATE 35: CPT | Performed by: INTERNAL MEDICINE

## 2022-03-05 PROCEDURE — 97530 THERAPEUTIC ACTIVITIES: CPT

## 2022-03-05 PROCEDURE — 63710000001 INSULIN ASPART PER 5 UNITS: Performed by: PHYSICIAN ASSISTANT

## 2022-03-05 PROCEDURE — 80048 BASIC METABOLIC PNL TOTAL CA: CPT | Performed by: INTERNAL MEDICINE

## 2022-03-05 PROCEDURE — 25010000002 ENOXAPARIN PER 10 MG: Performed by: INTERNAL MEDICINE

## 2022-03-05 PROCEDURE — 97110 THERAPEUTIC EXERCISES: CPT

## 2022-03-05 PROCEDURE — 85025 COMPLETE CBC W/AUTO DIFF WBC: CPT | Performed by: INTERNAL MEDICINE

## 2022-03-05 RX ORDER — LACTULOSE 10 G/15ML
20 SOLUTION ORAL ONCE
Status: DISCONTINUED | OUTPATIENT
Start: 2022-03-05 | End: 2022-03-09 | Stop reason: HOSPADM

## 2022-03-05 RX ADMIN — PANTOPRAZOLE SODIUM 40 MG: 40 TABLET, DELAYED RELEASE ORAL at 05:20

## 2022-03-05 RX ADMIN — OXYCODONE HYDROCHLORIDE AND ACETAMINOPHEN 1 TABLET: 10; 325 TABLET ORAL at 05:17

## 2022-03-05 RX ADMIN — METFORMIN HYDROCHLORIDE 1000 MG: 500 TABLET ORAL at 08:26

## 2022-03-05 RX ADMIN — OXYCODONE HYDROCHLORIDE AND ACETAMINOPHEN 1 TABLET: 10; 325 TABLET ORAL at 12:30

## 2022-03-05 RX ADMIN — INSULIN ASPART 5 UNITS: 100 INJECTION, SOLUTION INTRAVENOUS; SUBCUTANEOUS at 17:14

## 2022-03-05 RX ADMIN — INSULIN ASPART 2 UNITS: 100 INJECTION, SOLUTION INTRAVENOUS; SUBCUTANEOUS at 08:26

## 2022-03-05 RX ADMIN — OXYCODONE HYDROCHLORIDE AND ACETAMINOPHEN 1 TABLET: 10; 325 TABLET ORAL at 23:38

## 2022-03-05 RX ADMIN — INSULIN ASPART 3 UNITS: 100 INJECTION, SOLUTION INTRAVENOUS; SUBCUTANEOUS at 20:31

## 2022-03-05 RX ADMIN — Medication 150 MG: at 20:23

## 2022-03-05 RX ADMIN — DOCUSATE SODIUM 100 MG: 100 CAPSULE, LIQUID FILLED ORAL at 20:23

## 2022-03-05 RX ADMIN — INSULIN DETEMIR 20 UNITS: 100 INJECTION, SOLUTION SUBCUTANEOUS at 20:31

## 2022-03-05 RX ADMIN — BUPROPION HYDROCHLORIDE 400 MG: 150 TABLET, EXTENDED RELEASE ORAL at 20:23

## 2022-03-05 RX ADMIN — Medication 150 MG: at 08:25

## 2022-03-05 RX ADMIN — ENOXAPARIN SODIUM 30 MG: 30 INJECTION SUBCUTANEOUS at 17:14

## 2022-03-05 RX ADMIN — FLUOXETINE HYDROCHLORIDE 10 MG: 10 CAPSULE ORAL at 08:25

## 2022-03-05 RX ADMIN — ENOXAPARIN SODIUM 30 MG: 30 INJECTION SUBCUTANEOUS at 05:20

## 2022-03-05 RX ADMIN — DOCUSATE SODIUM 100 MG: 100 CAPSULE, LIQUID FILLED ORAL at 08:25

## 2022-03-05 RX ADMIN — METFORMIN HYDROCHLORIDE 1000 MG: 500 TABLET ORAL at 17:14

## 2022-03-05 RX ADMIN — Medication 10 ML: at 20:22

## 2022-03-05 RX ADMIN — OXYCODONE HYDROCHLORIDE AND ACETAMINOPHEN 1 TABLET: 10; 325 TABLET ORAL at 18:16

## 2022-03-05 NOTE — PROGRESS NOTES
"Assisted By: Nai RHOADES    CC: Follow-up on acute blood loss anemia and hip fracture    Interview History/HPI: Patient states that she is doing okay, she tried the bedpan this morning but was unable to have a bowel movement, she has having flatus.  She states she is getting \"disgusted\".  She states she can do what she wants to do, she would like to try a trapeze bar which we are trying to get.  She still has fairly high intensity pain with trying to mobilize although lying still she does not have the much pain.  Denies chest pain.    ROS:     Vitals:    03/05/22 1300   BP: 113/47   Pulse: 63   Resp: 14   Temp: 98.4 °F (36.9 °C)   SpO2: 90%         Intake/Output Summary (Last 24 hours) at 3/5/2022 1436  Last data filed at 3/5/2022 1216  Gross per 24 hour   Intake 360 ml   Output 1000 ml   Net -640 ml       EXAM: Lungs clear heart regular rate and rhythm mucous membranes  pale, no edema today strength symmetric in her upper extremities abdomen soft, benign nondistended nontender bowel sounds are active.  She is having flatus..      EKG:     Tele: Sinus and mild sinus tachycardia    LABS:     Lab Results (last 48 hours)     Procedure Component Value Units Date/Time    POC Glucose Once [[id number]]  (Abnormal) Collected: 03/04/22 1113    Specimen: Blood Updated: 03/04/22 1122     Glucose 194 mg/dL      Comment: Meter: [id number] : [id number] akosua matamoros       POC Glucose Once [[id number]]  (Abnormal) Collected: 03/04/22 0613    Specimen: Blood Updated: 03/04/22 0619     Glucose 203 mg/dL      Comment: Meter: [id number] : [id number] RIN GONZALEZ       Manual Differential [[id number]]  (Abnormal) Collected: 03/04/22 0240    Specimen: Blood Updated: 03/04/22 0533     Neutrophil % 69.0 %      Lymphocyte % 14.0 %      Monocyte % 11.0 %      Eosinophil % 4.0 %      Basophil % 1.0 %      Bands %  1.0 %      Neutrophils Absolute 6.63 10*3/mm3      Lymphocytes Absolute 1.33 10*3/mm3      Monocytes Absolute 1.04 " 10*3/mm3      Eosinophils Absolute 0.38 10*3/mm3      Basophils Absolute 0.09 10*3/mm3      nRBC 1.0 /100 WBC      Anisocytosis Slight/1+     Hypochromia Slight/1+     Polychromasia Slight/1+     Platelet Morphology Normal    CBC & Differential [[id number]]  (Abnormal) Collected: 03/04/22 0240    Specimen: Blood Updated: 03/04/22 0417    Narrative:      The following orders were created for panel order CBC & Differential.  Procedure                               Abnormality         Status                     ---------                               -----------         ------                     CBC Auto Differential[[id number]]        Abnormal            Final result               Scan Slide[[id number]]                                                                    Please view results for these tests on the individual orders.    CBC Auto Differential [[id number]]  (Abnormal) Collected: 03/04/22 0240    Specimen: Blood Updated: 03/04/22 0417     WBC 9.47 10*3/mm3      RBC 1.97 10*6/mm3      Hemoglobin 5.9 g/dL      Hematocrit 18.9 %      MCV 95.9 fL      MCH 29.9 pg      MCHC 31.2 g/dL      RDW 14.4 %      RDW-SD 49.6 fl      MPV 9.5 fL      Platelets 468 10*3/mm3     Basic Metabolic Panel [[id number]]  (Abnormal) Collected: 03/04/22 0240    Specimen: Blood Updated: 03/04/22 0400     Glucose 162 mg/dL      BUN 16 mg/dL      Creatinine 0.66 mg/dL      Sodium 130 mmol/L      Potassium 4.7 mmol/L      Chloride 96 mmol/L      CO2 24.2 mmol/L      Calcium 8.0 mg/dL      BUN/Creatinine Ratio 24.2     Anion Gap 9.8 mmol/L      eGFR 99.9 mL/min/1.73      Comment: National Kidney Foundation and American Society of Nephrology (ASN) Task Force recommended calculation based on the Chronic Kidney Disease Epidemiology Collaboration (CKD-EPI) equation refit without adjustment for race.       Narrative:      GFR Normal >60  Chronic Kidney Disease <60  Kidney Failure <15      POC Glucose Once [[id number]]  (Abnormal) Collected: 03/03/22  2032    Specimen: Blood Updated: 03/03/22 2038     Glucose 251 mg/dL      Comment: Meter: [id number] : [id number] GERMAN EDWARDS       POC Glucose Once [[id number]]  (Abnormal) Collected: 03/03/22 1657    Specimen: Blood Updated: 03/03/22 1704     Glucose 186 mg/dL      Comment: Meter: [id number] : [id number] akosua matamoros       Basic Metabolic Panel [[id number]]  (Abnormal) Collected: 03/03/22 1530    Specimen: Blood Updated: 03/03/22 1606     Glucose 165 mg/dL      BUN 16 mg/dL      Creatinine 0.64 mg/dL      Sodium 131 mmol/L      Potassium 4.6 mmol/L      Chloride 97 mmol/L      CO2 24.4 mmol/L      Calcium 8.1 mg/dL      BUN/Creatinine Ratio 25.0     Anion Gap 9.6 mmol/L      eGFR 100.7 mL/min/1.73      Comment: National Kidney Foundation and American Society of Nephrology (ASN) Task Force recommended calculation based on the Chronic Kidney Disease Epidemiology Collaboration (CKD-EPI) equation refit without adjustment for race.       Narrative:      GFR Normal >60  Chronic Kidney Disease <60  Kidney Failure <15      POC Glucose Once [[id number]]  (Abnormal) Collected: 03/03/22 1116    Specimen: Blood Updated: 03/03/22 1124     Glucose 203 mg/dL      Comment: Meter: [id number] : [id number] akosua matamoros       POC Glucose Once [[id number]]  (Abnormal) Collected: 03/03/22 0607    Specimen: Blood Updated: 03/03/22 0613     Glucose 190 mg/dL      Comment: Meter: [id number] : [id number] RIN GONZALEZ       Manual Differential [[id number]]  (Abnormal) Collected: 03/03/22 0136    Specimen: Blood Updated: 03/03/22 0301     Neutrophil % 60.0 %      Lymphocyte % 22.0 %      Monocyte % 8.0 %      Eosinophil % 2.0 %      Basophil % 1.0 %      Bands %  3.0 %      Metamyelocyte % 2.0 %      Myelocyte % 2.0 %      Neutrophils Absolute 6.01 10*3/mm3      Lymphocytes Absolute 2.10 10*3/mm3      Monocytes Absolute 0.76 10*3/mm3      Eosinophils Absolute 0.19 10*3/mm3      Basophils Absolute 0.10 10*3/mm3       nRBC 2.0 /100 WBC      Hypochromia Slight/1+     Macrocytes Slight/1+     Platelet Morphology Normal    CBC & Differential [[id number]]  (Abnormal) Collected: 03/03/22 0136    Specimen: Blood Updated: 03/03/22 0301    Narrative:      The following orders were created for panel order CBC & Differential.  Procedure                               Abnormality         Status                     ---------                               -----------         ------                     CBC Auto Differential[[id number]]        Abnormal            Final result               Scan Slide[[id number]]                                       Final result                 Please view results for these tests on the individual orders.    Scan Slide [[id number]] Collected: 03/03/22 0136    Specimen: Blood Updated: 03/03/22 0301     Scan Slide --     Comment: See Manual Differential Results       CBC Auto Differential [[id number]]  (Abnormal) Collected: 03/03/22 0136    Specimen: Blood Updated: 03/03/22 0301     WBC 9.54 10*3/mm3      RBC 1.99 10*6/mm3      Hemoglobin 6.1 g/dL      Hematocrit 20.2 %      .5 fL      MCH 30.7 pg      MCHC 30.2 g/dL      RDW 14.2 %      RDW-SD 52.0 fl      MPV 9.4 fL      Platelets 377 10*3/mm3     Comprehensive Metabolic Panel [[id number]]  (Abnormal) Collected: 03/03/22 0136    Specimen: Blood Updated: 03/03/22 0256     Glucose 190 mg/dL      BUN 18 mg/dL      Creatinine 0.66 mg/dL      Sodium 129 mmol/L      Potassium 4.9 mmol/L      Chloride 96 mmol/L      CO2 23.0 mmol/L      Calcium 7.8 mg/dL      Total Protein 5.3 g/dL      Albumin 2.17 g/dL      ALT (SGPT) 9 U/L      AST (SGOT) 18 U/L      Alkaline Phosphatase 59 U/L      Total Bilirubin 0.4 mg/dL      Globulin 3.1 gm/dL      A/G Ratio 0.7 g/dL      BUN/Creatinine Ratio 27.3     Anion Gap 10.0 mmol/L      eGFR 99.9 mL/min/1.73      Comment: National Kidney Foundation and American Society of Nephrology (ASN) Task Force recommended calculation based on the  Chronic Kidney Disease Epidemiology Collaboration (CKD-EPI) equation refit without adjustment for race.       Narrative:      GFR Normal >60  Chronic Kidney Disease <60  Kidney Failure <15      Phosphorus [[id number]]  (Normal) Collected: 03/03/22 0136    Specimen: Blood Updated: 03/03/22 0256     Phosphorus 2.6 mg/dL     Magnesium [[id number]]  (Normal) Collected: 03/03/22 0136    Specimen: Blood Updated: 03/03/22 0256     Magnesium 2.2 mg/dL     POC Glucose Once [[id number]]  (Abnormal) Collected: 03/02/22 2236    Specimen: Blood Updated: 03/02/22 2301     Glucose 236 mg/dL      Comment: Meter: [id number] : [id number] rachele tyler       POC Glucose Once [[id number]]  (Abnormal) Collected: 03/02/22 1632    Specimen: Blood Updated: 03/02/22 1640     Glucose 224 mg/dL      Comment: Meter: [id number] : [id number] akosua matamoros                  Radiology:    Imaging Results (Last 72 Hours)     ** No results found for the last 72 hours. **      Femur x-ray left showed comminuted fracture of the proximal left femur on admission    Results for orders placed during the hospital encounter of 02/25/22    Adult Transthoracic Echo Complete W/ Cont if Necessary Per Protocol    Interpretation Summary  · Estimated left ventricular EF = 65% Left ventricular ejection fraction appears to be 61 - 65%. Left ventricular systolic function is normal.  · Left ventricular diastolic function was normal.  · Estimated right ventricular systolic pressure from tricuspid regurgitation is normal (<35 mmHg).  · No significant mitral or aortic regurgitation  · No pericardial effusion  · No previous echo  · Mild aortic valve stenosis is present.      Assessment/Plan:  Left hip fracture, still having some pain, unfortunately she has been very slow to mobilize and this is going to be problematic especially with her BMI of 50.  We will continue to encourage therapy.  We are getting her a trapeze bar, prognosis to return to previous function is  guarded.  She is on scheduled Percocet to try to help her mobilize.    Acute blood loss anemia, hemoglobin is stable I do not think she is actively bleeding  We are continuing on iron and Procrit.  Would like continue Procrit until hemoglobin reaches about 7.5-8 as per above do not think she is actively bleeding.  Patient is continuing on Lovenox because of high risk of thromboembolic phenomenon without it.    Diabetes, improving control, continue current dose of Metformin and insulin    Constipation, will repeat lactulose dosing, she is having flatus, the difficulty as she cannot get up to bedside commode.    Hyponatremia, improving, follow    Disposition SNF    Abner Gomez MD

## 2022-03-05 NOTE — PLAN OF CARE
Goal Outcome Evaluation:  Plan of Care Reviewed With: patient        Progress: improving  Outcome Evaluation: Pt rested well, pain controlled with scheduled meds. Pt received bed bath. Pt very hesitant to move, possibly due to fear of pain. Indpendence encouraged strongly and support provided. VSS. No new complaints or symptoms.

## 2022-03-05 NOTE — THERAPY TREATMENT NOTE
"Acute Care - Physical Therapy Treatment Note   Ji     Patient Name: Es Olivier  : 1960  MRN: [id number]  Today's Date: 3/5/2022      Visit Dx:     ICD-10-CM ICD-9-CM   1. Closed fracture of proximal end of left femur, initial encounter ([facility])  S72.002A 820.8     Patient Active Problem List   Diagnosis   • Biliary dyskinesia   • Heart disease   • Hypertension   • DM2 (diabetes mellitus, type 2) ([facility])   • Hyperlipidemia   • Fatigue   • Dyspepsia   • Dyspnea on exertion   • Morbid obesity with BMI of 45.0-49.9, adult ([facility])   • Urinary incontinence   • Depression   • GERD (gastroesophageal reflux disease)   • History of Helicobacter pylori infection   • Vitamin D deficiency   • Joint pain   • Sleep apnea   • Hiatal hernia   • Fatty liver   • Closed fracture of proximal end of left femur ([facility])     Past Medical History:   Diagnosis Date   • Biliary dyskinesia     abnormal HIDA 2018 w/ EF 31%, symptomatic w/ N/V   • Closed fracture of proximal end of left femur ([facility]) 2022   • Depression    • DM2 (diabetes mellitus, type 2) ([facility])     dx , on insulin >5 years, A1c 7, associated neuropathy, no other complications   • Dyspepsia    • Dyspnea on exertion    • Fatigue    • Fatty liver     dx on CT    • GERD (gastroesophageal reflux disease)     controlled w/ daily Protonix   • Heart disease     w/ cardiac clearance 2019, negative stress test 10/2017, EF 65%   • Hiatal hernia     \"small\" noted on UGI    • History of Helicobacter pylori infection     treated remotely   • Hyperlipidemia    • Hypertension    • Joint pain    • Morbid obesity with BMI of 45.0-49.9, adult ([facility])    • Sleep apnea     formerly on a device, no longer using, says just doesn't need it   • Urinary incontinence     no meds   • Vitamin D deficiency      Past Surgical History:   Procedure Laterality Date   • BLADDER SURGERY      \"tack\", uncomplicated, but unsuccessful   • CATARACT EXTRACTION     • COLONOSCOPY      " unremarkable   • DILATATION AND CURETTAGE  1987   • ORIF HIP FRACTURE Left 2/26/2022    Procedure: Left hip Open reduction and internal fixation.;  Surgeon: Jarrod Leung MD;  Location: [facility];  Service: Orthopedics;  Laterality: Left;   • TONSILLECTOMY  1984     PT Assessment (last 12 hours)     PT Evaluation and Treatment     Row Name 03/05/22 1641          Physical Therapy Time and Intention    Document Type therapy note (daily note)  -     Mode of Treatment physical therapy  -     Patient Effort fair  -     Comment Pt assisted to sit EOB, STS attempted x2 with depA, unable to perform at this time. Pt given HEP to perform  -     Row Name 03/05/22 1641          Bed Mobility    Bed Mobility supine-sit;sit-supine;scooting/bridging  -     Scooting/Bridging Castro (Bed Mobility) moderate assist (50% patient effort);maximum assist (25% patient effort);1 person assist;2 person assist  -     Supine-Sit Castro (Bed Mobility) maximum assist (25% patient effort);dependent (less than 25% patient effort);2 person assist  HOB elevated,  -     Sit-Supine Castro (Bed Mobility) dependent (less than 25% patient effort);2 person assist  3 person assist  -     Assistive Device (Bed Mobility) draw sheet  -     Comment, (Bed Mobility) Rolling assisted for draw sheet placement  -     Row Name 03/05/22 1641          Transfers    Comment, (Transfers) STS attempted depA however unsuccessful.  -     Row Name 03/05/22 1641          Motor Skills    Therapeutic Exercise hip;knee  HEP glute sets, hip flexion AA, hip abd AA, knee flex/ext P/AA  -     Row Name             Wound Left thigh Incision    Wound - Properties Group Side: Left  -RH Location: thigh  -RH Primary Wound Type: Incision  -RH     Retired Wound - Properties Group Side: Left  -RH Location: thigh  -RH Primary Wound Type: Incision  -RH     Retired Wound - Properties Group Side: Left  -RH Location: thigh  -RH Primary Wound Type:  Incision  -     Row Name 03/05/22 1641          Coping    Observed Emotional State --  -     Row Name 03/05/22 1641          Positioning and Restraints    Pre-Treatment Position in bed  -     Post Treatment Position bed  -     In Bed supine;with family/caregiver  -     Row Name 03/05/22 1641          Therapy Assessment/Plan (PT)    Comment, Therapy Assessment/Plan (PT) Progress slower than expected, HEP given to assist with LE strength  -           User Key  (r) = Recorded By, (t) = Taken By, (c) = Cosigned By    Initials Name Provider Type    Jessica Rodriguez, PT Physical Therapist     Marie Eugene, RN Registered Nurse                  PT Recommendation and Plan  Anticipated Discharge Disposition (PT): skilled nursing facility  Planned Therapy Interventions (PT): bed mobility training, gait training, strengthening, transfer training  Therapy Frequency (PT): 6 times/wk (update)  Outcome Evaluation: Pt evaluated this date with grossly dependent assist with bed mobility this date. D/C rec for SNF for rehabilitation as pt was independent with PLOF.       Time Calculation:    PT Charges     Row Name 03/05/22 1648             Time Calculation    PT Received On 03/05/22  -      PT Goal Re-Cert Due Date 03/14/22  -              Time Calculation- PT    Total Timed Code Minutes- PT 40 minute(s)  -            User Key  (r) = Recorded By, (t) = Taken By, (c) = Cosigned By    Initials Name Provider Type    Jessica Rodriguez, MAISHA Physical Therapist              Therapy Charges for Today     Code Description Service Date Service Provider Modifiers Qty    81187650244 HC PT THERAPEUTIC ACT EA 15 MIN 3/4/2022 Jessica Aquino, PT GP 2    16830257369 HC PT THERAPEUTIC ACT EA 15 MIN 3/5/2022 Jessica Aquino, PT GP 1    36844771090 HC PT THERAPEUTIC ACT EA 15 MIN 3/5/2022 Jessica Aquino, PT GP 1    98478212770 HC PT THER PROC EA 15 MIN 3/5/2022 Jessica Aquino, PT GP 1               Jessica Aquino  PT  3/5/2022

## 2022-03-05 NOTE — NURSING NOTE
Patient is alert and oriented and is going to call her family to let them know that she moved to Room 348A.

## 2022-03-05 NOTE — PLAN OF CARE
Goal Outcome Evaluation:  Plan of Care Reviewed With: patient           Outcome Evaluation: Pt has been resting in bed. Pt attempted to stand with PT, was unable. VSS. Will continue to monitor.

## 2022-03-06 LAB
ALBUMIN SERPL-MCNC: 2.28 G/DL (ref 3.5–5.2)
ALBUMIN/GLOB SERPL: 0.7 G/DL
ALP SERPL-CCNC: 58 U/L (ref 39–117)
ALT SERPL W P-5'-P-CCNC: 7 U/L (ref 1–33)
ANION GAP SERPL CALCULATED.3IONS-SCNC: 11.5 MMOL/L (ref 5–15)
AST SERPL-CCNC: 14 U/L (ref 1–32)
BILIRUB SERPL-MCNC: 0.5 MG/DL (ref 0–1.2)
BUN SERPL-MCNC: 11 MG/DL (ref 8–23)
BUN/CREAT SERPL: 17.7 (ref 7–25)
CALCIUM SPEC-SCNC: 8.2 MG/DL (ref 8.6–10.5)
CHLORIDE SERPL-SCNC: 98 MMOL/L (ref 98–107)
CO2 SERPL-SCNC: 23.5 MMOL/L (ref 22–29)
CREAT SERPL-MCNC: 0.62 MG/DL (ref 0.57–1)
DEPRECATED RDW RBC AUTO: 49.6 FL (ref 37–54)
EGFRCR SERPLBLD CKD-EPI 2021: 101.5 ML/MIN/1.73
EOSINOPHIL # BLD MANUAL: 0.53 10*3/MM3 (ref 0–0.4)
EOSINOPHIL NFR BLD MANUAL: 6 % (ref 0.3–6.2)
ERYTHROCYTE [DISTWIDTH] IN BLOOD BY AUTOMATED COUNT: 14.6 % (ref 12.3–15.4)
GLOBULIN UR ELPH-MCNC: 3.1 GM/DL
GLUCOSE BLDC GLUCOMTR-MCNC: 142 MG/DL (ref 70–130)
GLUCOSE BLDC GLUCOMTR-MCNC: 154 MG/DL (ref 70–130)
GLUCOSE BLDC GLUCOMTR-MCNC: 160 MG/DL (ref 70–130)
GLUCOSE BLDC GLUCOMTR-MCNC: 215 MG/DL (ref 70–130)
GLUCOSE SERPL-MCNC: 129 MG/DL (ref 65–99)
HCT VFR BLD AUTO: 19.3 % (ref 34–46.6)
HGB BLD-MCNC: 6 G/DL (ref 12–15.9)
HYPOCHROMIA BLD QL: ABNORMAL
LARGE PLATELETS: ABNORMAL
LYMPHOCYTES # BLD MANUAL: 1.5 10*3/MM3 (ref 0.7–3.1)
LYMPHOCYTES NFR BLD MANUAL: 6 % (ref 5–12)
MACROCYTES BLD QL SMEAR: ABNORMAL
MCH RBC QN AUTO: 29.6 PG (ref 26.6–33)
MCHC RBC AUTO-ENTMCNC: 31.1 G/DL (ref 31.5–35.7)
MCV RBC AUTO: 95.1 FL (ref 79–97)
METAMYELOCYTES NFR BLD MANUAL: 1 % (ref 0–0)
MONOCYTES # BLD: 0.53 10*3/MM3 (ref 0.1–0.9)
MYELOCYTES NFR BLD MANUAL: 3 % (ref 0–0)
NEUTROPHILS # BLD AUTO: 5.92 10*3/MM3 (ref 1.7–7)
NEUTROPHILS NFR BLD MANUAL: 62 % (ref 42.7–76)
NEUTS BAND NFR BLD MANUAL: 5 % (ref 0–5)
PLATELET # BLD AUTO: 478 10*3/MM3 (ref 140–450)
PMV BLD AUTO: 9 FL (ref 6–12)
POLYCHROMASIA BLD QL SMEAR: ABNORMAL
POTASSIUM SERPL-SCNC: 4.1 MMOL/L (ref 3.5–5.2)
PROT SERPL-MCNC: 5.4 G/DL (ref 6–8.5)
RBC # BLD AUTO: 2.03 10*6/MM3 (ref 3.77–5.28)
SCAN SLIDE: NORMAL
SODIUM SERPL-SCNC: 133 MMOL/L (ref 136–145)
VARIANT LYMPHS NFR BLD MANUAL: 17 % (ref 19.6–45.3)
WBC NRBC COR # BLD: 8.84 10*3/MM3 (ref 3.4–10.8)

## 2022-03-06 PROCEDURE — 80053 COMPREHEN METABOLIC PANEL: CPT | Performed by: INTERNAL MEDICINE

## 2022-03-06 PROCEDURE — 85007 BL SMEAR W/DIFF WBC COUNT: CPT | Performed by: INTERNAL MEDICINE

## 2022-03-06 PROCEDURE — 85025 COMPLETE CBC W/AUTO DIFF WBC: CPT | Performed by: INTERNAL MEDICINE

## 2022-03-06 PROCEDURE — 25010000002 ENOXAPARIN PER 10 MG: Performed by: INTERNAL MEDICINE

## 2022-03-06 PROCEDURE — 99231 SBSQ HOSP IP/OBS SF/LOW 25: CPT | Performed by: INTERNAL MEDICINE

## 2022-03-06 PROCEDURE — 25010000002 EPOETIN ALFA-EPBX 20000 UNIT/ML SOLUTION: Performed by: INTERNAL MEDICINE

## 2022-03-06 PROCEDURE — 82962 GLUCOSE BLOOD TEST: CPT

## 2022-03-06 PROCEDURE — 63710000001 INSULIN ASPART PER 5 UNITS: Performed by: PHYSICIAN ASSISTANT

## 2022-03-06 PROCEDURE — 63710000001 INSULIN DETEMIR PER 5 UNITS: Performed by: INTERNAL MEDICINE

## 2022-03-06 RX ADMIN — INSULIN DETEMIR 20 UNITS: 100 INJECTION, SOLUTION SUBCUTANEOUS at 21:45

## 2022-03-06 RX ADMIN — METFORMIN HYDROCHLORIDE 1000 MG: 500 TABLET ORAL at 08:19

## 2022-03-06 RX ADMIN — ACETAMINOPHEN 650 MG: 325 TABLET ORAL at 21:42

## 2022-03-06 RX ADMIN — INSULIN ASPART 3 UNITS: 100 INJECTION, SOLUTION INTRAVENOUS; SUBCUTANEOUS at 21:43

## 2022-03-06 RX ADMIN — OXYCODONE HYDROCHLORIDE AND ACETAMINOPHEN 1 TABLET: 10; 325 TABLET ORAL at 23:15

## 2022-03-06 RX ADMIN — Medication 150 MG: at 08:19

## 2022-03-06 RX ADMIN — METFORMIN HYDROCHLORIDE 1000 MG: 500 TABLET ORAL at 17:23

## 2022-03-06 RX ADMIN — ENOXAPARIN SODIUM 30 MG: 30 INJECTION SUBCUTANEOUS at 17:23

## 2022-03-06 RX ADMIN — POLYETHYLENE GLYCOL 3350 17 G: 17 POWDER, FOR SOLUTION ORAL at 08:19

## 2022-03-06 RX ADMIN — DOCUSATE SODIUM 100 MG: 100 CAPSULE, LIQUID FILLED ORAL at 08:19

## 2022-03-06 RX ADMIN — ENOXAPARIN SODIUM 30 MG: 30 INJECTION SUBCUTANEOUS at 05:29

## 2022-03-06 RX ADMIN — OXYCODONE HYDROCHLORIDE AND ACETAMINOPHEN 1 TABLET: 10; 325 TABLET ORAL at 05:29

## 2022-03-06 RX ADMIN — BUPROPION HYDROCHLORIDE 400 MG: 150 TABLET, EXTENDED RELEASE ORAL at 21:27

## 2022-03-06 RX ADMIN — INSULIN ASPART 3 UNITS: 100 INJECTION, SOLUTION INTRAVENOUS; SUBCUTANEOUS at 17:23

## 2022-03-06 RX ADMIN — OFLOXACIN 50000 UNITS: 300 TABLET, COATED ORAL at 08:19

## 2022-03-06 RX ADMIN — INSULIN ASPART 5 UNITS: 100 INJECTION, SOLUTION INTRAVENOUS; SUBCUTANEOUS at 11:34

## 2022-03-06 RX ADMIN — OXYCODONE HYDROCHLORIDE AND ACETAMINOPHEN 1 TABLET: 10; 325 TABLET ORAL at 11:38

## 2022-03-06 RX ADMIN — DOCUSATE SODIUM 100 MG: 100 CAPSULE, LIQUID FILLED ORAL at 21:27

## 2022-03-06 RX ADMIN — Medication 150 MG: at 21:28

## 2022-03-06 RX ADMIN — EPOETIN ALFA-EPBX 40000 UNITS: 20000 INJECTION, SOLUTION INTRAVENOUS; SUBCUTANEOUS at 11:35

## 2022-03-06 RX ADMIN — PANTOPRAZOLE SODIUM 40 MG: 40 TABLET, DELAYED RELEASE ORAL at 05:29

## 2022-03-06 RX ADMIN — FLUOXETINE HYDROCHLORIDE 10 MG: 10 CAPSULE ORAL at 08:19

## 2022-03-06 RX ADMIN — HYDROXYZINE HYDROCHLORIDE 25 MG: 25 TABLET ORAL at 10:48

## 2022-03-06 RX ADMIN — OXYCODONE HYDROCHLORIDE AND ACETAMINOPHEN 1 TABLET: 10; 325 TABLET ORAL at 17:23

## 2022-03-06 RX ADMIN — HYDROXYZINE HYDROCHLORIDE 25 MG: 25 TABLET ORAL at 21:28

## 2022-03-06 NOTE — PLAN OF CARE
Goal Outcome Evaluation:  Plan of Care Reviewed With: patient           Outcome Evaluation: Pt resting in bed. Dressing changed to LLE. Pt reported feeling anxious, PRN meds given. Will continue to monitor.

## 2022-03-06 NOTE — PLAN OF CARE
Goal Outcome Evaluation:  Plan of Care Reviewed With: patient        Progress: improving  Outcome Evaluation: Pt has been resting. VSS. Dressing changed. Pain well controlled with scheduled meds.

## 2022-03-06 NOTE — PROGRESS NOTES
Assisted By: Doreen RHOADES    CC: Follow-up on acute blood loss anemia and hip fracture       Interview History/HPI: Patient still required two-person assist to even sit up in the bed yesterday.  She is having no chest pain, she states she has having gas but still no bowel movement.  She had worse refused her cathartics yesterday but did take MiraLAX this morning.  No abdominal pain, she is still having some pain left hip with attempted movement.  No bleeding has been seen, still getting some serous drainage from his dressing.    ROS:     Vitals:    03/06/22 1135   BP: 127/52   Pulse: 88   Resp:    Temp:    SpO2:          Intake/Output Summary (Last 24 hours) at 3/6/2022 1525  Last data filed at 3/6/2022 0500  Gross per 24 hour   Intake 150 ml   Output 650 ml   Net -500 ml       EXAM: Morbidly obese by BMI of 50, she is in no distress mood good skin warm dry lungs clear heart regular rate and rhythm abdomen soft bowel sounds are active no grammatical mass appreciated nondistended nontender no edema, the proximal bandage of her incisions does have some serous looking material but otherwise dressings are dry.  Mucous membranes appear pale      EKG:     Tele: Sinus tachycardia    LABS:     Lab Results (last 48 hours)     Procedure Component Value Units Date/Time    POC Glucose Once [[id number]]  (Abnormal) Collected: 03/06/22 1049    Specimen: Blood Updated: 03/06/22 1055     Glucose 215 mg/dL      Comment: Meter: [id number] : [id number] crystal       Manual Differential [[id number]]  (Abnormal) Collected: 03/06/22 0526    Specimen: Blood Updated: 03/06/22 0800     Neutrophil % 62.0 %      Lymphocyte % 17.0 %      Monocyte % 6.0 %      Eosinophil % 6.0 %      Bands %  5.0 %      Metamyelocyte % 1.0 %      Myelocyte % 3.0 %      Neutrophils Absolute 5.92 10*3/mm3      Lymphocytes Absolute 1.50 10*3/mm3      Monocytes Absolute 0.53 10*3/mm3      Eosinophils Absolute 0.53 10*3/mm3      Hypochromia Mod/2+     Macrocytes  Slight/1+     Polychromasia Slight/1+     Large Platelets Slight/1+    CBC & Differential [[id number]]  (Abnormal) Collected: 03/06/22 0526    Specimen: Blood Updated: 03/06/22 0759    Narrative:      The following orders were created for panel order CBC & Differential.  Procedure                               Abnormality         Status                     ---------                               -----------         ------                     CBC Auto Differential[[id number]]        Abnormal            Final result               Scan Slide[[id number]]                                       Final result                 Please view results for these tests on the individual orders.    Scan Slide [[id number]] Collected: 03/06/22 0526    Specimen: Blood Updated: 03/06/22 0759     Scan Slide --     Comment: See Manual Differential Results       POC Glucose Once [[id number]]  (Abnormal) Collected: 03/06/22 0657    Specimen: Blood Updated: 03/06/22 0705     Glucose 142 mg/dL      Comment: Meter: [id number] : [id number] lydia lamon       Comprehensive Metabolic Panel [[id number]]  (Abnormal) Collected: 03/06/22 0526    Specimen: Blood Updated: 03/06/22 0634     Glucose 129 mg/dL      BUN 11 mg/dL      Creatinine 0.62 mg/dL      Sodium 133 mmol/L      Potassium 4.1 mmol/L      Chloride 98 mmol/L      CO2 23.5 mmol/L      Calcium 8.2 mg/dL      Total Protein 5.4 g/dL      Albumin 2.28 g/dL      ALT (SGPT) 7 U/L      AST (SGOT) 14 U/L      Alkaline Phosphatase 58 U/L      Total Bilirubin 0.5 mg/dL      Globulin 3.1 gm/dL      A/G Ratio 0.7 g/dL      BUN/Creatinine Ratio 17.7     Anion Gap 11.5 mmol/L      eGFR 101.5 mL/min/1.73      Comment: National Kidney Foundation and American Society of Nephrology (ASN) Task Force recommended calculation based on the Chronic Kidney Disease Epidemiology Collaboration (CKD-EPI) equation refit without adjustment for race.       Narrative:      GFR Normal >60  Chronic Kidney Disease <60  Kidney  Failure <15      CBC Auto Differential [[id number]]  (Abnormal) Collected: 03/06/22 0526    Specimen: Blood Updated: 03/06/22 0621     WBC 8.84 10*3/mm3      RBC 2.03 10*6/mm3      Hemoglobin 6.0 g/dL      Hematocrit 19.3 %      MCV 95.1 fL      MCH 29.6 pg      MCHC 31.1 g/dL      RDW 14.6 %      RDW-SD 49.6 fl      MPV 9.0 fL      Platelets 478 10*3/mm3     POC Glucose Once [[id number]]  (Abnormal) Collected: 03/05/22 2026    Specimen: Blood Updated: 03/05/22 2032     Glucose 159 mg/dL      Comment: Meter: [id number] : [id number] GERMAN EDWADRS       POC Glucose Once [[id number]]  (Abnormal) Collected: 03/05/22 1624    Specimen: Blood Updated: 03/05/22 1631     Glucose 201 mg/dL      Comment: Meter: [id number] : [id number] nedra marah       POC Glucose Once [[id number]]  (Abnormal) Collected: 03/05/22 1037    Specimen: Blood Updated: 03/05/22 1042     Glucose 148 mg/dL      Comment: Meter: [id number] : [id number]       Manual Differential [[id number]]  (Abnormal) Collected: 03/05/22 0809    Specimen: Blood Updated: 03/05/22 0918     Neutrophil % 62.0 %      Lymphocyte % 18.0 %      Monocyte % 6.0 %      Eosinophil % 4.0 %      Bands %  3.0 %      Metamyelocyte % 3.0 %      Myelocyte % 4.0 %      Neutrophils Absolute 5.74 10*3/mm3      Lymphocytes Absolute 1.59 10*3/mm3      Monocytes Absolute 0.53 10*3/mm3      Eosinophils Absolute 0.35 10*3/mm3      nRBC 6.0 /100 WBC      Hypochromia Mod/2+     Polychromasia Mod/2+     Large Platelets Slight/1+    Basic Metabolic Panel [[id number]]  (Abnormal) Collected: 03/05/22 0808    Specimen: Blood Updated: 03/05/22 0913     Glucose 149 mg/dL      BUN 12 mg/dL      Creatinine 0.63 mg/dL      Sodium 133 mmol/L      Potassium 4.5 mmol/L      Chloride 98 mmol/L      CO2 25.8 mmol/L      Calcium 8.4 mg/dL      BUN/Creatinine Ratio 19.0     Anion Gap 9.2 mmol/L      eGFR 101.1 mL/min/1.73      Comment: National Kidney Foundation and American Society of  Nephrology (ASN) Task Force recommended calculation based on the Chronic Kidney Disease Epidemiology Collaboration (CKD-EPI) equation refit without adjustment for race.       Narrative:      GFR Normal >60  Chronic Kidney Disease <60  Kidney Failure <15      CBC & Differential [[id number]]  (Abnormal) Collected: 03/05/22 0809    Specimen: Blood Updated: 03/05/22 0857    Narrative:      The following orders were created for panel order CBC & Differential.  Procedure                               Abnormality         Status                     ---------                               -----------         ------                     CBC Auto Differential[[id number]]        Abnormal            Final result               Scan Slide[[id number]]                                                                    Please view results for these tests on the individual orders.    CBC Auto Differential [[id number]]  (Abnormal) Collected: 03/05/22 0809    Specimen: Blood Updated: 03/05/22 0857     WBC 8.83 10*3/mm3      RBC 2.03 10*6/mm3      Hemoglobin 6.0 g/dL      Hematocrit 19.3 %      MCV 95.1 fL      MCH 29.6 pg      MCHC 31.1 g/dL      RDW 14.4 %      RDW-SD 49.3 fl      MPV 9.1 fL      Platelets 494 10*3/mm3     POC Glucose Once [[id number]]  (Abnormal) Collected: 03/05/22 0614    Specimen: Blood Updated: 03/05/22 0621     Glucose 151 mg/dL      Comment: Meter: [id number] : [id number] RIN GONZALEZ       POC Glucose Once [[id number]]  (Abnormal) Collected: 03/04/22 2020    Specimen: Blood Updated: 03/04/22 2026     Glucose 223 mg/dL      Comment: Meter: [id number] : [id number] GERMAN EDWARDS       POC Glucose Once [[id number]]  (Abnormal) Collected: 03/04/22 1624    Specimen: Blood Updated: 03/04/22 1635     Glucose 214 mg/dL      Comment: Meter: [id number] : [id number] akosua matamoros                  Radiology:    Imaging Results (Last 72 Hours)     ** No results found for the last 72 hours. **          Results for  orders placed during the hospital encounter of 02/25/22    Adult Transthoracic Echo Complete W/ Cont if Necessary Per Protocol    Interpretation Summary  · Estimated left ventricular EF = 65% Left ventricular ejection fraction appears to be 61 - 65%. Left ventricular systolic function is normal.  · Left ventricular diastolic function was normal.  · Estimated right ventricular systolic pressure from tricuspid regurgitation is normal (<35 mmHg).  · No significant mitral or aortic regurgitation  · No pericardial effusion  · No previous echo  · Mild aortic valve stenosis is present.      Assessment/Plan:   Left hip fracture, still having some pain, unfortunately she has been very slow to mobilize and this is going to be problematic especially with her BMI of 50.  She still is requiring 2 person assistance sitting up on bed.  Her body habitus certainly is playing into this as well. We will continue to encourage therapy as available...  She is on scheduled Percocet to try to help her mobilize.     Acute blood loss anemia, hemoglobin is stable I do not think she is actively bleeding  We are continuing on iron and Procrit.  Would like continue Procrit until hemoglobin reaches about 7.5-8 as per above do not think she is actively bleeding.  Patient is continuing on Lovenox because of high risk of thromboembolic phenomenon without it.     Diabetes,  overall besides the last glucose control, follow     Constipation, will repeat lactulose dosing, she is having flatus, the difficulty as she cannot get up to bedside commode.     Hyponatremia,  improved to stable, follow-up    Disposition SNF    Abner Gomez MD

## 2022-03-07 LAB
ANION GAP SERPL CALCULATED.3IONS-SCNC: 10 MMOL/L (ref 5–15)
BASOPHILS # BLD AUTO: 0.05 10*3/MM3 (ref 0–0.2)
BASOPHILS NFR BLD AUTO: 0.6 % (ref 0–1.5)
BUN SERPL-MCNC: 11 MG/DL (ref 8–23)
BUN/CREAT SERPL: 16.4 (ref 7–25)
CALCIUM SPEC-SCNC: 8.3 MG/DL (ref 8.6–10.5)
CHLORIDE SERPL-SCNC: 100 MMOL/L (ref 98–107)
CO2 SERPL-SCNC: 23 MMOL/L (ref 22–29)
CREAT SERPL-MCNC: 0.67 MG/DL (ref 0.57–1)
DEPRECATED RDW RBC AUTO: 51.9 FL (ref 37–54)
EGFRCR SERPLBLD CKD-EPI 2021: 99.6 ML/MIN/1.73
EOSINOPHIL # BLD AUTO: 0.31 10*3/MM3 (ref 0–0.4)
EOSINOPHIL NFR BLD AUTO: 3.6 % (ref 0.3–6.2)
ERYTHROCYTE [DISTWIDTH] IN BLOOD BY AUTOMATED COUNT: 14.9 % (ref 12.3–15.4)
GLUCOSE BLDC GLUCOMTR-MCNC: 122 MG/DL (ref 70–130)
GLUCOSE BLDC GLUCOMTR-MCNC: 140 MG/DL (ref 70–130)
GLUCOSE BLDC GLUCOMTR-MCNC: 176 MG/DL (ref 70–130)
GLUCOSE SERPL-MCNC: 124 MG/DL (ref 65–99)
HCT VFR BLD AUTO: 19.8 % (ref 34–46.6)
HGB BLD-MCNC: 5.9 G/DL (ref 12–15.9)
IMM GRANULOCYTES # BLD AUTO: 0.46 10*3/MM3 (ref 0–0.05)
IMM GRANULOCYTES NFR BLD AUTO: 5.3 % (ref 0–0.5)
LYMPHOCYTES # BLD AUTO: 2.15 10*3/MM3 (ref 0.7–3.1)
LYMPHOCYTES NFR BLD AUTO: 24.9 % (ref 19.6–45.3)
MCH RBC QN AUTO: 29.1 PG (ref 26.6–33)
MCHC RBC AUTO-ENTMCNC: 29.8 G/DL (ref 31.5–35.7)
MCV RBC AUTO: 97.5 FL (ref 79–97)
MONOCYTES # BLD AUTO: 0.91 10*3/MM3 (ref 0.1–0.9)
MONOCYTES NFR BLD AUTO: 10.5 % (ref 5–12)
NEUTROPHILS NFR BLD AUTO: 4.76 10*3/MM3 (ref 1.7–7)
NEUTROPHILS NFR BLD AUTO: 55.1 % (ref 42.7–76)
NRBC BLD AUTO-RTO: 1.7 /100 WBC (ref 0–0.2)
PLATELET # BLD AUTO: 498 10*3/MM3 (ref 140–450)
PMV BLD AUTO: 9 FL (ref 6–12)
POTASSIUM SERPL-SCNC: 4 MMOL/L (ref 3.5–5.2)
RBC # BLD AUTO: 2.03 10*6/MM3 (ref 3.77–5.28)
SODIUM SERPL-SCNC: 133 MMOL/L (ref 136–145)
WBC NRBC COR # BLD: 8.64 10*3/MM3 (ref 3.4–10.8)

## 2022-03-07 PROCEDURE — 63710000001 INSULIN ASPART PER 5 UNITS: Performed by: PHYSICIAN ASSISTANT

## 2022-03-07 PROCEDURE — 99231 SBSQ HOSP IP/OBS SF/LOW 25: CPT | Performed by: INTERNAL MEDICINE

## 2022-03-07 PROCEDURE — 63710000001 INSULIN DETEMIR PER 5 UNITS: Performed by: INTERNAL MEDICINE

## 2022-03-07 PROCEDURE — 80048 BASIC METABOLIC PNL TOTAL CA: CPT | Performed by: INTERNAL MEDICINE

## 2022-03-07 PROCEDURE — 25010000002 ENOXAPARIN PER 10 MG: Performed by: INTERNAL MEDICINE

## 2022-03-07 PROCEDURE — 85025 COMPLETE CBC W/AUTO DIFF WBC: CPT | Performed by: INTERNAL MEDICINE

## 2022-03-07 PROCEDURE — 82962 GLUCOSE BLOOD TEST: CPT

## 2022-03-07 PROCEDURE — 97110 THERAPEUTIC EXERCISES: CPT

## 2022-03-07 PROCEDURE — 97530 THERAPEUTIC ACTIVITIES: CPT

## 2022-03-07 RX ORDER — OXYCODONE AND ACETAMINOPHEN 10; 325 MG/1; MG/1
1 TABLET ORAL EVERY 6 HOURS PRN
Status: DISCONTINUED | OUTPATIENT
Start: 2022-03-07 | End: 2022-03-09 | Stop reason: HOSPADM

## 2022-03-07 RX ORDER — CYCLOBENZAPRINE HCL 10 MG
5 TABLET ORAL 3 TIMES DAILY PRN
Status: DISCONTINUED | OUTPATIENT
Start: 2022-03-07 | End: 2022-03-09 | Stop reason: HOSPADM

## 2022-03-07 RX ORDER — AMOXICILLIN 250 MG
2 CAPSULE ORAL 2 TIMES DAILY
Status: DISCONTINUED | OUTPATIENT
Start: 2022-03-07 | End: 2022-03-09 | Stop reason: HOSPADM

## 2022-03-07 RX ADMIN — ENOXAPARIN SODIUM 30 MG: 30 INJECTION SUBCUTANEOUS at 06:06

## 2022-03-07 RX ADMIN — METFORMIN HYDROCHLORIDE 1000 MG: 500 TABLET ORAL at 17:06

## 2022-03-07 RX ADMIN — DOCUSATE SODIUM 100 MG: 100 CAPSULE, LIQUID FILLED ORAL at 08:58

## 2022-03-07 RX ADMIN — PANTOPRAZOLE SODIUM 40 MG: 40 TABLET, DELAYED RELEASE ORAL at 06:06

## 2022-03-07 RX ADMIN — DOCUSATE SODIUM 50 MG AND SENNOSIDES 8.6 MG 2 TABLET: 8.6; 5 TABLET, FILM COATED ORAL at 20:56

## 2022-03-07 RX ADMIN — Medication 150 MG: at 08:57

## 2022-03-07 RX ADMIN — OXYCODONE HYDROCHLORIDE AND ACETAMINOPHEN 1 TABLET: 10; 325 TABLET ORAL at 15:03

## 2022-03-07 RX ADMIN — FLUOXETINE HYDROCHLORIDE 10 MG: 10 CAPSULE ORAL at 08:58

## 2022-03-07 RX ADMIN — Medication 10 MG: at 21:06

## 2022-03-07 RX ADMIN — Medication 150 MG: at 20:57

## 2022-03-07 RX ADMIN — OXYCODONE HYDROCHLORIDE AND ACETAMINOPHEN 1 TABLET: 10; 325 TABLET ORAL at 06:06

## 2022-03-07 RX ADMIN — INSULIN ASPART 3 UNITS: 100 INJECTION, SOLUTION INTRAVENOUS; SUBCUTANEOUS at 17:06

## 2022-03-07 RX ADMIN — Medication 10 ML: at 08:58

## 2022-03-07 RX ADMIN — BUPROPION HYDROCHLORIDE 400 MG: 150 TABLET, EXTENDED RELEASE ORAL at 20:56

## 2022-03-07 RX ADMIN — ENOXAPARIN SODIUM 30 MG: 30 INJECTION SUBCUTANEOUS at 17:06

## 2022-03-07 RX ADMIN — ACETAMINOPHEN 650 MG: 325 TABLET ORAL at 21:06

## 2022-03-07 RX ADMIN — METFORMIN HYDROCHLORIDE 1000 MG: 500 TABLET ORAL at 08:57

## 2022-03-07 RX ADMIN — INSULIN DETEMIR 20 UNITS: 100 INJECTION, SOLUTION SUBCUTANEOUS at 21:07

## 2022-03-07 RX ADMIN — INSULIN ASPART 3 UNITS: 100 INJECTION, SOLUTION INTRAVENOUS; SUBCUTANEOUS at 21:06

## 2022-03-07 NOTE — THERAPY TREATMENT NOTE
"Acute Care - Occupational Therapy Treatment Note  RICKEY Workman     Patient Name: Es Olivier  : 1960  MRN: [id number]  Today's Date: 3/7/2022             Admit Date: 2022       ICD-10-CM ICD-9-CM   1. Closed fracture of proximal end of left femur, initial encounter ([facility])  S72.002A 820.8     Patient Active Problem List   Diagnosis   • Biliary dyskinesia   • Heart disease   • Hypertension   • DM2 (diabetes mellitus, type 2) ([facility])   • Hyperlipidemia   • Fatigue   • Dyspepsia   • Dyspnea on exertion   • Morbid obesity with BMI of 45.0-49.9, adult ([facility])   • Urinary incontinence   • Depression   • GERD (gastroesophageal reflux disease)   • History of Helicobacter pylori infection   • Vitamin D deficiency   • Joint pain   • Sleep apnea   • Hiatal hernia   • Fatty liver   • Closed fracture of proximal end of left femur ([facility])     Past Medical History:   Diagnosis Date   • Biliary dyskinesia     abnormal HIDA 2018 w/ EF 31%, symptomatic w/ N/V   • Closed fracture of proximal end of left femur ([facility]) 2022   • Depression    • DM2 (diabetes mellitus, type 2) ([facility])     dx , on insulin >5 years, A1c 7, associated neuropathy, no other complications   • Dyspepsia    • Dyspnea on exertion    • Fatigue    • Fatty liver     dx on CT    • GERD (gastroesophageal reflux disease)     controlled w/ daily Protonix   • Heart disease     w/ cardiac clearance 2019, negative stress test 10/2017, EF 65%   • Hiatal hernia     \"small\" noted on UGI    • History of Helicobacter pylori infection     treated remotely   • Hyperlipidemia    • Hypertension    • Joint pain    • Morbid obesity with BMI of 45.0-49.9, adult ([facility])    • Sleep apnea     formerly on a device, no longer using, says just doesn't need it   • Urinary incontinence     no meds   • Vitamin D deficiency      Past Surgical History:   Procedure Laterality Date   • BLADDER SURGERY      \"tack\", uncomplicated, but unsuccessful   • CATARACT EXTRACTION   "   • COLONOSCOPY  2015    unremarkable   • DILATATION AND CURETTAGE  1987   • ORIF HIP FRACTURE Left 2/26/2022    Procedure: Left hip Open reduction and internal fixation.;  Surgeon: Jarrod Leung MD;  Location: [facility];  Service: Orthopedics;  Laterality: Left;   • TONSILLECTOMY  1984         OT ASSESSMENT FLOWSHEET (last 12 hours)     OT Evaluation and Treatment     Row Name 03/07/22 1557                   OT Time and Intention    Subjective Information pain;fatigue  -LA        Document Type therapy note (daily note)  -LA        Mode of Treatment individual therapy;occupational therapy  -LA        Patient Effort adequate  -LA                  General Information    Existing Precautions/Restrictions fall;non-weight bearing  LLE NWB  -LA                  Cognition    Affect/Mental Status (Cognition) WFL  -LA        Orientation Status (Cognition) oriented x 3  -LA        Follows Commands (Cognition) follows one-step commands  -LA                  Bed Mobility    All Activities, [place] (Bed Mobility) minimum assist (75% patient effort);other (see comments);verbal cues  leg   -LA                  Transfer Assessment/Treatment    Transfers sit-stand transfer  -LA        Maintains Weight-bearing Status (Transfers) verbal cues to maintain  -LA        Comment, (Transfers) Max verbal cues and encouragement; min-modA x2 person  -LA                  Wound Left thigh Incision    Wound - Properties Group Side: Left  -RH Location: thigh  -RH Primary Wound Type: Incision  -RH        Retired Wound - Properties Group Side: Left  -RH Location: thigh  -RH Primary Wound Type: Incision  -RH        Retired Wound - Properties Group Side: Left  -RH Location: thigh  -RH Primary Wound Type: Incision  -RH                  Positioning and Restraints    Post Treatment Position bed  -LA        In Bed supine;call light within reach;encouraged to call for assist  -LA                  Progress Summary (OT)    Daily Progress  Summary (OT) Patient agreeable to therapy this date. Patient engaged in bed mobility with max verbal cues and max encouragement. Patient able to get from supine to EOB with Gian using leg  to assist with movement of left LE. Patient able to sit EOB with anxiety. Sit to stand with walker with min-modA x2 person. patient returned to bed with maxA. Attempted use of bedpan at EOB unsuccessfully.  -LA              User Key  (r) = Recorded By, (t) = Taken By, (c) = Cosigned By    Initials Name Effective Dates    Marie Carreno RN 01/25/22 -     Ashlee Adams OT 02/14/22 -                  Occupational Therapy Education                 Title: PT OT SLP Therapies (Done)     Topic: Occupational Therapy (Done)     Point: ADL training (Done)     Description:   Instruct learner(s) on proper safety adaptation and remediation techniques during self care or transfers.   Instruct in proper use of assistive devices.              Learning Progress Summary           Patient Acceptance, E,TB, VU by  at 3/7/2022 0940      Show all documentation for this point (2)                 Point: Precautions (Done)     Description:   Instruct learner(s) on prescribed precautions during self-care and functional transfers.              Learning Progress Summary           Patient Acceptance, E,TB, VU by  at 3/7/2022 0940      Show all documentation for this point (2)                             User Key     Initials Effective Dates Name Provider Type Discipline     06/16/21 -  Carol Silver, RN Registered Nurse Nurse                  OT Recommendation and Plan              Time Calculation:     Therapy Charges for Today     Code Description Service Date Service Provider Modifiers Qty    62439528778  OT THER PROC EA 15 MIN 3/7/2022 Ashlee Mar OT GO 1    [id number]  OT THERAPEUTIC ACT EA 15 MIN 3/7/2022 Ashlee Mar OT GO 1               Ashlee Mar OT  3/7/2022

## 2022-03-07 NOTE — PAYOR COMM NOTE
"[facility]  PREET STOCK  PHONE  [phone]  FAX  [phone]  NPI:  [id number]    REF#[id number]  CLINICAL UPDATE    Jenn Muro (62 y.o. Female)             Date of Birth   1960    Social Security Number       Address   [address] [place] [address]    Home Phone   [phone]    MRN   [id number]       Alevism   Yazdanism    Marital Status                               Admission Date   2/25/22    Admission Type   Emergency    Admitting Provider   Mima Randall MD    Attending Provider   aJir Valdes MD    Department, Room/Bed   [address], 3348/1S       Discharge Date       Discharge Disposition       Discharge Destination                               Attending Provider: Jair Valdes MD    Allergies: Mobic [Meloxicam], Lisinopril    Isolation: None   Infection: None   Code Status: CPR   Advance Care Planning Activity    Ht: 167.6 cm (66\")   Wt: 142 kg (314 lb)    Admission Cmt: None   Principal Problem: Closed fracture of proximal end of left femur (HCC) [S72.002A]                 Active Insurance as of 2/25/2022     Primary Coverage     Payor Plan Insurance Group Employer/Plan Group    [facility] MEDICARE REPLACEMENT [employer] MEDICARE REPLACEMENT      Payor Plan Address Payor Plan Phone Number Payor Plan Fax Number Effective Dates    [address] [address] [phone]  10/1/2020 - None Entered    [facility] [id number]       Subscriber Name Subscriber Birth Date Member ID       JENN MURO 1960 [id number]                 Emergency Contacts      (Rel.) Home Phone Work Phone Mobile Phone    Brynn Mane (Daughter) [phone] -- --    Mikki Muro (Daughter) [phone] -- [phone]              Current Facility-Administered Medications   Medication Dose Route Frequency Provider Last Rate Last Admin   • acetaminophen (TYLENOL) tablet 650 mg  650 mg Oral Q4H PRN Jarrod Leung MD   650 mg at 03/06/22 2142    Or "   • acetaminophen (TYLENOL) suppository 650 mg  650 mg Rectal Q4H PRN Jarrod Leung MD       • buPROPion SR (WELLBUTRIN SR) 12 hr tablet 400 mg  400 mg Oral Nightly Jarrod Leung MD   400 mg at 03/06/22 2127   • cholecalciferol (VITAMIN D3) capsule 50,000 Units  50,000 Units Oral Weekly Jarrod Leung MD   50,000 Units at 03/06/22 0819   • dextrose (D50W) (25 g/50 mL) IV injection 25 g  25 g Intravenous Q15 Min PRN Jarrod Leung MD       • dextrose (GLUTOSE) oral gel 15 g  15 g Oral Q15 Min PRN Jarrod Leung MD       • docusate sodium (COLACE) capsule 100 mg  100 mg Oral BID Yvette Cruz PA   100 mg at 03/07/22 0858   • enoxaparin (LOVENOX) syringe 30 mg  30 mg Subcutaneous Q12H Abner Gomez MD   30 mg at 03/07/22 0606   • Epoetin Fadi-epbx (RETACRIT) injection 40,000 Units  40,000 Units Subcutaneous Q48H Abner Gomez MD   40,000 Units at 03/06/22 1135   • FLUoxetine (PROzac) capsule 10 mg  10 mg Oral Daily Jarrod Leung MD   10 mg at 03/07/22 0858   • glucagon (human recombinant) (GLUCAGEN DIAGNOSTIC) injection 1 mg  1 mg Intramuscular Q15 Min PRN Jarrod Leung MD       • hydrOXYzine (ATARAX) tablet 25 mg  25 mg Oral TID PRN Jarrod Leung MD   25 mg at 03/06/22 2128   • insulin aspart (novoLOG) injection 0-14 Units  0-14 Units Subcutaneous 4x Daily AC & at Bedtime Yvette Cruz PA   3 Units at 03/06/22 2143   • insulin detemir (LEVEMIR) injection 20 Units  20 Units Subcutaneous Nightly Abner Gomez MD   20 Units at 03/06/22 2145   • iron polysaccharides (NIFEREX) capsule 150 mg  150 mg Oral BID Abner Gomez MD   150 mg at 03/07/22 0857   • lactulose (CHRONULAC) 10 GM/15ML solution 20 g  20 g Oral Once Abner Gomez MD       • Magnesium Sulfate 2 gram infusion - Mg less than or equal to 1.5 mg/dL  2 g Intravenous PRN Yvette Cruz PA        Or   • Magnesium Sulfate 1 gram infusion - Mg 1.6-1.9 mg/dL  1 g Intravenous PRN Yvette Cruz PA        • melatonin tablet 10 mg  10 mg Oral Nightly PRN Jarrod Leung MD       • metFORMIN (GLUCOPHAGE) tablet 1,000 mg  1,000 mg Oral BID With Meals Abner Gomez MD   1,000 mg at 03/07/22 0857   • naloxone (NARCAN) injection 0.4 mg  0.4 mg Intravenous Q5 Min PRN Jarrod Leung MD       • nitroglycerin (NITROSTAT) SL tablet 0.4 mg  0.4 mg Sublingual Q5 Min PRN Jarrod Leung MD       • ondansetron (ZOFRAN) injection 4 mg  4 mg Intravenous Q6H PRN Jarrod Leung MD       • oxyCODONE-acetaminophen (PERCOCET)  MG per tablet 1 tablet  1 tablet Oral Q6H Yvette Cruz PA   1 tablet at 03/07/22 0606   • pantoprazole (PROTONIX) EC tablet 40 mg  40 mg Oral Q AM Jarrod Leung MD   40 mg at 03/07/22 0606   • polyethylene glycol (MIRALAX) packet 17 g  17 g Oral Daily Yvette Cruz PA   17 g at 03/06/22 0819   • potassium & sodium phosphates (PHOS-NAK) 280-160-250 MG packet - for Phosphorus less than 1.25 mg/dL  2 packet Oral Q6H PRN Yvette Cruz PA        Or   • potassium & sodium phosphates (PHOS-NAK) 280-160-250 MG packet - for Phosphorus 1.25 - 2.5 mg/dL  2 packet Oral Q6H PRN Yvette Cruz PA       • prochlorperazine (COMPAZINE) injection 5 mg  5 mg Intravenous Q6H PRN Jarrod Leung MD       • sodium chloride 0.9 % flush 10 mL  10 mL Intravenous PRN Jarrod Leung MD   10 mL at 03/07/22 0858        Physician Progress Notes (last 72 hours)      Abner Gomez MD at 03/06/22 1525          Assisted By: Doreen RHOADES    CC: Follow-up on acute blood loss anemia and hip fracture       Interview History/HPI: Patient still required two-person assist to even sit up in the bed yesterday.  She is having no chest pain, she states she has having gas but still no bowel movement.  She had worse refused her cathartics yesterday but did take MiraLAX this morning.  No abdominal pain, she is still having some pain left hip with attempted movement.  No bleeding has been seen, still getting  some serous drainage from his dressing.    ROS:     Vitals:    03/06/22 1135   BP: 127/52   Pulse: 88   Resp:    Temp:    SpO2:          Intake/Output Summary (Last 24 hours) at 3/6/2022 1525  Last data filed at 3/6/2022 0500  Gross per 24 hour   Intake 150 ml   Output 650 ml   Net -500 ml       EXAM: Morbidly obese by BMI of 50, she is in no distress mood good skin warm dry lungs clear heart regular rate and rhythm abdomen soft bowel sounds are active no grammatical mass appreciated nondistended nontender no edema, the proximal bandage of her incisions does have some serous looking material but otherwise dressings are dry.  Mucous membranes appear pale      EKG:     Tele: Sinus tachycardia    LABS:     Lab Results (last 48 hours)     Procedure Component Value Units Date/Time    POC Glucose Once [[id number]]  (Abnormal) Collected: 03/06/22 1049    Specimen: Blood Updated: 03/06/22 1055     Glucose 215 mg/dL      Comment: Meter: [id number] : [id number] nedra crystal       Manual Differential [[id number]]  (Abnormal) Collected: 03/06/22 0526    Specimen: Blood Updated: 03/06/22 0800     Neutrophil % 62.0 %      Lymphocyte % 17.0 %      Monocyte % 6.0 %      Eosinophil % 6.0 %      Bands %  5.0 %      Metamyelocyte % 1.0 %      Myelocyte % 3.0 %      Neutrophils Absolute 5.92 10*3/mm3      Lymphocytes Absolute 1.50 10*3/mm3      Monocytes Absolute 0.53 10*3/mm3      Eosinophils Absolute 0.53 10*3/mm3      Hypochromia Mod/2+     Macrocytes Slight/1+     Polychromasia Slight/1+     Large Platelets Slight/1+    CBC & Differential [[id number]]  (Abnormal) Collected: 03/06/22 0526    Specimen: Blood Updated: 03/06/22 0751    Narrative:      The following orders were created for panel order CBC & Differential.  Procedure                               Abnormality         Status                     ---------                               -----------         ------                     CBC Auto Differential[[id number]]         Abnormal            Final result               Scan Slide[[id number]]                                       Final result                 Please view results for these tests on the individual orders.    Scan Slide [[id number]] Collected: 03/06/22 0526    Specimen: Blood Updated: 03/06/22 0759     Scan Slide --     Comment: See Manual Differential Results       POC Glucose Once [[id number]]  (Abnormal) Collected: 03/06/22 0657    Specimen: Blood Updated: 03/06/22 0705     Glucose 142 mg/dL      Comment: Meter: [id number] : [id number] lydia gaston       Comprehensive Metabolic Panel [[id number]]  (Abnormal) Collected: 03/06/22 0526    Specimen: Blood Updated: 03/06/22 0634     Glucose 129 mg/dL      BUN 11 mg/dL      Creatinine 0.62 mg/dL      Sodium 133 mmol/L      Potassium 4.1 mmol/L      Chloride 98 mmol/L      CO2 23.5 mmol/L      Calcium 8.2 mg/dL      Total Protein 5.4 g/dL      Albumin 2.28 g/dL      ALT (SGPT) 7 U/L      AST (SGOT) 14 U/L      Alkaline Phosphatase 58 U/L      Total Bilirubin 0.5 mg/dL      Globulin 3.1 gm/dL      A/G Ratio 0.7 g/dL      BUN/Creatinine Ratio 17.7     Anion Gap 11.5 mmol/L      eGFR 101.5 mL/min/1.73      Comment: National Kidney Foundation and American Society of Nephrology (ASN) Task Force recommended calculation based on the Chronic Kidney Disease Epidemiology Collaboration (CKD-EPI) equation refit without adjustment for race.       Narrative:      GFR Normal >60  Chronic Kidney Disease <60  Kidney Failure <15      CBC Auto Differential [[id number]]  (Abnormal) Collected: 03/06/22 0526    Specimen: Blood Updated: 03/06/22 0621     WBC 8.84 10*3/mm3      RBC 2.03 10*6/mm3      Hemoglobin 6.0 g/dL      Hematocrit 19.3 %      MCV 95.1 fL      MCH 29.6 pg      MCHC 31.1 g/dL      RDW 14.6 %      RDW-SD 49.6 fl      MPV 9.0 fL      Platelets 478 10*3/mm3     POC Glucose Once [[id number]]  (Abnormal) Collected: 03/05/22 2026    Specimen: Blood Updated: 03/05/22 2032     Glucose 159  mg/dL      Comment: Meter: [id number] : [id number] GERMAN EDWARDS       POC Glucose Once [[id number]]  (Abnormal) Collected: 03/05/22 1624    Specimen: Blood Updated: 03/05/22 1631     Glucose 201 mg/dL      Comment: Meter: [id number] : [id number] [employer]       POC Glucose Once [[id number]]  (Abnormal) Collected: 03/05/22 1037    Specimen: Blood Updated: 03/05/22 1042     Glucose 148 mg/dL      Comment: Meter: [id number] : [id number] [employer]       Manual Differential [[id number]]  (Abnormal) Collected: 03/05/22 0809    Specimen: Blood Updated: 03/05/22 0918     Neutrophil % 62.0 %      Lymphocyte % 18.0 %      Monocyte % 6.0 %      Eosinophil % 4.0 %      Bands %  3.0 %      Metamyelocyte % 3.0 %      Myelocyte % 4.0 %      Neutrophils Absolute 5.74 10*3/mm3      Lymphocytes Absolute 1.59 10*3/mm3      Monocytes Absolute 0.53 10*3/mm3      Eosinophils Absolute 0.35 10*3/mm3      nRBC 6.0 /100 WBC      Hypochromia Mod/2+     Polychromasia Mod/2+     Large Platelets Slight/1+    Basic Metabolic Panel [[id number]]  (Abnormal) Collected: 03/05/22 0808    Specimen: Blood Updated: 03/05/22 0913     Glucose 149 mg/dL      BUN 12 mg/dL      Creatinine 0.63 mg/dL      Sodium 133 mmol/L      Potassium 4.5 mmol/L      Chloride 98 mmol/L      CO2 25.8 mmol/L      Calcium 8.4 mg/dL      BUN/Creatinine Ratio 19.0     Anion Gap 9.2 mmol/L      eGFR 101.1 mL/min/1.73      Comment: National Kidney Foundation and American Society of Nephrology (ASN) Task Force recommended calculation based on the Chronic Kidney Disease Epidemiology Collaboration (CKD-EPI) equation refit without adjustment for race.       Narrative:      GFR Normal >60  Chronic Kidney Disease <60  Kidney Failure <15      CBC & Differential [[id number]]  (Abnormal) Collected: 03/05/22 0809    Specimen: Blood Updated: 03/05/22 0857    Narrative:      The following orders were created for panel order CBC & Differential.  Procedure                                Abnormality         Status                     ---------                               -----------         ------                     CBC Auto Differential[[id number]]        Abnormal            Final result               Scan Slide[[id number]]                                                                    Please view results for these tests on the individual orders.    CBC Auto Differential [[id number]]  (Abnormal) Collected: 03/05/22 0809    Specimen: Blood Updated: 03/05/22 0857     WBC 8.83 10*3/mm3      RBC 2.03 10*6/mm3      Hemoglobin 6.0 g/dL      Hematocrit 19.3 %      MCV 95.1 fL      MCH 29.6 pg      MCHC 31.1 g/dL      RDW 14.4 %      RDW-SD 49.3 fl      MPV 9.1 fL      Platelets 494 10*3/mm3     POC Glucose Once [[id number]]  (Abnormal) Collected: 03/05/22 0614    Specimen: Blood Updated: 03/05/22 0621     Glucose 151 mg/dL      Comment: Meter: [id number] : [id number] RIN CARLOS       POC Glucose Once [[id number]]  (Abnormal) Collected: 03/04/22 2020    Specimen: Blood Updated: 03/04/22 2026     Glucose 223 mg/dL      Comment: Meter: [id number] : [id number] GERMAN EDWARDS       POC Glucose Once [[id number]]  (Abnormal) Collected: 03/04/22 1624    Specimen: Blood Updated: 03/04/22 1635     Glucose 214 mg/dL      Comment: Meter: [id number] : [id number] akosua matamoros                  Radiology:    Imaging Results (Last 72 Hours)     ** No results found for the last 72 hours. **          Results for orders placed during the hospital encounter of 02/25/22    Adult Transthoracic Echo Complete W/ Cont if Necessary Per Protocol    Interpretation Summary  · Estimated left ventricular EF = 65% Left ventricular ejection fraction appears to be 61 - 65%. Left ventricular systolic function is normal.  · Left ventricular diastolic function was normal.  · Estimated right ventricular systolic pressure from tricuspid regurgitation is normal (<35 mmHg).  · No significant mitral or aortic  "regurgitation  · No pericardial effusion  · No previous echo  · Mild aortic valve stenosis is present.      Assessment/Plan:   Left hip fracture, still having some pain, unfortunately she has been very slow to mobilize and this is going to be problematic especially with her BMI of 50.  She still is requiring 2 person assistance sitting up on bed.  Her body habitus certainly is playing into this as well. We will continue to encourage therapy as available...  She is on scheduled Percocet to try to help her mobilize.     Acute blood loss anemia, hemoglobin is stable I do not think she is actively bleeding  We are continuing on iron and Procrit.  Would like continue Procrit until hemoglobin reaches about 7.5-8 as per above do not think she is actively bleeding.  Patient is continuing on Lovenox because of high risk of thromboembolic phenomenon without it.     Diabetes,  overall besides the last glucose control, follow     Constipation, will repeat lactulose dosing, she is having flatus, the difficulty as she cannot get up to bedside commode.     Hyponatremia,  improved to stable, follow-up    Disposition SNF    Abner Gomez MD       Electronically signed by Abner Gomez MD at 03/06/22 1530     Abner Gomez MD at 03/05/22 1436          Assisted By: Nai RHOADES    CC: Follow-up on acute blood loss anemia and hip fracture    Interview History/HPI: Patient states that she is doing okay, she tried the bedpan this morning but was unable to have a bowel movement, she has having flatus.  She states she is getting \"disgusted\".  She states she can do what she wants to do, she would like to try a trapeze bar which we are trying to get.  She still has fairly high intensity pain with trying to mobilize although lying still she does not have the much pain.  Denies chest pain.    ROS:     Vitals:    03/05/22 1300   BP: 113/47   Pulse: 63   Resp: 14   Temp: 98.4 °F (36.9 °C)   SpO2: 90%         Intake/Output Summary (Last 24 " hours) at 3/5/2022 1436  Last data filed at 3/5/2022 1216  Gross per 24 hour   Intake 360 ml   Output 1000 ml   Net -640 ml       EXAM: Lungs clear heart regular rate and rhythm mucous membranes  pale, no edema today strength symmetric in her upper extremities abdomen soft, benign nondistended nontender bowel sounds are active.  She is having flatus..      EKG:     Tele: Sinus and mild sinus tachycardia    LABS:     Lab Results (last 48 hours)     Procedure Component Value Units Date/Time    POC Glucose Once [[id number]]  (Abnormal) Collected: 03/04/22 1113    Specimen: Blood Updated: 03/04/22 1122     Glucose 194 mg/dL      Comment: Meter: [id number] : [id number] akosua saturnino       POC Glucose Once [[id number]]  (Abnormal) Collected: 03/04/22 0613    Specimen: Blood Updated: 03/04/22 0619     Glucose 203 mg/dL      Comment: Meter: [id number] : [id number] RIN GONZALEZ       Manual Differential [[id number]]  (Abnormal) Collected: 03/04/22 0240    Specimen: Blood Updated: 03/04/22 0533     Neutrophil % 69.0 %      Lymphocyte % 14.0 %      Monocyte % 11.0 %      Eosinophil % 4.0 %      Basophil % 1.0 %      Bands %  1.0 %      Neutrophils Absolute 6.63 10*3/mm3      Lymphocytes Absolute 1.33 10*3/mm3      Monocytes Absolute 1.04 10*3/mm3      Eosinophils Absolute 0.38 10*3/mm3      Basophils Absolute 0.09 10*3/mm3      nRBC 1.0 /100 WBC      Anisocytosis Slight/1+     Hypochromia Slight/1+     Polychromasia Slight/1+     Platelet Morphology Normal    CBC & Differential [[id number]]  (Abnormal) Collected: 03/04/22 0240    Specimen: Blood Updated: 03/04/22 0417    Narrative:      The following orders were created for panel order CBC & Differential.  Procedure                               Abnormality         Status                     ---------                               -----------         ------                     CBC Auto Differential[[id number]]        Abnormal            Final result               Scan  Slide[[id number]]                                                                    Please view results for these tests on the individual orders.    CBC Auto Differential [[id number]]  (Abnormal) Collected: 03/04/22 0240    Specimen: Blood Updated: 03/04/22 0417     WBC 9.47 10*3/mm3      RBC 1.97 10*6/mm3      Hemoglobin 5.9 g/dL      Hematocrit 18.9 %      MCV 95.9 fL      MCH 29.9 pg      MCHC 31.2 g/dL      RDW 14.4 %      RDW-SD 49.6 fl      MPV 9.5 fL      Platelets 468 10*3/mm3     Basic Metabolic Panel [[id number]]  (Abnormal) Collected: 03/04/22 0240    Specimen: Blood Updated: 03/04/22 0400     Glucose 162 mg/dL      BUN 16 mg/dL      Creatinine 0.66 mg/dL      Sodium 130 mmol/L      Potassium 4.7 mmol/L      Chloride 96 mmol/L      CO2 24.2 mmol/L      Calcium 8.0 mg/dL      BUN/Creatinine Ratio 24.2     Anion Gap 9.8 mmol/L      eGFR 99.9 mL/min/1.73      Comment: National Kidney Foundation and American Society of Nephrology (ASN) Task Force recommended calculation based on the Chronic Kidney Disease Epidemiology Collaboration (CKD-EPI) equation refit without adjustment for race.       Narrative:      GFR Normal >60  Chronic Kidney Disease <60  Kidney Failure <15      POC Glucose Once [[id number]]  (Abnormal) Collected: 03/03/22 2032    Specimen: Blood Updated: 03/03/22 2038     Glucose 251 mg/dL      Comment: Meter: [id number] : [id number] GERMAN EDWARDS       POC Glucose Once [[id number]]  (Abnormal) Collected: 03/03/22 1657    Specimen: Blood Updated: 03/03/22 1704     Glucose 186 mg/dL      Comment: Meter: [id number] : [id number] akosua matamoros       Basic Metabolic Panel [[id number]]  (Abnormal) Collected: 03/03/22 1530    Specimen: Blood Updated: 03/03/22 1606     Glucose 165 mg/dL      BUN 16 mg/dL      Creatinine 0.64 mg/dL      Sodium 131 mmol/L      Potassium 4.6 mmol/L      Chloride 97 mmol/L      CO2 24.4 mmol/L      Calcium 8.1 mg/dL      BUN/Creatinine Ratio 25.0     Anion Gap 9.6  mmol/L      eGFR 100.7 mL/min/1.73      Comment: National Kidney Foundation and American Society of Nephrology (ASN) Task Force recommended calculation based on the Chronic Kidney Disease Epidemiology Collaboration (CKD-EPI) equation refit without adjustment for race.       Narrative:      GFR Normal >60  Chronic Kidney Disease <60  Kidney Failure <15      POC Glucose Once [[id number]]  (Abnormal) Collected: 03/03/22 1116    Specimen: Blood Updated: 03/03/22 1124     Glucose 203 mg/dL      Comment: Meter: [id number] : [id number] akosua matamoros       POC Glucose Once [[id number]]  (Abnormal) Collected: 03/03/22 0607    Specimen: Blood Updated: 03/03/22 0613     Glucose 190 mg/dL      Comment: Meter: [id number] : [id number] RIN LAUGHLINA       Manual Differential [[id number]]  (Abnormal) Collected: 03/03/22 0136    Specimen: Blood Updated: 03/03/22 0301     Neutrophil % 60.0 %      Lymphocyte % 22.0 %      Monocyte % 8.0 %      Eosinophil % 2.0 %      Basophil % 1.0 %      Bands %  3.0 %      Metamyelocyte % 2.0 %      Myelocyte % 2.0 %      Neutrophils Absolute 6.01 10*3/mm3      Lymphocytes Absolute 2.10 10*3/mm3      Monocytes Absolute 0.76 10*3/mm3      Eosinophils Absolute 0.19 10*3/mm3      Basophils Absolute 0.10 10*3/mm3      nRBC 2.0 /100 WBC      Hypochromia Slight/1+     Macrocytes Slight/1+     Platelet Morphology Normal    CBC & Differential [[id number]]  (Abnormal) Collected: 03/03/22 0136    Specimen: Blood Updated: 03/03/22 0301    Narrative:      The following orders were created for panel order CBC & Differential.  Procedure                               Abnormality         Status                     ---------                               -----------         ------                     CBC Auto Differential[[id number]]        Abnormal            Final result               Scan Slide[[id number]]                                       Final result                 Please view results for these tests  on the individual orders.    Scan Slide [[id number]] Collected: 03/03/22 0136    Specimen: Blood Updated: 03/03/22 0301     Scan Slide --     Comment: See Manual Differential Results       CBC Auto Differential [[id number]]  (Abnormal) Collected: 03/03/22 0136    Specimen: Blood Updated: 03/03/22 0301     WBC 9.54 10*3/mm3      RBC 1.99 10*6/mm3      Hemoglobin 6.1 g/dL      Hematocrit 20.2 %      .5 fL      MCH 30.7 pg      MCHC 30.2 g/dL      RDW 14.2 %      RDW-SD 52.0 fl      MPV 9.4 fL      Platelets 377 10*3/mm3     Comprehensive Metabolic Panel [[id number]]  (Abnormal) Collected: 03/03/22 0136    Specimen: Blood Updated: 03/03/22 0256     Glucose 190 mg/dL      BUN 18 mg/dL      Creatinine 0.66 mg/dL      Sodium 129 mmol/L      Potassium 4.9 mmol/L      Chloride 96 mmol/L      CO2 23.0 mmol/L      Calcium 7.8 mg/dL      Total Protein 5.3 g/dL      Albumin 2.17 g/dL      ALT (SGPT) 9 U/L      AST (SGOT) 18 U/L      Alkaline Phosphatase 59 U/L      Total Bilirubin 0.4 mg/dL      Globulin 3.1 gm/dL      A/G Ratio 0.7 g/dL      BUN/Creatinine Ratio 27.3     Anion Gap 10.0 mmol/L      eGFR 99.9 mL/min/1.73      Comment: National Kidney Foundation and American Society of Nephrology (ASN) Task Force recommended calculation based on the Chronic Kidney Disease Epidemiology Collaboration (CKD-EPI) equation refit without adjustment for race.       Narrative:      GFR Normal >60  Chronic Kidney Disease <60  Kidney Failure <15      Phosphorus [[id number]]  (Normal) Collected: 03/03/22 0136    Specimen: Blood Updated: 03/03/22 0256     Phosphorus 2.6 mg/dL     Magnesium [[id number]]  (Normal) Collected: 03/03/22 0136    Specimen: Blood Updated: 03/03/22 0256     Magnesium 2.2 mg/dL     POC Glucose Once [[id number]]  (Abnormal) Collected: 03/02/22 2236    Specimen: Blood Updated: 03/02/22 2301     Glucose 236 mg/dL      Comment: Meter: [id number] : [id number] rachele tyler       POC Glucose Once [[id number]]   (Abnormal) Collected: 03/02/22 1632    Specimen: Blood Updated: 03/02/22 1640     Glucose 224 mg/dL      Comment: Meter: [id number] : [id number] akosua matamoros                  Radiology:    Imaging Results (Last 72 Hours)     ** No results found for the last 72 hours. **      Femur x-ray left showed comminuted fracture of the proximal left femur on admission    Results for orders placed during the hospital encounter of 02/25/22    Adult Transthoracic Echo Complete W/ Cont if Necessary Per Protocol    Interpretation Summary  · Estimated left ventricular EF = 65% Left ventricular ejection fraction appears to be 61 - 65%. Left ventricular systolic function is normal.  · Left ventricular diastolic function was normal.  · Estimated right ventricular systolic pressure from tricuspid regurgitation is normal (<35 mmHg).  · No significant mitral or aortic regurgitation  · No pericardial effusion  · No previous echo  · Mild aortic valve stenosis is present.      Assessment/Plan:  Left hip fracture, still having some pain, unfortunately she has been very slow to mobilize and this is going to be problematic especially with her BMI of 50.  We will continue to encourage therapy.  We are getting her a trapeze bar, prognosis to return to previous function is guarded.  She is on scheduled Percocet to try to help her mobilize.    Acute blood loss anemia, hemoglobin is stable I do not think she is actively bleeding  We are continuing on iron and Procrit.  Would like continue Procrit until hemoglobin reaches about 7.5-8 as per above do not think she is actively bleeding.  Patient is continuing on Lovenox because of high risk of thromboembolic phenomenon without it.    Diabetes, improving control, continue current dose of Metformin and insulin    Constipation, will repeat lactulose dosing, she is having flatus, the difficulty as she cannot get up to bedside commode.    Hyponatremia, improving, follow    Disposition SNF    Abner BOYCE  MD Patricia       Electronically signed by Abner Gomez MD at 03/05/22 1446     Abner Gomez MD at 03/04/22 1329          Assisted By: Natalie RHOADES    CC: Follow-up on acute blood loss anemia    Interview History/HPI: Patient states that she is doing okay, pain is lessening and she seems fairly eager to get up with therapy today.  She states she had not had a bowel movement in over a week, she would like to sit on the bedside commode.  She denies any chest pain, appetite is adequate.    ROS:     Vitals:    03/04/22 0608   BP: 137/59   Pulse: 95   Resp: 16   Temp: 98.3 °F (36.8 °C)   SpO2: 92%         Intake/Output Summary (Last 24 hours) at 3/4/2022 1329  Last data filed at 3/4/2022 1019  Gross per 24 hour   Intake 1200 ml   Output 1125 ml   Net 75 ml       EXAM: Lungs clear heart regular rate and rhythm mixed membranes are somewhat pale bandage on the incision has some serous type discharge, trace to 1+ edema of the left lower extremity, no edema right lower extremity, strength overall symmetric in her upper extremities.      EKG:     Tele: Sinus and mild sinus tachycardia    LABS:     Lab Results (last 48 hours)     Procedure Component Value Units Date/Time    POC Glucose Once [[id number]]  (Abnormal) Collected: 03/04/22 1113    Specimen: Blood Updated: 03/04/22 1122     Glucose 194 mg/dL      Comment: Meter: [id number] : [id number] akosua matamoros       POC Glucose Once [[id number]]  (Abnormal) Collected: 03/04/22 0613    Specimen: Blood Updated: 03/04/22 0619     Glucose 203 mg/dL      Comment: Meter: [id number] : [id number] RIN GONZALEZ       Manual Differential [[id number]]  (Abnormal) Collected: 03/04/22 0240    Specimen: Blood Updated: 03/04/22 0533     Neutrophil % 69.0 %      Lymphocyte % 14.0 %      Monocyte % 11.0 %      Eosinophil % 4.0 %      Basophil % 1.0 %      Bands %  1.0 %      Neutrophils Absolute 6.63 10*3/mm3      Lymphocytes Absolute 1.33 10*3/mm3      Monocytes Absolute 1.04  10*3/mm3      Eosinophils Absolute 0.38 10*3/mm3      Basophils Absolute 0.09 10*3/mm3      nRBC 1.0 /100 WBC      Anisocytosis Slight/1+     Hypochromia Slight/1+     Polychromasia Slight/1+     Platelet Morphology Normal    CBC & Differential [[id number]]  (Abnormal) Collected: 03/04/22 0240    Specimen: Blood Updated: 03/04/22 0417    Narrative:      The following orders were created for panel order CBC & Differential.  Procedure                               Abnormality         Status                     ---------                               -----------         ------                     CBC Auto Differential[[id number]]        Abnormal            Final result               Scan Slide[[id number]]                                                                    Please view results for these tests on the individual orders.    CBC Auto Differential [[id number]]  (Abnormal) Collected: 03/04/22 0240    Specimen: Blood Updated: 03/04/22 0417     WBC 9.47 10*3/mm3      RBC 1.97 10*6/mm3      Hemoglobin 5.9 g/dL      Hematocrit 18.9 %      MCV 95.9 fL      MCH 29.9 pg      MCHC 31.2 g/dL      RDW 14.4 %      RDW-SD 49.6 fl      MPV 9.5 fL      Platelets 468 10*3/mm3     Basic Metabolic Panel [[id number]]  (Abnormal) Collected: 03/04/22 0240    Specimen: Blood Updated: 03/04/22 0400     Glucose 162 mg/dL      BUN 16 mg/dL      Creatinine 0.66 mg/dL      Sodium 130 mmol/L      Potassium 4.7 mmol/L      Chloride 96 mmol/L      CO2 24.2 mmol/L      Calcium 8.0 mg/dL      BUN/Creatinine Ratio 24.2     Anion Gap 9.8 mmol/L      eGFR 99.9 mL/min/1.73      Comment: National Kidney Foundation and American Society of Nephrology (ASN) Task Force recommended calculation based on the Chronic Kidney Disease Epidemiology Collaboration (CKD-EPI) equation refit without adjustment for race.       Narrative:      GFR Normal >60  Chronic Kidney Disease <60  Kidney Failure <15      POC Glucose Once [[id number]]  (Abnormal) Collected: 03/03/22  2032    Specimen: Blood Updated: 03/03/22 2038     Glucose 251 mg/dL      Comment: Meter: [id number] : [id number] GERMAN EDWARDS       POC Glucose Once [[id number]]  (Abnormal) Collected: 03/03/22 1657    Specimen: Blood Updated: 03/03/22 1704     Glucose 186 mg/dL      Comment: Meter: [id number] : [id number] akosua matamoros       Basic Metabolic Panel [[id number]]  (Abnormal) Collected: 03/03/22 1530    Specimen: Blood Updated: 03/03/22 1606     Glucose 165 mg/dL      BUN 16 mg/dL      Creatinine 0.64 mg/dL      Sodium 131 mmol/L      Potassium 4.6 mmol/L      Chloride 97 mmol/L      CO2 24.4 mmol/L      Calcium 8.1 mg/dL      BUN/Creatinine Ratio 25.0     Anion Gap 9.6 mmol/L      eGFR 100.7 mL/min/1.73      Comment: National Kidney Foundation and American Society of Nephrology (ASN) Task Force recommended calculation based on the Chronic Kidney Disease Epidemiology Collaboration (CKD-EPI) equation refit without adjustment for race.       Narrative:      GFR Normal >60  Chronic Kidney Disease <60  Kidney Failure <15      POC Glucose Once [[id number]]  (Abnormal) Collected: 03/03/22 1116    Specimen: Blood Updated: 03/03/22 1124     Glucose 203 mg/dL      Comment: Meter: [id number] : [id number] akosua matamoros       POC Glucose Once [[id number]]  (Abnormal) Collected: 03/03/22 0607    Specimen: Blood Updated: 03/03/22 0613     Glucose 190 mg/dL      Comment: Meter: [id number] : [id number] RIN GONZALEZ       Manual Differential [[id number]]  (Abnormal) Collected: 03/03/22 0136    Specimen: Blood Updated: 03/03/22 0301     Neutrophil % 60.0 %      Lymphocyte % 22.0 %      Monocyte % 8.0 %      Eosinophil % 2.0 %      Basophil % 1.0 %      Bands %  3.0 %      Metamyelocyte % 2.0 %      Myelocyte % 2.0 %      Neutrophils Absolute 6.01 10*3/mm3      Lymphocytes Absolute 2.10 10*3/mm3      Monocytes Absolute 0.76 10*3/mm3      Eosinophils Absolute 0.19 10*3/mm3      Basophils Absolute 0.10 10*3/mm3       nRBC 2.0 /100 WBC      Hypochromia Slight/1+     Macrocytes Slight/1+     Platelet Morphology Normal    CBC & Differential [[id number]]  (Abnormal) Collected: 03/03/22 0136    Specimen: Blood Updated: 03/03/22 0301    Narrative:      The following orders were created for panel order CBC & Differential.  Procedure                               Abnormality         Status                     ---------                               -----------         ------                     CBC Auto Differential[[id number]]        Abnormal            Final result               Scan Slide[[id number]]                                       Final result                 Please view results for these tests on the individual orders.    Scan Slide [[id number]] Collected: 03/03/22 0136    Specimen: Blood Updated: 03/03/22 0301     Scan Slide --     Comment: See Manual Differential Results       CBC Auto Differential [[id number]]  (Abnormal) Collected: 03/03/22 0136    Specimen: Blood Updated: 03/03/22 0301     WBC 9.54 10*3/mm3      RBC 1.99 10*6/mm3      Hemoglobin 6.1 g/dL      Hematocrit 20.2 %      .5 fL      MCH 30.7 pg      MCHC 30.2 g/dL      RDW 14.2 %      RDW-SD 52.0 fl      MPV 9.4 fL      Platelets 377 10*3/mm3     Comprehensive Metabolic Panel [[id number]]  (Abnormal) Collected: 03/03/22 0136    Specimen: Blood Updated: 03/03/22 0256     Glucose 190 mg/dL      BUN 18 mg/dL      Creatinine 0.66 mg/dL      Sodium 129 mmol/L      Potassium 4.9 mmol/L      Chloride 96 mmol/L      CO2 23.0 mmol/L      Calcium 7.8 mg/dL      Total Protein 5.3 g/dL      Albumin 2.17 g/dL      ALT (SGPT) 9 U/L      AST (SGOT) 18 U/L      Alkaline Phosphatase 59 U/L      Total Bilirubin 0.4 mg/dL      Globulin 3.1 gm/dL      A/G Ratio 0.7 g/dL      BUN/Creatinine Ratio 27.3     Anion Gap 10.0 mmol/L      eGFR 99.9 mL/min/1.73      Comment: National Kidney Foundation and American Society of Nephrology (ASN) Task Force recommended calculation based on the  Chronic Kidney Disease Epidemiology Collaboration (CKD-EPI) equation refit without adjustment for race.       Narrative:      GFR Normal >60  Chronic Kidney Disease <60  Kidney Failure <15      Phosphorus [[id number]]  (Normal) Collected: 03/03/22 0136    Specimen: Blood Updated: 03/03/22 0256     Phosphorus 2.6 mg/dL     Magnesium [[id number]]  (Normal) Collected: 03/03/22 0136    Specimen: Blood Updated: 03/03/22 0256     Magnesium 2.2 mg/dL     POC Glucose Once [[id number]]  (Abnormal) Collected: 03/02/22 2236    Specimen: Blood Updated: 03/02/22 2301     Glucose 236 mg/dL      Comment: Meter: [id number] : [id number] rachele tyler       POC Glucose Once [[id number]]  (Abnormal) Collected: 03/02/22 1632    Specimen: Blood Updated: 03/02/22 1640     Glucose 224 mg/dL      Comment: Meter: [id number] : [id number] akosua matamoros                  Radiology:    Imaging Results (Last 72 Hours)     ** No results found for the last 72 hours. **          Echo pending    Assessment/Plan:  Left hip fracture, discussed with Dr. Leung, he does not feel the patient is active bleeding nor do I, he states this is going to have drainage there is going to be serous for quite some time.  Continue to try to mobilize per therapy.    Acute blood loss anemia, although hemoglobin is lower than yesterday, I feel like allowing for day-to-day variation hemoglobin is stable.  We are continuing on iron and Procrit.  As per above do not think she is actively bleeding.  Patient is continuing on Lovenox because of high risk of thromboembolic phenomenon without it.    Diabetes, not well controlled, continue Metformin at thousand twice daily, increase basal insulin, continue sliding scale.    Morbid obesity adversely affecting her overall health especially her mobility.    Hyponatremia, mild but slight drift, follow, patient had low urine sodium and chloride, if continues to drift may consider fluids again tomorrow.    Disposition  [facility]    Abner Gomez MD       Electronically signed by Abner Gomez MD at 03/04/22 1336       Consult Notes (last 72 hours)  Notes from 03/04/22 1325 through 03/07/22 1325   No notes of this type exist for this encounter.

## 2022-03-07 NOTE — PLAN OF CARE
Goal Outcome Evaluation:     Patient resting today.  Blood sugars remains elevated, coverage with meals added.  No other issues noted.

## 2022-03-07 NOTE — PROGRESS NOTES
[facility] HOSPITALIST PROGRESS NOTE     Patient Identification:  Name:  Es Olivier  Age:  62 y.o.  Sex:  female  :  1960  MRN:  [id number]  Visit Number:  [id number]  ROOM: [address] [place] [address]     Primary Care Provider:  Delilah Pizarro MD    Length of stay in inpatient status:  10    Subjective     Chief Compliant:    Chief Complaint   Patient presents with   • Hip Injury     pt reports was walking and tripped and fell landing on left hip. pt now reports left hip pain       History of Presenting Illness:    Patient notes back and muscle pain related to laying in bed. Denies any other complaints. Reports to working with PT/OT earlier in the day.     ROS:  Otherwise 10 point ROS negative other than documented above in HPI.     Objective     Current Hospital Meds:buPROPion SR, 400 mg, Oral, Nightly  cholecalciferol, 50,000 Units, Oral, Weekly  enoxaparin, 30 mg, Subcutaneous, Q12H  epoetin zelda/zelda-epbx, 40,000 Units, Subcutaneous, Q48H  FLUoxetine, 10 mg, Oral, Daily  insulin aspart, 0-14 Units, Subcutaneous, 4x Daily AC & at Bedtime  insulin detemir, 20 Units, Subcutaneous, Nightly  iron polysaccharides, 150 mg, Oral, BID  lactulose, 20 g, Oral, Once  metFORMIN, 1,000 mg, Oral, BID With Meals  pantoprazole, 40 mg, Oral, Q AM  polyethylene glycol, 17 g, Oral, Daily  senna-docusate sodium, 2 tablet, Oral, BID         Current Antimicrobial Therapy:  Anti-Infectives (From admission, onward)    Ordered     Dose/Rate Route Frequency Start Stop    22 1557  ceFAZolin in Sodium Chloride (ANCEF) IVPB solution 3 g        Ordering Provider: Jarrod Leung MD    3 g  200 mL/hr over 30 Minutes Intravenous Every 8 Hours 22 2100 22 1336        Current Diuretic Therapy:  Diuretics (From admission, onward)    None        ----------------------------------------------------------------------------------------------------------------------  Vital Signs:  Temp:  [98.1 °F (36.7 °C)-98.3 °F (36.8  °C)] 98.1 °F (36.7 °C)  Heart Rate:  [87-94] 90  Resp:  [18] 18  BP: (120-128)/(50-60) 120/50  SpO2:  [96 %-98 %] 97 %  on   ;   Device (Oxygen Therapy): room air  Body mass index is 50.68 kg/m².    Wt Readings from Last 3 Encounters:   02/25/22 (!) 142 kg (314 lb)   08/11/21 135 kg (297 lb)   05/09/19 135 kg (298 lb)     Intake & Output (last 3 days)       03/04 0701  03/05 0700 03/05 0701  03/06 0700 03/06 0701 03/07 0700 03/07 0701  03/08 0700    P.O. 840 510 840     Total Intake(mL/kg) 840 (5.9) 510 (3.6) 840 (5.9)     Urine (mL/kg/hr) 875 (0.3) 1625 (0.5) 1700 (0.5)     Stool        Total Output 875 1625 1700     Net -35 -1115 -860             Urine Unmeasured Occurrence 1 x           Diet Regular; Consistent Carbohydrate  ----------------------------------------------------------------------------------------------------------------------  Physical exam:  Constitutional:  Well-developed and well-nourished.  No respiratory distress.      HENT:  Head:  Normocephalic and atraumatic.  Mouth:  Moist mucous membranes.    Eyes:  Conjunctivae and EOM are normal. No scleral icterus.    Neck:  Neck supple.  No JVD present.    Cardiovascular:  Normal rate, regular rhythm and normal heart sounds with no murmur.  Pulmonary/Chest:  No respiratory distress, no wheezes, no crackles, with normal breath sounds and good air movement.  Abdominal:  Soft.  Bowel sounds are normal.  No distension and no tenderness.   Musculoskeletal:  No edema, no tenderness, and no deformity.  No red or swollen joints anywhere.    Neurological:  Alert and oriented to person, place, and time.  No cranial nerve deficit.  No tongue deviation.  No facial droop.  No slurred speech.   Skin:  Skin is warm and dry. No rash noted. No pallor.   Peripheral vascular:  Pulses in all 4 extremities with no clubbing, no cyanosis, no  edema.  ----------------------------------------------------------------------------------------------------------------------  Tele:    ----------------------------------------------------------------------------------------------------------------------  Results from last 7 days   Lab Units 03/07/22  0141 03/06/22  0526 03/05/22  0809   WBC 10*3/mm3 8.64 8.84 8.83   HEMOGLOBIN g/dL 5.9* 6.0* 6.0*   HEMATOCRIT % 19.8* 19.3* 19.3*   MCV fL 97.5* 95.1 95.1   MCHC g/dL 29.8* 31.1* 31.1*   PLATELETS 10*3/mm3 498* 478* 494*         Results from last 7 days   Lab Units 03/07/22  0141 03/06/22  0526 03/05/22  0808 03/03/22  1530 03/03/22  0136 03/02/22  1109 03/02/22  0252 03/01/22  0533   SODIUM mmol/L 133* 133* 133*   < > 129*  --  128* 128*   POTASSIUM mmol/L 4.0 4.1 4.5   < > 4.9  --  4.4 4.1   MAGNESIUM mg/dL  --   --   --   --  2.2  --  2.2 2.2   CHLORIDE mmol/L 100 98 98   < > 96*  --  97* 96*   CO2 mmol/L 23.0 23.5 25.8   < > 23.0  --  22.9 21.4*   BUN mg/dL 11 11 12   < > 18  --  18 17   CREATININE mg/dL 0.67 0.62 0.63   < > 0.66  --  0.82 0.65   CALCIUM mg/dL 8.3* 8.2* 8.4*   < > 7.8*  --  7.6* 7.6*   PHOSPHORUS mg/dL  --   --   --   --  2.6 2.6 2.3* 2.3*   GLUCOSE mg/dL 124* 129* 149*   < > 190*  --  182* 189*   ALBUMIN g/dL  --  2.28*  --   --  2.17*  --  2.12*  --    BILIRUBIN mg/dL  --  0.5  --   --  0.4  --  0.4  --    ALK PHOS U/L  --  58  --   --  59  --  46  --    AST (SGOT) U/L  --  14  --   --  18  --  18  --    ALT (SGPT) U/L  --  7  --   --  9  --  7  --     < > = values in this interval not displayed.   Estimated Creatinine Clearance: 128.6 mL/min (by C-G formula based on SCr of 0.67 mg/dL).  No results found for: AMMONIA              Glucose   Date/Time Value Ref Range Status   03/07/2022 1632 176 (H) 70 - 130 mg/dL Final     Comment:     Meter: [id number] : [id number] Nadeem Medina   03/07/2022 1029 140 (H) 70 - 130 mg/dL Final     Comment:     Meter: [id number] : [id number] Nadeem  Carol   03/07/2022 0657 122 70 - 130 mg/dL Final     Comment:     Meter: [id number] : [id number] lydia gaston   03/06/2022 2125 160 (H) 70 - 130 mg/dL Final     Comment:     Meter: [id number] : [id number] rachele tyler   03/06/2022 1629 154 (H) 70 - 130 mg/dL Final     Comment:     Meter: [id number] : [id number] nedra crystal   03/06/2022 1049 215 (H) 70 - 130 mg/dL Final     Comment:     Meter: [id number] : [id number] nedra crystal   03/06/2022 0657 142 (H) 70 - 130 mg/dL Final     Comment:     Meter: [id number] : [id number] lydia gaston   03/05/2022 2026 159 (H) 70 - 130 mg/dL Final     Comment:     Meter: [id number] : [id number] GERMAN EDWARDS     Lab Results   Component Value Date    TSH 8.590 (H) 02/25/2022    FREET4 1.13 02/25/2022     No results found for: PREGTESTUR, PREGSERUM, HCG, HCGQUANT  Pain Management Panel     Pain Management Panel Latest Ref Rng & Units 4/6/2016 4/7/2014    AMPHETAMINES SCREEN, URINE Negative Negative Negative    BARBITURATES SCREEN Negative Negative Negative    BENZODIAZEPINE SCREEN, URINE Negative Negative Negative    COCAINE SCREEN, URINE Negative Negative Negative    METHADONE SCREEN, URINE Negative Negative Negative        Brief Urine Lab Results  (Last result in the past 365 days)      Color   Clarity   Blood   Leuk Est   Nitrite   Protein   CREAT   Urine HCG        02/26/22 0218 Yellow   Clear   Small (1+)   Negative   Negative   Negative               No results found for: BLOODCX  No results found for: URINECX  No results found for: WOUNDCX  No results found for: STOOLCX  No results found for: RESPCX  No results found for: AFBCX        I have personally looked at the labs and they are summarized above.  ----------------------------------------------------------------------------------------------------------------------  Detailed radiology reports for the last 24 hours:    Imaging Results (Last 24 Hours)     ** No results found for the last 24  hours. **        Assessment & Plan    #L hip fracture   - s/p hip repair on 2/26  - Will make opiate regimen PRN  - PT/OT following. Awaiting placement.     #Postoperative blood loss anemia  - Hemoglobin stable at 5.9 with no active signs of bleeding   - Procrit and iron started previously   - Continues to refuse transfusion on Mandaeism grounds     #Chronic back pain   - Will add PRN flexaril     #DM II  - SSI    #Constipation   - Escalate BR as needed     #Hyponatremia  -Na stable at 133, encourage PO intake.     F: PO  E: Replace as needed   N: CC    Code status: Full     Dispo: Pending clinical improvement. Awaiting SNF placement.       VTE Prophylaxis:   Mechanical Order History:      Ordered        03/01/22 1105  Place Sequential Compression Device  Once            03/01/22 1105  Maintain Sequential Compression Device  Continuous            02/26/22 1455  Place Sequential Compression Device  Once            02/26/22 1455  Maintain Sequential Compression Device  Continuous                    Pharmalogical Order History:      Ordered     Dose Route Frequency Stop    03/03/22 1453  enoxaparin (LOVENOX) syringe 30 mg         30 mg SC Every 12 Hours --    02/26/22 1557  enoxaparin (LOVENOX) syringe 30 mg  Status:  Discontinued         30 mg SC Every 12 Hours Scheduled 03/01/22 0758    02/26/22 1335  sodium chloride 1,030 mL with heparin (porcine) 5000 UNIT/ML 30,000 Units mixture  Status:  Discontinued         -- -- As Needed 02/26/22 1453    02/26/22 1501  enoxaparin (Lovenox) 30 MG/0.3ML solution syringe         30 mg SC Every 12 Hours Scheduled 03/19/22 2359    02/25/22 2143  heparin (porcine) 5000 UNIT/ML injection 5,000 Units  Status:  Discontinued         5,000 Units SC Every 12 Hours Scheduled 02/26/22 1557                  Jair Valdes MD  [facility]  03/07/22  16:56 EST

## 2022-03-07 NOTE — PLAN OF CARE
Goal Outcome Evaluation:  Plan of Care Reviewed With: patient        Progress: no change  Outcome Evaluation: pt resting, seemed to be anxious, gave prn meds, no other complaints, SCUDS on, pt refused to ambulate, will continue to monitor

## 2022-03-07 NOTE — THERAPY TREATMENT NOTE
"Acute Care - Physical Therapy Treatment Note   Ji     Patient Name: Es Olivier  : 1960  MRN: [id number]  Today's Date: 3/7/2022      Visit Dx:     ICD-10-CM ICD-9-CM   1. Closed fracture of proximal end of left femur, initial encounter ([facility])  S72.002A 820.8     Patient Active Problem List   Diagnosis   • Biliary dyskinesia   • Heart disease   • Hypertension   • DM2 (diabetes mellitus, type 2) ([facility])   • Hyperlipidemia   • Fatigue   • Dyspepsia   • Dyspnea on exertion   • Morbid obesity with BMI of 45.0-49.9, adult ([facility])   • Urinary incontinence   • Depression   • GERD (gastroesophageal reflux disease)   • History of Helicobacter pylori infection   • Vitamin D deficiency   • Joint pain   • Sleep apnea   • Hiatal hernia   • Fatty liver   • Closed fracture of proximal end of left femur ([facility])     Past Medical History:   Diagnosis Date   • Biliary dyskinesia     abnormal HIDA 2018 w/ EF 31%, symptomatic w/ N/V   • Closed fracture of proximal end of left femur ([facility]) 2022   • Depression    • DM2 (diabetes mellitus, type 2) ([facility])     dx , on insulin >5 years, A1c 7, associated neuropathy, no other complications   • Dyspepsia    • Dyspnea on exertion    • Fatigue    • Fatty liver     dx on CT    • GERD (gastroesophageal reflux disease)     controlled w/ daily Protonix   • Heart disease     w/ cardiac clearance 2019, negative stress test 10/2017, EF 65%   • Hiatal hernia     \"small\" noted on UGI    • History of Helicobacter pylori infection     treated remotely   • Hyperlipidemia    • Hypertension    • Joint pain    • Morbid obesity with BMI of 45.0-49.9, adult ([facility])    • Sleep apnea     formerly on a device, no longer using, says just doesn't need it   • Urinary incontinence     no meds   • Vitamin D deficiency      Past Surgical History:   Procedure Laterality Date   • BLADDER SURGERY      \"tack\", uncomplicated, but unsuccessful   • CATARACT EXTRACTION     • COLONOSCOPY      " unremarkable   • DILATATION AND CURETTAGE  1987   • ORIF HIP FRACTURE Left 2/26/2022    Procedure: Left hip Open reduction and internal fixation.;  Surgeon: Jarrod Leung MD;  Location: [facility];  Service: Orthopedics;  Laterality: Left;   • TONSILLECTOMY  1984     PT Assessment (last 12 hours)     PT Evaluation and Treatment     Row Name 03/07/22 1627          Physical Therapy Time and Intention    Document Type therapy note (daily note)  -     Mode of Treatment physical therapy  -     Patient Effort adequate  -     Comment Pt able to better perform STS this date however not completely upright as of yet.  -     Row Name 03/07/22 1627          Bed Mobility    Bed Mobility supine-sit;sit-supine;scooting/bridging  -     Supine-Sit [place] (Bed Mobility) minimum assist (75% patient effort);moderate assist (50% patient effort);2 person assist  HOB elevated,  -     Sit-Supine [place] (Bed Mobility) dependent (less than 25% patient effort);2 person assist  3 person assist  -     Row Name 03/07/22 1627          Transfers    Transfers sit-stand transfer  -     Sit-Stand [place] (Transfers) moderate assist (50% patient effort);2 person assist;verbal cues  -     Row Name 03/07/22 1627          Sit-Stand Transfer    Assistive Device (Sit-Stand Transfers) walker, front-wheeled  -     Row Name             Wound Left thigh Incision    Wound - Properties Group Side: Left  -RH Location: thigh  -RH Primary Wound Type: Incision  -RH     Retired Wound - Properties Group Side: Left  -RH Location: thigh  -RH Primary Wound Type: Incision  -RH     Retired Wound - Properties Group Side: Left  -RH Location: thigh  -RH Primary Wound Type: Incision  -RH     Row Name 03/07/22 1627          Positioning and Restraints    Pre-Treatment Position in bed  -     Post Treatment Position bed  -     In Bed supine;call light within reach  -     Row Name 03/07/22 1627          Therapy Assessment/Plan (PT)     Comment, Therapy Assessment/Plan (PT) Pt able to perform STS with more independence however ultimately unable to stand upright. Pt declined LE TE d/t fatigue/weakness/nausea. Pt agreeable to performing HEP in her own time.  -ALEKSANDRA           User Key  (r) = Recorded By, (t) = Taken By, (c) = Cosigned By    Initials Name Provider Type    Jessica Rodriguez PT Physical Therapist    Marie Carreno RN Registered Nurse                  PT Recommendation and Plan  Anticipated Discharge Disposition (PT): skilled nursing facility  Planned Therapy Interventions (PT): bed mobility training, gait training, strengthening, transfer training  Therapy Frequency (PT): 6 times/wk (update)  Outcome Evaluation: Pt evaluated this date with grossly dependent assist with bed mobility this date. D/C rec for SNF for rehabilitation as pt was independent with PLOF.       Time Calculation:    PT Charges     Row Name 03/07/22 1632             Time Calculation    PT Received On 03/07/22  -ALEKSANDRA      PT Goal Re-Cert Due Date 03/14/22  -              Time Calculation- PT    Total Timed Code Minutes- PT 23 minute(s)  -            User Key  (r) = Recorded By, (t) = Taken By, (c) = Cosigned By    Initials Name Provider Type    Jessica Rodriguez, PT Physical Therapist              Therapy Charges for Today     Code Description Service Date Service Provider Modifiers Qty    10857088909  PT THERAPEUTIC ACT EA 15 MIN 3/7/2022 Jessica Aquino PT GP 2               Jessica Aquino PT  3/7/2022

## 2022-03-07 NOTE — CASE MANAGEMENT/SOCIAL WORK
Discharge Planning Assessment  [facility]     Patient Name: Es Olivier  MRN: [id number]  Today's Date: 3/7/2022    Admit Date: 2/25/2022       Discharge Plan     Row Name 03/07/22 1523       Plan    Plan SS contacted [facility] and left a message for Adryan BAUTISTA to follow.              Continued Care and Services - Admitted Since 2/25/2022     Destination     Service Provider Request Status Selected Services Address Phone Fax Patient Preferred    [facility]  Pending - No Request Sent N/A [address] , [facility] [address] [phone] --    [facility]  Declined N/A [address] [facility] [facility] [id number] [phone] --    [facility]  Declined  No Bed Available N/A [address] [facility] [address] [phone] --    [facility]  Declined  No Bed Available N/A [address] [facility] [address] [phone] --              ARIEL Bradford

## 2022-03-08 LAB
ANION GAP SERPL CALCULATED.3IONS-SCNC: 10 MMOL/L (ref 5–15)
BASOPHILS # BLD MANUAL: 0.09 10*3/MM3 (ref 0–0.2)
BASOPHILS NFR BLD MANUAL: 1 % (ref 0–1.5)
BUN SERPL-MCNC: 13 MG/DL (ref 8–23)
BUN/CREAT SERPL: 18.8 (ref 7–25)
CALCIUM SPEC-SCNC: 8.2 MG/DL (ref 8.6–10.5)
CHLORIDE SERPL-SCNC: 101 MMOL/L (ref 98–107)
CO2 SERPL-SCNC: 23 MMOL/L (ref 22–29)
CREAT SERPL-MCNC: 0.69 MG/DL (ref 0.57–1)
DEPRECATED RDW RBC AUTO: 52.8 FL (ref 37–54)
EGFRCR SERPLBLD CKD-EPI 2021: 98.9 ML/MIN/1.73
EOSINOPHIL # BLD MANUAL: 0.38 10*3/MM3 (ref 0–0.4)
EOSINOPHIL NFR BLD MANUAL: 4 % (ref 0.3–6.2)
ERYTHROCYTE [DISTWIDTH] IN BLOOD BY AUTOMATED COUNT: 15 % (ref 12.3–15.4)
GLUCOSE BLDC GLUCOMTR-MCNC: 142 MG/DL (ref 70–130)
GLUCOSE BLDC GLUCOMTR-MCNC: 145 MG/DL (ref 70–130)
GLUCOSE BLDC GLUCOMTR-MCNC: 173 MG/DL (ref 70–130)
GLUCOSE BLDC GLUCOMTR-MCNC: 178 MG/DL (ref 70–130)
GLUCOSE BLDC GLUCOMTR-MCNC: 193 MG/DL (ref 70–130)
GLUCOSE SERPL-MCNC: 169 MG/DL (ref 65–99)
HCT VFR BLD AUTO: 20.9 % (ref 34–46.6)
HGB BLD-MCNC: 6.3 G/DL (ref 12–15.9)
HYPOCHROMIA BLD QL: ABNORMAL
LYMPHOCYTES # BLD MANUAL: 2.08 10*3/MM3 (ref 0.7–3.1)
LYMPHOCYTES NFR BLD MANUAL: 10 % (ref 5–12)
MACROCYTES BLD QL SMEAR: ABNORMAL
MCH RBC QN AUTO: 29.6 PG (ref 26.6–33)
MCHC RBC AUTO-ENTMCNC: 30.1 G/DL (ref 31.5–35.7)
MCV RBC AUTO: 98.1 FL (ref 79–97)
METAMYELOCYTES NFR BLD MANUAL: 1 % (ref 0–0)
MONOCYTES # BLD: 0.95 10*3/MM3 (ref 0.1–0.9)
MYELOCYTES NFR BLD MANUAL: 4 % (ref 0–0)
NEUTROPHILS # BLD AUTO: 5.49 10*3/MM3 (ref 1.7–7)
NEUTROPHILS NFR BLD MANUAL: 51 % (ref 42.7–76)
NEUTS BAND NFR BLD MANUAL: 7 % (ref 0–5)
PLAT MORPH BLD: NORMAL
PLATELET # BLD AUTO: 463 10*3/MM3 (ref 140–450)
PMV BLD AUTO: 9 FL (ref 6–12)
POTASSIUM SERPL-SCNC: 3.9 MMOL/L (ref 3.5–5.2)
RBC # BLD AUTO: 2.13 10*6/MM3 (ref 3.77–5.28)
SODIUM SERPL-SCNC: 134 MMOL/L (ref 136–145)
STOMATOCYTES BLD QL SMEAR: ABNORMAL
VARIANT LYMPHS NFR BLD MANUAL: 22 % (ref 19.6–45.3)
WBC NRBC COR # BLD: 9.47 10*3/MM3 (ref 3.4–10.8)

## 2022-03-08 PROCEDURE — 85007 BL SMEAR W/DIFF WBC COUNT: CPT | Performed by: INTERNAL MEDICINE

## 2022-03-08 PROCEDURE — 63710000001 INSULIN DETEMIR PER 5 UNITS: Performed by: INTERNAL MEDICINE

## 2022-03-08 PROCEDURE — 25010000002 EPOETIN ALFA-EPBX 20000 UNIT/ML SOLUTION: Performed by: INTERNAL MEDICINE

## 2022-03-08 PROCEDURE — 82962 GLUCOSE BLOOD TEST: CPT

## 2022-03-08 PROCEDURE — 85025 COMPLETE CBC W/AUTO DIFF WBC: CPT | Performed by: INTERNAL MEDICINE

## 2022-03-08 PROCEDURE — 99231 SBSQ HOSP IP/OBS SF/LOW 25: CPT | Performed by: INTERNAL MEDICINE

## 2022-03-08 PROCEDURE — 97110 THERAPEUTIC EXERCISES: CPT

## 2022-03-08 PROCEDURE — 25010000002 ENOXAPARIN PER 10 MG: Performed by: INTERNAL MEDICINE

## 2022-03-08 PROCEDURE — 63710000001 INSULIN ASPART PER 5 UNITS: Performed by: PHYSICIAN ASSISTANT

## 2022-03-08 PROCEDURE — 80048 BASIC METABOLIC PNL TOTAL CA: CPT | Performed by: INTERNAL MEDICINE

## 2022-03-08 RX ADMIN — HYDROXYZINE HYDROCHLORIDE 25 MG: 25 TABLET ORAL at 14:41

## 2022-03-08 RX ADMIN — INSULIN ASPART 3 UNITS: 100 INJECTION, SOLUTION INTRAVENOUS; SUBCUTANEOUS at 21:14

## 2022-03-08 RX ADMIN — DOCUSATE SODIUM 50 MG AND SENNOSIDES 8.6 MG 2 TABLET: 8.6; 5 TABLET, FILM COATED ORAL at 08:21

## 2022-03-08 RX ADMIN — ENOXAPARIN SODIUM 30 MG: 30 INJECTION SUBCUTANEOUS at 18:16

## 2022-03-08 RX ADMIN — METFORMIN HYDROCHLORIDE 1000 MG: 500 TABLET ORAL at 18:16

## 2022-03-08 RX ADMIN — PANTOPRAZOLE SODIUM 40 MG: 40 TABLET, DELAYED RELEASE ORAL at 06:09

## 2022-03-08 RX ADMIN — EPOETIN ALFA-EPBX 40000 UNITS: 20000 INJECTION, SOLUTION INTRAVENOUS; SUBCUTANEOUS at 13:11

## 2022-03-08 RX ADMIN — METFORMIN HYDROCHLORIDE 1000 MG: 500 TABLET ORAL at 08:22

## 2022-03-08 RX ADMIN — Medication 150 MG: at 08:22

## 2022-03-08 RX ADMIN — Medication 150 MG: at 21:13

## 2022-03-08 RX ADMIN — INSULIN DETEMIR 20 UNITS: 100 INJECTION, SOLUTION SUBCUTANEOUS at 21:15

## 2022-03-08 RX ADMIN — OXYCODONE HYDROCHLORIDE AND ACETAMINOPHEN 1 TABLET: 10; 325 TABLET ORAL at 06:09

## 2022-03-08 RX ADMIN — ENOXAPARIN SODIUM 30 MG: 30 INJECTION SUBCUTANEOUS at 06:09

## 2022-03-08 RX ADMIN — Medication 10 MG: at 21:14

## 2022-03-08 RX ADMIN — POLYETHYLENE GLYCOL 3350 17 G: 17 POWDER, FOR SOLUTION ORAL at 08:21

## 2022-03-08 RX ADMIN — DOCUSATE SODIUM 50 MG AND SENNOSIDES 8.6 MG 2 TABLET: 8.6; 5 TABLET, FILM COATED ORAL at 21:13

## 2022-03-08 RX ADMIN — FLUOXETINE HYDROCHLORIDE 10 MG: 10 CAPSULE ORAL at 08:22

## 2022-03-08 RX ADMIN — INSULIN ASPART 3 UNITS: 100 INJECTION, SOLUTION INTRAVENOUS; SUBCUTANEOUS at 12:38

## 2022-03-08 RX ADMIN — BUPROPION HYDROCHLORIDE 400 MG: 150 TABLET, EXTENDED RELEASE ORAL at 21:13

## 2022-03-08 RX ADMIN — OXYCODONE HYDROCHLORIDE AND ACETAMINOPHEN 1 TABLET: 10; 325 TABLET ORAL at 21:13

## 2022-03-08 NOTE — PROGRESS NOTES
[facility] HOSPITALIST PROGRESS NOTE     Patient Identification:  Name:  Es Olivier  Age:  62 y.o.  Sex:  female  :  1960  MRN:  [id number]  Visit Number:  [id number]  ROOM: [address] [place] [address]     Primary Care Provider:  Delilah Pizarro MD    Length of stay in inpatient status:  11    Subjective     Chief Compliant:    Chief Complaint   Patient presents with   • Hip Injury     pt reports was walking and tripped and fell landing on left hip. pt now reports left hip pain       History of Presenting Illness:    Patient denies any new complaints. She notes bed is not comfortable but otherwise does not have any complaints. Reports she is working with PT. She is agreeable to swing bed consult.     ROS:  Otherwise 10 point ROS negative other than documented above in HPI.     Objective     Current Hospital Meds:buPROPion SR, 400 mg, Oral, Nightly  cholecalciferol, 50,000 Units, Oral, Weekly  enoxaparin, 30 mg, Subcutaneous, Q12H  epoetin zelda/zelda-epbx, 40,000 Units, Subcutaneous, Q48H  FLUoxetine, 10 mg, Oral, Daily  insulin aspart, 0-14 Units, Subcutaneous, 4x Daily AC & at Bedtime  insulin detemir, 20 Units, Subcutaneous, Nightly  iron polysaccharides, 150 mg, Oral, BID  lactulose, 20 g, Oral, Once  metFORMIN, 1,000 mg, Oral, BID With Meals  pantoprazole, 40 mg, Oral, Q AM  polyethylene glycol, 17 g, Oral, Daily  senna-docusate sodium, 2 tablet, Oral, BID         Current Antimicrobial Therapy:  Anti-Infectives (From admission, onward)    Ordered     Dose/Rate Route Frequency Start Stop    22 1557  ceFAZolin in Sodium Chloride (ANCEF) IVPB solution 3 g        Ordering Provider: Jarrod Leung MD    3 g  200 mL/hr over 30 Minutes Intravenous Every 8 Hours 22 2100 22 1336        Current Diuretic Therapy:  Diuretics (From admission, onward)    None        ----------------------------------------------------------------------------------------------------------------------  Vital  Signs:  Temp:  [98 °F (36.7 °C)-98.4 °F (36.9 °C)] 98 °F (36.7 °C)  Heart Rate:  [86-90] 87  Resp:  [18-20] 18  BP: (103-131)/(51-84) 131/84  SpO2:  [96 %-97 %] 96 %  on   ;   Device (Oxygen Therapy): room air  Body mass index is 50.68 kg/m².    Wt Readings from Last 3 Encounters:   02/25/22 (!) 142 kg (314 lb)   08/11/21 135 kg (297 lb)   05/09/19 135 kg (298 lb)     Intake & Output (last 3 days)       03/05 0701 03/06 0700 03/06 0701 03/07 0700 03/07 0701 03/08 0700 03/08 0701 03/09 0700    P.O. 510 840 360 360    Total Intake(mL/kg) 510 (3.6) 840 (5.9) 360 (2.5) 360 (2.5)    Urine (mL/kg/hr) 1625 (0.5) 1700 (0.5) 2550 (0.7) 450 (0.5)    Total Output 1625 1700 2550 450    Net -1115 -860 -2190 -90                Diet Regular; Consistent Carbohydrate  ----------------------------------------------------------------------------------------------------------------------  Physical exam:  Constitutional:  Well-developed and well-nourished.  No respiratory distress.      HENT:  Head:  Normocephalic and atraumatic.  Mouth:  Moist mucous membranes.    Eyes:  Conjunctivae and EOM are normal. No scleral icterus.    Neck:  Neck supple.  No JVD present.    Cardiovascular:  Normal rate, regular rhythm and normal heart sounds with no murmur.  Pulmonary/Chest:  No respiratory distress, no wheezes, no crackles, with normal breath sounds and good air movement.  Abdominal:  Soft.  Bowel sounds are normal.  No distension and no tenderness.   Musculoskeletal:  No edema, no tenderness, and no deformity.  No red or swollen joints anywhere.    Neurological:  Alert and oriented to person, place, and time.  No cranial nerve deficit.  No tongue deviation.  No facial droop.  No slurred speech.   Skin:  Skin is warm and dry. No rash noted. No pallor.   Peripheral vascular:  Pulses in all 4 extremities with no clubbing, no cyanosis, no  edema.  ----------------------------------------------------------------------------------------------------------------------  Tele:    ----------------------------------------------------------------------------------------------------------------------  Results from last 7 days   Lab Units 03/08/22  0118 03/07/22  0141 03/06/22  0526   WBC 10*3/mm3 9.47 8.64 8.84   HEMOGLOBIN g/dL 6.3* 5.9* 6.0*   HEMATOCRIT % 20.9* 19.8* 19.3*   MCV fL 98.1* 97.5* 95.1   MCHC g/dL 30.1* 29.8* 31.1*   PLATELETS 10*3/mm3 463* 498* 478*         Results from last 7 days   Lab Units 03/08/22  0118 03/07/22  0141 03/06/22  0526 03/03/22  1530 03/03/22  0136 03/02/22  1109 03/02/22  0252   SODIUM mmol/L 134* 133* 133*   < > 129*  --  128*   POTASSIUM mmol/L 3.9 4.0 4.1   < > 4.9  --  4.4   MAGNESIUM mg/dL  --   --   --   --  2.2  --  2.2   CHLORIDE mmol/L 101 100 98   < > 96*  --  97*   CO2 mmol/L 23.0 23.0 23.5   < > 23.0  --  22.9   BUN mg/dL 13 11 11   < > 18  --  18   CREATININE mg/dL 0.69 0.67 0.62   < > 0.66  --  0.82   CALCIUM mg/dL 8.2* 8.3* 8.2*   < > 7.8*  --  7.6*   PHOSPHORUS mg/dL  --   --   --   --  2.6 2.6 2.3*   GLUCOSE mg/dL 169* 124* 129*   < > 190*  --  182*   ALBUMIN g/dL  --   --  2.28*  --  2.17*  --  2.12*   BILIRUBIN mg/dL  --   --  0.5  --  0.4  --  0.4   ALK PHOS U/L  --   --  58  --  59  --  46   AST (SGOT) U/L  --   --  14  --  18  --  18   ALT (SGPT) U/L  --   --  7  --  9  --  7    < > = values in this interval not displayed.   Estimated Creatinine Clearance: 124.9 mL/min (by C-G formula based on SCr of 0.69 mg/dL).  No results found for: AMMONIA              Glucose   Date/Time Value Ref Range Status   03/08/2022 1020 173 (H) 70 - 130 mg/dL Final     Comment:     Meter: [id number] : [id number] Nadeem Medina   03/08/2022 0630 142 (H) 70 - 130 mg/dL Final     Comment:     Meter: [id number] : [id number] Scott Gutierrez   03/07/2022 2101 178 (H) 70 - 130 mg/dL Final     Comment:     Meter:  [id number] : [id number] rachele tyler   03/07/2022 1632 176 (H) 70 - 130 mg/dL Final     Comment:     Meter: [id number] : [id number] Nadeem Medina   03/07/2022 1029 140 (H) 70 - 130 mg/dL Final     Comment:     Meter: [id number] : [id number] Nadeem Medina   03/07/2022 0657 122 70 - 130 mg/dL Final     Comment:     Meter: [id number] : [id number] lydia gaston   03/06/2022 2125 160 (H) 70 - 130 mg/dL Final     Comment:     Meter: [id number] : [id number] rachele tyler   03/06/2022 1629 154 (H) 70 - 130 mg/dL Final     Comment:     Meter: [id number] : [id number] sneed marah     Lab Results   Component Value Date    TSH 8.590 (H) 02/25/2022    FREET4 1.13 02/25/2022     No results found for: PREGTESTUR, PREGSERUM, HCG, HCGQUANT  Pain Management Panel     Pain Management Panel Latest Ref Rng & Units 4/6/2016 4/7/2014    AMPHETAMINES SCREEN, URINE Negative Negative Negative    BARBITURATES SCREEN Negative Negative Negative    BENZODIAZEPINE SCREEN, URINE Negative Negative Negative    COCAINE SCREEN, URINE Negative Negative Negative    METHADONE SCREEN, URINE Negative Negative Negative        Brief Urine Lab Results  (Last result in the past 365 days)      Color   Clarity   Blood   Leuk Est   Nitrite   Protein   CREAT   Urine HCG        02/26/22 0218 Yellow   Clear   Small (1+)   Negative   Negative   Negative               No results found for: BLOODCX  No results found for: URINECX  No results found for: WOUNDCX  No results found for: STOOLCX  No results found for: RESPCX  No results found for: AFBCX        I have personally looked at the labs and they are summarized above.  ----------------------------------------------------------------------------------------------------------------------  Detailed radiology reports for the last 24 hours:    Imaging Results (Last 24 Hours)     ** No results found for the last 24 hours. **        Assessment & Plan    #L hip fracture   - s/p hip repair on  2/26  - Will make opiate regimen PRN  - PT/OT following. Awaiting placement.     #Postoperative blood loss anemia  - Hemoglobin stable at 6.3 with no active signs of bleeding   - Procrit and iron started previously   - Continues to refuse transfusion on [place] grounds   - Will decrease lab draws to every other day.     #Chronic back pain   - Will add PRN flexaril     #DM II  - SSI    #Constipation   - Escalate BR as needed     #Hyponatremia  -Na stable at 134, encourage PO intake.     F: PO  E: Replace as needed   N: CC    Code status: Full     Dispo: Pending clinical improvement. Awaiting SNF placement.       VTE Prophylaxis:   Mechanical Order History:      Ordered        03/01/22 1105  Place Sequential Compression Device  Once            03/01/22 1105  Maintain Sequential Compression Device  Continuous            02/26/22 1455  Place Sequential Compression Device  Once            02/26/22 1455  Maintain Sequential Compression Device  Continuous                    Pharmalogical Order History:      Ordered     Dose Route Frequency Stop    03/03/22 1453  enoxaparin (LOVENOX) syringe 30 mg         30 mg SC Every 12 Hours --    02/26/22 1557  enoxaparin (LOVENOX) syringe 30 mg  Status:  Discontinued         30 mg SC Every 12 Hours Scheduled 03/01/22 0758    02/26/22 1335  sodium chloride 1,030 mL with heparin (porcine) 5000 UNIT/ML 30,000 Units mixture  Status:  Discontinued         -- -- As Needed 02/26/22 1453    02/26/22 1501  enoxaparin (Lovenox) 30 MG/0.3ML solution syringe         30 mg SC Every 12 Hours Scheduled 03/19/22 2359    02/25/22 2143  heparin (porcine) 5000 UNIT/ML injection 5,000 Units  Status:  Discontinued         5,000 Units SC Every 12 Hours Scheduled 02/26/22 1557                  Jair Valdes MD  [facility]  03/08/22  13:56 EST

## 2022-03-08 NOTE — THERAPY TREATMENT NOTE
"Acute Care - Physical Therapy Treatment Note   Ji     Patient Name: Es Olivier  : 1960  MRN: [id number]  Today's Date: 3/8/2022      Visit Dx:     ICD-10-CM ICD-9-CM   1. Closed fracture of proximal end of left femur, initial encounter ([facility])  S72.002A 820.8     Patient Active Problem List   Diagnosis   • Biliary dyskinesia   • Heart disease   • Hypertension   • DM2 (diabetes mellitus, type 2) ([facility])   • Hyperlipidemia   • Fatigue   • Dyspepsia   • Dyspnea on exertion   • Morbid obesity with BMI of 45.0-49.9, adult ([facility])   • Urinary incontinence   • Depression   • GERD (gastroesophageal reflux disease)   • History of Helicobacter pylori infection   • Vitamin D deficiency   • Joint pain   • Sleep apnea   • Hiatal hernia   • Fatty liver   • Closed fracture of proximal end of left femur ([facility])     Past Medical History:   Diagnosis Date   • Biliary dyskinesia     abnormal HIDA 2018 w/ EF 31%, symptomatic w/ N/V   • Closed fracture of proximal end of left femur ([facility]) 2022   • Depression    • DM2 (diabetes mellitus, type 2) ([facility])     dx , on insulin >5 years, A1c 7, associated neuropathy, no other complications   • Dyspepsia    • Dyspnea on exertion    • Fatigue    • Fatty liver     dx on CT    • GERD (gastroesophageal reflux disease)     controlled w/ daily Protonix   • Heart disease     w/ cardiac clearance 2019, negative stress test 10/2017, EF 65%   • Hiatal hernia     \"small\" noted on UGI    • History of Helicobacter pylori infection     treated remotely   • Hyperlipidemia    • Hypertension    • Joint pain    • Morbid obesity with BMI of 45.0-49.9, adult ([facility])    • Sleep apnea     formerly on a device, no longer using, says just doesn't need it   • Urinary incontinence     no meds   • Vitamin D deficiency      Past Surgical History:   Procedure Laterality Date   • BLADDER SURGERY      \"tack\", uncomplicated, but unsuccessful   • CATARACT EXTRACTION     • COLONOSCOPY      " unremarkable   • DILATATION AND CURETTAGE  1987   • ORIF HIP FRACTURE Left 2/26/2022    Procedure: Left hip Open reduction and internal fixation.;  Surgeon: Jarrod Leung MD;  Location: [facility];  Service: Orthopedics;  Laterality: Left;   • TONSILLECTOMY  1984     PT Assessment (last 12 hours)     PT Evaluation and Treatment     Row Name 03/08/22 1605          Physical Therapy Time and Intention    Comment Pt willing to WORKwith therapy this date, declined sitting EOB. Agreeable to supine bed TE; Pt also educated on SWG status/answered questions  -     Row Name 03/08/22 1605          Motor Skills    Therapeutic Exercise hip;knee  hip abduction, hip flexion/SLR (AA L LE), quad sets, IR/ER, knee bends (AA L LE)  -     Row Name             Wound Left thigh Incision    Wound - Properties Group Side: Left  -RH Location: thigh  -RH Primary Wound Type: Incision  -RH     Retired Wound - Properties Group Side: Left  -RH Location: thigh  -RH Primary Wound Type: Incision  -RH     Retired Wound - Properties Group Side: Left  -RH Location: thigh  -RH Primary Wound Type: Incision  -RH     Row Name 03/08/22 1605          Positioning and Restraints    Pre-Treatment Position in bed  -     Post Treatment Position bed  -     In Bed supine;call light within reach  -           User Key  (r) = Recorded By, (t) = Taken By, (c) = Cosigned By    Initials Name Provider Type    Jessica Rodriguez, PT Physical Therapist     Marie Eugene, RN Registered Nurse                  PT Recommendation and Plan  Anticipated Discharge Disposition (PT): skilled nursing facility  Planned Therapy Interventions (PT): bed mobility training, gait training, strengthening, transfer training  Therapy Frequency (PT): 6 times/wk (update)  Outcome Evaluation: Pt evaluated this date with grossly dependent assist with bed mobility this date. D/C rec for SNF for rehabilitation as pt was independent with PLOF.       Time Calculation:    PT  Charges     Row Name 03/08/22 1638             Time Calculation    PT Received On 03/08/22  -ALEKSANDRA      PT Goal Re-Cert Due Date 03/14/22  -              Time Calculation- PT    Total Timed Code Minutes- PT 24 minute(s)  -            User Key  (r) = Recorded By, (t) = Taken By, (c) = Cosigned By    Initials Name Provider Type    Jessica Rodriguez, PT Physical Therapist              Therapy Charges for Today     Code Description Service Date Service Provider Modifiers Qty    75260470945 HC PT THERAPEUTIC ACT EA 15 MIN 3/7/2022 Jessica Aquino, PT GP 2    09576999014 HC PT THER PROC EA 15 MIN 3/8/2022 Jessica Aquino, PT GP 2               Jessica Aquino PT  3/8/2022

## 2022-03-08 NOTE — DISCHARGE PLACEMENT REQUEST
"Jenn Muro (62 y.o. Female)             Date of Birth   1960    Social Security Number       Address   [address] [place] [address]    Home Phone   [phone]    MRN   [id number]       Adventism   Yazidism    Marital Status                               Admission Date   2/25/22    Admission Type   Emergency    Admitting Provider   Mima Randall MD    Attending Provider   Jair Valdes MD    Department, Room/Bed   [address], 3348/1S       Discharge Date       Discharge Disposition       Discharge Destination                               Attending Provider: Jair Valdes MD    Allergies: Mobic [Meloxicam], Lisinopril    Isolation: None   Infection: None   Code Status: CPR   Advance Care Planning Activity    Ht: 167.6 cm (66\")   Wt: 142 kg (314 lb)    Admission Cmt: None   Principal Problem: Closed fracture of proximal end of left femur (HCC) [S72.002A]                 Active Insurance as of 2/25/2022     Primary Coverage     Payor Plan Insurance Group Employer/Plan Group    [facility]IC[facility]      Payor Plan Address Payor Plan Phone Number Payor Plan Fax Number Effective Dates    [address] [address] [phone]  10/1/2020 - None Entered    [facility] [id number]       Subscriber Name Subscriber Birth Date Member ID       JENN MURO 1960 [id number]                 Emergency Contacts      (Rel.) Home Phone Work Phone Mobile Phone    Brynn Mane (Daughter) [phone] -- --    Mikki Muro (Daughter) [phone] -- [phone]               History & Physical      Karey Finney PA-C at 02/25/22 2043     Attestation signed by Marley Hoffmann DO at 02/26/22 0713    I have reviewed this documentation and agree.  Patient also briefly seen and examined at bedside.  She continues to report some intermittent left hip pain with radiation to the groin.  Patient states she has been " "in her usual state of health recently and denies any dyspnea on exertion or chest pains.  Patient denies any previous adverse effect with sedation.    On exam, his heart is regular rate and rhythm without obvious murmur.  Lungs are slightly diminished likely due to body habitus without any wheezing, rhonchi and/or rales.  Left lower extremity is mildly externally rotated.    Continue nothing by mouth status while awaiting orthopedic surgery evaluation.  Continue with as needed pain control.  At this point, patient felt acceptable risk to proceed with hip repair.  Patient's postoperative respiratory failure risk calculator is estimated to be between 1 and 4%.                       [facility] Medicine Services  HISTORY & PHYSICAL    Patient Identification:  Name:  Es Olivier  Age:  62 y.o.  Sex:  female  :  1960  MRN:  [id number]   Visit Number:  [id number]  Admit Date: 2022   Primary Care Physician:  Delilah Pizarro MD     Subjective     Chief complaint:   Chief Complaint   Patient presents with   • Hip Injury     pt reports was walking and tripped and fell landing on left hip. pt now reports left hip pain     History of presenting illness:   Patient is a 62 y.o. female with past medical history significant for insulin-dependent type 2 diabetes mellitus, essential hypertension, obstructive sleep apnea, GERD, depression, that presented to the [facility] emergency department for evaluation of fall.     The patient states that she tripped over her grandchild's feet and fell earlier this evening.  She reports landing on her left side and hitting the left temporal region of her head on the baseboard.  She denies any loss of consciousness, bruising, or wounds.  She reports she immediately felt pain in the left hip and was unable to get up without assistance.  She admits to frequent falls here recently and states she feels \"unsteady on her feet.\"  She denies any dizziness, " lightheadedness, or numbness/tingling in her bilateral lower extremities.  She denies using a cane or walker at home she also denies any recent illness, fever, chills, diaphoresis, coughing, wheezing, shortness of breath, chest pain, abdominal pain, nausea, vomiting, diarrhea, dysuria, or malodorous urine.  She denies any current smoking tobacco use but admits to smoking in the past (quit in 1984).  She admits to having surgery in the past and denies any reactions to anesthesia.    Upon arrival to the ED, vitals were temperature 97.2 °F, pulse 62, respirations 18, /59, SPO2 97% on room air.  CMP with glucose 192, BUN/creatinine ratio 27.4, otherwise unremarkable.  CBC with hemoglobin 11.4.  UA with 1+ blood, 6-12 RBC, otherwise unremarkable.  COVID-19 negative.  Chest x-ray with no acute cardiopulmonary findings.  X-ray of left femur shows comminuted fracture of the proximal left femur.  X-ray of hip with and without pelvis shows same findings.  EKG with normal sinus rhythm, poor R wave progression, possible left atrial enlargement, heart rate 71, QTc 467 MS.    In the emergency department the patient received IV morphine 8 mg, IV Zofran 8 mg.    Patient has been admitted to the medical/surgical floor for further evaluation and treatment  ---------------------------------------------------------------------------------------------------------------------   Review of Systems   Constitutional: Negative for chills, diaphoresis and fever.   HENT: Negative for congestion and sore throat.    Respiratory: Negative for cough, shortness of breath and wheezing.    Cardiovascular: Negative for chest pain, palpitations and leg swelling.   Gastrointestinal: Negative for abdominal pain, constipation, diarrhea, nausea and vomiting.   Genitourinary: Negative for dysuria and frequency.   Musculoskeletal: Positive for arthralgias (Left hip) and gait problem (Frequent falls).   Skin: Negative for wound.   Neurological: Positive  "for weakness (Occasionally feels weak in bilateral lower extremities). Negative for dizziness, syncope, light-headedness and numbness.   Psychiatric/Behavioral: Negative for confusion.      ---------------------------------------------------------------------------------------------------------------------   Past Medical History:   Diagnosis Date   • Biliary dyskinesia     abnormal HIDA 2018 w/ EF 31%, symptomatic w/ N/V   • Depression    • DM2 (diabetes mellitus, type 2) ([facility])     dx , on insulin >5 years, A1c 7, associated neuropathy, no other complications   • Dyspepsia    • Dyspnea on exertion    • Fatigue    • Fatty liver     dx on CT    • GERD (gastroesophageal reflux disease)     controlled w/ daily Protonix   • Heart disease     w/ cardiac clearance 2019, negative stress test 10/2017, EF 65%   • Hiatal hernia     \"small\" noted on UGI    • History of Helicobacter pylori infection     treated remotely   • Hyperlipidemia    • Hypertension    • Joint pain    • Morbid obesity with BMI of 45.0-49.9, adult ([facility])    • Sleep apnea     formerly on a device, no longer using, says just doesn't need it   • Urinary incontinence     no meds   • Vitamin D deficiency      Past Surgical History:   Procedure Laterality Date   • BLADDER SURGERY      \"tack\", uncomplicated, but unsuccessful   • CATARACT EXTRACTION     • COLONOSCOPY      unremarkable   • DILATATION AND CURETTAGE     • TONSILLECTOMY       Family History   Problem Relation Age of Onset   • Breast cancer Sister         20s   • Cancer Sister    • Breast cancer Sister         60s   • Bone cancer Mother         60   • Osteoarthritis Mother    • Diabetes Father    • Heart attack Father    • Heart disease Father    • Heart disease Brother      Social History     Socioeconomic History   • Marital status:    Tobacco Use   • Smoking status: Former Smoker     Years: 10.00     Quit date:      Years since quittin.1   • Smokeless " tobacco: Never Used   Vaping Use   • Vaping Use: Never used   Substance and Sexual Activity   • Alcohol use: Yes     Comment: SOCIALLY   • Drug use: No   • Sexual activity: Defer     ---------------------------------------------------------------------------------------------------------------------   Allergies:  Mobic [meloxicam] and Lisinopril  ---------------------------------------------------------------------------------------------------------------------   Medications below are reported home medications pulling from within the system; at this time, these medications have not been reconciled unless otherwise specified and are in the verification process for further verifcation as current home medications.    Prior to Admission Medications     Prescriptions Last Dose Informant Patient Reported? Taking?    buPROPion SR (WELLBUTRIN SR) 200 MG 12 hr tablet 2/24/2022 Medication Bottle Yes Yes    Take 400 mg by mouth every night at bedtime. Prior to [facility] Admission, Patient was on:   Script is wrote 1 BID but pt takes 2 QHS    FLUoxetine (PROzac) 10 MG capsule 2/24/2022 Medication Bottle Yes Yes    Take 10 mg by mouth Daily.    hydrochlorothiazide (HYDRODIURIL) 12.5 MG tablet 2/24/2022 Medication Bottle Yes Yes    Take 12.5 mg by mouth Daily.    insulin regular (humuLIN R,novoLIN R) 100 UNIT/ML injection 2/25/2022 Medication Bottle Yes Yes    Inject 4 Units under the skin into the appropriate area as directed 3 (Three) Times a Day Before Meals.    losartan-hydrochlorothiazide (HYZAAR) 50-12.5 MG per tablet 2/24/2022 Medication Bottle Yes Yes    Take 1 tablet by mouth Daily.    metFORMIN XR (GLUCOPHAGE-XR) 500 MG 24 hr tablet 2/24/2022 Pharmacy Yes Yes    Take 2,000 mg by mouth Every Night.    pioglitazone (ACTOS) 45 MG tablet 2/24/2022 Medication Bottle Yes Yes    Take 45 mg by mouth every night at bedtime.    vitamin D (ERGOCALCIFEROL) 1.25 MG (88798 UT) capsule capsule 2/20/2022 Medication Bottle Yes Yes     Take 50,000 Units by mouth 1 (One) Time Per Week.        ---------------------------------------------------------------------------------------------------------------------    Objective     Hospital Scheduled Meds:          Current listed hospital scheduled medications may not yet reflect those currently placed in orders that are signed and held, awaiting patient's arrival to floor/unit.    ---------------------------------------------------------------------------------------------------------------------   Vital Signs:  Temp:  [97.2 °F (36.2 °C)] 97.2 °F (36.2 °C)  Heart Rate:  [61-83] 79  Resp:  [18] 18  BP: (105-151)/(58-97) 105/89  Mean Arterial Pressure (Non-Invasive) for the past 24 hrs (Last 3 readings):   Noninvasive MAP (mmHg)   02/25/22 1833 95   02/25/22 1803 75   02/25/22 1703 97     SpO2 Percentage    02/25/22 1803 02/25/22 1833 02/25/22 1853   SpO2: 99% 98% 94%     SpO2:  [93 %-100 %] 94 %  on   ;   Device (Oxygen Therapy): room air    Body mass index is 50.84 kg/m².  Wt Readings from Last 3 Encounters:   02/25/22 (!) 143 kg (315 lb)   08/11/21 135 kg (297 lb)   05/09/19 135 kg (298 lb)     ---------------------------------------------------------------------------------------------------------------------   Physical Exam:  Physical Exam  Constitutional:       General: She is awake.      Appearance: Normal appearance. She is well-developed. She is morbidly obese.   HENT:      Head: Normocephalic and atraumatic.   Eyes:      General: Lids are normal.   Cardiovascular:      Rate and Rhythm: Normal rate and regular rhythm.      Pulses: Normal pulses.           Dorsalis pedis pulses are 2+ on the right side and 2+ on the left side.        Posterior tibial pulses are 2+ on the right side and 2+ on the left side.      Heart sounds: Normal heart sounds. No murmur heard.  No friction rub. No gallop.    Pulmonary:      Effort: Pulmonary effort is normal. No tachypnea.      Breath sounds: Normal breath  sounds. No decreased breath sounds or wheezing.   Abdominal:      Palpations: Abdomen is soft.      Tenderness: There is no abdominal tenderness.   Musculoskeletal:      Right hip: No tenderness. Normal range of motion.      Left hip: No tenderness. Decreased range of motion.      Right lower leg: No edema.      Left lower leg: No edema.   Feet:      Right foot:      Skin integrity: Skin integrity normal.      Left foot:      Skin integrity: Skin integrity normal.   Skin:     Findings: No ecchymosis or erythema.   Neurological:      General: No focal deficit present.      Mental Status: She is alert and oriented to person, place, and time. Mental status is at baseline.      Sensory: Sensation is intact. No sensory deficit.   Psychiatric:         Speech: Speech normal.         Behavior: Behavior is cooperative.         Cognition and Memory: Cognition normal.       ---------------------------------------------------------------------------------------------------------------------  EKG:    Pending cardiology read.  Per my evaluation, normal sinus rhythm with poor R wave progression and possible left atrial enlargement, heart rate 71, QTc 467 MS.    Telemetry:    Normal sinus rhythm, heart rate 89, SPO2 91% on room air    I have personally reviewed the EKG/Telemetry strip  ---------------------------------------------------------------------------------------------------------------------             Results from last 7 days   Lab Units 02/25/22  1016   WBC 10*3/mm3 8.89   HEMOGLOBIN g/dL 11.4*   HEMATOCRIT % 35.8   MCV fL 95.0   MCHC g/dL 31.8   PLATELETS 10*3/mm3 330     Results from last 7 days   Lab Units 02/25/22  1016   SODIUM mmol/L 139   POTASSIUM mmol/L 3.9   CHLORIDE mmol/L 104   CO2 mmol/L 24.8   BUN mg/dL 20   CREATININE mg/dL 0.73   EGFR IF NONAFRICN AM mL/min/1.73 81   CALCIUM mg/dL 8.9   GLUCOSE mg/dL 192*   ALBUMIN g/dL 3.86   BILIRUBIN mg/dL 0.4   ALK PHOS U/L 62   AST (SGOT) U/L 14   ALT (SGPT) U/L 12    Estimated Creatinine Clearance: 118.6 mL/min (by C-G formula based on SCr of 0.73 mg/dL).  No results found for: AMMONIA    No results found for: HGBA1C, POCGLU  Lab Results   Component Value Date    HGBA1C 6.9 (H) 05/09/2019     Lab Results   Component Value Date    TSH 3.370 05/09/2019         Pain Management Panel     Pain Management Panel Latest Ref Rng & Units 4/6/2016 4/7/2014    AMPHETAMINES SCREEN, URINE Negative Negative Negative    BARBITURATES SCREEN Negative Negative Negative    BENZODIAZEPINE SCREEN, URINE Negative Negative Negative    COCAINE SCREEN, URINE Negative Negative Negative    METHADONE SCREEN, URINE Negative Negative Negative        I have personally reviewed the above laboratory results.   ---------------------------------------------------------------------------------------------------------------------  Imaging Results (Last 7 Days)     Procedure Component Value Units Date/Time    XR Hip With or Without Pelvis 2 - 3 View Left [[id number]] Collected: 02/25/22 1056     Updated: 02/25/22 1159    Narrative:      EXAMINATION: XR HIP W OR WO PELVIS 2-3 VIEW LEFT-      CLINICAL INDICATION: fall with hip pain        COMPARISON: None available     FINDINGS:  One view of the pelvis and 2 views of the left hip show comminuted  fracture of the proximal left femur     No other fracture       Impression:      Comminuted fracture of the proximal left femur      This report was finalized on 2/25/2022 10:56 AM by Dr. Denis Wright MD.       XR Femur 2 View Left [[id number]] Collected: 02/25/22 1056     Updated: 02/25/22 1158    Narrative:      EXAMINATION: XR FEMUR 2 VW LEFT-      CLINICAL INDICATION: fall with hip pain        COMPARISON: None available     FINDINGS: 4 views of the left femur show comminuted fracture of the  proximal left femur extending from the intertrochanteric region.       Impression:      Comminuted fracture of the proximal left femur      This report was finalized on 2/25/2022  10:56 AM by Dr. Denis Wright MD.       XR Chest 1 View [[id number]] Collected: 02/25/22 1055     Updated: 02/25/22 1158    Narrative:      XR CHEST 1 VW-     CLINICAL INDICATION: fall with hip pain        COMPARISON: 12/05/2017      TECHNIQUE: Single frontal view of the chest.     FINDINGS:     There is no focal alveolar infiltrate or effusion.  The cardiac silhouette is normal. The pulmonary vasculature is  unremarkable.  There is no evidence of an acute osseous abnormality.   There are no suspicious-appearing parenchymal soft tissue nodules.          Impression:      No evidence of active or acute cardiopulmonary disease on today's chest  radiograph.     This report was finalized on 2/25/2022 10:56 AM by Dr. Denis Wright MD.           I have personally reviewed the above radiology results.     ---------------------------------------------------------------------------------------------------------------------    Assessment & Plan      Active Hospital Problems    Diagnosis  POA   • **Closed fracture of proximal end of left femur (HCC) [S72.002A]  Yes     #Acute comminuted fx of the left proximal femur 2/2 to mechanical   -Imaging reviewed; x-ray of left femur shows a comminuted fracture of the proximal femur  -Orthopedic surgery has been consulted, input/assistance is much appreciated  -IV morphine 2mg q 2h PRN for pain; PO Percocet 10 mg q4h; Tylenol PRN  -Neurovascular checks every 4 hours  -Subcutaneous heparin for VTE prophylaxis  -Obtain vitamin D level   -Meyer catheter in place   -PT/OT consulted for assistance with rehabilitation in the postoperative state    #Mild acute normocytic anemia   -Baseline hemoglobin appears to be around 12-13; hemoglobin on admission 11.4  -Obtain vitamin B12, folate, ferritin, iron, iron panel; replace as necessary  -If hemoglobin less than 7 or platelets less than 50,000 with active bleeding, or less than 20,000, will transfuse    #Essential hypertension   -BP has been within  normal limits  -Review home medications once available; plan to continue antihypertensive agents with appropriate holding parameters    #GERD  -PPI    #FRANCINE, non compliant with CPAP  -Supplemental oxygen as needed, titrate to maintain SPO2 greater than or equal to 90%    #Insulin dependent type II diabetes mellitus   -Obtain hemoglobin A1c  -Hold home Metformin  -Sliding scale insulin ordered; Accu-Cheks before every meal and nightly; titrate insulin therapy as necessary    #Depression   -Review home medications once available    #Morbid obesity   -BMI 50.84 kg/m²  -Complicates all aspects of patient care      F/E/N: No IV fluids. Replace electrolytes as necessary. Consistent carb diet, NPO after midnight   ---------------------------------------------------  DVT Prophylaxis: Heparin   GI Prophylaxis: Protonix   Activity: Strict bed rest     The patient is considered to be a high risk patient due to: acute left femur fx 2/2 to mechanical fall     INPATIENT status due to the need for care which can only be reasonably provided in an hospital setting such as aggressive/expedited ancillary services and/or consultation services, the necessity for IV medications, close physician monitoring and/or the possible need for procedures.  In such, I feel patient’s risk for adverse outcomes and need for care warrant INPATIENT evaluation and predict the patient’s care encounter to likely last beyond 2 midnights.    Code Status: FULL CODE     Disposition/Discharge planning: Plan for home at discharge. Pending clinical course     I have discussed the patient's assessment and plan with the patient, nursing staff, and attending physician       Karey Finney PA-C  Hospitalist Service -- [facility]       02/25/22  20:43 EST    Attending Physician: Marley Hoffmann DO       Electronically signed by Marley Hoffmann DO at 02/26/22 0713       Vital Signs (last day)     Date/Time Temp Temp src Pulse Resp BP Patient Position  SpO2    03/08/22 0628 98 (36.7) Oral 86 18 126/64 Lying --    03/08/22 0232 98.4 (36.9) Oral 90 18 114/51 Lying 96    03/07/22 1909 98.2 (36.8) Oral 89 20 103/59 Lying 97    03/07/22 1318 98.1 (36.7) Oral 90 -- 120/50 Lying --    03/07/22 1133 -- -- 89 -- 124/60 Lying --    03/07/22 0657 98.1 (36.7) Oral 87 18 126/50 Lying 97    03/07/22 0250 98.1 (36.7) Oral 91 18 128/52 Lying 98          Intake & Output (last day)       03/07 0701 03/08 0700 03/08 0701 03/09 0700    P.O. 360 120    Total Intake(mL/kg) 360 (2.5) 120 (0.8)    Urine (mL/kg/hr) 2550 (0.7)     Total Output 2550     Net -2190 +120              Lines, Drains & Airways     Active LDAs     Name Placement date Placement time Site Days    Peripheral IV 03/03/22 0716 Distal; Left Wrist 03/03/22  0716  Wrist  5    External Urinary Catheter 03/01/22  1800  --  6                  Current Facility-Administered Medications   Medication Dose Route Frequency Provider Last Rate Last Admin   • acetaminophen (TYLENOL) tablet 650 mg  650 mg Oral Q4H PRN Jarrod Leung MD   650 mg at 03/07/22 2106    Or   • acetaminophen (TYLENOL) suppository 650 mg  650 mg Rectal Q4H PRN Jarrod Leung MD       • buPROPion SR (WELLBUTRIN SR) 12 hr tablet 400 mg  400 mg Oral Nightly Jarrod Leung MD   400 mg at 03/07/22 2056   • cholecalciferol (VITAMIN D3) capsule 50,000 Units  50,000 Units Oral Weekly Jarrod Leung MD   50,000 Units at 03/06/22 0819   • cyclobenzaprine (FLEXERIL) tablet 5 mg  5 mg Oral TID PRN Jair Valdes MD       • dextrose (D50W) (25 g/50 mL) IV injection 25 g  25 g Intravenous Q15 Min PRN Jarrod Leung MD       • dextrose (GLUTOSE) oral gel 15 g  15 g Oral Q15 Min PRN Jarrod Leung MD       • enoxaparin (LOVENOX) syringe 30 mg  30 mg Subcutaneous Q12H Abner Gomez MD   30 mg at 03/08/22 0609   • Epoetin Fadi-epbx (RETACRIT) injection 40,000 Units  40,000 Units Subcutaneous Q48H Abner Gomez MD   40,000 Units at 03/06/22 1133    • FLUoxetine (PROzac) capsule 10 mg  10 mg Oral Daily Jarrod Leung MD   10 mg at 03/08/22 0822   • glucagon (human recombinant) (GLUCAGEN DIAGNOSTIC) injection 1 mg  1 mg Intramuscular Q15 Min PRN Jarrod Leung MD       • hydrOXYzine (ATARAX) tablet 25 mg  25 mg Oral TID PRN Jarrod Leung MD   25 mg at 03/06/22 2128   • insulin aspart (novoLOG) injection 0-14 Units  0-14 Units Subcutaneous 4x Daily AC & at Bedtime Yvette Cruz PA   3 Units at 03/07/22 2106   • insulin detemir (LEVEMIR) injection 20 Units  20 Units Subcutaneous Nightly Abner Gomez MD   20 Units at 03/07/22 2107   • iron polysaccharides (NIFEREX) capsule 150 mg  150 mg Oral BID Abner Gomez MD   150 mg at 03/08/22 0822   • lactulose (CHRONULAC) 10 GM/15ML solution 20 g  20 g Oral Once Abner Gomez MD       • Magnesium Sulfate 2 gram infusion - Mg less than or equal to 1.5 mg/dL  2 g Intravenous PRN Yvette Cruz PA        Or   • Magnesium Sulfate 1 gram infusion - Mg 1.6-1.9 mg/dL  1 g Intravenous PRN Yvette Cruz PA       • melatonin tablet 10 mg  10 mg Oral Nightly PRN Jarrod Leung MD   10 mg at 03/07/22 2106   • metFORMIN (GLUCOPHAGE) tablet 1,000 mg  1,000 mg Oral BID With Meals Abner Gomez MD   1,000 mg at 03/08/22 0822   • naloxone (NARCAN) injection 0.4 mg  0.4 mg Intravenous Q5 Min PRN Jarrod Leung MD       • nitroglycerin (NITROSTAT) SL tablet 0.4 mg  0.4 mg Sublingual Q5 Min PRN Jarrod Leung MD       • ondansetron (ZOFRAN) injection 4 mg  4 mg Intravenous Q6H PRN Jarrod Leung MD       • oxyCODONE-acetaminophen (PERCOCET)  MG per tablet 1 tablet  1 tablet Oral Q6H PRN Jair Valdes MD   1 tablet at 03/08/22 0609   • pantoprazole (PROTONIX) EC tablet 40 mg  40 mg Oral Q AM Jarrod Leung MD   40 mg at 03/08/22 0609   • polyethylene glycol (MIRALAX) packet 17 g  17 g Oral Daily Yvette Cruz PA   17 g at 03/08/22 0821   • potassium & sodium phosphates  (PHOS-NAK) 280-160-250 MG packet - for Phosphorus less than 1.25 mg/dL  2 packet Oral Q6H PRN Yvette Cruz PA        Or   • potassium & sodium phosphates (PHOS-NAK) 280-160-250 MG packet - for Phosphorus 1.25 - 2.5 mg/dL  2 packet Oral Q6H PRN Yvette Cruz PA       • prochlorperazine (COMPAZINE) injection 5 mg  5 mg Intravenous Q6H PRN Jarrod Leung MD       • sennosides-docusate (PERICOLACE) 8.6-50 MG per tablet 2 tablet  2 tablet Oral BID Jair Valdes MD   2 tablet at 03/08/22 0821   • sodium chloride 0.9 % flush 10 mL  10 mL Intravenous PRN Jarrod Leung MD   10 mL at 03/07/22 0858     Lab Results (most recent)     Procedure Component Value Units Date/Time    POC Glucose Once [[id number]]  (Abnormal) Collected: 03/08/22 1020    Specimen: Blood Updated: 03/08/22 1027     Glucose 173 mg/dL      Comment: Meter: [id number] : [id number] Nadeem Medina       POC Glucose Once [[id number]]  (Abnormal) Collected: 03/08/22 0630    Specimen: Blood Updated: 03/08/22 0654     Glucose 142 mg/dL      Comment: Meter: [id number] : [id number] Scott Gutierrez       CBC & Differential [[id number]]  (Abnormal) Collected: 03/08/22 0118    Specimen: Blood Updated: 03/08/22 0352    Narrative:      The following orders were created for panel order CBC & Differential.  Procedure                               Abnormality         Status                     ---------                               -----------         ------                     CBC Auto Differential[[id number]]        Abnormal            Final result               Scan Slide[[id number]]                                                                    Please view results for these tests on the individual orders.    Manual Differential [[id number]]  (Abnormal) Collected: 03/08/22 0118    Specimen: Blood Updated: 03/08/22 0995     Neutrophil % 51.0 %      Lymphocyte % 22.0 %      Monocyte % 10.0 %      Eosinophil % 4.0 %      Basophil % 1.0 %       Bands %  7.0 %      Metamyelocyte % 1.0 %      Myelocyte % 4.0 %      Neutrophils Absolute 5.49 10*3/mm3      Lymphocytes Absolute 2.08 10*3/mm3      Monocytes Absolute 0.95 10*3/mm3      Eosinophils Absolute 0.38 10*3/mm3      Basophils Absolute 0.09 10*3/mm3      Hypochromia Slight/1+     Macrocytes Slight/1+     Stomatocytes Slight/1+     Platelet Morphology Normal    CBC Auto Differential [[id number]]  (Abnormal) Collected: 03/08/22 0118    Specimen: Blood Updated: 03/08/22 0345     WBC 9.47 10*3/mm3      RBC 2.13 10*6/mm3      Hemoglobin 6.3 g/dL      Hematocrit 20.9 %      MCV 98.1 fL      MCH 29.6 pg      MCHC 30.1 g/dL      RDW 15.0 %      RDW-SD 52.8 fl      MPV 9.0 fL      Platelets 463 10*3/mm3     Basic Metabolic Panel [[id number]]  (Abnormal) Collected: 03/08/22 0118    Specimen: Blood Updated: 03/08/22 0211     Glucose 169 mg/dL      BUN 13 mg/dL      Creatinine 0.69 mg/dL      Sodium 134 mmol/L      Potassium 3.9 mmol/L      Chloride 101 mmol/L      CO2 23.0 mmol/L      Calcium 8.2 mg/dL      BUN/Creatinine Ratio 18.8     Anion Gap 10.0 mmol/L      eGFR 98.9 mL/min/1.73      Comment: National Kidney Foundation and American Society of Nephrology (ASN) Task Force recommended calculation based on the Chronic Kidney Disease Epidemiology Collaboration (CKD-EPI) equation refit without adjustment for race.       Narrative:      GFR Normal >60  Chronic Kidney Disease <60  Kidney Failure <15      Basic Metabolic Panel [[id number]]  (Abnormal) Collected: 03/07/22 0141    Specimen: Blood Updated: 03/07/22 0250     Glucose 124 mg/dL      BUN 11 mg/dL      Creatinine 0.67 mg/dL      Sodium 133 mmol/L      Potassium 4.0 mmol/L      Chloride 100 mmol/L      CO2 23.0 mmol/L      Calcium 8.3 mg/dL      BUN/Creatinine Ratio 16.4     Anion Gap 10.0 mmol/L      eGFR 99.6 mL/min/1.73      Comment: National Kidney Foundation and American Society of Nephrology (ASN) Task Force recommended calculation based on the Chronic  Kidney Disease Epidemiology Collaboration (CKD-EPI) equation refit without adjustment for race.       Narrative:      GFR Normal >60  Chronic Kidney Disease <60  Kidney Failure <15      CBC & Differential [[id number]]  (Abnormal) Collected: 03/07/22 0141    Specimen: Blood Updated: 03/07/22 0250    Narrative:      The following orders were created for panel order CBC & Differential.  Procedure                               Abnormality         Status                     ---------                               -----------         ------                     CBC Auto Differential[[id number]]        Abnormal            Final result               Scan Slide[[id number]]                                                                    Please view results for these tests on the individual orders.    CBC Auto Differential [[id number]]  (Abnormal) Collected: 03/07/22 0141    Specimen: Blood Updated: 03/07/22 0250     WBC 8.64 10*3/mm3      RBC 2.03 10*6/mm3      Hemoglobin 5.9 g/dL      Hematocrit 19.8 %      MCV 97.5 fL      MCH 29.1 pg      MCHC 29.8 g/dL      RDW 14.9 %      RDW-SD 51.9 fl      MPV 9.0 fL      Platelets 498 10*3/mm3      Neutrophil % 55.1 %      Lymphocyte % 24.9 %      Monocyte % 10.5 %      Eosinophil % 3.6 %      Basophil % 0.6 %      Immature Grans % 5.3 %      Neutrophils, Absolute 4.76 10*3/mm3      Lymphocytes, Absolute 2.15 10*3/mm3      Monocytes, Absolute 0.91 10*3/mm3      Eosinophils, Absolute 0.31 10*3/mm3      Basophils, Absolute 0.05 10*3/mm3      Immature Grans, Absolute 0.46 10*3/mm3      nRBC 1.7 /100 WBC     Manual Differential [[id number]]  (Abnormal) Collected: 03/06/22 0526    Specimen: Blood Updated: 03/06/22 0800     Neutrophil % 62.0 %      Lymphocyte % 17.0 %      Monocyte % 6.0 %      Eosinophil % 6.0 %      Bands %  5.0 %      Metamyelocyte % 1.0 %      Myelocyte % 3.0 %      Neutrophils Absolute 5.92 10*3/mm3      Lymphocytes Absolute 1.50 10*3/mm3      Monocytes Absolute 0.53  10*3/mm3      Eosinophils Absolute 0.53 10*3/mm3      Hypochromia Mod/2+     Macrocytes Slight/1+     Polychromasia Slight/1+     Large Platelets Slight/1+    Scan Slide [[id number]] Collected: 03/06/22 0526    Specimen: Blood Updated: 03/06/22 0759     Scan Slide --     Comment: See Manual Differential Results       Comprehensive Metabolic Panel [[id number]]  (Abnormal) Collected: 03/06/22 0526    Specimen: Blood Updated: 03/06/22 0634     Glucose 129 mg/dL      BUN 11 mg/dL      Creatinine 0.62 mg/dL      Sodium 133 mmol/L      Potassium 4.1 mmol/L      Chloride 98 mmol/L      CO2 23.5 mmol/L      Calcium 8.2 mg/dL      Total Protein 5.4 g/dL      Albumin 2.28 g/dL      ALT (SGPT) 7 U/L      AST (SGOT) 14 U/L      Alkaline Phosphatase 58 U/L      Total Bilirubin 0.5 mg/dL      Globulin 3.1 gm/dL      A/G Ratio 0.7 g/dL      BUN/Creatinine Ratio 17.7     Anion Gap 11.5 mmol/L      eGFR 101.5 mL/min/1.73      Comment: National Kidney Foundation and American Society of Nephrology (ASN) Task Force recommended calculation based on the Chronic Kidney Disease Epidemiology Collaboration (CKD-EPI) equation refit without adjustment for race.       Narrative:      GFR Normal >60  Chronic Kidney Disease <60  Kidney Failure <15      Scan Slide [[id number]] Collected: 03/03/22 0136    Specimen: Blood Updated: 03/03/22 0301     Scan Slide --     Comment: See Manual Differential Results       Comprehensive Metabolic Panel [[id number]]  (Abnormal) Collected: 03/03/22 0136    Specimen: Blood Updated: 03/03/22 0256     Glucose 190 mg/dL      BUN 18 mg/dL      Creatinine 0.66 mg/dL      Sodium 129 mmol/L      Potassium 4.9 mmol/L      Chloride 96 mmol/L      CO2 23.0 mmol/L      Calcium 7.8 mg/dL      Total Protein 5.3 g/dL      Albumin 2.17 g/dL      ALT (SGPT) 9 U/L      AST (SGOT) 18 U/L      Alkaline Phosphatase 59 U/L      Total Bilirubin 0.4 mg/dL      Globulin 3.1 gm/dL      A/G Ratio 0.7 g/dL      BUN/Creatinine Ratio 27.3      Anion Gap 10.0 mmol/L      eGFR 99.9 mL/min/1.73      Comment: National Kidney Foundation and American Society of Nephrology (ASN) Task Force recommended calculation based on the Chronic Kidney Disease Epidemiology Collaboration (CKD-EPI) equation refit without adjustment for race.       Narrative:      GFR Normal >60  Chronic Kidney Disease <60  Kidney Failure <15      Phosphorus [[id number]]  (Normal) Collected: 03/03/22 0136    Specimen: Blood Updated: 03/03/22 0256     Phosphorus 2.6 mg/dL     Magnesium [[id number]]  (Normal) Collected: 03/03/22 0136    Specimen: Blood Updated: 03/03/22 0256     Magnesium 2.2 mg/dL     Folate [[id number]]  (Normal) Collected: 03/02/22 0252    Specimen: Blood Updated: 03/02/22 1246     Folate 8.27 ng/mL     Narrative:      Results may be falsely increased if patient taking Biotin.      Vitamin B12 [[id number]]  (Abnormal) Collected: 03/02/22 0252    Specimen: Blood Updated: 03/02/22 1246     Vitamin B-12 192 pg/mL     Narrative:      Results may be falsely increased if patient taking Biotin.      Phosphorus [[id number]]  (Normal) Collected: 03/02/22 1109    Specimen: Blood Updated: 03/02/22 1145     Phosphorus 2.6 mg/dL     Magnesium [[id number]]  (Normal) Collected: 03/02/22 0252    Specimen: Blood Updated: 03/02/22 0332     Magnesium 2.2 mg/dL     Chloride, Urine, Random - Urine, Clean Catch [[id number]] Collected: 03/01/22 1047    Specimen: Urine, Clean Catch Updated: 03/01/22 1122     Chloride, Urine 23 mmol/L     Narrative:      Reference intervals for random urine have not been established.  Clinical usage is dependent upon physician's interpretation in combination with other laboratory tests.       Sodium, Urine, Random - Urine, Clean Catch [[id number]] Collected: 03/01/22 1047    Specimen: Urine, Clean Catch Updated: 03/01/22 1121     Sodium, Urine <20 mmol/L     Narrative:      Reference intervals for random urine have not been established.  Clinical usage is dependent upon  physician's interpretation in combination with other laboratory tests.       Hemoglobin & Hematocrit, Blood [[id number]]  (Abnormal) Collected: 03/01/22 0952    Specimen: Blood Updated: 03/01/22 1030     Hemoglobin 6.3 g/dL      Hematocrit 19.4 %     CBC (No Diff) [[id number]]  (Abnormal) Collected: 03/01/22 0533    Specimen: Blood Updated: 03/01/22 0558     WBC 8.98 10*3/mm3      RBC 1.99 10*6/mm3      Hemoglobin 6.1 g/dL      Hematocrit 19.0 %      MCV 95.5 fL      MCH 30.7 pg      MCHC 32.1 g/dL      RDW 13.6 %      RDW-SD 46.8 fl      MPV 9.6 fL      Platelets 278 10*3/mm3     Sodium [[id number]]  (Abnormal) Collected: 02/28/22 1604    Specimen: Blood Updated: 02/28/22 1647     Sodium 127 mmol/L     Hemoglobin [[id number]]  (Abnormal) Collected: 02/28/22 0447    Specimen: Blood Updated: 02/28/22 0514     Hemoglobin 7.0 g/dL     CBC (No Diff) [[id number]]  (Abnormal) Collected: 02/27/22 0137    Specimen: Blood Updated: 02/27/22 0220     WBC 12.22 10*3/mm3      RBC 2.90 10*6/mm3      Hemoglobin 8.8 g/dL      Hematocrit 27.8 %      MCV 95.9 fL      MCH 30.3 pg      MCHC 31.7 g/dL      RDW 13.5 %      RDW-SD 47.9 fl      MPV 10.3 fL      Platelets 258 10*3/mm3     Vitamin D 25 Hydroxy [[id number]]  (Abnormal) Collected: 02/26/22 0122    Specimen: Blood Updated: 02/26/22 1315     25 Hydroxy, Vitamin D 26.0 ng/ml     Narrative:      Reference Range for Total Vitamin D 25(OH)     Deficiency <20.0 ng/mL   Insufficiency 21-29 ng/mL   Sufficiency  ng/mL  Toxicity >100 ng/ml    Results may be falsely increased if patient taking Biotin.      Urinalysis With Culture If Indicated - Urine, Clean Catch [[id number]]  (Abnormal) Collected: 02/26/22 0218    Specimen: Urine, Clean Catch Updated: 02/26/22 0239     Color, UA Yellow     Appearance, UA Clear     pH, UA <=5.0     Specific Gravity, UA 1.022     Glucose,  mg/dL (Trace)     Ketones, UA Negative     Bilirubin, UA Negative     Blood, UA Small (1+)     Protein, UA  Negative     Leuk Esterase, UA Negative     Nitrite, UA Negative     Urobilinogen, UA 1.0 E.U./dL    Urinalysis, Microscopic Only - Urine, Clean Catch [[id number]]  (Abnormal) Collected: 02/26/22 0218    Specimen: Urine, Clean Catch Updated: 02/26/22 0239     RBC, UA 13-20 /HPF      WBC, UA 3-5 /HPF      Bacteria, UA None Seen /HPF      Squamous Epithelial Cells, UA 0-2 /HPF      Hyaline Casts, UA 0-2 /LPF      Methodology Automated Microscopy    Ferritin [[id number]]  (Normal) Collected: 02/25/22 1016    Specimen: Blood Updated: 02/25/22 2216     Ferritin 43.82 ng/mL     Narrative:      Results may be falsely decreased if patient taking Biotin.      TSH [[id number]]  (Abnormal) Collected: 02/25/22 1016    Specimen: Blood Updated: 02/25/22 2216     TSH 8.590 uIU/mL     T4, Free [[id number]]  (Normal) Collected: 02/25/22 1016    Specimen: Blood Updated: 02/25/22 2216     Free T4 1.13 ng/dL     Narrative:      Results may be falsely increased if patient taking Biotin.      Iron Profile [[id number]]  (Abnormal) Collected: 02/25/22 1016    Specimen: Blood Updated: 02/25/22 2210     Iron 72 mcg/dL      Iron Saturation 17 %      Transferrin 286 mg/dL      TIBC 426 mcg/dL     Hemoglobin A1c [[id number]]  (Abnormal) Collected: 02/25/22 1016    Specimen: Blood Updated: 02/25/22 2207     Hemoglobin A1C 6.80 %     Narrative:      Hemoglobin A1C Ranges:    Increased Risk for Diabetes  5.7% to 6.4%  Diabetes                     >= 6.5%  Diabetic Goal                < 7.0%    COVID-19 and FLU A/B PCR - Swab, Nasopharynx [[id number]]  (Normal) Collected: 02/25/22 1053    Specimen: Swab from Nasopharynx Updated: 02/25/22 1134     COVID19 Not Detected     Influenza A PCR Not Detected     Influenza B PCR Not Detected    Narrative:      Fact sheet for providers: https://www.fda.gov/media/372566/download    Fact sheet for patients: https://www.fda.gov/media/407949/download    Test performed by PCR.    [place] Draw [[id number]] Collected:  02/25/22 1016    Specimen: Blood Updated: 02/25/22 1130    Narrative:      The following orders were created for panel order [place] Draw.  Procedure                               Abnormality         Status                     ---------                               -----------         ------                     Green Top (Gel)[[id number]]                                  Final result               Lavender Top[[id number]]                                     Final result               Gold Top - SST[[id number]]                                   Final result               Light Blue Top[[id number]]                                   Final result                 Please view results for these tests on the individual orders.    Green Top (Gel) [[id number]] Collected: 02/25/22 1016    Specimen: Blood Updated: 02/25/22 1130     Extra Tube Hold for add-ons.     Comment: Auto resulted.       Gold Top - SST [[id number]] Collected: 02/25/22 1016    Specimen: Blood Updated: 02/25/22 1130     Extra Tube Hold for add-ons.     Comment: Auto resulted.       Light Blue Top [[id number]] Collected: 02/25/22 1016    Specimen: Blood Updated: 02/25/22 1130     Extra Tube hold for add-on     Comment: Auto resulted       Lavender Top [[id number]] Collected: 02/25/22 1016    Specimen: Blood Updated: 02/25/22 1130     Extra Tube hold for add-on     Comment: Auto resulted       Urinalysis With Microscopic If Indicated (No Culture) - Urine, Catheter [[id number]]  (Abnormal) Collected: 02/25/22 1102    Specimen: Urine, Catheter Updated: 02/25/22 1118     Color, UA Yellow     Appearance, UA Clear     pH, UA <=5.0     Specific Gravity, UA 1.016     Glucose, UA Negative     Ketones, UA Negative     Bilirubin, UA Negative     Blood, UA Small (1+)     Protein, UA Negative     Leuk Esterase, UA Negative     Nitrite, UA Negative     Urobilinogen, UA 1.0 E.U./dL    Urinalysis, Microscopic Only - Urine, Catheter [[id number]]  (Abnormal) Collected: 02/25/22 1102     Specimen: Urine, Catheter Updated: 02/25/22 1118     RBC, UA 6-12 /HPF      WBC, UA 0-2 /HPF      Bacteria, UA None Seen /HPF      Squamous Epithelial Cells, UA 0-2 /HPF      Hyaline Casts, UA None Seen /LPF      Methodology Automated Microscopy          Imaging Results (Most Recent)     Procedure Component Value Units Date/Time    FL Surgery Fluoro [[id number]] Collected: 02/26/22 1417     Updated: 02/26/22 1428    Narrative:      EXAMINATION: FL SURGERY FLUORO-      CLINICAL INDICATION: orif lt hip ; S72.002A-Fracture of unspecified part  of neck of left femur, initial encounter for closed fracture        COMPARISON: None available.     Total fluoroscopy time 212 seconds.     Fluoroscopy was provided and 7 images were acquired as the patient  underwent orthopedic surgery     For a full procedural report, please see the report provided by the  performing physician.      This report was finalized on 2/26/2022 2:21 PM by Dr. Denis Wright MD.       XR Hip With or Without Pelvis 2 - 3 View Left [[id number]] Collected: 02/25/22 1056     Updated: 02/25/22 1159    Narrative:      EXAMINATION: XR HIP W OR WO PELVIS 2-3 VIEW LEFT-      CLINICAL INDICATION: fall with hip pain        COMPARISON: None available     FINDINGS:  One view of the pelvis and 2 views of the left hip show comminuted  fracture of the proximal left femur     No other fracture       Impression:      Comminuted fracture of the proximal left femur      This report was finalized on 2/25/2022 10:56 AM by Dr. Denis Wright MD.       XR Femur 2 View Left [[id number]] Collected: 02/25/22 1056     Updated: 02/25/22 1158    Narrative:      EXAMINATION: XR FEMUR 2 VW LEFT-      CLINICAL INDICATION: fall with hip pain        COMPARISON: None available     FINDINGS: 4 views of the left femur show comminuted fracture of the  proximal left femur extending from the intertrochanteric region.       Impression:      Comminuted fracture of the proximal left femur       This report was finalized on 2/25/2022 10:56 AM by Dr. Denis Wright MD.       XR Chest 1 View [[id number]] Collected: 02/25/22 1055     Updated: 02/25/22 1158    Narrative:      XR CHEST 1 VW-     CLINICAL INDICATION: fall with hip pain        COMPARISON: 12/05/2017      TECHNIQUE: Single frontal view of the chest.     FINDINGS:     There is no focal alveolar infiltrate or effusion.  The cardiac silhouette is normal. The pulmonary vasculature is  unremarkable.  There is no evidence of an acute osseous abnormality.   There are no suspicious-appearing parenchymal soft tissue nodules.          Impression:      No evidence of active or acute cardiopulmonary disease on today's chest  radiograph.     This report was finalized on 2/25/2022 10:56 AM by Dr. Denis Wright MD.              Operative/Procedure Notes (most recent note)      Jarrod Leung MD at 02/26/22 1245        Operative report  Preoperative diagnosis left femur subtrochanteric fracture  And morbid obesity BMI 56  Surgical procedure left femur open reduction internal fixation using S[employer]s intramedullary nail TFN a size of 360 x 11 mm 125 degree proximal locking with blade and 90 mm distal locking with 2 screws  Increased difficulty because of morbid obesity  After proper patient and limb identification bring to the operating room with general anesthesia dorsal decubitus on the [place] table  Overall increased difficulty because of bodily habitus BMI 56 just for preoperatively positioning on the table during the case for retracting tissue and positioning of instruments for nailing as well as overall closing.  Patient is BMI 56 overall procedure was increased time of surgery by double.  Just positioning on the table need special traction for the large abdominal pannus positioning on the table with a very large leg.  So under C arm longitudinal incision regarding the lateral portion of the femur proximal hip and proximally.  Large fatty layer as deep as mid  forearm.  Then incision of fascia of the gluteus and fascia michael insertion of guidepin on the trochanteric area had to open the fracture site put to large clamp to mobilize the fragment into good general alignment passing the trocar through fracture site and then the guidepin.  The overall handle had to be completely buried under the fat under the incision.  Guidepin was successfully passed through fracture site then reaming with the different reamers and then insertion of the fletcher progressively in good positioning on both AP and lateral view.  Insertion of guidepin along the femoral neck with good positioning on the AP and lateral view reaming and then insertion of the blade which fingertip locking proximally.  Then under C arm locking the fletcher the distally with 2 screw from lateral to medial.  Irrigating thoroughly with sterile fluid the closing fascia with a strata fix and fatty layer with Vicryl 1 skin Vicryl 1 Monocryl 2 oh and metal clip.  Large dressing was applied and patient returned to recovery in stable condition blood loss overall 1700 cc Cell Saver was used during the case and were able to return to 750 cc of blood.  The blood loss though was measured with some air irrigation.  No specimen    Electronically signed by Jarrod Leung MD at 22 1446          Physician Progress Notes (most recent note)      Jair Valdes MD at 22 1656              [facility] HOSPITALIST PROGRESS NOTE     Patient Identification:  Name:  Es Olivier  Age:  62 y.o.  Sex:  female  :  1960  MRN:  [id number]  Visit Number:  [id number]  ROOM: [address] [place] [address]     Primary Care Provider:  Delilah Pizarro MD    Length of stay in inpatient status:  10    Subjective     Chief Compliant:    Chief Complaint   Patient presents with   • Hip Injury     pt reports was walking and tripped and fell landing on left hip. pt now reports left hip pain       History of Presenting Illness:    Patient notes back and  muscle pain related to laying in bed. Denies any other complaints. Reports to working with PT/OT earlier in the day.     ROS:  Otherwise 10 point ROS negative other than documented above in HPI.     Objective     Current Hospital Meds:buPROPion SR, 400 mg, Oral, Nightly  cholecalciferol, 50,000 Units, Oral, Weekly  enoxaparin, 30 mg, Subcutaneous, Q12H  epoetin zelda/zelda-epbx, 40,000 Units, Subcutaneous, Q48H  FLUoxetine, 10 mg, Oral, Daily  insulin aspart, 0-14 Units, Subcutaneous, 4x Daily AC & at Bedtime  insulin detemir, 20 Units, Subcutaneous, Nightly  iron polysaccharides, 150 mg, Oral, BID  lactulose, 20 g, Oral, Once  metFORMIN, 1,000 mg, Oral, BID With Meals  pantoprazole, 40 mg, Oral, Q AM  polyethylene glycol, 17 g, Oral, Daily  senna-docusate sodium, 2 tablet, Oral, BID         Current Antimicrobial Therapy:  Anti-Infectives (From admission, onward)    Ordered     Dose/Rate Route Frequency Start Stop    02/26/22 0787  ceFAZolin in Sodium Chloride (ANCEF) IVPB solution 3 g        Ordering Provider: Jarrod Leung MD    3 g  200 mL/hr over 30 Minutes Intravenous Every 8 Hours 02/26/22 2100 02/27/22 1336        Current Diuretic Therapy:  Diuretics (From admission, onward)    None        ----------------------------------------------------------------------------------------------------------------------  Vital Signs:  Temp:  [98.1 °F (36.7 °C)-98.3 °F (36.8 °C)] 98.1 °F (36.7 °C)  Heart Rate:  [87-94] 90  Resp:  [18] 18  BP: (120-128)/(50-60) 120/50  SpO2:  [96 %-98 %] 97 %  on   ;   Device (Oxygen Therapy): room air  Body mass index is 50.68 kg/m².    Wt Readings from Last 3 Encounters:   02/25/22 (!) 142 kg (314 lb)   08/11/21 135 kg (297 lb)   05/09/19 135 kg (298 lb)     Intake & Output (last 3 days)       03/04 0701  03/05 0700 03/05 0701  03/06 0700 03/06 0701  03/07 0700 03/07 0701  03/08 0700    P.O. 840 510 840     Total Intake(mL/kg) 840 (5.9) 510 (3.6) 840 (5.9)     Urine (mL/kg/hr) 875 (0.3)  1625 (0.5) 1700 (0.5)     Stool        Total Output 875 1625 1700     Net -35 -9335 -220             Urine Unmeasured Occurrence 1 x           Diet Regular; Consistent Carbohydrate  ----------------------------------------------------------------------------------------------------------------------  Physical exam:  Constitutional:  Well-developed and well-nourished.  No respiratory distress.      HENT:  Head:  Normocephalic and atraumatic.  Mouth:  Moist mucous membranes.    Eyes:  Conjunctivae and EOM are normal. No scleral icterus.    Neck:  Neck supple.  No JVD present.    Cardiovascular:  Normal rate, regular rhythm and normal heart sounds with no murmur.  Pulmonary/Chest:  No respiratory distress, no wheezes, no crackles, with normal breath sounds and good air movement.  Abdominal:  Soft.  Bowel sounds are normal.  No distension and no tenderness.   Musculoskeletal:  No edema, no tenderness, and no deformity.  No red or swollen joints anywhere.    Neurological:  Alert and oriented to person, place, and time.  No cranial nerve deficit.  No tongue deviation.  No facial droop.  No slurred speech.   Skin:  Skin is warm and dry. No rash noted. No pallor.   Peripheral vascular:  Pulses in all 4 extremities with no clubbing, no cyanosis, no edema.  ----------------------------------------------------------------------------------------------------------------------  Tele:    ----------------------------------------------------------------------------------------------------------------------  Results from last 7 days   Lab Units 03/07/22  0141 03/06/22  0526 03/05/22  0809   WBC 10*3/mm3 8.64 8.84 8.83   HEMOGLOBIN g/dL 5.9* 6.0* 6.0*   HEMATOCRIT % 19.8* 19.3* 19.3*   MCV fL 97.5* 95.1 95.1   MCHC g/dL 29.8* 31.1* 31.1*   PLATELETS 10*3/mm3 498* 478* 494*         Results from last 7 days   Lab Units 03/07/22  0141 03/06/22  0526 03/05/22  0808 03/03/22  1530 03/03/22  0136 03/02/22  1109 03/02/22  0252  03/01/22  0533   SODIUM mmol/L 133* 133* 133*   < > 129*  --  128* 128*   POTASSIUM mmol/L 4.0 4.1 4.5   < > 4.9  --  4.4 4.1   MAGNESIUM mg/dL  --   --   --   --  2.2  --  2.2 2.2   CHLORIDE mmol/L 100 98 98   < > 96*  --  97* 96*   CO2 mmol/L 23.0 23.5 25.8   < > 23.0  --  22.9 21.4*   BUN mg/dL 11 11 12   < > 18  --  18 17   CREATININE mg/dL 0.67 0.62 0.63   < > 0.66  --  0.82 0.65   CALCIUM mg/dL 8.3* 8.2* 8.4*   < > 7.8*  --  7.6* 7.6*   PHOSPHORUS mg/dL  --   --   --   --  2.6 2.6 2.3* 2.3*   GLUCOSE mg/dL 124* 129* 149*   < > 190*  --  182* 189*   ALBUMIN g/dL  --  2.28*  --   --  2.17*  --  2.12*  --    BILIRUBIN mg/dL  --  0.5  --   --  0.4  --  0.4  --    ALK PHOS U/L  --  58  --   --  59  --  46  --    AST (SGOT) U/L  --  14  --   --  18  --  18  --    ALT (SGPT) U/L  --  7  --   --  9  --  7  --     < > = values in this interval not displayed.   Estimated Creatinine Clearance: 128.6 mL/min (by C-G formula based on SCr of 0.67 mg/dL).  No results found for: AMMONIA              Glucose   Date/Time Value Ref Range Status   03/07/2022 1632 176 (H) 70 - 130 mg/dL Final     Comment:     Meter: [id number] : [id number] Nadeem Peckistie   03/07/2022 1029 140 (H) 70 - 130 mg/dL Final     Comment:     Meter: [id number] : [id number] Silver Carol   03/07/2022 0657 122 70 - 130 mg/dL Final     Comment:     Meter: [id number] : [id number] lydia gaston   03/06/2022 2125 160 (H) 70 - 130 mg/dL Final     Comment:     Meter: [id number] : [id number] rachele tyler   03/06/2022 1629 154 (H) 70 - 130 mg/dL Final     Comment:     Meter: [id number] : [id number] nedra pelayoby   03/06/2022 1049 215 (H) 70 - 130 mg/dL Final     Comment:     Meter: [id number] : [id number] sneed marah   03/06/2022 0657 142 (H) 70 - 130 mg/dL Final     Comment:     Meter: [id number] : [id number] lydia gaston   03/05/2022 2026 159 (H) 70 - 130 mg/dL Final     Comment:     Meter: [id number] : [id number] GERMAN  EDWARDS     Lab Results   Component Value Date    TSH 8.590 (H) 02/25/2022    FREET4 1.13 02/25/2022     No results found for: PREGTESTUR, PREGSERUM, HCG, HCGQUANT  Pain Management Panel     Pain Management Panel Latest Ref Rng & Units 4/6/2016 4/7/2014    AMPHETAMINES SCREEN, URINE Negative Negative Negative    BARBITURATES SCREEN Negative Negative Negative    BENZODIAZEPINE SCREEN, URINE Negative Negative Negative    COCAINE SCREEN, URINE Negative Negative Negative    METHADONE SCREEN, URINE Negative Negative Negative        Brief Urine Lab Results  (Last result in the past 365 days)      Color   Clarity   Blood   Leuk Est   Nitrite   Protein   CREAT   Urine HCG        02/26/22 0218 Yellow   Clear   Small (1+)   Negative   Negative   Negative               No results found for: BLOODCX  No results found for: URINECX  No results found for: WOUNDCX  No results found for: STOOLCX  No results found for: RESPCX  No results found for: AFBCX        I have personally looked at the labs and they are summarized above.  ----------------------------------------------------------------------------------------------------------------------  Detailed radiology reports for the last 24 hours:    Imaging Results (Last 24 Hours)     ** No results found for the last 24 hours. **        Assessment & Plan    #L hip fracture   - s/p hip repair on 2/26  - Will make opiate regimen PRN  - PT/OT following. Awaiting placement.     #Postoperative blood loss anemia  - Hemoglobin stable at 5.9 with no active signs of bleeding   - Procrit and iron started previously   - Continues to refuse transfusion on [place] grounds     #Chronic back pain   - Will add PRN flexaril     #DM II  - SSI    #Constipation   - Escalate BR as needed     #Hyponatremia  -Na stable at 133, encourage PO intake.     F: PO  E: Replace as needed   N: CC    Code status: Full     Dispo: Pending clinical improvement. Awaiting SNF placement.       VTE Prophylaxis:    Mechanical Order History:      Ordered        22 1105  Place Sequential Compression Device  Once            22 1105  Maintain Sequential Compression Device  Continuous            22 1455  Place Sequential Compression Device  Once            22 1455  Maintain Sequential Compression Device  Continuous                    Pharmalogical Order History:      Ordered     Dose Route Frequency Stop    22 1453  enoxaparin (LOVENOX) syringe 30 mg         30 mg SC Every 12 Hours --    22 1557  enoxaparin (LOVENOX) syringe 30 mg  Status:  Discontinued         30 mg SC Every 12 Hours Scheduled 22 0758    22 1335  sodium chloride 1,030 mL with heparin (porcine) 5000 UNIT/ML 30,000 Units mixture  Status:  Discontinued         -- -- As Needed 22 1453    22 1501  enoxaparin (Lovenox) 30 MG/0.3ML solution syringe         30 mg SC Every 12 Hours Scheduled 22 2359    22 2143  heparin (porcine) 5000 UNIT/ML injection 5,000 Units  Status:  Discontinued         5,000 Units SC Every 12 Hours Scheduled 22 1557                  Jair Valdes MD  [facility] Hospitalist  22  16:56 EST    Electronically signed by Jair Valdes MD at 22 1657          Consult Notes (most recent note)      Abdiaziz Eldrideg APRN at 22 0835     Attestation signed by Jarrod Leung MD at 22 1207    I have reviewed this documentation and agree.             Summary:Left hip subtrochanteric fracture.                 Consults    Patient Identification:  Name:  Es Olivier  Age:  62 y.o.  Sex:  female  :  1960  MRN:  [id number]  Visit Number:  [id number]  Primary care provider:  Delilah Pizarro MD    History of presenting illness:  This is a 62 year old female who presented to the emergency room yesterday after sustaining a fall at home, landing on her left side.  Patient notes she tripped over her grandchilds feet, lost her balance, and  fell.  She does note a recent history of frequent falls.  She denies episodes of dizziness and loss of consciousness.  She has been very active and does not use an assist device with mobility.  Today, the patient notes pain to the hip with movement.  She notes the pain is fairly controlled with her pain medication when resting.  Patient also notes that she is a [employer] and does not want any blood products, if her injury requires surgical intervention.  ---------------------------------------------------------------------------------------------------------------------    Review of Systems   Constitutional: Negative.    HENT: Negative.    Eyes: Negative.    Respiratory: Negative.    Cardiovascular: Negative.    Gastrointestinal: Negative.    Endocrine: Negative.    Genitourinary: Negative.    Musculoskeletal: Positive for arthralgias and gait problem.        Left hip, since fall.  Frequent falls at home.   Skin: Negative.    Allergic/Immunologic: Negative.    Hematological: Negative.    Psychiatric/Behavioral: Negative.       ---------------------------------------------------------------------------------------------------------------------   Past History:  Family History   Problem Relation Age of Onset   • Breast cancer Sister         20s   • Cancer Sister    • Breast cancer Sister         60s   • Bone cancer Mother         60   • Osteoarthritis Mother    • Diabetes Father    • Heart attack Father    • Heart disease Father    • Heart disease Brother      Past Medical History:   Diagnosis Date   • Biliary dyskinesia     abnormal HIDA 11/2018 w/ EF 31%, symptomatic w/ N/V   • Depression    • DM2 (diabetes mellitus, type 2) ([facility])     dx 1994, on insulin >5 years, A1c 7, associated neuropathy, no other complications   • Dyspepsia    • Dyspnea on exertion    • Fatigue    • Fatty liver     dx on CT 2016   • GERD (gastroesophageal reflux disease)     controlled w/ daily Protonix   • Heart disease     w/ cardiac  "clearance 2019, negative stress test 10/2017, EF 65%   • Hiatal hernia     \"small\" noted on UGI    • History of Helicobacter pylori infection     treated remotely   • Hyperlipidemia    • Hypertension    • Joint pain    • Morbid obesity with BMI of 45.0-49.9, adult (HCC)    • Sleep apnea     formerly on a device, no longer using, says just doesn't need it   • Urinary incontinence     no meds   • Vitamin D deficiency      Past Surgical History:   Procedure Laterality Date   • BLADDER SURGERY      \"tack\", uncomplicated, but unsuccessful   • CATARACT EXTRACTION     • COLONOSCOPY      unremarkable   • DILATATION AND CURETTAGE     • TONSILLECTOMY       Social History     Socioeconomic History   • Marital status:    Tobacco Use   • Smoking status: Former Smoker     Years: 10.     Quit date:      Years since quittin.1   • Smokeless tobacco: Never Used   Vaping Use   • Vaping Use: Never used   Substance and Sexual Activity   • Alcohol use: Not Currently   • Drug use: No   • Sexual activity: Defer     ---------------------------------------------------------------------------------------------------------------------   Allergies:  Mobic [meloxicam] and Lisinopril  ---------------------------------------------------------------------------------------------------------------------   Prior to Admission Medications     Prescriptions Last Dose Informant Patient Reported? Taking?    buPROPion SR (WELLBUTRIN SR) 200 MG 12 hr tablet 2022 Medication Bottle Yes Yes    Take 400 mg by mouth every night at bedtime. Prior to [facility] Admission, Patient was on:   Script is wrote 1 BID but pt takes 2 QHS    FLUoxetine (PROzac) 10 MG capsule 2022 Medication Bottle Yes Yes    Take 10 mg by mouth Daily.    hydrochlorothiazide (HYDRODIURIL) 12.5 MG tablet 2022 Medication Bottle Yes Yes    Take 12.5 mg by mouth Daily.    insulin regular (humuLIN R,novoLIN R) 100 UNIT/ML injection 2022 " Medication Bottle Yes Yes    Inject 4 Units under the skin into the appropriate area as directed 3 (Three) Times a Day Before Meals.    losartan-hydrochlorothiazide (HYZAAR) 50-12.5 MG per tablet 2/24/2022 Medication Bottle Yes Yes    Take 1 tablet by mouth Daily.    metFORMIN XR (GLUCOPHAGE-XR) 500 MG 24 hr tablet 2/24/2022 Pharmacy Yes Yes    Take 2,000 mg by mouth Every Night.    pioglitazone (ACTOS) 45 MG tablet 2/24/2022 Medication Bottle Yes Yes    Take 45 mg by mouth every night at bedtime.    vitamin D (ERGOCALCIFEROL) 1.25 MG (11442 UT) capsule capsule 2/20/2022 Medication Bottle Yes Yes    Take 50,000 Units by mouth 1 (One) Time Per Week.        Hospital Meds:  buPROPion SR, 400 mg, Oral, Nightly  [START ON 2/27/2022] cholecalciferol, 50,000 Units, Oral, Weekly  FLUoxetine, 10 mg, Oral, Daily  heparin (porcine), 5,000 Units, Subcutaneous, Q12H  insulin aspart, 0-7 Units, Subcutaneous, TID AC  pantoprazole, 40 mg, Oral, Q AM         ---------------------------------------------------------------------------------------------------------------------   Vital Signs:  Temp:  [97.2 °F (36.2 °C)-98.2 °F (36.8 °C)] 98.2 °F (36.8 °C)  Heart Rate:  [61-94] 90  Resp:  [14-18] 18  BP: (105-151)/(57-97) 123/59      02/25/22  0951 02/25/22  2130   Weight: (!) 143 kg (315 lb) (!) 142 kg (314 lb)     Body mass index is 50.68 kg/m².  ---------------------------------------------------------------------------------------------------------------------     Physical exam:  Constitutional:  Well-developed and well-nourished.  No respiratory distress.  Morbidly obese.    HENT:  Head: Normocephalic and atraumatic.  Mouth:  Moist mucous membranes.    Eyes:  Conjunctivae and EOM are normal.  No scleral icterus.  Neck:  Neck supple.    Cardiovascular:  Normal rate, regular rhythm.  Pulmonary/Chest:  No respiratory distress, no wheezes, and good air movement.  Abdominal:  Soft.  No distension and no tenderness.   Musculoskeletal:  Upon examination of the left hip and lower extremity: There is no edema, erythema, ecchymosis, or open wounds.  Femoral pulse is 3/4.  Skin is pink, warm, and dry.  Able to dorsiflex and plantar flex the ankle.  Able to flex and extend the digits.  DP pulse 2/4.  Capillary refill brisk and light touch sensation is intact, distally.    Neurological:  Alert and oriented to person, place, and time.  No facial droop.  No slurred speech.   Skin:  Skin is warm and dry.  No rash noted.  No pallor.   Psychiatric:  Normal mood and affect.  Behavior is normal.  Judgment and thought content normal.   Peripheral vascular:  Strong pulses on all 4 extremities.    ---------------------------------------------------------------------------------------------------------------------   .  ---------------------------------------------------------------------------------------------------------------------   Results from last 7 days   Lab Units 02/26/22  0122 02/25/22  1016   WBC 10*3/mm3 8.83 8.89   HEMOGLOBIN g/dL 10.8* 11.4*   HEMATOCRIT % 34.7 35.8   MCV fL 96.1 95.0   MCHC g/dL 31.1* 31.8   PLATELETS 10*3/mm3 298 330         Results from last 7 days   Lab Units 02/26/22  0122 02/25/22  1016   SODIUM mmol/L 136 139   POTASSIUM mmol/L 4.4 3.9   MAGNESIUM mg/dL 2.0  --    CHLORIDE mmol/L 100 104   CO2 mmol/L 24.4 24.8   BUN mg/dL 23 20   CREATININE mg/dL 0.81 0.73   EGFR IF NONAFRICN AM mL/min/1.73 72 81   CALCIUM mg/dL 8.8 8.9   GLUCOSE mg/dL 205* 192*   ALBUMIN g/dL 3.51 3.86   BILIRUBIN mg/dL 0.5 0.4   ALK PHOS U/L 53 62   AST (SGOT) U/L 13 14   ALT (SGPT) U/L 11 12   Estimated Creatinine Clearance: 106.4 mL/min (by C-G formula based on SCr of 0.81 mg/dL).  No results found for: AMMONIA          Lab Results   Component Value Date    HGBA1C 6.80 (H) 02/25/2022     Lab Results   Component Value Date    TSH 8.590 (H) 02/25/2022    FREET4 1.13 02/25/2022     No results found for: PREGTESTUR, PREGSERUM, HCG, HCGQUANT  Pain Management  Panel     Pain Management Panel Latest Ref Rng & Units 4/6/2016 4/7/2014    AMPHETAMINES SCREEN, URINE Negative Negative Negative    BARBITURATES SCREEN Negative Negative Negative    BENZODIAZEPINE SCREEN, URINE Negative Negative Negative    COCAINE SCREEN, URINE Negative Negative Negative    METHADONE SCREEN, URINE Negative Negative Negative        No results found for: BLOODCX  No results found for: URINECX  No results found for: WOUNDCX  No results found for: STOOLCX      ---------------------------------------------------------------------------------------------------------------------   Imaging Results (Last 7 Days)     Procedure Component Value Units Date/Time    XR Hip With or Without Pelvis 2 - 3 View Left [[id number]] Collected: 02/25/22 1056     Updated: 02/25/22 1159    Narrative:      EXAMINATION: XR HIP W OR WO PELVIS 2-3 VIEW LEFT-      CLINICAL INDICATION: fall with hip pain        COMPARISON: None available     FINDINGS:  One view of the pelvis and 2 views of the left hip show comminuted  fracture of the proximal left femur     No other fracture       Impression:      Comminuted fracture of the proximal left femur      This report was finalized on 2/25/2022 10:56 AM by Dr. Denis Wright MD.       XR Femur 2 View Left [[id number]] Collected: 02/25/22 1056     Updated: 02/25/22 1158    Narrative:      EXAMINATION: XR FEMUR 2 VW LEFT-      CLINICAL INDICATION: fall with hip pain        COMPARISON: None available     FINDINGS: 4 views of the left femur show comminuted fracture of the  proximal left femur extending from the intertrochanteric region.       Impression:      Comminuted fracture of the proximal left femur      This report was finalized on 2/25/2022 10:56 AM by Dr. Denis Wright MD.       XR Chest 1 View [[id number]] Collected: 02/25/22 1055     Updated: 02/25/22 1158    Narrative:      XR CHEST 1 VW-     CLINICAL INDICATION: fall with hip pain        COMPARISON: 12/05/2017      TECHNIQUE:  Single frontal view of the chest.     FINDINGS:     There is no focal alveolar infiltrate or effusion.  The cardiac silhouette is normal. The pulmonary vasculature is  unremarkable.  There is no evidence of an acute osseous abnormality.   There are no suspicious-appearing parenchymal soft tissue nodules.          Impression:      No evidence of active or acute cardiopulmonary disease on today's chest  radiograph.     This report was finalized on 2022 10:56 AM by Dr. Denis Wright MD.           ----------------------------------------------------------------------------------------------------------------------  Assessment and Plan:   Left hip, comminuted, subtrochanteric fracture.    Discussed the patient with Dr. Leung.  He has reviewed the xrays.  He recommends a left hip open reduction and internal fixation today.  The patient is a [employer] and does not want to have a blood transfusion.  We are able to have a cell saver arranged to use during surgery.  This has been discussed with the patient as well as the high risk of bleeding during surgery with this type of fracture repair.  The patient is agreeable to using the cell saver only.  NPO for surgery.  Consent for left hip open reduction and internal fixation.  Patient is COVID 19 negative and has had a type and screen.        Thank you for the consult.    JAMMIE Nguyen  22  08:36 EST    Electronically signed by Jarrod Leung MD at 22 1207       Nutrition Notes (most recent note)    No notes exist for this encounter.            Physical Therapy Notes (most recent note)      Jessica Aquino PT at 22 1635  Version 1 of 1         Acute Care - Physical Therapy Treatment Note  RICKEY Workman     Patient Name: Es Olivier  : 1960  MRN: [id number]  Today's Date: 3/7/2022      Visit Dx:     ICD-10-CM ICD-9-CM   1. Closed fracture of proximal end of left femur, initial encounter ([facility])  S72.002A 820.8     Patient  "Active Problem List   Diagnosis   • Biliary dyskinesia   • Heart disease   • Hypertension   • DM2 (diabetes mellitus, type 2) ([facility])   • Hyperlipidemia   • Fatigue   • Dyspepsia   • Dyspnea on exertion   • Morbid obesity with BMI of 45.0-49.9, adult ([facility])   • Urinary incontinence   • Depression   • GERD (gastroesophageal reflux disease)   • History of Helicobacter pylori infection   • Vitamin D deficiency   • Joint pain   • Sleep apnea   • Hiatal hernia   • Fatty liver   • Closed fracture of proximal end of left femur ([facility])     Past Medical History:   Diagnosis Date   • Biliary dyskinesia     abnormal HIDA 11/2018 w/ EF 31%, symptomatic w/ N/V   • Closed fracture of proximal end of left femur ([facility]) 2/25/2022   • Depression    • DM2 (diabetes mellitus, type 2) ([facility])     dx 1994, on insulin >5 years, A1c 7, associated neuropathy, no other complications   • Dyspepsia    • Dyspnea on exertion    • Fatigue    • Fatty liver     dx on CT 2016   • GERD (gastroesophageal reflux disease)     controlled w/ daily Protonix   • Heart disease     w/ cardiac clearance 4/2019, negative stress test 10/2017, EF 65%   • Hiatal hernia     \"small\" noted on UGI 2014   • History of Helicobacter pylori infection     treated remotely   • Hyperlipidemia    • Hypertension    • Joint pain    • Morbid obesity with BMI of 45.0-49.9, adult ([facility])    • Sleep apnea     formerly on a device, no longer using, says just doesn't need it   • Urinary incontinence     no meds   • Vitamin D deficiency      Past Surgical History:   Procedure Laterality Date   • BLADDER SURGERY  2013    \"tack\", uncomplicated, but unsuccessful   • CATARACT EXTRACTION     • COLONOSCOPY  2015    unremarkable   • DILATATION AND CURETTAGE  1987   • ORIF HIP FRACTURE Left 2/26/2022    Procedure: Left hip Open reduction and internal fixation.;  Surgeon: Jarrod Leung MD;  Location: [facility];  Service: Orthopedics;  Laterality: Left;   • TONSILLECTOMY  1984     PT Assessment " (last 12 hours)     PT Evaluation and Treatment     Row Name 03/07/22 1627          Physical Therapy Time and Intention    Document Type therapy note (daily note)  -     Mode of Treatment physical therapy  -     Patient Effort adequate  -     Comment Pt able to better perform STS this date however not completely upright as of yet.  -     Row Name 03/07/22 1627          Bed Mobility    Bed Mobility supine-sit;sit-supine;scooting/bridging  -     Supine-Sit [place] (Bed Mobility) minimum assist (75% patient effort);moderate assist (50% patient effort);2 person assist  HOB elevated,  -     Sit-Supine [place] (Bed Mobility) dependent (less than 25% patient effort);2 person assist  3 person assist  -     Row Name 03/07/22 1627          Transfers    Transfers sit-stand transfer  -     Sit-Stand [place] (Transfers) moderate assist (50% patient effort);2 person assist;verbal cues  -     Row Name 03/07/22 1627          Sit-Stand Transfer    Assistive Device (Sit-Stand Transfers) walker, front-wheeled  -     Row Name             Wound Left thigh Incision    Wound - Properties Group Side: Left  -RH Location: thigh  -RH Primary Wound Type: Incision  -RH     Retired Wound - Properties Group Side: Left  -RH Location: thigh  -RH Primary Wound Type: Incision  -RH     Retired Wound - Properties Group Side: Left  -RH Location: thigh  -RH Primary Wound Type: Incision  -RH     Row Name 03/07/22 1627          Positioning and Restraints    Pre-Treatment Position in bed  -     Post Treatment Position bed  -     In Bed supine;call light within reach  -     Row Name 03/07/22 1627          Therapy Assessment/Plan (PT)    Comment, Therapy Assessment/Plan (PT) Pt able to perform STS with more independence however ultimately unable to stand upright. Pt declined LE TE d/t fatigue/weakness/nausea. Pt agreeable to performing HEP in her own time.  -           User Key  (r) = Recorded By, (t) = Taken By, (c)  = Cosigned By    Initials Name Provider Type    Jessica Rodriguez PT Physical Therapist    Marie Carreno RN Registered Nurse                  PT Recommendation and Plan  Anticipated Discharge Disposition (PT): skilled nursing facility  Planned Therapy Interventions (PT): bed mobility training, gait training, strengthening, transfer training  Therapy Frequency (PT): 6 times/wk (update)  Outcome Evaluation: Pt evaluated this date with grossly dependent assist with bed mobility this date. D/C rec for SNF for rehabilitation as pt was independent with PLOF.       Time Calculation:    PT Charges     Row Name 22 1632             Time Calculation    PT Received On 22  -      PT Goal Re-Cert Due Date 22  -              Time Calculation- PT    Total Timed Code Minutes- PT 23 minute(s)  -            User Key  (r) = Recorded By, (t) = Taken By, (c) = Cosigned By    Initials Name Provider Type    Jessica Rodriguez, PT Physical Therapist              Therapy Charges for Today     Code Description Service Date Service Provider Modifiers Qty    [id number]  PT THERAPEUTIC ACT EA 15 MIN 3/7/2022 Jessica Aquino, PT GP 2               Jessica Aquino, PT  3/7/2022      Electronically signed by Jessica Aquino PT at 22 1635          Occupational Therapy Notes (most recent note)      Ashlee Mar, OT at 22 1600          Acute Care - Occupational Therapy Treatment Note  RICKEY Workman     Patient Name: Es Olivier  : 1960  MRN: [id number]  Today's Date: 3/7/2022             Admit Date: 2022       ICD-10-CM ICD-9-CM   1. Closed fracture of proximal end of left femur, initial encounter ([facility])  S72.002A 820.8     Patient Active Problem List   Diagnosis   • Biliary dyskinesia   • Heart disease   • Hypertension   • DM2 (diabetes mellitus, type 2) ([facility])   • Hyperlipidemia   • Fatigue   • Dyspepsia   • Dyspnea on exertion   • Morbid obesity with BMI of 45.0-49.9, adult ([facility])   •  "Urinary incontinence   • Depression   • GERD (gastroesophageal reflux disease)   • History of Helicobacter pylori infection   • Vitamin D deficiency   • Joint pain   • Sleep apnea   • Hiatal hernia   • Fatty liver   • Closed fracture of proximal end of left femur (HCC)     Past Medical History:   Diagnosis Date   • Biliary dyskinesia     abnormal HIDA 11/2018 w/ EF 31%, symptomatic w/ N/V   • Closed fracture of proximal end of left femur (HCC) 2/25/2022   • Depression    • DM2 (diabetes mellitus, type 2) ([facility])     dx 1994, on insulin >5 years, A1c 7, associated neuropathy, no other complications   • Dyspepsia    • Dyspnea on exertion    • Fatigue    • Fatty liver     dx on CT 2016   • GERD (gastroesophageal reflux disease)     controlled w/ daily Protonix   • Heart disease     w/ cardiac clearance 4/2019, negative stress test 10/2017, EF 65%   • Hiatal hernia     \"small\" noted on UGI 2014   • History of Helicobacter pylori infection     treated remotely   • Hyperlipidemia    • Hypertension    • Joint pain    • Morbid obesity with BMI of 45.0-49.9, adult ([facility])    • Sleep apnea     formerly on a device, no longer using, says just doesn't need it   • Urinary incontinence     no meds   • Vitamin D deficiency      Past Surgical History:   Procedure Laterality Date   • BLADDER SURGERY  2013    \"tack\", uncomplicated, but unsuccessful   • CATARACT EXTRACTION     • COLONOSCOPY  2015    unremarkable   • DILATATION AND CURETTAGE  1987   • ORIF HIP FRACTURE Left 2/26/2022    Procedure: Left hip Open reduction and internal fixation.;  Surgeon: Jarrod Leung MD;  Location: [facility];  Service: Orthopedics;  Laterality: Left;   • TONSILLECTOMY  1984         OT ASSESSMENT FLOWSHEET (last 12 hours)     OT Evaluation and Treatment     Row Name 03/07/22 1554                   OT Time and Intention    Subjective Information pain;fatigue  -LA        Document Type therapy note (daily note)  -LA        Mode of Treatment individual " therapy;occupational therapy  -LA        Patient Effort adequate  -LA                  General Information    Existing Precautions/Restrictions fall;non-weight bearing  LLE NWB  -LA                  Cognition    Affect/Mental Status (Cognition) WFL  -LA        Orientation Status (Cognition) oriented x 3  -LA        Follows Commands (Cognition) follows one-step commands  -LA                  Bed Mobility    All Activities, Louisa (Bed Mobility) minimum assist (75% patient effort);other (see comments);verbal cues  leg   -LA                  Transfer Assessment/Treatment    Transfers sit-stand transfer  -LA        Maintains Weight-bearing Status (Transfers) verbal cues to maintain  -LA        Comment, (Transfers) Max verbal cues and encouragement; min-modA x2 person  -LA                  Wound Left thigh Incision    Wound - Properties Group Side: Left  -RH Location: thigh  -RH Primary Wound Type: Incision  -RH        Retired Wound - Properties Group Side: Left  -RH Location: thigh  -RH Primary Wound Type: Incision  -RH        Retired Wound - Properties Group Side: Left  -RH Location: thigh  -RH Primary Wound Type: Incision  -RH                  Positioning and Restraints    Post Treatment Position bed  -LA        In Bed supine;call light within reach;encouraged to call for assist  -LA                  Progress Summary (OT)    Daily Progress Summary (OT) Patient agreeable to therapy this date. Patient engaged in bed mobility with max verbal cues and max encouragement. Patient able to get from supine to EOB with Gian using leg  to assist with movement of left LE. Patient able to sit EOB with anxiety. Sit to stand with walker with min-modA x2 person. patient returned to bed with maxA. Attempted use of bedpan at EOB unsuccessfully.  -LA              User Key  (r) = Recorded By, (t) = Taken By, (c) = Cosigned By    Initials Name Effective Dates    Marie Carreno RN 01/25/22 -     Ashlee Adams  OT 02/14/22 -                  Occupational Therapy Education                 Title: PT OT SLP Therapies (Done)     Topic: Occupational Therapy (Done)     Point: ADL training (Done)     Description:   Instruct learner(s) on proper safety adaptation and remediation techniques during self care or transfers.   Instruct in proper use of assistive devices.              Learning Progress Summary           Patient Acceptance, E,TB, VU by MARY ANN at 3/7/2022 0940      Show all documentation for this point (2)                 Point: Precautions (Done)     Description:   Instruct learner(s) on prescribed precautions during self-care and functional transfers.              Learning Progress Summary           Patient Acceptance, E,TB, VU by MARY ANN at 3/7/2022 0940      Show all documentation for this point (2)                             User Key     Initials Effective Dates Name Provider Type Discipline     06/16/21 -  Carol Silver, RN Registered Nurse Nurse                  OT Recommendation and Plan              Time Calculation:     Therapy Charges for Today     Code Description Service Date Service Provider Modifiers Qty    [id number]  OT THER PROC EA 15 MIN 3/7/2022 Ashlee Mar OT GO 1    [id number]  OT THERAPEUTIC ACT EA 15 MIN 3/7/2022 Ashlee Mar OT GO 1               Ashlee Mar OT  3/7/2022    Electronically signed by Ashlee Mar OT at 03/07/22 1601         ADL Documentation (most recent)    Flowsheet Row Most Recent Value   Transferring 3 - assistive equipment and person   Toileting 3 - assistive equipment and person   Bathing 3 - assistive equipment and person   Dressing 3 - assistive equipment and person   Eating 0 - independent   Communication 0 - understands/communicates without difficulty   Swallowing 0 - swallows foods/liquids without difficulty   Equipment Currently Used at Home none

## 2022-03-08 NOTE — PLAN OF CARE
Goal Outcome Evaluation:     Patient resting in bed.  Wound care tolerated.  Swing bed consult.  No acute issues noted.

## 2022-03-08 NOTE — PLAN OF CARE
Goal Outcome Evaluation:  Plan of Care Reviewed With: patient        Progress: no change  Outcome Evaluation: pt resting, no changes, pt refused to wear SCUDS tonight, will continue to monitor

## 2022-03-08 NOTE — CASE MANAGEMENT/SOCIAL WORK
Discharge Planning Assessment  [facility]     Patient Name: Es Olivier  MRN: [id number]  Today's Date: 3/8/2022    Admit Date: 2/25/2022     Discharge Plan     Row Name 03/08/22 1029       Plan    Plan SS received message from [facility] and pt was denied. SS to follow.    11:48: SS spoke to pt who declines SNF placement at [facility] or [facility]. SS spoke to pt's daughter, Brynn who will discuss SNF placement options and call SS back. SS contacted [facility] per Karlee Mane and she does not have a bed available. SS sent a message to [facility] regarding bed availability. [facility] include 234 beds at the [facility] are out of network with pt's insurance. SS to follow.    14:50: [facility] does not have a bed available. SS discussed pt with Dr. Valdes and recommended swing bed consult. Swing bed consult has been ordered. SS to follow.              Continued Care and Services - Admitted Since 2/25/2022     Destination     Service Provider Request Status Selected Services Address Phone Fax Patient Preferred    [facility]  Declined N/A [address] [facility] [address] [phone] --    [facility]  Declined  No Bed Available N/A [address] [facility] [address] [phone] --    [facility]  Declined  No Bed Available N/A [address] [facility] [address] [phone] --    [facility]  Declined  Clinical Denial N/A [address] [facility] [address] [phone] --              ARIEL Bradford

## 2022-03-09 ENCOUNTER — HOSPITAL ENCOUNTER (INPATIENT)
Facility: HOSPITAL | Age: 62
LOS: 16 days | Discharge: NURSING, REHAB OR LONG-TERM CARE | End: 2022-03-25
Attending: INTERNAL MEDICINE | Admitting: INTERNAL MEDICINE

## 2022-03-09 VITALS
TEMPERATURE: 98.3 F | WEIGHT: 293 LBS | BODY MASS INDEX: 47.09 KG/M2 | OXYGEN SATURATION: 97 % | HEIGHT: 66 IN | DIASTOLIC BLOOD PRESSURE: 52 MMHG | RESPIRATION RATE: 18 BRPM | HEART RATE: 88 BPM | SYSTOLIC BLOOD PRESSURE: 122 MMHG

## 2022-03-09 PROBLEM — S72.002A CLOSED FRACTURE OF NECK OF LEFT FEMUR (HCC): Status: ACTIVE | Noted: 2022-03-09

## 2022-03-09 LAB
GLUCOSE BLDC GLUCOMTR-MCNC: 131 MG/DL (ref 70–130)
GLUCOSE BLDC GLUCOMTR-MCNC: 149 MG/DL (ref 70–130)
GLUCOSE BLDC GLUCOMTR-MCNC: 163 MG/DL (ref 70–130)
GLUCOSE BLDC GLUCOMTR-MCNC: 172 MG/DL (ref 70–130)

## 2022-03-09 PROCEDURE — 97166 OT EVAL MOD COMPLEX 45 MIN: CPT

## 2022-03-09 PROCEDURE — 25010000002 ENOXAPARIN PER 10 MG: Performed by: INTERNAL MEDICINE

## 2022-03-09 PROCEDURE — 94799 UNLISTED PULMONARY SVC/PX: CPT

## 2022-03-09 PROCEDURE — 82962 GLUCOSE BLOOD TEST: CPT

## 2022-03-09 PROCEDURE — 63710000001 INSULIN ASPART PER 5 UNITS: Performed by: INTERNAL MEDICINE

## 2022-03-09 PROCEDURE — 97162 PT EVAL MOD COMPLEX 30 MIN: CPT

## 2022-03-09 PROCEDURE — 63710000001 INSULIN DETEMIR PER 5 UNITS: Performed by: INTERNAL MEDICINE

## 2022-03-09 PROCEDURE — 99239 HOSP IP/OBS DSCHRG MGMT >30: CPT | Performed by: INTERNAL MEDICINE

## 2022-03-09 RX ORDER — NITROGLYCERIN 0.4 MG/1
0.4 TABLET SUBLINGUAL
Status: CANCELLED | OUTPATIENT
Start: 2022-03-09

## 2022-03-09 RX ORDER — CYCLOBENZAPRINE HCL 10 MG
5 TABLET ORAL 3 TIMES DAILY PRN
Status: DISCONTINUED | OUTPATIENT
Start: 2022-03-09 | End: 2022-03-26 | Stop reason: HOSPADM

## 2022-03-09 RX ORDER — PROCHLORPERAZINE EDISYLATE 5 MG/ML
5 INJECTION INTRAMUSCULAR; INTRAVENOUS EVERY 6 HOURS PRN
Status: CANCELLED | OUTPATIENT
Start: 2022-03-09

## 2022-03-09 RX ORDER — IRON POLYSACCHARIDE COMPLEX 150 MG
150 CAPSULE ORAL 2 TIMES DAILY
Status: CANCELLED | OUTPATIENT
Start: 2022-03-09

## 2022-03-09 RX ORDER — NALOXONE HCL 0.4 MG/ML
0.4 VIAL (ML) INJECTION
Status: CANCELLED | OUTPATIENT
Start: 2022-03-09

## 2022-03-09 RX ORDER — NITROGLYCERIN 0.4 MG/1
0.4 TABLET SUBLINGUAL
Status: DISCONTINUED | OUTPATIENT
Start: 2022-03-09 | End: 2022-03-26 | Stop reason: HOSPADM

## 2022-03-09 RX ORDER — ACETAMINOPHEN 650 MG/1
650 SUPPOSITORY RECTAL EVERY 4 HOURS PRN
Status: DISCONTINUED | OUTPATIENT
Start: 2022-03-09 | End: 2022-03-26 | Stop reason: HOSPADM

## 2022-03-09 RX ORDER — ONDANSETRON 2 MG/ML
4 INJECTION INTRAMUSCULAR; INTRAVENOUS EVERY 6 HOURS PRN
Status: DISCONTINUED | OUTPATIENT
Start: 2022-03-09 | End: 2022-03-26 | Stop reason: HOSPADM

## 2022-03-09 RX ORDER — HYDROXYZINE HYDROCHLORIDE 25 MG/1
25 TABLET, FILM COATED ORAL 3 TIMES DAILY PRN
Status: DISCONTINUED | OUTPATIENT
Start: 2022-03-09 | End: 2022-03-26 | Stop reason: HOSPADM

## 2022-03-09 RX ORDER — DEXTROSE MONOHYDRATE 25 G/50ML
25 INJECTION, SOLUTION INTRAVENOUS
Status: DISCONTINUED | OUTPATIENT
Start: 2022-03-09 | End: 2022-03-26 | Stop reason: HOSPADM

## 2022-03-09 RX ORDER — CHOLECALCIFEROL (VITAMIN D3) 125 MCG
10 CAPSULE ORAL NIGHTLY PRN
Status: DISCONTINUED | OUTPATIENT
Start: 2022-03-09 | End: 2022-03-26 | Stop reason: HOSPADM

## 2022-03-09 RX ORDER — ACETAMINOPHEN 325 MG/1
650 TABLET ORAL EVERY 4 HOURS PRN
Status: CANCELLED | OUTPATIENT
Start: 2022-03-09

## 2022-03-09 RX ORDER — OXYCODONE AND ACETAMINOPHEN 10; 325 MG/1; MG/1
1 TABLET ORAL EVERY 6 HOURS PRN
Status: CANCELLED | OUTPATIENT
Start: 2022-03-09

## 2022-03-09 RX ORDER — SODIUM CHLORIDE 0.9 % (FLUSH) 0.9 %
10 SYRINGE (ML) INJECTION AS NEEDED
Status: DISCONTINUED | OUTPATIENT
Start: 2022-03-09 | End: 2022-03-26 | Stop reason: HOSPADM

## 2022-03-09 RX ORDER — OXYCODONE AND ACETAMINOPHEN 10; 325 MG/1; MG/1
1 TABLET ORAL EVERY 6 HOURS PRN
Status: DISCONTINUED | OUTPATIENT
Start: 2022-03-09 | End: 2022-03-26 | Stop reason: HOSPADM

## 2022-03-09 RX ORDER — NICOTINE POLACRILEX 4 MG
15 LOZENGE BUCCAL
Status: CANCELLED | OUTPATIENT
Start: 2022-03-09

## 2022-03-09 RX ORDER — PROCHLORPERAZINE EDISYLATE 5 MG/ML
5 INJECTION INTRAMUSCULAR; INTRAVENOUS EVERY 6 HOURS PRN
Status: DISCONTINUED | OUTPATIENT
Start: 2022-03-09 | End: 2022-03-26 | Stop reason: HOSPADM

## 2022-03-09 RX ORDER — ACETAMINOPHEN 650 MG/1
650 SUPPOSITORY RECTAL EVERY 4 HOURS PRN
Status: CANCELLED | OUTPATIENT
Start: 2022-03-09

## 2022-03-09 RX ORDER — PANTOPRAZOLE SODIUM 40 MG/1
40 TABLET, DELAYED RELEASE ORAL
Status: CANCELLED | OUTPATIENT
Start: 2022-03-10

## 2022-03-09 RX ORDER — SODIUM CHLORIDE 0.9 % (FLUSH) 0.9 %
10 SYRINGE (ML) INJECTION AS NEEDED
Status: CANCELLED | OUTPATIENT
Start: 2022-03-09

## 2022-03-09 RX ORDER — IRON POLYSACCHARIDE COMPLEX 150 MG
150 CAPSULE ORAL 2 TIMES DAILY
Status: DISCONTINUED | OUTPATIENT
Start: 2022-03-09 | End: 2022-03-26 | Stop reason: HOSPADM

## 2022-03-09 RX ORDER — MAGNESIUM SULFATE 1 G/100ML
1 INJECTION INTRAVENOUS AS NEEDED
Status: DISCONTINUED | OUTPATIENT
Start: 2022-03-09 | End: 2022-03-26 | Stop reason: HOSPADM

## 2022-03-09 RX ORDER — AMOXICILLIN 250 MG
2 CAPSULE ORAL 2 TIMES DAILY
Status: DISCONTINUED | OUTPATIENT
Start: 2022-03-09 | End: 2022-03-11

## 2022-03-09 RX ORDER — FLUOXETINE 10 MG/1
10 CAPSULE ORAL DAILY
Status: CANCELLED | OUTPATIENT
Start: 2022-03-10

## 2022-03-09 RX ORDER — MAGNESIUM SULFATE HEPTAHYDRATE 40 MG/ML
2 INJECTION, SOLUTION INTRAVENOUS AS NEEDED
Status: DISCONTINUED | OUTPATIENT
Start: 2022-03-09 | End: 2022-03-26 | Stop reason: HOSPADM

## 2022-03-09 RX ORDER — AMOXICILLIN 250 MG
2 CAPSULE ORAL 2 TIMES DAILY
Status: CANCELLED | OUTPATIENT
Start: 2022-03-09

## 2022-03-09 RX ORDER — NICOTINE POLACRILEX 4 MG
15 LOZENGE BUCCAL
Status: DISCONTINUED | OUTPATIENT
Start: 2022-03-09 | End: 2022-03-26 | Stop reason: HOSPADM

## 2022-03-09 RX ORDER — MAGNESIUM SULFATE 1 G/100ML
1 INJECTION INTRAVENOUS AS NEEDED
Status: CANCELLED | OUTPATIENT
Start: 2022-03-09

## 2022-03-09 RX ORDER — ACETAMINOPHEN 325 MG/1
650 TABLET ORAL EVERY 4 HOURS PRN
Status: DISCONTINUED | OUTPATIENT
Start: 2022-03-09 | End: 2022-03-26 | Stop reason: HOSPADM

## 2022-03-09 RX ORDER — POLYETHYLENE GLYCOL 3350 17 G/17G
17 POWDER, FOR SOLUTION ORAL DAILY
Status: CANCELLED | OUTPATIENT
Start: 2022-03-10

## 2022-03-09 RX ORDER — FLUOXETINE 10 MG/1
10 CAPSULE ORAL DAILY
Status: DISCONTINUED | OUTPATIENT
Start: 2022-03-10 | End: 2022-03-20

## 2022-03-09 RX ORDER — PANTOPRAZOLE SODIUM 40 MG/1
40 TABLET, DELAYED RELEASE ORAL
Status: DISCONTINUED | OUTPATIENT
Start: 2022-03-10 | End: 2022-03-26 | Stop reason: HOSPADM

## 2022-03-09 RX ORDER — NALOXONE HCL 0.4 MG/ML
0.4 VIAL (ML) INJECTION
Status: DISCONTINUED | OUTPATIENT
Start: 2022-03-09 | End: 2022-03-26 | Stop reason: HOSPADM

## 2022-03-09 RX ORDER — DEXTROSE MONOHYDRATE 25 G/50ML
25 INJECTION, SOLUTION INTRAVENOUS
Status: CANCELLED | OUTPATIENT
Start: 2022-03-09

## 2022-03-09 RX ORDER — CHOLECALCIFEROL (VITAMIN D3) 125 MCG
10 CAPSULE ORAL NIGHTLY PRN
Status: CANCELLED | OUTPATIENT
Start: 2022-03-09

## 2022-03-09 RX ORDER — CYCLOBENZAPRINE HCL 10 MG
5 TABLET ORAL 3 TIMES DAILY PRN
Status: CANCELLED | OUTPATIENT
Start: 2022-03-09

## 2022-03-09 RX ORDER — ONDANSETRON 2 MG/ML
4 INJECTION INTRAMUSCULAR; INTRAVENOUS EVERY 6 HOURS PRN
Status: CANCELLED | OUTPATIENT
Start: 2022-03-09

## 2022-03-09 RX ORDER — HYDROXYZINE HYDROCHLORIDE 25 MG/1
25 TABLET, FILM COATED ORAL 3 TIMES DAILY PRN
Status: CANCELLED | OUTPATIENT
Start: 2022-03-09

## 2022-03-09 RX ORDER — MAGNESIUM SULFATE HEPTAHYDRATE 40 MG/ML
2 INJECTION, SOLUTION INTRAVENOUS AS NEEDED
Status: CANCELLED | OUTPATIENT
Start: 2022-03-09

## 2022-03-09 RX ORDER — POLYETHYLENE GLYCOL 3350 17 G/17G
17 POWDER, FOR SOLUTION ORAL DAILY
Status: DISCONTINUED | OUTPATIENT
Start: 2022-03-10 | End: 2022-03-12

## 2022-03-09 RX ADMIN — Medication 150 MG: at 21:47

## 2022-03-09 RX ADMIN — INSULIN ASPART 3 UNITS: 100 INJECTION, SOLUTION INTRAVENOUS; SUBCUTANEOUS at 12:00

## 2022-03-09 RX ADMIN — BUPROPION HYDROCHLORIDE 400 MG: 150 TABLET, EXTENDED RELEASE ORAL at 21:46

## 2022-03-09 RX ADMIN — ENOXAPARIN SODIUM 30 MG: 30 INJECTION SUBCUTANEOUS at 17:35

## 2022-03-09 RX ADMIN — ACETAMINOPHEN 650 MG: 325 TABLET ORAL at 12:01

## 2022-03-09 RX ADMIN — Medication 10 ML: at 09:30

## 2022-03-09 RX ADMIN — FLUOXETINE HYDROCHLORIDE 10 MG: 10 CAPSULE ORAL at 09:29

## 2022-03-09 RX ADMIN — POLYETHYLENE GLYCOL 3350 17 G: 17 POWDER, FOR SOLUTION ORAL at 09:29

## 2022-03-09 RX ADMIN — OXYCODONE HYDROCHLORIDE AND ACETAMINOPHEN 1 TABLET: 10; 325 TABLET ORAL at 05:54

## 2022-03-09 RX ADMIN — Medication 150 MG: at 09:29

## 2022-03-09 RX ADMIN — ENOXAPARIN SODIUM 30 MG: 30 INJECTION SUBCUTANEOUS at 05:45

## 2022-03-09 RX ADMIN — PANTOPRAZOLE SODIUM 40 MG: 40 TABLET, DELAYED RELEASE ORAL at 05:45

## 2022-03-09 RX ADMIN — DOCUSATE SODIUM 50 MG AND SENNOSIDES 8.6 MG 2 TABLET: 8.6; 5 TABLET, FILM COATED ORAL at 09:29

## 2022-03-09 RX ADMIN — INSULIN ASPART 3 UNITS: 100 INJECTION, SOLUTION INTRAVENOUS; SUBCUTANEOUS at 21:47

## 2022-03-09 RX ADMIN — METFORMIN HYDROCHLORIDE 1000 MG: 500 TABLET ORAL at 17:35

## 2022-03-09 RX ADMIN — DOCUSATE SODIUM 50 MG AND SENNOSIDES 8.6 MG 2 TABLET: 8.6; 5 TABLET, FILM COATED ORAL at 21:47

## 2022-03-09 RX ADMIN — METFORMIN HYDROCHLORIDE 1000 MG: 500 TABLET ORAL at 09:29

## 2022-03-09 RX ADMIN — OXYCODONE HYDROCHLORIDE AND ACETAMINOPHEN 1 TABLET: 10; 325 TABLET ORAL at 17:37

## 2022-03-09 RX ADMIN — INSULIN DETEMIR 20 UNITS: 100 INJECTION, SOLUTION SUBCUTANEOUS at 21:48

## 2022-03-09 RX ADMIN — Medication 10 MG: at 21:47

## 2022-03-09 NOTE — DISCHARGE SUMMARY
[facility] HOSPITALISTS DISCHARGE SUMMARY    Patient Identification:  Name:  Es Olivier  Age:  62 y.o.  Sex:  female  :  1960  MRN:  [id number]  Visit Number:  [id number]    Date of Admission: 2022  Date of Discharge:  3/9/2022    PCP: Delilah Pizarro MD    DISCHARGE DIAGNOSIS  #L hip fracture   #Postoperative blood loss anemia  #Chronic back pain   #DM II  #Constipation   #Hyponatremia    CONSULTS   Orthopedic surgery     PROCEDURES PERFORMED  Left femur open reduction internal fixation using Synthes intramedullary nail     HOSPITAL COURSE  Patient is a 62 y.o. female presented on to [facility] complaining of fall on left hip.  Please see the admitting history and physical for further details.      Ms. Olivier Is our 63 yo F with hx insulin-dependent type 2 diabetes mellitus, essential hypertension, obstructive sleep apnea, GERD, depression who was admitted on 2022 with a broken left femur.  She underwent repair on 2022. Patient had significant postoperative anemia. Patient has refused blood products due to religous preference. Hemoglobin dropped down to 5.3 but with iron and procrit hemoglobin has improved to 6.3 and has been stable the last few days. Inpatient rehab did not approve and  has not been able to find facility for patient to go for rehab. Patient approved for swing bed admission. We have decreased lab draws to every other day to minimize iatrogenic blood loss with next repeat in the AM. Continue Lovenox cautiously given high DVT risk. Patient denies any new complaints and is stable for swing bed admission today.     VITAL SIGNS:  Temp:  [97.5 °F (36.4 °C)-98.3 °F (36.8 °C)] 98.3 °F (36.8 °C)  Heart Rate:  [84-92] 88  Resp:  [18] 18  BP: (121-135)/(52-64) 122/52  SpO2:  [97 %] 97 %  on   ;   Device (Oxygen Therapy): room air    Body mass index is 50.68 kg/m².  Wt Readings from Last 3 Encounters:   22 (!) 142 kg (314 lb)   21  135 kg (297 lb)   05/09/19 135 kg (298 lb)       PHYSICAL EXAM:  Constitutional:  Well-developed and well-nourished.  No respiratory distress.      HENT:  Head:  Normocephalic and atraumatic.  Mouth:  Moist mucous membranes.    Eyes:  Conjunctivae and EOM are normal.  Pupils are equal, round, and reactive to light.  No scleral icterus.    Cardiovascular:  Normal rate, regular rhythm and normal heart sounds with no murmur.  Pulmonary/Chest:  No respiratory distress, no wheezes, no crackles, with normal breath sounds and good air movement.  Abdominal:  Soft.  Bowel sounds are normal.  No distension and no tenderness.   Musculoskeletal:  No edema, no tenderness, and no deformity.  No red or swollen joints anywhere.    Neurological:  Alert and oriented to person, place, and time.  No gross neurological deficit.   Skin:  Skin is warm and dry. No rash noted. No pallor.   Peripheral vascular:  Strong pulses in all 4 extremities with no clubbing, no cyanosis, no edema.    DISCHARGE DISPOSITION   Stable    DISCHARGE MEDICATIONS:     Discharge Medications      New Medications      Instructions Start Date   enoxaparin 30 MG/0.3ML solution syringe  Commonly known as: Lovenox   30 mg, Subcutaneous, Every 12 Hours Scheduled      oxyCODONE-acetaminophen  MG per tablet  Commonly known as: PERCOCET   Take 1 tablet by mouth Every 6 (Six) Hours As Needed for Moderate Pain for up to 14 days.         ASK your doctor about these medications      Instructions Start Date   buPROPion  MG 12 hr tablet  Commonly known as: WELLBUTRIN SR  Ask about: Which instructions should I use?   400 mg, Oral, Every Night at Bedtime, Prior to [facility] Admission, Patient was on:  Script is wrote 1 BID but pt takes 2 QHS      FLUoxetine 10 MG capsule  Commonly known as: PROzac   10 mg, Oral, Daily      hydroCHLOROthiazide 12.5 MG tablet  Commonly known as: HYDRODIURIL   12.5 mg, Oral, Daily      insulin regular 100 UNIT/ML injection  Commonly known  as: humuLIN R,novoLIN R   4 Units, Subcutaneous, 3 Times Daily Before Meals      losartan-hydrochlorothiazide 50-12.5 MG per tablet  Commonly known as: HYZAAR   1 tablet, Oral, Daily      metFORMIN  MG 24 hr tablet  Commonly known as: GLUCOPHAGE-XR   2,000 mg, Oral, Nightly      pioglitazone 45 MG tablet  Commonly known as: ACTOS   45 mg, Oral, Every Night at Bedtime      vitamin D 1.25 MG (14486 UT) capsule capsule  Commonly known as: ERGOCALCIFEROL   50,000 Units, Oral, Weekly                Follow-up Information     Faizan Field, DO Follow up in 10 day(s).    Specialty: Orthopedic Surgery  Contact information:  [address] [place] [id number] [phone]             Delilah Pizarro MD .    Specialty: Geriatric Medicine  Contact information:  [address]  [address]  [place] [address]  [phone]                          TEST  RESULTS PENDING AT DISCHARGE       CODE STATUS  Code Status and Medical Interventions:   Ordered at: 02/25/22 1833     Level Of Support Discussed With:    Patient     Code Status (Patient has no pulse and is not breathing):    CPR (Attempt to Resuscitate)     Medical Interventions (Patient has pulse or is breathing):    Full Support       Jair Valdes MD  [facility]  03/09/22  11:10 EST    Please note that this discharge summary required more than 30 minutes to complete.

## 2022-03-09 NOTE — PLAN OF CARE
Goal Outcome Evaluation:  Plan of Care Reviewed With: patient        Progress: no change  Outcome Evaluation: pt resting, no changes, will continue to monitor

## 2022-03-09 NOTE — PAYOR COMM NOTE
"Es Muro (62 y.o. Female)     Contact: Irving Soriano Rn P:[phone] F: [phone] [email or web address]    Patient has left femur fracture and needs PT and OT for gait and strength training , to be able to return home safely       YOB: 1960  Social Security Number:   Address: [address] [place] [address] [address] [place] [address]  Home Phone: [phone]  MRN: [id number]  Congregational: [employer]  Marital Status:         Admission Date: 2/25/22  Admission Type: Emergency  Admitting Provider: Mima Randall MD  Attending Provider: Jair Valdes MD  Department, Room/Bed: [place] [address]/  Discharge Date:   Discharge Disposition:   Discharge Destination:               Attending Provider: Jair Valdes MD    Allergies: Mobic [Meloxicam], Lisinopril    Isolation: None   Infection: None   Code Status: CPR   Advance Care Planning Activity    Ht: 167.6 cm (66\")   Wt: 142 kg (314 lb)    Admission Cmt: None   Principal Problem: Closed fracture of proximal end of left femur (HCC) [S72.002A]                 Active Insurance as of 2/25/2022     Primary Coverage     Payor Plan Insurance Group Employer/Plan Group    [facility] MEDICARE REPLACEMENT [employer] MEDICARE REPLACEMENT      Payor Plan Address Payor Plan Phone Number Payor Plan Fax Number Effective Dates    [address] [address] [phone]  10/1/2020 - None Entered    [facility] [id number]       Subscriber Name Subscriber Birth Date Member ID       ES MURO 1960 [id number]                 Emergency Contacts      (Rel.) Home Phone Work Phone Mobile Phone    Brynn Mane (Daughter) [phone] -- --    Mikki Muro (Daughter) [phone] -- [phone]               History & Physical      RegKarey PA-C at 02/25/22 2043     Attestation signed by Marley Hoffmann DO at 02/26/22 3697    I have reviewed this documentation and agree.  Patient also briefly seen and examined at " bedside.  She continues to report some intermittent left hip pain with radiation to the groin.  Patient states she has been in her usual state of health recently and denies any dyspnea on exertion or chest pains.  Patient denies any previous adverse effect with sedation.    On exam, his heart is regular rate and rhythm without obvious murmur.  Lungs are slightly diminished likely due to body habitus without any wheezing, rhonchi and/or rales.  Left lower extremity is mildly externally rotated.    Continue nothing by mouth status while awaiting orthopedic surgery evaluation.  Continue with as needed pain control.  At this point, patient felt acceptable risk to proceed with hip repair.  Patient's postoperative respiratory failure risk calculator is estimated to be between 1 and 4%.                       [facility] Medicine Services  HISTORY & PHYSICAL    Patient Identification:  Name:  Es Olivier  Age:  62 y.o.  Sex:  female  :  1960  MRN:  [id number]   Visit Number:  [id number]  Admit Date: 2022   Primary Care Physician:  Delilah Pizarro MD     Subjective     Chief complaint:   Chief Complaint   Patient presents with   • Hip Injury     pt reports was walking and tripped and fell landing on left hip. pt now reports left hip pain     History of presenting illness:   Patient is a 62 y.o. female with past medical history significant for insulin-dependent type 2 diabetes mellitus, essential hypertension, obstructive sleep apnea, GERD, depression, that presented to the [facility] emergency department for evaluation of fall.     The patient states that she tripped over her grandchild's feet and fell earlier this evening.  She reports landing on her left side and hitting the left temporal region of her head on the baseboard.  She denies any loss of consciousness, bruising, or wounds.  She reports she immediately felt pain in the left hip and was unable to get up without  "assistance.  She admits to frequent falls here recently and states she feels \"unsteady on her feet.\"  She denies any dizziness, lightheadedness, or numbness/tingling in her bilateral lower extremities.  She denies using a cane or walker at home she also denies any recent illness, fever, chills, diaphoresis, coughing, wheezing, shortness of breath, chest pain, abdominal pain, nausea, vomiting, diarrhea, dysuria, or malodorous urine.  She denies any current smoking tobacco use but admits to smoking in the past (quit in 1984).  She admits to having surgery in the past and denies any reactions to anesthesia.    Upon arrival to the ED, vitals were temperature 97.2 °F, pulse 62, respirations 18, /59, SPO2 97% on room air.  CMP with glucose 192, BUN/creatinine ratio 27.4, otherwise unremarkable.  CBC with hemoglobin 11.4.  UA with 1+ blood, 6-12 RBC, otherwise unremarkable.  COVID-19 negative.  Chest x-ray with no acute cardiopulmonary findings.  X-ray of left femur shows comminuted fracture of the proximal left femur.  X-ray of hip with and without pelvis shows same findings.  EKG with normal sinus rhythm, poor R wave progression, possible left atrial enlargement, heart rate 71, QTc 467 MS.    In the emergency department the patient received IV morphine 8 mg, IV Zofran 8 mg.    Patient has been admitted to the medical/surgical floor for further evaluation and treatment  ---------------------------------------------------------------------------------------------------------------------   Review of Systems   Constitutional: Negative for chills, diaphoresis and fever.   HENT: Negative for congestion and sore throat.    Respiratory: Negative for cough, shortness of breath and wheezing.    Cardiovascular: Negative for chest pain, palpitations and leg swelling.   Gastrointestinal: Negative for abdominal pain, constipation, diarrhea, nausea and vomiting.   Genitourinary: Negative for dysuria and frequency. " "  Musculoskeletal: Positive for arthralgias (Left hip) and gait problem (Frequent falls).   Skin: Negative for wound.   Neurological: Positive for weakness (Occasionally feels weak in bilateral lower extremities). Negative for dizziness, syncope, light-headedness and numbness.   Psychiatric/Behavioral: Negative for confusion.      ---------------------------------------------------------------------------------------------------------------------   Past Medical History:   Diagnosis Date   • Biliary dyskinesia     abnormal HIDA 11/2018 w/ EF 31%, symptomatic w/ N/V   • Depression    • DM2 (diabetes mellitus, type 2) (Formerly [facility])     dx 1994, on insulin >5 years, A1c 7, associated neuropathy, no other complications   • Dyspepsia    • Dyspnea on exertion    • Fatigue    • Fatty liver     dx on CT 2016   • GERD (gastroesophageal reflux disease)     controlled w/ daily Protonix   • Heart disease     w/ cardiac clearance 4/2019, negative stress test 10/2017, EF 65%   • Hiatal hernia     \"small\" noted on UGI 2014   • History of Helicobacter pylori infection     treated remotely   • Hyperlipidemia    • Hypertension    • Joint pain    • Morbid obesity with BMI of 45.0-49.9, adult (Formerly [facility])    • Sleep apnea     formerly on a device, no longer using, says just doesn't need it   • Urinary incontinence     no meds   • Vitamin D deficiency      Past Surgical History:   Procedure Laterality Date   • BLADDER SURGERY  2013    \"tack\", uncomplicated, but unsuccessful   • CATARACT EXTRACTION     • COLONOSCOPY  2015    unremarkable   • DILATATION AND CURETTAGE  1987   • TONSILLECTOMY  1984     Family History   Problem Relation Age of Onset   • Breast cancer Sister         20s   • Cancer Sister    • Breast cancer Sister         60s   • Bone cancer Mother         60   • Osteoarthritis Mother    • Diabetes Father    • Heart attack Father    • Heart disease Father    • Heart disease Brother      Social History     Socioeconomic History   • Marital " status:    Tobacco Use   • Smoking status: Former Smoker     Years: 10.00     Quit date:      Years since quittin.1   • Smokeless tobacco: Never Used   Vaping Use   • Vaping Use: Never used   Substance and Sexual Activity   • Alcohol use: Yes     Comment: SOCIALLY   • Drug use: No   • Sexual activity: Defer     ---------------------------------------------------------------------------------------------------------------------   Allergies:  Mobic [meloxicam] and Lisinopril  ---------------------------------------------------------------------------------------------------------------------   Medications below are reported home medications pulling from within the system; at this time, these medications have not been reconciled unless otherwise specified and are in the verification process for further verifcation as current home medications.    Prior to Admission Medications     Prescriptions Last Dose Informant Patient Reported? Taking?    buPROPion SR (WELLBUTRIN SR) 200 MG 12 hr tablet 2022 Medication Bottle Yes Yes    Take 400 mg by mouth every night at bedtime. Prior to [facility] Admission, Patient was on:   Script is wrote 1 BID but pt takes 2 QHS    FLUoxetine (PROzac) 10 MG capsule 2022 Medication Bottle Yes Yes    Take 10 mg by mouth Daily.    hydrochlorothiazide (HYDRODIURIL) 12.5 MG tablet 2022 Medication Bottle Yes Yes    Take 12.5 mg by mouth Daily.    insulin regular (humuLIN R,novoLIN R) 100 UNIT/ML injection 2022 Medication Bottle Yes Yes    Inject 4 Units under the skin into the appropriate area as directed 3 (Three) Times a Day Before Meals.    losartan-hydrochlorothiazide (HYZAAR) 50-12.5 MG per tablet 2022 Medication Bottle Yes Yes    Take 1 tablet by mouth Daily.    metFORMIN XR (GLUCOPHAGE-XR) 500 MG 24 hr tablet 2022 Pharmacy Yes Yes    Take 2,000 mg by mouth Every Night.    pioglitazone (ACTOS) 45 MG tablet 2022 Medication Bottle Yes Yes    Take  45 mg by mouth every night at bedtime.    vitamin D (ERGOCALCIFEROL) 1.25 MG (67240 UT) capsule capsule 2/20/2022 Medication Bottle Yes Yes    Take 50,000 Units by mouth 1 (One) Time Per Week.        ---------------------------------------------------------------------------------------------------------------------    Objective     Hospital Scheduled Meds:          Current listed hospital scheduled medications may not yet reflect those currently placed in orders that are signed and held, awaiting patient's arrival to floor/unit.    ---------------------------------------------------------------------------------------------------------------------   Vital Signs:  Temp:  [97.2 °F (36.2 °C)] 97.2 °F (36.2 °C)  Heart Rate:  [61-83] 79  Resp:  [18] 18  BP: (105-151)/(58-97) 105/89  Mean Arterial Pressure (Non-Invasive) for the past 24 hrs (Last 3 readings):   Noninvasive MAP (mmHg)   02/25/22 1833 95   02/25/22 1803 75   02/25/22 1703 97     SpO2 Percentage    02/25/22 1803 02/25/22 1833 02/25/22 1853   SpO2: 99% 98% 94%     SpO2:  [93 %-100 %] 94 %  on   ;   Device (Oxygen Therapy): room air    Body mass index is 50.84 kg/m².  Wt Readings from Last 3 Encounters:   02/25/22 (!) 143 kg (315 lb)   08/11/21 135 kg (297 lb)   05/09/19 135 kg (298 lb)     ---------------------------------------------------------------------------------------------------------------------   Physical Exam:  Physical Exam  Constitutional:       General: She is awake.      Appearance: Normal appearance. She is well-developed. She is morbidly obese.   HENT:      Head: Normocephalic and atraumatic.   Eyes:      General: Lids are normal.   Cardiovascular:      Rate and Rhythm: Normal rate and regular rhythm.      Pulses: Normal pulses.           Dorsalis pedis pulses are 2+ on the right side and 2+ on the left side.        Posterior tibial pulses are 2+ on the right side and 2+ on the left side.      Heart sounds: Normal heart sounds. No murmur  heard.  No friction rub. No gallop.    Pulmonary:      Effort: Pulmonary effort is normal. No tachypnea.      Breath sounds: Normal breath sounds. No decreased breath sounds or wheezing.   Abdominal:      Palpations: Abdomen is soft.      Tenderness: There is no abdominal tenderness.   Musculoskeletal:      Right hip: No tenderness. Normal range of motion.      Left hip: No tenderness. Decreased range of motion.      Right lower leg: No edema.      Left lower leg: No edema.   Feet:      Right foot:      Skin integrity: Skin integrity normal.      Left foot:      Skin integrity: Skin integrity normal.   Skin:     Findings: No ecchymosis or erythema.   Neurological:      General: No focal deficit present.      Mental Status: She is alert and oriented to person, place, and time. Mental status is at baseline.      Sensory: Sensation is intact. No sensory deficit.   Psychiatric:         Speech: Speech normal.         Behavior: Behavior is cooperative.         Cognition and Memory: Cognition normal.       ---------------------------------------------------------------------------------------------------------------------  EKG:    Pending cardiology read.  Per my evaluation, normal sinus rhythm with poor R wave progression and possible left atrial enlargement, heart rate 71, QTc 467 MS.    Telemetry:    Normal sinus rhythm, heart rate 89, SPO2 91% on room air    I have personally reviewed the EKG/Telemetry strip  ---------------------------------------------------------------------------------------------------------------------             Results from last 7 days   Lab Units 02/25/22  1016   WBC 10*3/mm3 8.89   HEMOGLOBIN g/dL 11.4*   HEMATOCRIT % 35.8   MCV fL 95.0   MCHC g/dL 31.8   PLATELETS 10*3/mm3 330     Results from last 7 days   Lab Units 02/25/22  1016   SODIUM mmol/L 139   POTASSIUM mmol/L 3.9   CHLORIDE mmol/L 104   CO2 mmol/L 24.8   BUN mg/dL 20   CREATININE mg/dL 0.73   EGFR IF NONAFRICN AM mL/min/1.73 81    CALCIUM mg/dL 8.9   GLUCOSE mg/dL 192*   ALBUMIN g/dL 3.86   BILIRUBIN mg/dL 0.4   ALK PHOS U/L 62   AST (SGOT) U/L 14   ALT (SGPT) U/L 12   Estimated Creatinine Clearance: 118.6 mL/min (by C-G formula based on SCr of 0.73 mg/dL).  No results found for: AMMONIA    No results found for: HGBA1C, POCGLU  Lab Results   Component Value Date    HGBA1C 6.9 (H) 05/09/2019     Lab Results   Component Value Date    TSH 3.370 05/09/2019         Pain Management Panel     Pain Management Panel Latest Ref Rng & Units 4/6/2016 4/7/2014    AMPHETAMINES SCREEN, URINE Negative Negative Negative    BARBITURATES SCREEN Negative Negative Negative    BENZODIAZEPINE SCREEN, URINE Negative Negative Negative    COCAINE SCREEN, URINE Negative Negative Negative    METHADONE SCREEN, URINE Negative Negative Negative        I have personally reviewed the above laboratory results.   ---------------------------------------------------------------------------------------------------------------------  Imaging Results (Last 7 Days)     Procedure Component Value Units Date/Time    XR Hip With or Without Pelvis 2 - 3 View Left [[id number]] Collected: 02/25/22 1056     Updated: 02/25/22 1159    Narrative:      EXAMINATION: XR HIP W OR WO PELVIS 2-3 VIEW LEFT-      CLINICAL INDICATION: fall with hip pain        COMPARISON: None available     FINDINGS:  One view of the pelvis and 2 views of the left hip show comminuted  fracture of the proximal left femur     No other fracture       Impression:      Comminuted fracture of the proximal left femur      This report was finalized on 2/25/2022 10:56 AM by Dr. Denis Wright MD.       XR Femur 2 View Left [[id number]] Collected: 02/25/22 1056     Updated: 02/25/22 8178    Narrative:      EXAMINATION: XR FEMUR 2 VW LEFT-      CLINICAL INDICATION: fall with hip pain        COMPARISON: None available     FINDINGS: 4 views of the left femur show comminuted fracture of the  proximal left femur extending from the  intertrochanteric region.       Impression:      Comminuted fracture of the proximal left femur      This report was finalized on 2/25/2022 10:56 AM by Dr. Denis Wright MD.       XR Chest 1 View [[id number]] Collected: 02/25/22 1055     Updated: 02/25/22 1158    Narrative:      XR CHEST 1 VW-     CLINICAL INDICATION: fall with hip pain        COMPARISON: 12/05/2017      TECHNIQUE: Single frontal view of the chest.     FINDINGS:     There is no focal alveolar infiltrate or effusion.  The cardiac silhouette is normal. The pulmonary vasculature is  unremarkable.  There is no evidence of an acute osseous abnormality.   There are no suspicious-appearing parenchymal soft tissue nodules.          Impression:      No evidence of active or acute cardiopulmonary disease on today's chest  radiograph.     This report was finalized on 2/25/2022 10:56 AM by Dr. Denis Wright MD.           I have personally reviewed the above radiology results.     ---------------------------------------------------------------------------------------------------------------------    Assessment & Plan      Active Hospital Problems    Diagnosis  POA   • **Closed fracture of proximal end of left femur (HCC) [S72.002A]  Yes     #Acute comminuted fx of the left proximal femur 2/2 to mechanical   -Imaging reviewed; x-ray of left femur shows a comminuted fracture of the proximal femur  -Orthopedic surgery has been consulted, input/assistance is much appreciated  -IV morphine 2mg q 2h PRN for pain; PO Percocet 10 mg q4h; Tylenol PRN  -Neurovascular checks every 4 hours  -Subcutaneous heparin for VTE prophylaxis  -Obtain vitamin D level   -Meyer catheter in place   -PT/OT consulted for assistance with rehabilitation in the postoperative state    #Mild acute normocytic anemia   -Baseline hemoglobin appears to be around 12-13; hemoglobin on admission 11.4  -Obtain vitamin B12, folate, ferritin, iron, iron panel; replace as necessary  -If hemoglobin less  than 7 or platelets less than 50,000 with active bleeding, or less than 20,000, will transfuse    #Essential hypertension   -BP has been within normal limits  -Review home medications once available; plan to continue antihypertensive agents with appropriate holding parameters    #GERD  -PPI    #FRANCINE, non compliant with CPAP  -Supplemental oxygen as needed, titrate to maintain SPO2 greater than or equal to 90%    #Insulin dependent type II diabetes mellitus   -Obtain hemoglobin A1c  -Hold home Metformin  -Sliding scale insulin ordered; Accu-Cheks before every meal and nightly; titrate insulin therapy as necessary    #Depression   -Review home medications once available    #Morbid obesity   -BMI 50.84 kg/m²  -Complicates all aspects of patient care      F/E/N: No IV fluids. Replace electrolytes as necessary. Consistent carb diet, NPO after midnight   ---------------------------------------------------  DVT Prophylaxis: Heparin   GI Prophylaxis: Protonix   Activity: Strict bed rest     The patient is considered to be a high risk patient due to: acute left femur fx 2/2 to mechanical fall     INPATIENT status due to the need for care which can only be reasonably provided in an hospital setting such as aggressive/expedited ancillary services and/or consultation services, the necessity for IV medications, close physician monitoring and/or the possible need for procedures.  In such, I feel patient’s risk for adverse outcomes and need for care warrant INPATIENT evaluation and predict the patient’s care encounter to likely last beyond 2 midnights.    Code Status: FULL CODE     Disposition/Discharge planning: Plan for home at discharge. Pending clinical course     I have discussed the patient's assessment and plan with the patient, nursing staff, and attending physician       Karey Finney PA-C  Hospitalist Service -- [facility]       02/25/22  20:43 EST    Attending Physician: Marley Hoffmann DO        Electronically signed by Marley Hoffmann DO at 02/26/22 0713         Current Facility-Administered Medications   Medication Dose Route Frequency Provider Last Rate Last Admin   • acetaminophen (TYLENOL) tablet 650 mg  650 mg Oral Q4H PRN Jarrod Leung MD   650 mg at 03/07/22 2106    Or   • acetaminophen (TYLENOL) suppository 650 mg  650 mg Rectal Q4H PRN Jarrod Leung MD       • buPROPion SR (WELLBUTRIN SR) 12 hr tablet 400 mg  400 mg Oral Nightly Jarrod Leung MD   400 mg at 03/08/22 2113   • cholecalciferol (VITAMIN D3) capsule 50,000 Units  50,000 Units Oral Weekly Jarrod Leung MD   50,000 Units at 03/06/22 0819   • cyclobenzaprine (FLEXERIL) tablet 5 mg  5 mg Oral TID PRN Jair Valdes MD       • dextrose (D50W) (25 g/50 mL) IV injection 25 g  25 g Intravenous Q15 Min PRN Jarrod Leung MD       • dextrose (GLUTOSE) oral gel 15 g  15 g Oral Q15 Min PRN Jarrod Leung MD       • enoxaparin (LOVENOX) syringe 30 mg  30 mg Subcutaneous Q12H Abner Gomez MD   30 mg at 03/09/22 0545   • Epoetin Fadi-epbx (RETACRIT) injection 40,000 Units  40,000 Units Subcutaneous Q48H Abner Gomez MD   40,000 Units at 03/08/22 1311   • FLUoxetine (PROzac) capsule 10 mg  10 mg Oral Daily Jarrod Leung MD   10 mg at 03/09/22 0929   • glucagon (human recombinant) (GLUCAGEN DIAGNOSTIC) injection 1 mg  1 mg Intramuscular Q15 Min PRN Jarrod Leung MD       • hydrOXYzine (ATARAX) tablet 25 mg  25 mg Oral TID PRN Jarrod Leung MD   25 mg at 03/08/22 1441   • insulin aspart (novoLOG) injection 0-14 Units  0-14 Units Subcutaneous 4x Daily AC & at Bedtime Yvette Cruz PA   3 Units at 03/08/22 2114   • insulin detemir (LEVEMIR) injection 20 Units  20 Units Subcutaneous Nightly Abner Gomez MD   20 Units at 03/08/22 2115   • iron polysaccharides (NIFEREX) capsule 150 mg  150 mg Oral BID Abner Gomez MD   150 mg at 03/09/22 0929   • lactulose (CHRONULAC) 10 GM/15ML  solution 20 g  20 g Oral Once Abner Gomez MD       • Magnesium Sulfate 2 gram infusion - Mg less than or equal to 1.5 mg/dL  2 g Intravenous PRN Yvette Cruz PA        Or   • Magnesium Sulfate 1 gram infusion - Mg 1.6-1.9 mg/dL  1 g Intravenous PRN Yvette Cruz PA       • melatonin tablet 10 mg  10 mg Oral Nightly PRN Jarrod Leung MD   10 mg at 03/08/22 2114   • metFORMIN (GLUCOPHAGE) tablet 1,000 mg  1,000 mg Oral BID With Meals Abner Gomez MD   1,000 mg at 03/09/22 0929   • naloxone (NARCAN) injection 0.4 mg  0.4 mg Intravenous Q5 Min PRN Jarrod Leung MD       • nitroglycerin (NITROSTAT) SL tablet 0.4 mg  0.4 mg Sublingual Q5 Min PRN Jarrod Leung MD       • ondansetron (ZOFRAN) injection 4 mg  4 mg Intravenous Q6H PRN Jarrod Leung MD       • oxyCODONE-acetaminophen (PERCOCET)  MG per tablet 1 tablet  1 tablet Oral Q6H PRN Jair Valdes MD   1 tablet at 03/09/22 0554   • pantoprazole (PROTONIX) EC tablet 40 mg  40 mg Oral Q AM Jarrod Leung MD   40 mg at 03/09/22 0545   • polyethylene glycol (MIRALAX) packet 17 g  17 g Oral Daily Yvette Cruz PA   17 g at 03/09/22 0929   • potassium & sodium phosphates (PHOS-NAK) 280-160-250 MG packet - for Phosphorus less than 1.25 mg/dL  2 packet Oral Q6H PRN Yvette Cruz PA        Or   • potassium & sodium phosphates (PHOS-NAK) 280-160-250 MG packet - for Phosphorus 1.25 - 2.5 mg/dL  2 packet Oral Q6H PRN Yvette Cruz PA       • prochlorperazine (COMPAZINE) injection 5 mg  5 mg Intravenous Q6H PRN Jarrod Leung MD       • sennosides-docusate (PERICOLACE) 8.6-50 MG per tablet 2 tablet  2 tablet Oral BID Jair Valdes MD   2 tablet at 03/09/22 0929   • sodium chloride 0.9 % flush 10 mL  10 mL Intravenous PRN Jarrod Leung MD   10 mL at 03/09/22 0930        Physician Progress Notes (most recent note)      Jair Valdes MD at 03/08/22 1356              [facility]     Patient Identification:  Name:  Es Olivier  Age:  62 y.o.  Sex:  female  :  1960  MRN:  [id number]  Visit Number:  [id number]  ROOM: [address] [place] [address]     Primary Care Provider:  Delilah Pizarro MD    Length of stay in inpatient status:  11    Subjective     Chief Compliant:    Chief Complaint   Patient presents with   • Hip Injury     pt reports was walking and tripped and fell landing on left hip. pt now reports left hip pain       History of Presenting Illness:    Patient denies any new complaints. She notes bed is not comfortable but otherwise does not have any complaints. Reports she is working with PT. She is agreeable to swing bed consult.     ROS:  Otherwise 10 point ROS negative other than documented above in HPI.     Objective     Current Hospital Meds:buPROPion SR, 400 mg, Oral, Nightly  cholecalciferol, 50,000 Units, Oral, Weekly  enoxaparin, 30 mg, Subcutaneous, Q12H  epoetin zelda/zelda-epbx, 40,000 Units, Subcutaneous, Q48H  FLUoxetine, 10 mg, Oral, Daily  insulin aspart, 0-14 Units, Subcutaneous, 4x Daily AC & at Bedtime  insulin detemir, 20 Units, Subcutaneous, Nightly  iron polysaccharides, 150 mg, Oral, BID  lactulose, 20 g, Oral, Once  metFORMIN, 1,000 mg, Oral, BID With Meals  pantoprazole, 40 mg, Oral, Q AM  polyethylene glycol, 17 g, Oral, Daily  senna-docusate sodium, 2 tablet, Oral, BID         Current Antimicrobial Therapy:  Anti-Infectives (From admission, onward)    Ordered     Dose/Rate Route Frequency Start Stop    22 1557  ceFAZolin in Sodium Chloride (ANCEF) IVPB solution 3 g        Ordering Provider: Jarrod Leung MD    3 g  200 mL/hr over 30 Minutes Intravenous Every 8 Hours 22 2100 22 1336        Current Diuretic Therapy:  Diuretics (From admission, onward)    None        ----------------------------------------------------------------------------------------------------------------------  Vital Signs:  Temp:  [98 °F (36.7 °C)-98.4 °F (36.9 °C)]  98 °F (36.7 °C)  Heart Rate:  [86-90] 87  Resp:  [18-20] 18  BP: (103-131)/(51-84) 131/84  SpO2:  [96 %-97 %] 96 %  on   ;   Device (Oxygen Therapy): room air  Body mass index is 50.68 kg/m².    Wt Readings from Last 3 Encounters:   02/25/22 (!) 142 kg (314 lb)   08/11/21 135 kg (297 lb)   05/09/19 135 kg (298 lb)     Intake & Output (last 3 days)       03/05 0701 03/06 0700 03/06 0701 03/07 0700 03/07 0701 03/08 0700 03/08 0701 03/09 0700    P.O. 510 840 360 360    Total Intake(mL/kg) 510 (3.6) 840 (5.9) 360 (2.5) 360 (2.5)    Urine (mL/kg/hr) 1625 (0.5) 1700 (0.5) 2550 (0.7) 450 (0.5)    Total Output 1625 1700 2550 450    Net -1115 -860 -2190 -90                Diet Regular; Consistent Carbohydrate  ----------------------------------------------------------------------------------------------------------------------  Physical exam:  Constitutional:  Well-developed and well-nourished.  No respiratory distress.      HENT:  Head:  Normocephalic and atraumatic.  Mouth:  Moist mucous membranes.    Eyes:  Conjunctivae and EOM are normal. No scleral icterus.    Neck:  Neck supple.  No JVD present.    Cardiovascular:  Normal rate, regular rhythm and normal heart sounds with no murmur.  Pulmonary/Chest:  No respiratory distress, no wheezes, no crackles, with normal breath sounds and good air movement.  Abdominal:  Soft.  Bowel sounds are normal.  No distension and no tenderness.   Musculoskeletal:  No edema, no tenderness, and no deformity.  No red or swollen joints anywhere.    Neurological:  Alert and oriented to person, place, and time.  No cranial nerve deficit.  No tongue deviation.  No facial droop.  No slurred speech.   Skin:  Skin is warm and dry. No rash noted. No pallor.   Peripheral vascular:  Pulses in all 4 extremities with no clubbing, no cyanosis, no edema.  ----------------------------------------------------------------------------------------------------------------------  Tele:     ----------------------------------------------------------------------------------------------------------------------  Results from last 7 days   Lab Units 03/08/22  0118 03/07/22  0141 03/06/22  0526   WBC 10*3/mm3 9.47 8.64 8.84   HEMOGLOBIN g/dL 6.3* 5.9* 6.0*   HEMATOCRIT % 20.9* 19.8* 19.3*   MCV fL 98.1* 97.5* 95.1   MCHC g/dL 30.1* 29.8* 31.1*   PLATELETS 10*3/mm3 463* 498* 478*         Results from last 7 days   Lab Units 03/08/22  0118 03/07/22  0141 03/06/22  0526 03/03/22  1530 03/03/22  0136 03/02/22  1109 03/02/22  0252   SODIUM mmol/L 134* 133* 133*   < > 129*  --  128*   POTASSIUM mmol/L 3.9 4.0 4.1   < > 4.9  --  4.4   MAGNESIUM mg/dL  --   --   --   --  2.2  --  2.2   CHLORIDE mmol/L 101 100 98   < > 96*  --  97*   CO2 mmol/L 23.0 23.0 23.5   < > 23.0  --  22.9   BUN mg/dL 13 11 11   < > 18  --  18   CREATININE mg/dL 0.69 0.67 0.62   < > 0.66  --  0.82   CALCIUM mg/dL 8.2* 8.3* 8.2*   < > 7.8*  --  7.6*   PHOSPHORUS mg/dL  --   --   --   --  2.6 2.6 2.3*   GLUCOSE mg/dL 169* 124* 129*   < > 190*  --  182*   ALBUMIN g/dL  --   --  2.28*  --  2.17*  --  2.12*   BILIRUBIN mg/dL  --   --  0.5  --  0.4  --  0.4   ALK PHOS U/L  --   --  58  --  59  --  46   AST (SGOT) U/L  --   --  14  --  18  --  18   ALT (SGPT) U/L  --   --  7  --  9  --  7    < > = values in this interval not displayed.   Estimated Creatinine Clearance: 124.9 mL/min (by C-G formula based on SCr of 0.69 mg/dL).  No results found for: AMMONIA              Glucose   Date/Time Value Ref Range Status   03/08/2022 1020 173 (H) 70 - 130 mg/dL Final     Comment:     Meter: [id number] : [id number] Nadeem Medina   03/08/2022 0630 142 (H) 70 - 130 mg/dL Final     Comment:     Meter: [id number] : [id number] Scott Gutierrez   03/07/2022 2101 178 (H) 70 - 130 mg/dL Final     Comment:     Meter: [id number] : [id number] rachele tyler   03/07/2022 1632 176 (H) 70 - 130 mg/dL Final     Comment:     Meter: [id number] : [id number]  Nadeem Medina   03/07/2022 1029 140 (H) 70 - 130 mg/dL Final     Comment:     Meter: [id number] : [id number] Nadeem Medina   03/07/2022 0657 122 70 - 130 mg/dL Final     Comment:     Meter: [id number] : [id number] lydia gaston   03/06/2022 2125 160 (H) 70 - 130 mg/dL Final     Comment:     Meter: [id number] : [id number] rachele tyler   03/06/2022 1629 154 (H) 70 - 130 mg/dL Final     Comment:     Meter: [id number] : [id number] nedra crystal     Lab Results   Component Value Date    TSH 8.590 (H) 02/25/2022    FREET4 1.13 02/25/2022     No results found for: PREGTESTUR, PREGSERUM, HCG, HCGQUANT  Pain Management Panel     Pain Management Panel Latest Ref Rng & Units 4/6/2016 4/7/2014    AMPHETAMINES SCREEN, URINE Negative Negative Negative    BARBITURATES SCREEN Negative Negative Negative    BENZODIAZEPINE SCREEN, URINE Negative Negative Negative    COCAINE SCREEN, URINE Negative Negative Negative    METHADONE SCREEN, URINE Negative Negative Negative        Brief Urine Lab Results  (Last result in the past 365 days)      Color   Clarity   Blood   Leuk Est   Nitrite   Protein   CREAT   Urine HCG        02/26/22 0218 Yellow   Clear   Small (1+)   Negative   Negative   Negative               No results found for: BLOODCX  No results found for: URINECX  No results found for: WOUNDCX  No results found for: STOOLCX  No results found for: RESPCX  No results found for: AFBCX        I have personally looked at the labs and they are summarized above.  ----------------------------------------------------------------------------------------------------------------------  Detailed radiology reports for the last 24 hours:    Imaging Results (Last 24 Hours)     ** No results found for the last 24 hours. **        Assessment & Plan    #L hip fracture   - s/p hip repair on 2/26  - Will make opiate regimen PRN  - PT/OT following. Awaiting placement.     #Postoperative blood loss anemia  - Hemoglobin stable at 6.3  with no active signs of bleeding   - Procrit and iron started previously   - Continues to refuse transfusion on [facility]   - Will decrease lab draws to every other day.     #Chronic back pain   - Will add PRN flexaril     #DM II  - SSI    #Constipation   - Escalate BR as needed     #Hyponatremia  -Na stable at 134, encourage PO intake.     F: PO  E: Replace as needed   N: CC    Code status: Full     Dispo: Pending clinical improvement. Awaiting SNF placement.       VTE Prophylaxis:   Mechanical Order History:      Ordered        03/01/22 1105  Place Sequential Compression Device  Once            03/01/22 1105  Maintain Sequential Compression Device  Continuous            02/26/22 1455  Place Sequential Compression Device  Once            02/26/22 1455  Maintain Sequential Compression Device  Continuous                    Pharmalogical Order History:      Ordered     Dose Route Frequency Stop    03/03/22 1453  enoxaparin (LOVENOX) syringe 30 mg         30 mg SC Every 12 Hours --    02/26/22 1557  enoxaparin (LOVENOX) syringe 30 mg  Status:  Discontinued         30 mg SC Every 12 Hours Scheduled 03/01/22 0758    02/26/22 1335  sodium chloride 1,030 mL with heparin (porcine) 5000 UNIT/ML 30,000 Units mixture  Status:  Discontinued         -- -- As Needed 02/26/22 1453    02/26/22 1501  enoxaparin (Lovenox) 30 MG/0.3ML solution syringe         30 mg SC Every 12 Hours Scheduled 03/19/22 2359    02/25/22 2143  heparin (porcine) 5000 UNIT/ML injection 5,000 Units  Status:  Discontinued         5,000 Units SC Every 12 Hours Scheduled 02/26/22 1557                  Jair Valdes MD  [facility]  03/08/22  13:56 EST    Electronically signed by Jair Valdes MD at 03/08/22 1413          Consult Notes (most recent note)      Abdiaziz Eldridge APRN at 02/26/22 0835     Attestation signed by Jarrod Leung MD at 02/26/22 1207    I have reviewed this documentation and agree.              Summary:Left hip subtrochanteric fracture.                 Consults    Patient Identification:  Name:  Es Olivier  Age:  62 y.o.  Sex:  female  :  1960  MRN:  [id number]  Visit Number:  [id number]  Primary care provider:  Delilah Pizarro MD    History of presenting illness:  This is a 62 year old female who presented to the emergency room yesterday after sustaining a fall at home, landing on her left side.  Patient notes she tripped over her grandchilds feet, lost her balance, and fell.  She does note a recent history of frequent falls.  She denies episodes of dizziness and loss of consciousness.  She has been very active and does not use an assist device with mobility.  Today, the patient notes pain to the hip with movement.  She notes the pain is fairly controlled with her pain medication when resting.  Patient also notes that she is a Mandaen and does not want any blood products, if her injury requires surgical intervention.  ---------------------------------------------------------------------------------------------------------------------    Review of Systems   Constitutional: Negative.    HENT: Negative.    Eyes: Negative.    Respiratory: Negative.    Cardiovascular: Negative.    Gastrointestinal: Negative.    Endocrine: Negative.    Genitourinary: Negative.    Musculoskeletal: Positive for arthralgias and gait problem.        Left hip, since fall.  Frequent falls at home.   Skin: Negative.    Allergic/Immunologic: Negative.    Hematological: Negative.    Psychiatric/Behavioral: Negative.       ---------------------------------------------------------------------------------------------------------------------   Past History:  Family History   Problem Relation Age of Onset   • Breast cancer Sister         20s   • Cancer Sister    • Breast cancer Sister         60s   • Bone cancer Mother         60   • Osteoarthritis Mother    • Diabetes Father    • Heart attack Father    • Heart  "disease Father    • Heart disease Brother      Past Medical History:   Diagnosis Date   • Biliary dyskinesia     abnormal HIDA 2018 w/ EF 31%, symptomatic w/ N/V   • Depression    • DM2 (diabetes mellitus, type 2) ([facility])     dx , on insulin >5 years, A1c 7, associated neuropathy, no other complications   • Dyspepsia    • Dyspnea on exertion    • Fatigue    • Fatty liver     dx on CT    • GERD (gastroesophageal reflux disease)     controlled w/ daily Protonix   • Heart disease     w/ cardiac clearance 2019, negative stress test 10/2017, EF 65%   • Hiatal hernia     \"small\" noted on UGI    • History of Helicobacter pylori infection     treated remotely   • Hyperlipidemia    • Hypertension    • Joint pain    • Morbid obesity with BMI of 45.0-49.9, adult ([facility])    • Sleep apnea     formerly on a device, no longer using, says just doesn't need it   • Urinary incontinence     no meds   • Vitamin D deficiency      Past Surgical History:   Procedure Laterality Date   • BLADDER SURGERY      \"tack\", uncomplicated, but unsuccessful   • CATARACT EXTRACTION     • COLONOSCOPY      unremarkable   • DILATATION AND CURETTAGE     • TONSILLECTOMY       Social History     Socioeconomic History   • Marital status:    Tobacco Use   • Smoking status: Former Smoker     Years: 10.00     Quit date:      Years since quittin.1   • Smokeless tobacco: Never Used   Vaping Use   • Vaping Use: Never used   Substance and Sexual Activity   • Alcohol use: Not Currently   • Drug use: No   • Sexual activity: Defer     ---------------------------------------------------------------------------------------------------------------------   Allergies:  Mobic [meloxicam] and Lisinopril  ---------------------------------------------------------------------------------------------------------------------   Prior to Admission Medications     Prescriptions Last Dose Informant Patient Reported? Taking?    buPROPion SR " (WELLBUTRIN SR) 200 MG 12 hr tablet 2/24/2022 Medication Bottle Yes Yes    Take 400 mg by mouth every night at bedtime. Prior to [facility] [place] by [facility] Admission, Patient was on:   Script is wrote 1 BID but pt takes 2 QHS    FLUoxetine (PROzac) 10 MG capsule 2/24/2022 Medication Bottle Yes Yes    Take 10 mg by mouth Daily.    hydrochlorothiazide (HYDRODIURIL) 12.5 MG tablet 2/24/2022 Medication Bottle Yes Yes    Take 12.5 mg by mouth Daily.    insulin regular (humuLIN R,novoLIN R) 100 UNIT/ML injection 2/25/2022 Medication Bottle Yes Yes    Inject 4 Units under the skin into the appropriate area as directed 3 (Three) Times a Day Before Meals.    losartan-hydrochlorothiazide (HYZAAR) 50-12.5 MG per tablet 2/24/2022 Medication Bottle Yes Yes    Take 1 tablet by mouth Daily.    metFORMIN XR (GLUCOPHAGE-XR) 500 MG 24 hr tablet 2/24/2022 Pharmacy Yes Yes    Take 2,000 mg by mouth Every Night.    pioglitazone (ACTOS) 45 MG tablet 2/24/2022 Medication Bottle Yes Yes    Take 45 mg by mouth every night at bedtime.    vitamin D (ERGOCALCIFEROL) 1.25 MG (42494 UT) capsule capsule 2/20/2022 Medication Bottle Yes Yes    Take 50,000 Units by mouth 1 (One) Time Per Week.        Hospital Meds:  buPROPion SR, 400 mg, Oral, Nightly  [START ON 2/27/2022] cholecalciferol, 50,000 Units, Oral, Weekly  FLUoxetine, 10 mg, Oral, Daily  heparin (porcine), 5,000 Units, Subcutaneous, Q12H  insulin aspart, 0-7 Units, Subcutaneous, TID AC  pantoprazole, 40 mg, Oral, Q AM         ---------------------------------------------------------------------------------------------------------------------   Vital Signs:  Temp:  [97.2 °F (36.2 °C)-98.2 °F (36.8 °C)] 98.2 °F (36.8 °C)  Heart Rate:  [61-94] 90  Resp:  [14-18] 18  BP: (105-151)/(57-97) 123/59      02/25/22  0951 02/25/22  2130   Weight: (!) 143 kg (315 lb) (!) 142 kg (314 lb)     Body mass index is 50.68  kg/m².  ---------------------------------------------------------------------------------------------------------------------     Physical exam:  Constitutional:  Well-developed and well-nourished.  No respiratory distress.  Morbidly obese.    HENT:  Head: Normocephalic and atraumatic.  Mouth:  Moist mucous membranes.    Eyes:  Conjunctivae and EOM are normal.  No scleral icterus.  Neck:  Neck supple.    Cardiovascular:  Normal rate, regular rhythm.  Pulmonary/Chest:  No respiratory distress, no wheezes, and good air movement.  Abdominal:  Soft.  No distension and no tenderness.   Musculoskeletal: Upon examination of the left hip and lower extremity: There is no edema, erythema, ecchymosis, or open wounds.  Femoral pulse is 3/4.  Skin is pink, warm, and dry.  Able to dorsiflex and plantar flex the ankle.  Able to flex and extend the digits.  DP pulse 2/4.  Capillary refill brisk and light touch sensation is intact, distally.    Neurological:  Alert and oriented to person, place, and time.  No facial droop.  No slurred speech.   Skin:  Skin is warm and dry.  No rash noted.  No pallor.   Psychiatric:  Normal mood and affect.  Behavior is normal.  Judgment and thought content normal.   Peripheral vascular:  Strong pulses on all 4 extremities.    ---------------------------------------------------------------------------------------------------------------------   .  ---------------------------------------------------------------------------------------------------------------------   Results from last 7 days   Lab Units 02/26/22  0122 02/25/22  1016   WBC 10*3/mm3 8.83 8.89   HEMOGLOBIN g/dL 10.8* 11.4*   HEMATOCRIT % 34.7 35.8   MCV fL 96.1 95.0   MCHC g/dL 31.1* 31.8   PLATELETS 10*3/mm3 298 330         Results from last 7 days   Lab Units 02/26/22  0122 02/25/22  1016   SODIUM mmol/L 136 139   POTASSIUM mmol/L 4.4 3.9   MAGNESIUM mg/dL 2.0  --    CHLORIDE mmol/L 100 104   CO2 mmol/L 24.4 24.8   BUN mg/dL 23 20    CREATININE mg/dL 0.81 0.73   EGFR IF NONAFRICN AM mL/min/1.73 72 81   CALCIUM mg/dL 8.8 8.9   GLUCOSE mg/dL 205* 192*   ALBUMIN g/dL 3.51 3.86   BILIRUBIN mg/dL 0.5 0.4   ALK PHOS U/L 53 62   AST (SGOT) U/L 13 14   ALT (SGPT) U/L 11 12   Estimated Creatinine Clearance: 106.4 mL/min (by C-G formula based on SCr of 0.81 mg/dL).  No results found for: AMMONIA          Lab Results   Component Value Date    HGBA1C 6.80 (H) 02/25/2022     Lab Results   Component Value Date    TSH 8.590 (H) 02/25/2022    FREET4 1.13 02/25/2022     No results found for: PREGTESTUR, PREGSERUM, HCG, HCGQUANT  Pain Management Panel     Pain Management Panel Latest Ref Rng & Units 4/6/2016 4/7/2014    AMPHETAMINES SCREEN, URINE Negative Negative Negative    BARBITURATES SCREEN Negative Negative Negative    BENZODIAZEPINE SCREEN, URINE Negative Negative Negative    COCAINE SCREEN, URINE Negative Negative Negative    METHADONE SCREEN, URINE Negative Negative Negative        No results found for: BLOODCX  No results found for: URINECX  No results found for: WOUNDCX  No results found for: STOOLCX      ---------------------------------------------------------------------------------------------------------------------   Imaging Results (Last 7 Days)     Procedure Component Value Units Date/Time    XR Hip With or Without Pelvis 2 - 3 View Left [[id number]] Collected: 02/25/22 1056     Updated: 02/25/22 7859    Narrative:      EXAMINATION: XR HIP W OR WO PELVIS 2-3 VIEW LEFT-      CLINICAL INDICATION: fall with hip pain        COMPARISON: None available     FINDINGS:  One view of the pelvis and 2 views of the left hip show comminuted  fracture of the proximal left femur     No other fracture       Impression:      Comminuted fracture of the proximal left femur      This report was finalized on 2/25/2022 10:56 AM by Dr. Denis Wright MD.       XR Femur 2 View Left [[id number]] Collected: 02/25/22 1056     Updated: 02/25/22 1158    Narrative:       EXAMINATION: XR FEMUR 2 VW LEFT-      CLINICAL INDICATION: fall with hip pain        COMPARISON: None available     FINDINGS: 4 views of the left femur show comminuted fracture of the  proximal left femur extending from the intertrochanteric region.       Impression:      Comminuted fracture of the proximal left femur      This report was finalized on 2/25/2022 10:56 AM by Dr. Denis Wright MD.       XR Chest 1 View [[id number]] Collected: 02/25/22 1055     Updated: 02/25/22 1158    Narrative:      XR CHEST 1 VW-     CLINICAL INDICATION: fall with hip pain        COMPARISON: 12/05/2017      TECHNIQUE: Single frontal view of the chest.     FINDINGS:     There is no focal alveolar infiltrate or effusion.  The cardiac silhouette is normal. The pulmonary vasculature is  unremarkable.  There is no evidence of an acute osseous abnormality.   There are no suspicious-appearing parenchymal soft tissue nodules.          Impression:      No evidence of active or acute cardiopulmonary disease on today's chest  radiograph.     This report was finalized on 2/25/2022 10:56 AM by Dr. Denis Wright MD.           ----------------------------------------------------------------------------------------------------------------------  Assessment and Plan:   Left hip, comminuted, subtrochanteric fracture.    Discussed the patient with Dr. Leung.  He has reviewed the xrays.  He recommends a left hip open reduction and internal fixation today.  The patient is a Nondenominational and does not want to have a blood transfusion.  We are able to have a cell saver arranged to use during surgery.  This has been discussed with the patient as well as the high risk of bleeding during surgery with this type of fracture repair.  The patient is agreeable to using the cell saver only.  NPO for surgery.  Consent for left hip open reduction and internal fixation.  Patient is COVID 19 negative and has had a type and screen.        Thank you for the  "consult.    Abdiaziz Eldridge, APRN  22  08:36 EST    Electronically signed by Jarrod Leung MD at 22 1207          Physical Therapy Notes (most recent note)      Jessica Aquino, PT at 22 1639  Version 1 of 1         Acute Care - Physical Therapy Treatment Note  RICKEY Workman     Patient Name: Es Olivier  : 1960  MRN: [id number]  Today's Date: 3/8/2022      Visit Dx:     ICD-10-CM ICD-9-CM   1. Closed fracture of proximal end of left femur, initial encounter ([facility])  S72.002A 820.8     Patient Active Problem List   Diagnosis   • Biliary dyskinesia   • Heart disease   • Hypertension   • DM2 (diabetes mellitus, type 2) ([facility])   • Hyperlipidemia   • Fatigue   • Dyspepsia   • Dyspnea on exertion   • Morbid obesity with BMI of 45.0-49.9, adult ([facility])   • Urinary incontinence   • Depression   • GERD (gastroesophageal reflux disease)   • History of Helicobacter pylori infection   • Vitamin D deficiency   • Joint pain   • Sleep apnea   • Hiatal hernia   • Fatty liver   • Closed fracture of proximal end of left femur ([facility])     Past Medical History:   Diagnosis Date   • Biliary dyskinesia     abnormal HIDA 2018 w/ EF 31%, symptomatic w/ N/V   • Closed fracture of proximal end of left femur ([facility]) 2022   • Depression    • DM2 (diabetes mellitus, type 2) ([facility])     dx , on insulin >5 years, A1c 7, associated neuropathy, no other complications   • Dyspepsia    • Dyspnea on exertion    • Fatigue    • Fatty liver     dx on CT    • GERD (gastroesophageal reflux disease)     controlled w/ daily Protonix   • Heart disease     w/ cardiac clearance 2019, negative stress test 10/2017, EF 65%   • Hiatal hernia     \"small\" noted on UGI    • History of Helicobacter pylori infection     treated remotely   • Hyperlipidemia    • Hypertension    • Joint pain    • Morbid obesity with BMI of 45.0-49.9, adult ([facility])    • Sleep apnea     formerly on a device, no longer using, says just doesn't need it " "  • Urinary incontinence     no meds   • Vitamin D deficiency      Past Surgical History:   Procedure Laterality Date   • BLADDER SURGERY  2013    \"tack\", uncomplicated, but unsuccessful   • CATARACT EXTRACTION     • COLONOSCOPY  2015    unremarkable   • DILATATION AND CURETTAGE  1987   • ORIF HIP FRACTURE Left 2/26/2022    Procedure: Left hip Open reduction and internal fixation.;  Surgeon: Jarrod Leung MD;  Location: [facility];  Service: Orthopedics;  Laterality: Left;   • TONSILLECTOMY  1984     PT Assessment (last 12 hours)     PT Evaluation and Treatment     Row Name 03/08/22 1602          Physical Therapy Time and Intention    Comment Pt willing to WORKwith therapy this date, declined sitting EOB. Agreeable to supine bed TE; Pt also educated on SWG status/answered questions  -     Row Name 03/08/22 4979          Motor Skills    Therapeutic Exercise hip;knee  hip abduction, hip flexion/SLR (AA L LE), quad sets, IR/ER, knee bends (AA L LE)  -     Row Name             Wound Left thigh Incision    Wound - Properties Group Side: Left  -RH Location: thigh  -RH Primary Wound Type: Incision  -RH     Retired Wound - Properties Group Side: Left  -RH Location: thigh  -RH Primary Wound Type: Incision  -RH     Retired Wound - Properties Group Side: Left  -RH Location: thigh  -RH Primary Wound Type: Incision  -RH     Row Name 03/08/22 1608          Positioning and Restraints    Pre-Treatment Position in bed  -     Post Treatment Position bed  -     In Bed supine;call light within reach  -           User Key  (r) = Recorded By, (t) = Taken By, (c) = Cosigned By    Initials Name Provider Type     Jessica Aquino, PT Physical Therapist     Marie Eugene, RN Registered Nurse                  PT Recommendation and Plan  Anticipated Discharge Disposition (PT): skilled nursing facility  Planned Therapy Interventions (PT): bed mobility training, gait training, strengthening, transfer training  Therapy " Frequency (PT): 6 times/wk (update)  Outcome Evaluation: Pt evaluated this date with grossly dependent assist with bed mobility this date. D/C rec for SNF for rehabilitation as pt was independent with PLOF.       Time Calculation:    PT Charges     Row Name 22 1638             Time Calculation    PT Received On 22  -      PT Goal Re-Cert Due Date 22  -              Time Calculation- PT    Total Timed Code Minutes- PT 24 minute(s)  -            User Key  (r) = Recorded By, (t) = Taken By, (c) = Cosigned By    Initials Name Provider Type    Jessica Rodriguez, MAISHA Physical Therapist              Therapy Charges for Today     Code Description Service Date Service Provider Modifiers Qty    68384100474 HC PT THERAPEUTIC ACT EA 15 MIN 3/7/2022 Jessica Aquino PT GP 2    73805579728 HC PT THER PROC EA 15 MIN 3/8/2022 Jessica Aquino PT GP 2               Jessica Aquino PT  3/8/2022      Electronically signed by Jessica Aquino PT at 22 1639          Occupational Therapy Notes (most recent note)      Ashlee Mar, OT at 22 1600          Acute Care - Occupational Therapy Treatment Note   Ji     Patient Name: Es Olivier  : 1960  MRN: [id number]  Today's Date: 3/7/2022             Admit Date: 2022       ICD-10-CM ICD-9-CM   1. Closed fracture of proximal end of left femur, initial encounter ([facility])  S72.002A 820.8     Patient Active Problem List   Diagnosis   • Biliary dyskinesia   • Heart disease   • Hypertension   • DM2 (diabetes mellitus, type 2) ([facility])   • Hyperlipidemia   • Fatigue   • Dyspepsia   • Dyspnea on exertion   • Morbid obesity with BMI of 45.0-49.9, adult ([facility])   • Urinary incontinence   • Depression   • GERD (gastroesophageal reflux disease)   • History of Helicobacter pylori infection   • Vitamin D deficiency   • Joint pain   • Sleep apnea   • Hiatal hernia   • Fatty liver   • Closed fracture of proximal end of left femur ([facility])     Past Medical  "History:   Diagnosis Date   • Biliary dyskinesia     abnormal HIDA 11/2018 w/ EF 31%, symptomatic w/ N/V   • Closed fracture of proximal end of left femur ([facility]) 2/25/2022   • Depression    • DM2 (diabetes mellitus, type 2) ([facility])     dx 1994, on insulin >5 years, A1c 7, associated neuropathy, no other complications   • Dyspepsia    • Dyspnea on exertion    • Fatigue    • Fatty liver     dx on CT 2016   • GERD (gastroesophageal reflux disease)     controlled w/ daily Protonix   • Heart disease     w/ cardiac clearance 4/2019, negative stress test 10/2017, EF 65%   • Hiatal hernia     \"small\" noted on UGI 2014   • History of Helicobacter pylori infection     treated remotely   • Hyperlipidemia    • Hypertension    • Joint pain    • Morbid obesity with BMI of 45.0-49.9, adult ([facility])    • Sleep apnea     formerly on a device, no longer using, says just doesn't need it   • Urinary incontinence     no meds   • Vitamin D deficiency      Past Surgical History:   Procedure Laterality Date   • BLADDER SURGERY  2013    \"tack\", uncomplicated, but unsuccessful   • CATARACT EXTRACTION     • COLONOSCOPY  2015    unremarkable   • DILATATION AND CURETTAGE  1987   • ORIF HIP FRACTURE Left 2/26/2022    Procedure: Left hip Open reduction and internal fixation.;  Surgeon: Jarrod Leung MD;  Location: [facility];  Service: Orthopedics;  Laterality: Left;   • TONSILLECTOMY  1984         OT ASSESSMENT FLOWSHEET (last 12 hours)     OT Evaluation and Treatment     Row Name 03/07/22 1554                   OT Time and Intention    Subjective Information pain;fatigue  -LA        Document Type therapy note (daily note)  -LA        Mode of Treatment individual therapy;occupational therapy  -LA        Patient Effort adequate  -LA                  General Information    Existing Precautions/Restrictions fall;non-weight bearing  LLE NWB  -LA                  Cognition    Affect/Mental Status (Cognition) WFL  -LA        Orientation Status " (Cognition) oriented x 3  -LA        Follows Commands (Cognition) follows one-step commands  -LA                  Bed Mobility    All Activities, [place] (Bed Mobility) minimum assist (75% patient effort);other (see comments);verbal cues  leg   -LA                  Transfer Assessment/Treatment    Transfers sit-stand transfer  -LA        Maintains Weight-bearing Status (Transfers) verbal cues to maintain  -LA        Comment, (Transfers) Max verbal cues and encouragement; min-modA x2 person  -LA                  Wound Left thigh Incision    Wound - Properties Group Side: Left  -RH Location: thigh  -RH Primary Wound Type: Incision  -RH        Retired Wound - Properties Group Side: Left  -RH Location: thigh  -RH Primary Wound Type: Incision  -RH        Retired Wound - Properties Group Side: Left  -RH Location: thigh  -RH Primary Wound Type: Incision  -RH                  Positioning and Restraints    Post Treatment Position bed  -LA        In Bed supine;call light within reach;encouraged to call for assist  -LA                  Progress Summary (OT)    Daily Progress Summary (OT) Patient agreeable to therapy this date. Patient engaged in bed mobility with max verbal cues and max encouragement. Patient able to get from supine to EOB with Gian using leg  to assist with movement of left LE. Patient able to sit EOB with anxiety. Sit to stand with walker with min-modA x2 person. patient returned to bed with maxA. Attempted use of bedpan at EOB unsuccessfully.  -LA              User Key  (r) = Recorded By, (t) = Taken By, (c) = Cosigned By    Initials Name Effective Dates    Marie Carreno RN 01/25/22 -     Ashlee Adams OT 02/14/22 -                  Occupational Therapy Education                 Title: PT OT SLP Therapies (Done)     Topic: Occupational Therapy (Done)     Point: ADL training (Done)     Description:   Instruct learner(s) on proper safety adaptation and remediation techniques  during self care or transfers.   Instruct in proper use of assistive devices.              Learning Progress Summary           Patient Acceptance, E,TB, VU by MARY ANN at 3/7/2022 0940      Show all documentation for this point (2)                 Point: Precautions (Done)     Description:   Instruct learner(s) on prescribed precautions during self-care and functional transfers.              Learning Progress Summary           Patient Acceptance, E,TB, VU by MARY ANN at 3/7/2022 0940      Show all documentation for this point (2)                             User Key     Initials Effective Dates Name Provider Type Discipline     06/16/21 -  Carol Silver, RN Registered Nurse Nurse                  OT Recommendation and Plan              Time Calculation:     Therapy Charges for Today     Code Description Service Date Service Provider Modifiers Qty    [id number]  OT THER PROC EA 15 MIN 3/7/2022 Ashlee Mar OT GO 1    [id number]  OT THERAPEUTIC ACT EA 15 MIN 3/7/2022 Ashlee Mar OT GO 1               Ashlee Mar OT  3/7/2022    Electronically signed by Ashlee Mar OT at 03/07/22 1602

## 2022-03-09 NOTE — CASE MANAGEMENT/SOCIAL WORK
Discharge Planning Assessment  [facility]     Patient Name: Es Olivier  MRN: [id number]  Today's Date: 3/9/2022    Admit Date: 2/25/2022     Discharge Plan     Row Name 03/09/22 1545       Plan    Final Discharge Disposition Code 61 - hospital-based swing bed    Final Note Pt was discharged from acute care and admitted to swing bed. SS will continue to follow and assist as needed with discharge planning.              Continued Care and Services - Discharged on 3/9/2022 Admission date: 2/25/2022 - Discharge disposition: Swing Bed    Destination     Service Provider Request Status Selected Services Address Phone Fax Patient Preferred    [facility]  Declined N/A [address] [facility] [id number] [phone] --    [facility]  Declined  No Bed Available N/A [address] [facility] [address] [phone] --    [facility]  Declined  No Bed Available N/A [address] [facility] [address] [phone] --    [facility]  Declined  Clinical Denial N/A [address] [facility] [address] [phone] --              ARIEL Bradford

## 2022-03-10 LAB
ANION GAP SERPL CALCULATED.3IONS-SCNC: 11.9 MMOL/L (ref 5–15)
BASOPHILS # BLD AUTO: 0.07 10*3/MM3 (ref 0–0.2)
BASOPHILS NFR BLD AUTO: 0.7 % (ref 0–1.5)
BUN SERPL-MCNC: 11 MG/DL (ref 8–23)
BUN/CREAT SERPL: 16.7 (ref 7–25)
CALCIUM SPEC-SCNC: 8.6 MG/DL (ref 8.6–10.5)
CHLORIDE SERPL-SCNC: 97 MMOL/L (ref 98–107)
CO2 SERPL-SCNC: 23.1 MMOL/L (ref 22–29)
CREAT SERPL-MCNC: 0.66 MG/DL (ref 0.57–1)
DEPRECATED RDW RBC AUTO: 52.9 FL (ref 37–54)
EGFRCR SERPLBLD CKD-EPI 2021: 99.9 ML/MIN/1.73
EOSINOPHIL # BLD AUTO: 0.24 10*3/MM3 (ref 0–0.4)
EOSINOPHIL NFR BLD AUTO: 2.4 % (ref 0.3–6.2)
ERYTHROCYTE [DISTWIDTH] IN BLOOD BY AUTOMATED COUNT: 15.5 % (ref 12.3–15.4)
GLUCOSE BLDC GLUCOMTR-MCNC: 118 MG/DL (ref 70–130)
GLUCOSE BLDC GLUCOMTR-MCNC: 137 MG/DL (ref 70–130)
GLUCOSE BLDC GLUCOMTR-MCNC: 141 MG/DL (ref 70–130)
GLUCOSE BLDC GLUCOMTR-MCNC: 169 MG/DL (ref 70–130)
GLUCOSE SERPL-MCNC: 122 MG/DL (ref 65–99)
HCT VFR BLD AUTO: 22.8 % (ref 34–46.6)
HGB BLD-MCNC: 6.7 G/DL (ref 12–15.9)
IMM GRANULOCYTES # BLD AUTO: 0.39 10*3/MM3 (ref 0–0.05)
IMM GRANULOCYTES NFR BLD AUTO: 3.9 % (ref 0–0.5)
LYMPHOCYTES # BLD AUTO: 1.9 10*3/MM3 (ref 0.7–3.1)
LYMPHOCYTES NFR BLD AUTO: 19.2 % (ref 19.6–45.3)
MCH RBC QN AUTO: 28.3 PG (ref 26.6–33)
MCHC RBC AUTO-ENTMCNC: 29.4 G/DL (ref 31.5–35.7)
MCV RBC AUTO: 96.2 FL (ref 79–97)
MONOCYTES # BLD AUTO: 0.84 10*3/MM3 (ref 0.1–0.9)
MONOCYTES NFR BLD AUTO: 8.5 % (ref 5–12)
NEUTROPHILS NFR BLD AUTO: 6.44 10*3/MM3 (ref 1.7–7)
NEUTROPHILS NFR BLD AUTO: 65.3 % (ref 42.7–76)
NRBC BLD AUTO-RTO: 1.5 /100 WBC (ref 0–0.2)
PLATELET # BLD AUTO: 485 10*3/MM3 (ref 140–450)
PMV BLD AUTO: 8.9 FL (ref 6–12)
POTASSIUM SERPL-SCNC: 3.9 MMOL/L (ref 3.5–5.2)
RBC # BLD AUTO: 2.37 10*6/MM3 (ref 3.77–5.28)
SODIUM SERPL-SCNC: 132 MMOL/L (ref 136–145)
WBC NRBC COR # BLD: 9.88 10*3/MM3 (ref 3.4–10.8)

## 2022-03-10 PROCEDURE — 25010000002 EPOETIN ALFA-EPBX 20000 UNIT/ML SOLUTION: Performed by: INTERNAL MEDICINE

## 2022-03-10 PROCEDURE — 25010000002 ENOXAPARIN PER 10 MG: Performed by: INTERNAL MEDICINE

## 2022-03-10 PROCEDURE — 97110 THERAPEUTIC EXERCISES: CPT

## 2022-03-10 PROCEDURE — 82962 GLUCOSE BLOOD TEST: CPT

## 2022-03-10 PROCEDURE — 85025 COMPLETE CBC W/AUTO DIFF WBC: CPT | Performed by: INTERNAL MEDICINE

## 2022-03-10 PROCEDURE — 80048 BASIC METABOLIC PNL TOTAL CA: CPT | Performed by: INTERNAL MEDICINE

## 2022-03-10 PROCEDURE — 63710000001 INSULIN DETEMIR PER 5 UNITS: Performed by: INTERNAL MEDICINE

## 2022-03-10 PROCEDURE — 63710000001 INSULIN ASPART PER 5 UNITS: Performed by: INTERNAL MEDICINE

## 2022-03-10 PROCEDURE — 97530 THERAPEUTIC ACTIVITIES: CPT

## 2022-03-10 RX ORDER — BISACODYL 10 MG
10 SUPPOSITORY, RECTAL RECTAL DAILY PRN
Status: DISCONTINUED | OUTPATIENT
Start: 2022-03-10 | End: 2022-03-26 | Stop reason: HOSPADM

## 2022-03-10 RX ORDER — LOPERAMIDE HYDROCHLORIDE 2 MG/1
2 CAPSULE ORAL 3 TIMES DAILY PRN
Status: DISCONTINUED | OUTPATIENT
Start: 2022-03-10 | End: 2022-03-26 | Stop reason: HOSPADM

## 2022-03-10 RX ADMIN — INSULIN ASPART 3 UNITS: 100 INJECTION, SOLUTION INTRAVENOUS; SUBCUTANEOUS at 20:30

## 2022-03-10 RX ADMIN — DOCUSATE SODIUM 50 MG AND SENNOSIDES 8.6 MG 2 TABLET: 8.6; 5 TABLET, FILM COATED ORAL at 09:30

## 2022-03-10 RX ADMIN — BUPROPION HYDROCHLORIDE 400 MG: 150 TABLET, EXTENDED RELEASE ORAL at 20:28

## 2022-03-10 RX ADMIN — INSULIN DETEMIR 20 UNITS: 100 INJECTION, SOLUTION SUBCUTANEOUS at 20:31

## 2022-03-10 RX ADMIN — METFORMIN HYDROCHLORIDE 1000 MG: 500 TABLET ORAL at 17:57

## 2022-03-10 RX ADMIN — LOPERAMIDE HYDROCHLORIDE 2 MG: 2 CAPSULE ORAL at 22:54

## 2022-03-10 RX ADMIN — Medication 150 MG: at 09:30

## 2022-03-10 RX ADMIN — EPOETIN ALFA-EPBX 40000 UNITS: 20000 INJECTION, SOLUTION INTRAVENOUS; SUBCUTANEOUS at 13:15

## 2022-03-10 RX ADMIN — Medication 150 MG: at 20:30

## 2022-03-10 RX ADMIN — FLUOXETINE HYDROCHLORIDE 10 MG: 10 CAPSULE ORAL at 09:30

## 2022-03-10 RX ADMIN — DOCUSATE SODIUM 50 MG AND SENNOSIDES 8.6 MG 2 TABLET: 8.6; 5 TABLET, FILM COATED ORAL at 20:30

## 2022-03-10 RX ADMIN — METFORMIN HYDROCHLORIDE 1000 MG: 500 TABLET ORAL at 09:30

## 2022-03-10 RX ADMIN — ENOXAPARIN SODIUM 30 MG: 30 INJECTION SUBCUTANEOUS at 06:07

## 2022-03-10 RX ADMIN — POLYETHYLENE GLYCOL (3350) 17 G: 17 POWDER, FOR SOLUTION ORAL at 09:30

## 2022-03-10 RX ADMIN — ENOXAPARIN SODIUM 30 MG: 30 INJECTION SUBCUTANEOUS at 17:57

## 2022-03-10 RX ADMIN — OXYCODONE HYDROCHLORIDE AND ACETAMINOPHEN 1 TABLET: 10; 325 TABLET ORAL at 15:48

## 2022-03-10 RX ADMIN — PANTOPRAZOLE SODIUM 40 MG: 40 TABLET, DELAYED RELEASE ORAL at 06:07

## 2022-03-10 NOTE — PAYOR COMM NOTE
"CONTACT:   Shahnaz Dykes RN  Phone: [phone]  Fax: [phone]    DISCHARGE NOTIFICATION- Pending additional days     DISCHARGE TO: Swing bed    REF # [id number]  DC DATE: 3/9/22    IF YOU NEED ANYTHING FURTHER PLEASE LET ME KNOW.   THANKS     Jenn Muro (62 y.o. Female)             Date of Birth   1960    Social Security Number       Address   [address] [place] [address]    Home Phone   [phone]    MRN   [id number]       [employer]   [employer]    Marital Status                               Admission Date   2/25/22    Admission Type   Emergency    Admitting Provider   Mima Randall MD    Attending Provider       Department, Room/Bed   [address], 3348/1S       Discharge Date   3/9/2022    Discharge Disposition   Swing Bed    Discharge Destination                               Attending Provider: (none)   Allergies: Mobic [Meloxicam], Lisinopril    Isolation: None   Infection: None   Code Status: CPR   Advance Care Planning Activity    Ht: 167.6 cm (66\")   Wt: 142 kg (314 lb)    Admission Cmt: None   Principal Problem: Closed fracture of proximal end of left femur (HCC) [S72.002A]                 Active Insurance as of 2/25/2022     Primary Coverage     Payor Plan Insurance Group Employer/Plan Group    [facility] MEDICARE REPLACEMENT [employer] MEDICARE REPLACEMENT      Payor Plan Address Payor Plan Phone Number Payor Plan Fax Number Effective Dates    [address] [address] [phone]  10/1/2020 - None Entered    [facility] [address]       Subscriber Name Subscriber Birth Date Member ID       JENN MURO 1960 [id number]                 Emergency Contacts      (Rel.) Home Phone Work Phone Mobile Phone    Brynn Mane (Daughter) [phone] -- --    Mikki Muro (Daughter) [phone] -- [phone]               Discharge Summary      Jair Valdes MD at 03/09/22 1110              [facility] " DISCHARGE SUMMARY    Patient Identification:  Name:  Es Olivier  Age:  62 y.o.  Sex:  female  :  1960  MRN:  [id number]  Visit Number:  [id number]    Date of Admission: 2022  Date of Discharge:  3/9/2022    PCP: Delilah Pizarro MD    DISCHARGE DIAGNOSIS  #L hip fracture   #Postoperative blood loss anemia  #Chronic back pain   #DM II  #Constipation   #Hyponatremia    CONSULTS   Orthopedic surgery     PROCEDURES PERFORMED  Left femur open reduction internal fixation using Synthes intramedullary nail     HOSPITAL COURSE  Patient is a 62 y.o. female presented on to [facility] complaining of fall on left hip.  Please see the admitting history and physical for further details.      Ms. Olivier Is our 63 yo F with hx insulin-dependent type 2 diabetes mellitus, essential hypertension, obstructive sleep apnea, GERD, depression who was admitted on 2022 with a broken left femur.  She underwent repair on 2022. Patient had significant postoperative anemia. Patient has refused blood products due to religous preference. Hemoglobin dropped down to 5.3 but with iron and procrit hemoglobin has improved to 6.3 and has been stable the last few days. Inpatient rehab did not approve and  has not been able to find facility for patient to go for rehab. Patient approved for swing bed admission. We have decreased lab draws to every other day to minimize iatrogenic blood loss with next repeat in the AM. Continue Lovenox cautiously given high DVT risk. Patient denies any new complaints and is stable for swing bed admission today.     VITAL SIGNS:  Temp:  [97.5 °F (36.4 °C)-98.3 °F (36.8 °C)] 98.3 °F (36.8 °C)  Heart Rate:  [84-92] 88  Resp:  [18] 18  BP: (121-135)/(52-64) 122/52  SpO2:  [97 %] 97 %  on   ;   Device (Oxygen Therapy): room air    Body mass index is 50.68 kg/m².  Wt Readings from Last 3 Encounters:   22 (!) 142 kg (314 lb)   21 135 kg (297 lb)   19 135 kg (298  lb)       PHYSICAL EXAM:  Constitutional:  Well-developed and well-nourished.  No respiratory distress.      HENT:  Head:  Normocephalic and atraumatic.  Mouth:  Moist mucous membranes.    Eyes:  Conjunctivae and EOM are normal.  Pupils are equal, round, and reactive to light.  No scleral icterus.    Cardiovascular:  Normal rate, regular rhythm and normal heart sounds with no murmur.  Pulmonary/Chest:  No respiratory distress, no wheezes, no crackles, with normal breath sounds and good air movement.  Abdominal:  Soft.  Bowel sounds are normal.  No distension and no tenderness.   Musculoskeletal:  No edema, no tenderness, and no deformity.  No red or swollen joints anywhere.    Neurological:  Alert and oriented to person, place, and time.  No gross neurological deficit.   Skin:  Skin is warm and dry. No rash noted. No pallor.   Peripheral vascular:  Strong pulses in all 4 extremities with no clubbing, no cyanosis, no edema.    DISCHARGE DISPOSITION   Stable    DISCHARGE MEDICATIONS:     Discharge Medications      New Medications      Instructions Start Date   enoxaparin 30 MG/0.3ML solution syringe  Commonly known as: Lovenox   30 mg, Subcutaneous, Every 12 Hours Scheduled      oxyCODONE-acetaminophen  MG per tablet  Commonly known as: PERCOCET   Take 1 tablet by mouth Every 6 (Six) Hours As Needed for Moderate Pain for up to 14 days.         ASK your doctor about these medications      Instructions Start Date   buPROPion  MG 12 hr tablet  Commonly known as: WELLBUTRIN SR  Ask about: Which instructions should I use?   400 mg, Oral, Every Night at Bedtime, Prior to [facility] Admission, Patient was on:  Script is wrote 1 BID but pt takes 2 QHS      FLUoxetine 10 MG capsule  Commonly known as: PROzac   10 mg, Oral, Daily      hydroCHLOROthiazide 12.5 MG tablet  Commonly known as: HYDRODIURIL   12.5 mg, Oral, Daily      insulin regular 100 UNIT/ML injection  Commonly known as: humuLIN R,novoLIN R   4 Units,  Subcutaneous, 3 Times Daily Before Meals      losartan-hydrochlorothiazide 50-12.5 MG per tablet  Commonly known as: HYZAAR   1 tablet, Oral, Daily      metFORMIN  MG 24 hr tablet  Commonly known as: GLUCOPHAGE-XR   2,000 mg, Oral, Nightly      pioglitazone 45 MG tablet  Commonly known as: ACTOS   45 mg, Oral, Every Night at Bedtime      vitamin D 1.25 MG (77768 UT) capsule capsule  Commonly known as: ERGOCALCIFEROL   50,000 Units, Oral, Weekly                Follow-up Information     Faizan Field, DO Follow up in 10 day(s).    Specialty: Orthopedic Surgery  Contact information:  [address] [place] [address]  [phone]             Delilah Pizarro MD .    Specialty: Geriatric Medicine  Contact information:  [address]  [address]  [facility] [address]  [phone]                          TEST  RESULTS PENDING AT DISCHARGE       CODE STATUS  Code Status and Medical Interventions:   Ordered at: 02/25/22 1833     Level Of Support Discussed With:    Patient     Code Status (Patient has no pulse and is not breathing):    CPR (Attempt to Resuscitate)     Medical Interventions (Patient has pulse or is breathing):    Full Support       Jair Valdes MD  [facility] Hospitalist  03/09/22  11:10 EST    Please note that this discharge summary required more than 30 minutes to complete.      Electronically signed by Jair Valdes MD at 03/09/22 6085

## 2022-03-11 LAB
GLUCOSE BLDC GLUCOMTR-MCNC: 140 MG/DL (ref 70–130)
GLUCOSE BLDC GLUCOMTR-MCNC: 152 MG/DL (ref 70–130)
GLUCOSE BLDC GLUCOMTR-MCNC: 159 MG/DL (ref 70–130)
GLUCOSE BLDC GLUCOMTR-MCNC: 191 MG/DL (ref 70–130)
INDURATION: 0 MM (ref 0–10)
Lab: NORMAL
TB SKIN TEST: NEGATIVE

## 2022-03-11 PROCEDURE — 63710000001 INSULIN DETEMIR PER 5 UNITS: Performed by: INTERNAL MEDICINE

## 2022-03-11 PROCEDURE — 82962 GLUCOSE BLOOD TEST: CPT

## 2022-03-11 PROCEDURE — 97110 THERAPEUTIC EXERCISES: CPT

## 2022-03-11 PROCEDURE — 25010000002 ENOXAPARIN PER 10 MG: Performed by: INTERNAL MEDICINE

## 2022-03-11 PROCEDURE — 94799 UNLISTED PULMONARY SVC/PX: CPT

## 2022-03-11 PROCEDURE — 97530 THERAPEUTIC ACTIVITIES: CPT

## 2022-03-11 PROCEDURE — 63710000001 INSULIN ASPART PER 5 UNITS: Performed by: INTERNAL MEDICINE

## 2022-03-11 PROCEDURE — 97535 SELF CARE MNGMENT TRAINING: CPT

## 2022-03-11 RX ADMIN — Medication 150 MG: at 20:26

## 2022-03-11 RX ADMIN — ENOXAPARIN SODIUM 30 MG: 30 INJECTION SUBCUTANEOUS at 05:11

## 2022-03-11 RX ADMIN — OXYCODONE HYDROCHLORIDE AND ACETAMINOPHEN 1 TABLET: 10; 325 TABLET ORAL at 21:45

## 2022-03-11 RX ADMIN — ENOXAPARIN SODIUM 30 MG: 30 INJECTION SUBCUTANEOUS at 17:20

## 2022-03-11 RX ADMIN — INSULIN ASPART 3 UNITS: 100 INJECTION, SOLUTION INTRAVENOUS; SUBCUTANEOUS at 17:19

## 2022-03-11 RX ADMIN — INSULIN ASPART 3 UNITS: 100 INJECTION, SOLUTION INTRAVENOUS; SUBCUTANEOUS at 20:32

## 2022-03-11 RX ADMIN — OXYCODONE HYDROCHLORIDE AND ACETAMINOPHEN 1 TABLET: 10; 325 TABLET ORAL at 15:33

## 2022-03-11 RX ADMIN — BUPROPION HYDROCHLORIDE 400 MG: 150 TABLET, EXTENDED RELEASE ORAL at 20:26

## 2022-03-11 RX ADMIN — Medication 150 MG: at 09:04

## 2022-03-11 RX ADMIN — INSULIN ASPART 3 UNITS: 100 INJECTION, SOLUTION INTRAVENOUS; SUBCUTANEOUS at 11:14

## 2022-03-11 RX ADMIN — METFORMIN HYDROCHLORIDE 1000 MG: 500 TABLET ORAL at 17:20

## 2022-03-11 RX ADMIN — METFORMIN HYDROCHLORIDE 1000 MG: 500 TABLET ORAL at 09:04

## 2022-03-11 RX ADMIN — HYDROXYZINE HYDROCHLORIDE 25 MG: 25 TABLET ORAL at 13:09

## 2022-03-11 RX ADMIN — LOPERAMIDE HYDROCHLORIDE 2 MG: 2 CAPSULE ORAL at 09:04

## 2022-03-11 RX ADMIN — INSULIN DETEMIR 20 UNITS: 100 INJECTION, SOLUTION SUBCUTANEOUS at 20:32

## 2022-03-11 RX ADMIN — PANTOPRAZOLE SODIUM 40 MG: 40 TABLET, DELAYED RELEASE ORAL at 05:11

## 2022-03-11 RX ADMIN — LOPERAMIDE HYDROCHLORIDE 2 MG: 2 CAPSULE ORAL at 15:33

## 2022-03-11 RX ADMIN — FLUOXETINE HYDROCHLORIDE 10 MG: 10 CAPSULE ORAL at 09:04

## 2022-03-12 LAB
ANION GAP SERPL CALCULATED.3IONS-SCNC: 12.3 MMOL/L (ref 5–15)
BUN SERPL-MCNC: 14 MG/DL (ref 8–23)
BUN/CREAT SERPL: 20.6 (ref 7–25)
CALCIUM SPEC-SCNC: 8.1 MG/DL (ref 8.6–10.5)
CHLORIDE SERPL-SCNC: 100 MMOL/L (ref 98–107)
CO2 SERPL-SCNC: 21.7 MMOL/L (ref 22–29)
CREAT SERPL-MCNC: 0.68 MG/DL (ref 0.57–1)
EGFRCR SERPLBLD CKD-EPI 2021: 99.2 ML/MIN/1.73
GLUCOSE BLDC GLUCOMTR-MCNC: 105 MG/DL (ref 70–130)
GLUCOSE BLDC GLUCOMTR-MCNC: 128 MG/DL (ref 70–130)
GLUCOSE BLDC GLUCOMTR-MCNC: 182 MG/DL (ref 70–130)
GLUCOSE BLDC GLUCOMTR-MCNC: 205 MG/DL (ref 70–130)
GLUCOSE SERPL-MCNC: 125 MG/DL (ref 65–99)
HCT VFR BLD AUTO: 24.1 % (ref 34–46.6)
HGB BLD-MCNC: 7 G/DL (ref 12–15.9)
POTASSIUM SERPL-SCNC: 3.9 MMOL/L (ref 3.5–5.2)
SODIUM SERPL-SCNC: 134 MMOL/L (ref 136–145)

## 2022-03-12 PROCEDURE — 97110 THERAPEUTIC EXERCISES: CPT

## 2022-03-12 PROCEDURE — 97530 THERAPEUTIC ACTIVITIES: CPT

## 2022-03-12 PROCEDURE — 85018 HEMOGLOBIN: CPT | Performed by: PHYSICIAN ASSISTANT

## 2022-03-12 PROCEDURE — 25010000002 ENOXAPARIN PER 10 MG: Performed by: INTERNAL MEDICINE

## 2022-03-12 PROCEDURE — 63710000001 INSULIN DETEMIR PER 5 UNITS: Performed by: INTERNAL MEDICINE

## 2022-03-12 PROCEDURE — 82962 GLUCOSE BLOOD TEST: CPT

## 2022-03-12 PROCEDURE — 63710000001 INSULIN ASPART PER 5 UNITS: Performed by: INTERNAL MEDICINE

## 2022-03-12 PROCEDURE — 85014 HEMATOCRIT: CPT | Performed by: PHYSICIAN ASSISTANT

## 2022-03-12 PROCEDURE — 80048 BASIC METABOLIC PNL TOTAL CA: CPT | Performed by: PHYSICIAN ASSISTANT

## 2022-03-12 PROCEDURE — 25010000002 EPOETIN ALFA-EPBX 20000 UNIT/ML SOLUTION: Performed by: INTERNAL MEDICINE

## 2022-03-12 RX ADMIN — ENOXAPARIN SODIUM 30 MG: 30 INJECTION SUBCUTANEOUS at 05:38

## 2022-03-12 RX ADMIN — ENOXAPARIN SODIUM 30 MG: 30 INJECTION SUBCUTANEOUS at 17:28

## 2022-03-12 RX ADMIN — FLUOXETINE HYDROCHLORIDE 10 MG: 10 CAPSULE ORAL at 08:18

## 2022-03-12 RX ADMIN — METFORMIN HYDROCHLORIDE 1000 MG: 500 TABLET ORAL at 08:18

## 2022-03-12 RX ADMIN — BUPROPION HYDROCHLORIDE 400 MG: 150 TABLET, EXTENDED RELEASE ORAL at 20:45

## 2022-03-12 RX ADMIN — Medication 150 MG: at 08:18

## 2022-03-12 RX ADMIN — METFORMIN HYDROCHLORIDE 1000 MG: 500 TABLET ORAL at 17:28

## 2022-03-12 RX ADMIN — INSULIN ASPART 3 UNITS: 100 INJECTION, SOLUTION INTRAVENOUS; SUBCUTANEOUS at 11:24

## 2022-03-12 RX ADMIN — PANTOPRAZOLE SODIUM 40 MG: 40 TABLET, DELAYED RELEASE ORAL at 05:38

## 2022-03-12 RX ADMIN — OXYCODONE HYDROCHLORIDE AND ACETAMINOPHEN 1 TABLET: 10; 325 TABLET ORAL at 21:53

## 2022-03-12 RX ADMIN — EPOETIN ALFA-EPBX 40000 UNITS: 20000 INJECTION, SOLUTION INTRAVENOUS; SUBCUTANEOUS at 12:36

## 2022-03-12 RX ADMIN — INSULIN ASPART 5 UNITS: 100 INJECTION, SOLUTION INTRAVENOUS; SUBCUTANEOUS at 20:45

## 2022-03-12 RX ADMIN — Medication 150 MG: at 20:45

## 2022-03-12 RX ADMIN — INSULIN DETEMIR 20 UNITS: 100 INJECTION, SOLUTION SUBCUTANEOUS at 20:46

## 2022-03-12 RX ADMIN — OXYCODONE HYDROCHLORIDE AND ACETAMINOPHEN 1 TABLET: 10; 325 TABLET ORAL at 08:18

## 2022-03-13 LAB
GLUCOSE BLDC GLUCOMTR-MCNC: 133 MG/DL (ref 70–130)
GLUCOSE BLDC GLUCOMTR-MCNC: 156 MG/DL (ref 70–130)
GLUCOSE BLDC GLUCOMTR-MCNC: 181 MG/DL (ref 70–130)
GLUCOSE BLDC GLUCOMTR-MCNC: 97 MG/DL (ref 70–130)

## 2022-03-13 PROCEDURE — 63710000001 INSULIN DETEMIR PER 5 UNITS: Performed by: INTERNAL MEDICINE

## 2022-03-13 PROCEDURE — 63710000001 INSULIN ASPART PER 5 UNITS: Performed by: INTERNAL MEDICINE

## 2022-03-13 PROCEDURE — 25010000002 ENOXAPARIN PER 10 MG: Performed by: INTERNAL MEDICINE

## 2022-03-13 PROCEDURE — 82962 GLUCOSE BLOOD TEST: CPT

## 2022-03-13 PROCEDURE — 94799 UNLISTED PULMONARY SVC/PX: CPT

## 2022-03-13 RX ORDER — CETIRIZINE HYDROCHLORIDE 10 MG/1
10 TABLET ORAL DAILY
Status: DISCONTINUED | OUTPATIENT
Start: 2022-03-13 | End: 2022-03-26 | Stop reason: HOSPADM

## 2022-03-13 RX ORDER — CETIRIZINE HYDROCHLORIDE 10 MG/1
10 TABLET ORAL DAILY
Status: DISCONTINUED | OUTPATIENT
Start: 2022-03-14 | End: 2022-03-13

## 2022-03-13 RX ORDER — CARBOXYMETHYLCELLULOSE SODIUM 5 MG/ML
2 SOLUTION/ DROPS OPHTHALMIC 3 TIMES DAILY PRN
Status: DISCONTINUED | OUTPATIENT
Start: 2022-03-13 | End: 2022-03-26 | Stop reason: HOSPADM

## 2022-03-13 RX ADMIN — PANTOPRAZOLE SODIUM 40 MG: 40 TABLET, DELAYED RELEASE ORAL at 04:57

## 2022-03-13 RX ADMIN — CETIRIZINE HYDROCHLORIDE 10 MG: 10 TABLET, FILM COATED ORAL at 22:53

## 2022-03-13 RX ADMIN — Medication 150 MG: at 21:40

## 2022-03-13 RX ADMIN — ENOXAPARIN SODIUM 30 MG: 30 INJECTION SUBCUTANEOUS at 05:48

## 2022-03-13 RX ADMIN — Medication 150 MG: at 08:16

## 2022-03-13 RX ADMIN — Medication 10 ML: at 21:37

## 2022-03-13 RX ADMIN — BUPROPION HYDROCHLORIDE 400 MG: 150 TABLET, EXTENDED RELEASE ORAL at 21:40

## 2022-03-13 RX ADMIN — ENOXAPARIN SODIUM 30 MG: 30 INJECTION SUBCUTANEOUS at 16:41

## 2022-03-13 RX ADMIN — FLUOXETINE HYDROCHLORIDE 10 MG: 10 CAPSULE ORAL at 08:17

## 2022-03-13 RX ADMIN — METFORMIN HYDROCHLORIDE 1000 MG: 500 TABLET ORAL at 08:16

## 2022-03-13 RX ADMIN — OXYCODONE HYDROCHLORIDE AND ACETAMINOPHEN 1 TABLET: 10; 325 TABLET ORAL at 22:53

## 2022-03-13 RX ADMIN — INSULIN ASPART 3 UNITS: 100 INJECTION, SOLUTION INTRAVENOUS; SUBCUTANEOUS at 16:39

## 2022-03-13 RX ADMIN — OFLOXACIN 50000 UNITS: 300 TABLET, COATED ORAL at 08:17

## 2022-03-13 RX ADMIN — INSULIN DETEMIR 20 UNITS: 100 INJECTION, SOLUTION SUBCUTANEOUS at 21:41

## 2022-03-13 RX ADMIN — METFORMIN HYDROCHLORIDE 1000 MG: 500 TABLET ORAL at 16:40

## 2022-03-13 RX ADMIN — INSULIN ASPART 3 UNITS: 100 INJECTION, SOLUTION INTRAVENOUS; SUBCUTANEOUS at 11:59

## 2022-03-14 LAB
ANION GAP SERPL CALCULATED.3IONS-SCNC: 11.2 MMOL/L (ref 5–15)
BACTERIA UR QL AUTO: ABNORMAL /HPF
BILIRUB UR QL STRIP: NEGATIVE
BUN SERPL-MCNC: 16 MG/DL (ref 8–23)
BUN/CREAT SERPL: 23.5 (ref 7–25)
CALCIUM SPEC-SCNC: 8.4 MG/DL (ref 8.6–10.5)
CHLORIDE SERPL-SCNC: 101 MMOL/L (ref 98–107)
CLARITY UR: CLEAR
CO2 SERPL-SCNC: 21.8 MMOL/L (ref 22–29)
COLOR UR: ABNORMAL
CREAT SERPL-MCNC: 0.68 MG/DL (ref 0.57–1)
EGFRCR SERPLBLD CKD-EPI 2021: 99.2 ML/MIN/1.73
GLUCOSE BLDC GLUCOMTR-MCNC: 138 MG/DL (ref 70–130)
GLUCOSE BLDC GLUCOMTR-MCNC: 150 MG/DL (ref 70–130)
GLUCOSE BLDC GLUCOMTR-MCNC: 163 MG/DL (ref 70–130)
GLUCOSE BLDC GLUCOMTR-MCNC: 96 MG/DL (ref 70–130)
GLUCOSE SERPL-MCNC: 187 MG/DL (ref 65–99)
GLUCOSE UR STRIP-MCNC: NEGATIVE MG/DL
HCT VFR BLD AUTO: 23.8 % (ref 34–46.6)
HGB BLD-MCNC: 7 G/DL (ref 12–15.9)
HGB UR QL STRIP.AUTO: NEGATIVE
HYALINE CASTS UR QL AUTO: ABNORMAL /LPF
KETONES UR QL STRIP: NEGATIVE
LEUKOCYTE ESTERASE UR QL STRIP.AUTO: ABNORMAL
NITRITE UR QL STRIP: NEGATIVE
PH UR STRIP.AUTO: 6.5 [PH] (ref 5–8)
POTASSIUM SERPL-SCNC: 4.2 MMOL/L (ref 3.5–5.2)
PROT UR QL STRIP: NEGATIVE
RBC # UR STRIP: ABNORMAL /HPF
REF LAB TEST METHOD: ABNORMAL
SODIUM SERPL-SCNC: 134 MMOL/L (ref 136–145)
SP GR UR STRIP: 1.02 (ref 1–1.03)
SQUAMOUS #/AREA URNS HPF: ABNORMAL /HPF
UROBILINOGEN UR QL STRIP: ABNORMAL
WBC # UR STRIP: ABNORMAL /HPF

## 2022-03-14 PROCEDURE — 25010000002 ENOXAPARIN PER 10 MG: Performed by: INTERNAL MEDICINE

## 2022-03-14 PROCEDURE — 97530 THERAPEUTIC ACTIVITIES: CPT

## 2022-03-14 PROCEDURE — 85018 HEMOGLOBIN: CPT | Performed by: PHYSICIAN ASSISTANT

## 2022-03-14 PROCEDURE — 82962 GLUCOSE BLOOD TEST: CPT

## 2022-03-14 PROCEDURE — 97110 THERAPEUTIC EXERCISES: CPT

## 2022-03-14 PROCEDURE — 63710000001 INSULIN DETEMIR PER 5 UNITS: Performed by: INTERNAL MEDICINE

## 2022-03-14 PROCEDURE — 80048 BASIC METABOLIC PNL TOTAL CA: CPT | Performed by: PHYSICIAN ASSISTANT

## 2022-03-14 PROCEDURE — 25010000002 EPOETIN ALFA-EPBX 20000 UNIT/ML SOLUTION: Performed by: INTERNAL MEDICINE

## 2022-03-14 PROCEDURE — 85014 HEMATOCRIT: CPT | Performed by: PHYSICIAN ASSISTANT

## 2022-03-14 PROCEDURE — 81001 URINALYSIS AUTO W/SCOPE: CPT | Performed by: PHYSICIAN ASSISTANT

## 2022-03-14 PROCEDURE — 63710000001 INSULIN ASPART PER 5 UNITS: Performed by: INTERNAL MEDICINE

## 2022-03-14 RX ORDER — ERYTHROMYCIN 5 MG/G
OINTMENT OPHTHALMIC EVERY 12 HOURS SCHEDULED
Status: DISCONTINUED | OUTPATIENT
Start: 2022-03-14 | End: 2022-03-18

## 2022-03-14 RX ADMIN — Medication 150 MG: at 08:31

## 2022-03-14 RX ADMIN — ERYTHROMYCIN: 5 OINTMENT OPHTHALMIC at 17:10

## 2022-03-14 RX ADMIN — INSULIN ASPART 3 UNITS: 100 INJECTION, SOLUTION INTRAVENOUS; SUBCUTANEOUS at 10:39

## 2022-03-14 RX ADMIN — ENOXAPARIN SODIUM 30 MG: 30 INJECTION SUBCUTANEOUS at 05:46

## 2022-03-14 RX ADMIN — METFORMIN HYDROCHLORIDE 1000 MG: 500 TABLET ORAL at 08:31

## 2022-03-14 RX ADMIN — FLUOXETINE HYDROCHLORIDE 10 MG: 10 CAPSULE ORAL at 08:31

## 2022-03-14 RX ADMIN — METFORMIN HYDROCHLORIDE 1000 MG: 500 TABLET ORAL at 17:09

## 2022-03-14 RX ADMIN — ENOXAPARIN SODIUM 30 MG: 30 INJECTION SUBCUTANEOUS at 17:10

## 2022-03-14 RX ADMIN — Medication 150 MG: at 20:03

## 2022-03-14 RX ADMIN — BUPROPION HYDROCHLORIDE 400 MG: 150 TABLET, EXTENDED RELEASE ORAL at 20:03

## 2022-03-14 RX ADMIN — EPOETIN ALFA-EPBX 40000 UNITS: 20000 INJECTION, SOLUTION INTRAVENOUS; SUBCUTANEOUS at 12:22

## 2022-03-14 RX ADMIN — OXYCODONE HYDROCHLORIDE AND ACETAMINOPHEN 1 TABLET: 10; 325 TABLET ORAL at 09:32

## 2022-03-14 RX ADMIN — INSULIN ASPART 5 UNITS: 100 INJECTION, SOLUTION INTRAVENOUS; SUBCUTANEOUS at 20:04

## 2022-03-14 RX ADMIN — CARBOXYMETHYLCELLULOSE SODIUM 2 DROP: 5 SOLUTION/ DROPS OPHTHALMIC at 08:33

## 2022-03-14 RX ADMIN — INSULIN DETEMIR 20 UNITS: 100 INJECTION, SOLUTION SUBCUTANEOUS at 20:04

## 2022-03-14 RX ADMIN — OXYCODONE HYDROCHLORIDE AND ACETAMINOPHEN 1 TABLET: 10; 325 TABLET ORAL at 23:15

## 2022-03-14 RX ADMIN — PANTOPRAZOLE SODIUM 40 MG: 40 TABLET, DELAYED RELEASE ORAL at 05:46

## 2022-03-15 LAB
GLUCOSE BLDC GLUCOMTR-MCNC: 119 MG/DL (ref 70–130)
GLUCOSE BLDC GLUCOMTR-MCNC: 136 MG/DL (ref 70–130)
GLUCOSE BLDC GLUCOMTR-MCNC: 170 MG/DL (ref 70–130)
GLUCOSE BLDC GLUCOMTR-MCNC: 176 MG/DL (ref 70–130)
HCT VFR BLD AUTO: 26.7 % (ref 34–46.6)
HGB BLD-MCNC: 7.9 G/DL (ref 12–15.9)

## 2022-03-15 PROCEDURE — 97110 THERAPEUTIC EXERCISES: CPT

## 2022-03-15 PROCEDURE — 82962 GLUCOSE BLOOD TEST: CPT

## 2022-03-15 PROCEDURE — 63710000001 INSULIN ASPART PER 5 UNITS: Performed by: INTERNAL MEDICINE

## 2022-03-15 PROCEDURE — 63710000001 INSULIN DETEMIR PER 5 UNITS: Performed by: INTERNAL MEDICINE

## 2022-03-15 PROCEDURE — 25010000002 ENOXAPARIN PER 10 MG: Performed by: INTERNAL MEDICINE

## 2022-03-15 PROCEDURE — 97530 THERAPEUTIC ACTIVITIES: CPT

## 2022-03-15 PROCEDURE — 85018 HEMOGLOBIN: CPT | Performed by: INTERNAL MEDICINE

## 2022-03-15 PROCEDURE — 85014 HEMATOCRIT: CPT | Performed by: INTERNAL MEDICINE

## 2022-03-15 RX ORDER — DOCUSATE SODIUM 100 MG/1
100 CAPSULE, LIQUID FILLED ORAL 2 TIMES DAILY
Status: DISCONTINUED | OUTPATIENT
Start: 2022-03-15 | End: 2022-03-26 | Stop reason: HOSPADM

## 2022-03-15 RX ADMIN — OXYCODONE HYDROCHLORIDE AND ACETAMINOPHEN 1 TABLET: 10; 325 TABLET ORAL at 09:55

## 2022-03-15 RX ADMIN — PANTOPRAZOLE SODIUM 40 MG: 40 TABLET, DELAYED RELEASE ORAL at 06:00

## 2022-03-15 RX ADMIN — CETIRIZINE HYDROCHLORIDE 10 MG: 10 TABLET, FILM COATED ORAL at 08:21

## 2022-03-15 RX ADMIN — INSULIN ASPART 3 UNITS: 100 INJECTION, SOLUTION INTRAVENOUS; SUBCUTANEOUS at 20:45

## 2022-03-15 RX ADMIN — INSULIN ASPART 3 UNITS: 100 INJECTION, SOLUTION INTRAVENOUS; SUBCUTANEOUS at 16:38

## 2022-03-15 RX ADMIN — DOCUSATE SODIUM 100 MG: 100 CAPSULE ORAL at 20:44

## 2022-03-15 RX ADMIN — Medication 150 MG: at 20:45

## 2022-03-15 RX ADMIN — DOCUSATE SODIUM 100 MG: 100 CAPSULE ORAL at 09:55

## 2022-03-15 RX ADMIN — Medication 10 ML: at 08:22

## 2022-03-15 RX ADMIN — PANTOPRAZOLE SODIUM 40 MG: 40 TABLET, DELAYED RELEASE ORAL at 05:46

## 2022-03-15 RX ADMIN — ENOXAPARIN SODIUM 30 MG: 30 INJECTION SUBCUTANEOUS at 06:01

## 2022-03-15 RX ADMIN — INSULIN DETEMIR 20 UNITS: 100 INJECTION, SOLUTION SUBCUTANEOUS at 20:45

## 2022-03-15 RX ADMIN — ENOXAPARIN SODIUM 30 MG: 30 INJECTION SUBCUTANEOUS at 16:39

## 2022-03-15 RX ADMIN — ERYTHROMYCIN: 5 OINTMENT OPHTHALMIC at 20:47

## 2022-03-15 RX ADMIN — Medication 150 MG: at 08:21

## 2022-03-15 RX ADMIN — METFORMIN HYDROCHLORIDE 1000 MG: 500 TABLET ORAL at 08:21

## 2022-03-15 RX ADMIN — METFORMIN HYDROCHLORIDE 1000 MG: 500 TABLET ORAL at 16:38

## 2022-03-15 RX ADMIN — FLUOXETINE HYDROCHLORIDE 10 MG: 10 CAPSULE ORAL at 08:21

## 2022-03-15 RX ADMIN — ERYTHROMYCIN: 5 OINTMENT OPHTHALMIC at 08:22

## 2022-03-15 RX ADMIN — BUPROPION HYDROCHLORIDE 400 MG: 150 TABLET, EXTENDED RELEASE ORAL at 20:45

## 2022-03-16 LAB
ANION GAP SERPL CALCULATED.3IONS-SCNC: 13 MMOL/L (ref 5–15)
BASOPHILS # BLD AUTO: 0.07 10*3/MM3 (ref 0–0.2)
BASOPHILS NFR BLD AUTO: 1.1 % (ref 0–1.5)
BUN SERPL-MCNC: 17 MG/DL (ref 8–23)
BUN/CREAT SERPL: 23.6 (ref 7–25)
CALCIUM SPEC-SCNC: 8.7 MG/DL (ref 8.6–10.5)
CHLORIDE SERPL-SCNC: 100 MMOL/L (ref 98–107)
CO2 SERPL-SCNC: 21 MMOL/L (ref 22–29)
CREAT SERPL-MCNC: 0.72 MG/DL (ref 0.57–1)
DEPRECATED RDW RBC AUTO: 51.8 FL (ref 37–54)
EGFRCR SERPLBLD CKD-EPI 2021: 95.3 ML/MIN/1.73
EOSINOPHIL # BLD AUTO: 0.3 10*3/MM3 (ref 0–0.4)
EOSINOPHIL NFR BLD AUTO: 4.8 % (ref 0.3–6.2)
ERYTHROCYTE [DISTWIDTH] IN BLOOD BY AUTOMATED COUNT: 15 % (ref 12.3–15.4)
GLUCOSE BLDC GLUCOMTR-MCNC: 102 MG/DL (ref 70–130)
GLUCOSE BLDC GLUCOMTR-MCNC: 102 MG/DL (ref 70–130)
GLUCOSE BLDC GLUCOMTR-MCNC: 169 MG/DL (ref 70–130)
GLUCOSE BLDC GLUCOMTR-MCNC: 179 MG/DL (ref 70–130)
GLUCOSE SERPL-MCNC: 143 MG/DL (ref 65–99)
HCT VFR BLD AUTO: 26.4 % (ref 34–46.6)
HGB BLD-MCNC: 7.6 G/DL (ref 12–15.9)
HYPOCHROMIA BLD QL: NORMAL
IMM GRANULOCYTES # BLD AUTO: 0.07 10*3/MM3 (ref 0–0.05)
IMM GRANULOCYTES NFR BLD AUTO: 1.1 % (ref 0–0.5)
LYMPHOCYTES # BLD AUTO: 1.62 10*3/MM3 (ref 0.7–3.1)
LYMPHOCYTES NFR BLD AUTO: 25.9 % (ref 19.6–45.3)
MCH RBC QN AUTO: 27.2 PG (ref 26.6–33)
MCHC RBC AUTO-ENTMCNC: 28.8 G/DL (ref 31.5–35.7)
MCV RBC AUTO: 94.6 FL (ref 79–97)
MONOCYTES # BLD AUTO: 0.66 10*3/MM3 (ref 0.1–0.9)
MONOCYTES NFR BLD AUTO: 10.5 % (ref 5–12)
NEUTROPHILS NFR BLD AUTO: 3.54 10*3/MM3 (ref 1.7–7)
NEUTROPHILS NFR BLD AUTO: 56.6 % (ref 42.7–76)
NRBC BLD AUTO-RTO: 0 /100 WBC (ref 0–0.2)
PLAT MORPH BLD: NORMAL
PLATELET # BLD AUTO: 579 10*3/MM3 (ref 140–450)
PMV BLD AUTO: 9.1 FL (ref 6–12)
POLYCHROMASIA BLD QL SMEAR: NORMAL
POTASSIUM SERPL-SCNC: 4.3 MMOL/L (ref 3.5–5.2)
RBC # BLD AUTO: 2.79 10*6/MM3 (ref 3.77–5.28)
SODIUM SERPL-SCNC: 134 MMOL/L (ref 136–145)
STOMATOCYTES BLD QL SMEAR: NORMAL
WBC NRBC COR # BLD: 6.26 10*3/MM3 (ref 3.4–10.8)

## 2022-03-16 PROCEDURE — 85007 BL SMEAR W/DIFF WBC COUNT: CPT | Performed by: PHYSICIAN ASSISTANT

## 2022-03-16 PROCEDURE — 82962 GLUCOSE BLOOD TEST: CPT

## 2022-03-16 PROCEDURE — 97530 THERAPEUTIC ACTIVITIES: CPT

## 2022-03-16 PROCEDURE — 97110 THERAPEUTIC EXERCISES: CPT

## 2022-03-16 PROCEDURE — 63710000001 INSULIN ASPART PER 5 UNITS: Performed by: INTERNAL MEDICINE

## 2022-03-16 PROCEDURE — 25010000002 ENOXAPARIN PER 10 MG: Performed by: INTERNAL MEDICINE

## 2022-03-16 PROCEDURE — 80048 BASIC METABOLIC PNL TOTAL CA: CPT | Performed by: PHYSICIAN ASSISTANT

## 2022-03-16 PROCEDURE — 85025 COMPLETE CBC W/AUTO DIFF WBC: CPT | Performed by: PHYSICIAN ASSISTANT

## 2022-03-16 PROCEDURE — 63710000001 INSULIN DETEMIR PER 5 UNITS: Performed by: INTERNAL MEDICINE

## 2022-03-16 RX ORDER — POLYETHYLENE GLYCOL 3350 17 G/17G
17 POWDER, FOR SOLUTION ORAL DAILY
Status: DISCONTINUED | OUTPATIENT
Start: 2022-03-16 | End: 2022-03-19

## 2022-03-16 RX ORDER — LACTULOSE 10 G/15ML
20 SOLUTION ORAL ONCE
Status: DISCONTINUED | OUTPATIENT
Start: 2022-03-16 | End: 2022-03-26 | Stop reason: HOSPADM

## 2022-03-16 RX ADMIN — BUPROPION HYDROCHLORIDE 400 MG: 150 TABLET, EXTENDED RELEASE ORAL at 20:44

## 2022-03-16 RX ADMIN — PANTOPRAZOLE SODIUM 40 MG: 40 TABLET, DELAYED RELEASE ORAL at 05:33

## 2022-03-16 RX ADMIN — ERYTHROMYCIN: 5 OINTMENT OPHTHALMIC at 20:45

## 2022-03-16 RX ADMIN — METFORMIN HYDROCHLORIDE 1000 MG: 500 TABLET ORAL at 16:59

## 2022-03-16 RX ADMIN — INSULIN DETEMIR 20 UNITS: 100 INJECTION, SOLUTION SUBCUTANEOUS at 20:45

## 2022-03-16 RX ADMIN — ENOXAPARIN SODIUM 30 MG: 30 INJECTION SUBCUTANEOUS at 16:59

## 2022-03-16 RX ADMIN — FLUOXETINE HYDROCHLORIDE 10 MG: 10 CAPSULE ORAL at 08:19

## 2022-03-16 RX ADMIN — INSULIN ASPART 3 UNITS: 100 INJECTION, SOLUTION INTRAVENOUS; SUBCUTANEOUS at 20:45

## 2022-03-16 RX ADMIN — DOCUSATE SODIUM 100 MG: 100 CAPSULE ORAL at 20:45

## 2022-03-16 RX ADMIN — OXYCODONE HYDROCHLORIDE AND ACETAMINOPHEN 1 TABLET: 10; 325 TABLET ORAL at 13:30

## 2022-03-16 RX ADMIN — ERYTHROMYCIN: 5 OINTMENT OPHTHALMIC at 08:21

## 2022-03-16 RX ADMIN — HYDROXYZINE HYDROCHLORIDE 25 MG: 25 TABLET ORAL at 05:34

## 2022-03-16 RX ADMIN — METFORMIN HYDROCHLORIDE 1000 MG: 500 TABLET ORAL at 08:19

## 2022-03-16 RX ADMIN — Medication 150 MG: at 08:20

## 2022-03-16 RX ADMIN — CETIRIZINE HYDROCHLORIDE 10 MG: 10 TABLET, FILM COATED ORAL at 08:19

## 2022-03-16 RX ADMIN — Medication 150 MG: at 20:45

## 2022-03-16 RX ADMIN — Medication 10 ML: at 08:19

## 2022-03-16 RX ADMIN — DOCUSATE SODIUM 100 MG: 100 CAPSULE ORAL at 08:19

## 2022-03-16 RX ADMIN — INSULIN ASPART 3 UNITS: 100 INJECTION, SOLUTION INTRAVENOUS; SUBCUTANEOUS at 16:59

## 2022-03-16 RX ADMIN — ENOXAPARIN SODIUM 30 MG: 30 INJECTION SUBCUTANEOUS at 05:44

## 2022-03-17 LAB
GLUCOSE BLDC GLUCOMTR-MCNC: 113 MG/DL (ref 70–130)
GLUCOSE BLDC GLUCOMTR-MCNC: 124 MG/DL (ref 70–130)
GLUCOSE BLDC GLUCOMTR-MCNC: 139 MG/DL (ref 70–130)
GLUCOSE BLDC GLUCOMTR-MCNC: 140 MG/DL (ref 70–130)

## 2022-03-17 PROCEDURE — 97530 THERAPEUTIC ACTIVITIES: CPT

## 2022-03-17 PROCEDURE — 82962 GLUCOSE BLOOD TEST: CPT

## 2022-03-17 PROCEDURE — 97110 THERAPEUTIC EXERCISES: CPT

## 2022-03-17 PROCEDURE — 25010000002 ENOXAPARIN PER 10 MG: Performed by: INTERNAL MEDICINE

## 2022-03-17 PROCEDURE — 63710000001 INSULIN DETEMIR PER 5 UNITS: Performed by: INTERNAL MEDICINE

## 2022-03-17 RX ADMIN — DOCUSATE SODIUM 100 MG: 100 CAPSULE ORAL at 20:30

## 2022-03-17 RX ADMIN — CETIRIZINE HYDROCHLORIDE 10 MG: 10 TABLET, FILM COATED ORAL at 08:27

## 2022-03-17 RX ADMIN — ENOXAPARIN SODIUM 30 MG: 30 INJECTION SUBCUTANEOUS at 17:31

## 2022-03-17 RX ADMIN — ERYTHROMYCIN: 5 OINTMENT OPHTHALMIC at 20:31

## 2022-03-17 RX ADMIN — METFORMIN HYDROCHLORIDE 1000 MG: 500 TABLET ORAL at 08:27

## 2022-03-17 RX ADMIN — FLUOXETINE HYDROCHLORIDE 10 MG: 10 CAPSULE ORAL at 08:27

## 2022-03-17 RX ADMIN — ENOXAPARIN SODIUM 30 MG: 30 INJECTION SUBCUTANEOUS at 05:44

## 2022-03-17 RX ADMIN — BUPROPION HYDROCHLORIDE 400 MG: 150 TABLET, EXTENDED RELEASE ORAL at 20:30

## 2022-03-17 RX ADMIN — PANTOPRAZOLE SODIUM 40 MG: 40 TABLET, DELAYED RELEASE ORAL at 05:44

## 2022-03-17 RX ADMIN — ERYTHROMYCIN: 5 OINTMENT OPHTHALMIC at 08:29

## 2022-03-17 RX ADMIN — OXYCODONE HYDROCHLORIDE AND ACETAMINOPHEN 1 TABLET: 10; 325 TABLET ORAL at 13:37

## 2022-03-17 RX ADMIN — Medication 150 MG: at 20:30

## 2022-03-17 RX ADMIN — DOCUSATE SODIUM 100 MG: 100 CAPSULE ORAL at 08:28

## 2022-03-17 RX ADMIN — INSULIN DETEMIR 20 UNITS: 100 INJECTION, SOLUTION SUBCUTANEOUS at 20:31

## 2022-03-17 RX ADMIN — METFORMIN HYDROCHLORIDE 1000 MG: 500 TABLET ORAL at 17:31

## 2022-03-17 RX ADMIN — Medication 150 MG: at 08:30

## 2022-03-18 LAB
BASOPHILS # BLD AUTO: 0.06 10*3/MM3 (ref 0–0.2)
BASOPHILS NFR BLD AUTO: 1.1 % (ref 0–1.5)
DEPRECATED RDW RBC AUTO: 51 FL (ref 37–54)
EOSINOPHIL # BLD AUTO: 0.25 10*3/MM3 (ref 0–0.4)
EOSINOPHIL NFR BLD AUTO: 4.4 % (ref 0.3–6.2)
ERYTHROCYTE [DISTWIDTH] IN BLOOD BY AUTOMATED COUNT: 15.3 % (ref 12.3–15.4)
GLUCOSE BLDC GLUCOMTR-MCNC: 106 MG/DL (ref 70–130)
GLUCOSE BLDC GLUCOMTR-MCNC: 127 MG/DL (ref 70–130)
GLUCOSE BLDC GLUCOMTR-MCNC: 145 MG/DL (ref 70–130)
GLUCOSE BLDC GLUCOMTR-MCNC: 161 MG/DL (ref 70–130)
HCT VFR BLD AUTO: 26 % (ref 34–46.6)
HGB BLD-MCNC: 7.6 G/DL (ref 12–15.9)
IMM GRANULOCYTES # BLD AUTO: 0.04 10*3/MM3 (ref 0–0.05)
IMM GRANULOCYTES NFR BLD AUTO: 0.7 % (ref 0–0.5)
LYMPHOCYTES # BLD AUTO: 1.6 10*3/MM3 (ref 0.7–3.1)
LYMPHOCYTES NFR BLD AUTO: 28.3 % (ref 19.6–45.3)
MCH RBC QN AUTO: 26.8 PG (ref 26.6–33)
MCHC RBC AUTO-ENTMCNC: 29.2 G/DL (ref 31.5–35.7)
MCV RBC AUTO: 91.5 FL (ref 79–97)
MONOCYTES # BLD AUTO: 0.57 10*3/MM3 (ref 0.1–0.9)
MONOCYTES NFR BLD AUTO: 10.1 % (ref 5–12)
NEUTROPHILS NFR BLD AUTO: 3.13 10*3/MM3 (ref 1.7–7)
NEUTROPHILS NFR BLD AUTO: 55.4 % (ref 42.7–76)
NRBC BLD AUTO-RTO: 0 /100 WBC (ref 0–0.2)
PLATELET # BLD AUTO: 602 10*3/MM3 (ref 140–450)
PMV BLD AUTO: 8.7 FL (ref 6–12)
RBC # BLD AUTO: 2.84 10*6/MM3 (ref 3.77–5.28)
WBC NRBC COR # BLD: 5.65 10*3/MM3 (ref 3.4–10.8)

## 2022-03-18 PROCEDURE — 25010000002 ENOXAPARIN PER 10 MG: Performed by: INTERNAL MEDICINE

## 2022-03-18 PROCEDURE — 82962 GLUCOSE BLOOD TEST: CPT

## 2022-03-18 PROCEDURE — 97530 THERAPEUTIC ACTIVITIES: CPT

## 2022-03-18 PROCEDURE — 94799 UNLISTED PULMONARY SVC/PX: CPT

## 2022-03-18 PROCEDURE — 63710000001 INSULIN DETEMIR PER 5 UNITS: Performed by: INTERNAL MEDICINE

## 2022-03-18 PROCEDURE — 97110 THERAPEUTIC EXERCISES: CPT

## 2022-03-18 PROCEDURE — 63710000001 INSULIN ASPART PER 5 UNITS: Performed by: INTERNAL MEDICINE

## 2022-03-18 PROCEDURE — 85025 COMPLETE CBC W/AUTO DIFF WBC: CPT | Performed by: PHYSICIAN ASSISTANT

## 2022-03-18 RX ADMIN — FLUOXETINE HYDROCHLORIDE 10 MG: 10 CAPSULE ORAL at 08:56

## 2022-03-18 RX ADMIN — PANTOPRAZOLE SODIUM 40 MG: 40 TABLET, DELAYED RELEASE ORAL at 05:27

## 2022-03-18 RX ADMIN — INSULIN DETEMIR 20 UNITS: 100 INJECTION, SOLUTION SUBCUTANEOUS at 21:00

## 2022-03-18 RX ADMIN — INSULIN ASPART 3 UNITS: 100 INJECTION, SOLUTION INTRAVENOUS; SUBCUTANEOUS at 17:40

## 2022-03-18 RX ADMIN — BUPROPION HYDROCHLORIDE 400 MG: 150 TABLET, EXTENDED RELEASE ORAL at 20:31

## 2022-03-18 RX ADMIN — Medication 10 ML: at 08:57

## 2022-03-18 RX ADMIN — Medication 150 MG: at 08:57

## 2022-03-18 RX ADMIN — CETIRIZINE HYDROCHLORIDE 10 MG: 10 TABLET, FILM COATED ORAL at 08:56

## 2022-03-18 RX ADMIN — DOCUSATE SODIUM 100 MG: 100 CAPSULE ORAL at 08:56

## 2022-03-18 RX ADMIN — METFORMIN HYDROCHLORIDE 1000 MG: 500 TABLET ORAL at 17:41

## 2022-03-18 RX ADMIN — ENOXAPARIN SODIUM 30 MG: 30 INJECTION SUBCUTANEOUS at 05:27

## 2022-03-18 RX ADMIN — DOCUSATE SODIUM 100 MG: 100 CAPSULE ORAL at 20:31

## 2022-03-18 RX ADMIN — PANTOPRAZOLE SODIUM 40 MG: 40 TABLET, DELAYED RELEASE ORAL at 20:31

## 2022-03-18 RX ADMIN — METFORMIN HYDROCHLORIDE 1000 MG: 500 TABLET ORAL at 08:56

## 2022-03-18 RX ADMIN — HYDROXYZINE HYDROCHLORIDE 25 MG: 25 TABLET ORAL at 00:30

## 2022-03-18 RX ADMIN — Medication 150 MG: at 20:31

## 2022-03-18 RX ADMIN — OXYCODONE HYDROCHLORIDE AND ACETAMINOPHEN 1 TABLET: 10; 325 TABLET ORAL at 14:49

## 2022-03-18 RX ADMIN — ENOXAPARIN SODIUM 30 MG: 30 INJECTION SUBCUTANEOUS at 17:41

## 2022-03-18 RX ADMIN — ERYTHROMYCIN: 5 OINTMENT OPHTHALMIC at 08:58

## 2022-03-19 LAB
GLUCOSE BLDC GLUCOMTR-MCNC: 119 MG/DL (ref 70–130)
GLUCOSE BLDC GLUCOMTR-MCNC: 135 MG/DL (ref 70–130)
GLUCOSE BLDC GLUCOMTR-MCNC: 149 MG/DL (ref 70–130)
GLUCOSE BLDC GLUCOMTR-MCNC: 183 MG/DL (ref 70–130)

## 2022-03-19 PROCEDURE — 97530 THERAPEUTIC ACTIVITIES: CPT

## 2022-03-19 PROCEDURE — 97110 THERAPEUTIC EXERCISES: CPT

## 2022-03-19 PROCEDURE — 25010000002 ENOXAPARIN PER 10 MG: Performed by: INTERNAL MEDICINE

## 2022-03-19 PROCEDURE — 63710000001 INSULIN ASPART PER 5 UNITS: Performed by: INTERNAL MEDICINE

## 2022-03-19 PROCEDURE — 63710000001 INSULIN DETEMIR PER 5 UNITS: Performed by: INTERNAL MEDICINE

## 2022-03-19 PROCEDURE — 82962 GLUCOSE BLOOD TEST: CPT

## 2022-03-19 RX ORDER — POLYETHYLENE GLYCOL 3350 17 G/17G
17 POWDER, FOR SOLUTION ORAL 2 TIMES DAILY
Status: DISCONTINUED | OUTPATIENT
Start: 2022-03-19 | End: 2022-03-26 | Stop reason: HOSPADM

## 2022-03-19 RX ADMIN — HYDROXYZINE HYDROCHLORIDE 25 MG: 25 TABLET ORAL at 01:33

## 2022-03-19 RX ADMIN — Medication 150 MG: at 09:26

## 2022-03-19 RX ADMIN — PANTOPRAZOLE SODIUM 40 MG: 40 TABLET, DELAYED RELEASE ORAL at 11:41

## 2022-03-19 RX ADMIN — INSULIN ASPART 3 UNITS: 100 INJECTION, SOLUTION INTRAVENOUS; SUBCUTANEOUS at 21:39

## 2022-03-19 RX ADMIN — OXYCODONE HYDROCHLORIDE AND ACETAMINOPHEN 1 TABLET: 10; 325 TABLET ORAL at 10:29

## 2022-03-19 RX ADMIN — POLYETHYLENE GLYCOL (3350) 17 G: 17 POWDER, FOR SOLUTION ORAL at 21:40

## 2022-03-19 RX ADMIN — CETIRIZINE HYDROCHLORIDE 10 MG: 10 TABLET, FILM COATED ORAL at 09:24

## 2022-03-19 RX ADMIN — ENOXAPARIN SODIUM 30 MG: 30 INJECTION SUBCUTANEOUS at 18:26

## 2022-03-19 RX ADMIN — BUPROPION HYDROCHLORIDE 400 MG: 150 TABLET, EXTENDED RELEASE ORAL at 21:39

## 2022-03-19 RX ADMIN — METFORMIN HYDROCHLORIDE 1000 MG: 500 TABLET ORAL at 18:27

## 2022-03-19 RX ADMIN — DOCUSATE SODIUM 100 MG: 100 CAPSULE ORAL at 21:39

## 2022-03-19 RX ADMIN — ENOXAPARIN SODIUM 30 MG: 30 INJECTION SUBCUTANEOUS at 09:25

## 2022-03-19 RX ADMIN — HYDROXYZINE HYDROCHLORIDE 25 MG: 25 TABLET ORAL at 22:53

## 2022-03-19 RX ADMIN — Medication 150 MG: at 21:39

## 2022-03-19 RX ADMIN — INSULIN DETEMIR 20 UNITS: 100 INJECTION, SOLUTION SUBCUTANEOUS at 21:00

## 2022-03-19 RX ADMIN — FLUOXETINE HYDROCHLORIDE 10 MG: 10 CAPSULE ORAL at 09:24

## 2022-03-19 RX ADMIN — METFORMIN HYDROCHLORIDE 1000 MG: 500 TABLET ORAL at 09:24

## 2022-03-19 RX ADMIN — Medication 10 ML: at 09:25

## 2022-03-20 LAB
ANION GAP SERPL CALCULATED.3IONS-SCNC: 12.9 MMOL/L (ref 5–15)
BASOPHILS # BLD AUTO: 0.05 10*3/MM3 (ref 0–0.2)
BASOPHILS NFR BLD AUTO: 0.8 % (ref 0–1.5)
BUN SERPL-MCNC: 17 MG/DL (ref 8–23)
BUN/CREAT SERPL: 28.3 (ref 7–25)
CALCIUM SPEC-SCNC: 8.6 MG/DL (ref 8.6–10.5)
CHLORIDE SERPL-SCNC: 101 MMOL/L (ref 98–107)
CO2 SERPL-SCNC: 21.1 MMOL/L (ref 22–29)
CREAT SERPL-MCNC: 0.6 MG/DL (ref 0.57–1)
DEPRECATED RDW RBC AUTO: 51.1 FL (ref 37–54)
EGFRCR SERPLBLD CKD-EPI 2021: 102.3 ML/MIN/1.73
EOSINOPHIL # BLD AUTO: 0.39 10*3/MM3 (ref 0–0.4)
EOSINOPHIL NFR BLD AUTO: 6.3 % (ref 0.3–6.2)
ERYTHROCYTE [DISTWIDTH] IN BLOOD BY AUTOMATED COUNT: 15.4 % (ref 12.3–15.4)
GLUCOSE BLDC GLUCOMTR-MCNC: 109 MG/DL (ref 70–130)
GLUCOSE BLDC GLUCOMTR-MCNC: 115 MG/DL (ref 70–130)
GLUCOSE BLDC GLUCOMTR-MCNC: 122 MG/DL (ref 70–130)
GLUCOSE BLDC GLUCOMTR-MCNC: 148 MG/DL (ref 70–130)
GLUCOSE SERPL-MCNC: 103 MG/DL (ref 65–99)
HCT VFR BLD AUTO: 26.2 % (ref 34–46.6)
HGB BLD-MCNC: 7.8 G/DL (ref 12–15.9)
IMM GRANULOCYTES # BLD AUTO: 0.03 10*3/MM3 (ref 0–0.05)
IMM GRANULOCYTES NFR BLD AUTO: 0.5 % (ref 0–0.5)
LYMPHOCYTES # BLD AUTO: 1.49 10*3/MM3 (ref 0.7–3.1)
LYMPHOCYTES NFR BLD AUTO: 24 % (ref 19.6–45.3)
MCH RBC QN AUTO: 27.3 PG (ref 26.6–33)
MCHC RBC AUTO-ENTMCNC: 29.8 G/DL (ref 31.5–35.7)
MCV RBC AUTO: 91.6 FL (ref 79–97)
MONOCYTES # BLD AUTO: 0.65 10*3/MM3 (ref 0.1–0.9)
MONOCYTES NFR BLD AUTO: 10.5 % (ref 5–12)
NEUTROPHILS NFR BLD AUTO: 3.59 10*3/MM3 (ref 1.7–7)
NEUTROPHILS NFR BLD AUTO: 57.9 % (ref 42.7–76)
NRBC BLD AUTO-RTO: 0 /100 WBC (ref 0–0.2)
PLATELET # BLD AUTO: 582 10*3/MM3 (ref 140–450)
PMV BLD AUTO: 8.7 FL (ref 6–12)
POTASSIUM SERPL-SCNC: 4.1 MMOL/L (ref 3.5–5.2)
RBC # BLD AUTO: 2.86 10*6/MM3 (ref 3.77–5.28)
SODIUM SERPL-SCNC: 135 MMOL/L (ref 136–145)
WBC NRBC COR # BLD: 6.2 10*3/MM3 (ref 3.4–10.8)

## 2022-03-20 PROCEDURE — 82962 GLUCOSE BLOOD TEST: CPT

## 2022-03-20 PROCEDURE — 63710000001 INSULIN DETEMIR PER 5 UNITS: Performed by: INTERNAL MEDICINE

## 2022-03-20 PROCEDURE — 80048 BASIC METABOLIC PNL TOTAL CA: CPT | Performed by: INTERNAL MEDICINE

## 2022-03-20 PROCEDURE — 25010000002 ENOXAPARIN PER 10 MG: Performed by: INTERNAL MEDICINE

## 2022-03-20 PROCEDURE — 85025 COMPLETE CBC W/AUTO DIFF WBC: CPT | Performed by: INTERNAL MEDICINE

## 2022-03-20 RX ORDER — FLUOXETINE 10 MG/1
10 CAPSULE ORAL ONCE
Status: COMPLETED | OUTPATIENT
Start: 2022-03-20 | End: 2022-03-20

## 2022-03-20 RX ORDER — FLUOXETINE HYDROCHLORIDE 20 MG/1
20 CAPSULE ORAL DAILY
Status: DISCONTINUED | OUTPATIENT
Start: 2022-03-21 | End: 2022-03-26 | Stop reason: HOSPADM

## 2022-03-20 RX ADMIN — Medication 150 MG: at 20:45

## 2022-03-20 RX ADMIN — Medication 150 MG: at 08:48

## 2022-03-20 RX ADMIN — ENOXAPARIN SODIUM 30 MG: 30 INJECTION SUBCUTANEOUS at 05:53

## 2022-03-20 RX ADMIN — DOCUSATE SODIUM 100 MG: 100 CAPSULE ORAL at 20:45

## 2022-03-20 RX ADMIN — METFORMIN HYDROCHLORIDE 1000 MG: 500 TABLET ORAL at 08:49

## 2022-03-20 RX ADMIN — Medication 10 MG: at 23:26

## 2022-03-20 RX ADMIN — PANTOPRAZOLE SODIUM 40 MG: 40 TABLET, DELAYED RELEASE ORAL at 05:53

## 2022-03-20 RX ADMIN — ENOXAPARIN SODIUM 30 MG: 30 INJECTION SUBCUTANEOUS at 17:10

## 2022-03-20 RX ADMIN — INSULIN DETEMIR 20 UNITS: 100 INJECTION, SOLUTION SUBCUTANEOUS at 21:00

## 2022-03-20 RX ADMIN — CETIRIZINE HYDROCHLORIDE 10 MG: 10 TABLET, FILM COATED ORAL at 08:49

## 2022-03-20 RX ADMIN — POLYETHYLENE GLYCOL (3350) 17 G: 17 POWDER, FOR SOLUTION ORAL at 20:44

## 2022-03-20 RX ADMIN — Medication 10 MG: at 01:15

## 2022-03-20 RX ADMIN — FLUOXETINE HYDROCHLORIDE 10 MG: 10 CAPSULE ORAL at 11:58

## 2022-03-20 RX ADMIN — FLUOXETINE HYDROCHLORIDE 10 MG: 10 CAPSULE ORAL at 08:48

## 2022-03-20 RX ADMIN — HYDROXYZINE HYDROCHLORIDE 25 MG: 25 TABLET ORAL at 01:15

## 2022-03-20 RX ADMIN — METFORMIN HYDROCHLORIDE 1000 MG: 500 TABLET ORAL at 17:10

## 2022-03-20 RX ADMIN — OFLOXACIN 50000 UNITS: 300 TABLET, COATED ORAL at 08:48

## 2022-03-20 RX ADMIN — Medication 10 ML: at 08:49

## 2022-03-20 RX ADMIN — BUPROPION HYDROCHLORIDE 400 MG: 150 TABLET, EXTENDED RELEASE ORAL at 20:45

## 2022-03-21 LAB
GLUCOSE BLDC GLUCOMTR-MCNC: 118 MG/DL (ref 70–130)
GLUCOSE BLDC GLUCOMTR-MCNC: 120 MG/DL (ref 70–130)
GLUCOSE BLDC GLUCOMTR-MCNC: 145 MG/DL (ref 70–130)
GLUCOSE BLDC GLUCOMTR-MCNC: 176 MG/DL (ref 70–130)

## 2022-03-21 PROCEDURE — 63710000001 INSULIN ASPART PER 5 UNITS: Performed by: INTERNAL MEDICINE

## 2022-03-21 PROCEDURE — 25010000002 ONDANSETRON PER 1 MG: Performed by: INTERNAL MEDICINE

## 2022-03-21 PROCEDURE — 82962 GLUCOSE BLOOD TEST: CPT

## 2022-03-21 PROCEDURE — 25010000002 ENOXAPARIN PER 10 MG: Performed by: INTERNAL MEDICINE

## 2022-03-21 PROCEDURE — 97110 THERAPEUTIC EXERCISES: CPT

## 2022-03-21 PROCEDURE — 97530 THERAPEUTIC ACTIVITIES: CPT

## 2022-03-21 PROCEDURE — 63710000001 INSULIN DETEMIR PER 5 UNITS: Performed by: INTERNAL MEDICINE

## 2022-03-21 RX ADMIN — ENOXAPARIN SODIUM 30 MG: 30 INJECTION SUBCUTANEOUS at 05:26

## 2022-03-21 RX ADMIN — Medication 150 MG: at 08:01

## 2022-03-21 RX ADMIN — INSULIN ASPART 3 UNITS: 100 INJECTION, SOLUTION INTRAVENOUS; SUBCUTANEOUS at 16:14

## 2022-03-21 RX ADMIN — Medication 10 MG: at 23:03

## 2022-03-21 RX ADMIN — POLYETHYLENE GLYCOL (3350) 17 G: 17 POWDER, FOR SOLUTION ORAL at 20:43

## 2022-03-21 RX ADMIN — BUPROPION HYDROCHLORIDE 400 MG: 150 TABLET, EXTENDED RELEASE ORAL at 20:43

## 2022-03-21 RX ADMIN — ENOXAPARIN SODIUM 30 MG: 30 INJECTION SUBCUTANEOUS at 16:11

## 2022-03-21 RX ADMIN — Medication 150 MG: at 20:43

## 2022-03-21 RX ADMIN — PANTOPRAZOLE SODIUM 40 MG: 40 TABLET, DELAYED RELEASE ORAL at 05:26

## 2022-03-21 RX ADMIN — INSULIN DETEMIR 20 UNITS: 100 INJECTION, SOLUTION SUBCUTANEOUS at 20:44

## 2022-03-21 RX ADMIN — POLYETHYLENE GLYCOL (3350) 17 G: 17 POWDER, FOR SOLUTION ORAL at 08:01

## 2022-03-21 RX ADMIN — CETIRIZINE HYDROCHLORIDE 10 MG: 10 TABLET, FILM COATED ORAL at 08:01

## 2022-03-21 RX ADMIN — METFORMIN HYDROCHLORIDE 1000 MG: 500 TABLET ORAL at 08:01

## 2022-03-21 RX ADMIN — METFORMIN HYDROCHLORIDE 1000 MG: 500 TABLET ORAL at 16:11

## 2022-03-21 RX ADMIN — DOCUSATE SODIUM 100 MG: 100 CAPSULE ORAL at 08:01

## 2022-03-21 RX ADMIN — DOCUSATE SODIUM 100 MG: 100 CAPSULE ORAL at 20:43

## 2022-03-21 RX ADMIN — ONDANSETRON 4 MG: 2 INJECTION INTRAMUSCULAR; INTRAVENOUS at 11:34

## 2022-03-21 RX ADMIN — FLUOXETINE HYDROCHLORIDE 20 MG: 20 CAPSULE ORAL at 08:01

## 2022-03-22 LAB
FLUAV RNA RESP QL NAA+PROBE: NOT DETECTED
FLUBV RNA RESP QL NAA+PROBE: NOT DETECTED
GLUCOSE BLDC GLUCOMTR-MCNC: 108 MG/DL (ref 70–130)
GLUCOSE BLDC GLUCOMTR-MCNC: 120 MG/DL (ref 70–130)
GLUCOSE BLDC GLUCOMTR-MCNC: 124 MG/DL (ref 70–130)
GLUCOSE BLDC GLUCOMTR-MCNC: 84 MG/DL (ref 70–130)
SARS-COV-2 RNA RESP QL NAA+PROBE: NOT DETECTED

## 2022-03-22 PROCEDURE — 87636 SARSCOV2 & INF A&B AMP PRB: CPT | Performed by: INTERNAL MEDICINE

## 2022-03-22 PROCEDURE — 97110 THERAPEUTIC EXERCISES: CPT

## 2022-03-22 PROCEDURE — 25010000002 ENOXAPARIN PER 10 MG: Performed by: INTERNAL MEDICINE

## 2022-03-22 PROCEDURE — 82962 GLUCOSE BLOOD TEST: CPT

## 2022-03-22 PROCEDURE — 63710000001 INSULIN DETEMIR PER 5 UNITS: Performed by: INTERNAL MEDICINE

## 2022-03-22 RX ADMIN — Medication 10 MG: at 22:51

## 2022-03-22 RX ADMIN — Medication 150 MG: at 09:25

## 2022-03-22 RX ADMIN — METFORMIN HYDROCHLORIDE 1000 MG: 500 TABLET ORAL at 16:36

## 2022-03-22 RX ADMIN — PANTOPRAZOLE SODIUM 40 MG: 40 TABLET, DELAYED RELEASE ORAL at 05:21

## 2022-03-22 RX ADMIN — METFORMIN HYDROCHLORIDE 1000 MG: 500 TABLET ORAL at 09:25

## 2022-03-22 RX ADMIN — HYDROXYZINE HYDROCHLORIDE 25 MG: 25 TABLET ORAL at 01:42

## 2022-03-22 RX ADMIN — DOCUSATE SODIUM 100 MG: 100 CAPSULE ORAL at 20:56

## 2022-03-22 RX ADMIN — Medication 150 MG: at 20:56

## 2022-03-22 RX ADMIN — BUPROPION HYDROCHLORIDE 400 MG: 150 TABLET, EXTENDED RELEASE ORAL at 20:56

## 2022-03-22 RX ADMIN — ENOXAPARIN SODIUM 30 MG: 30 INJECTION SUBCUTANEOUS at 05:21

## 2022-03-22 RX ADMIN — ENOXAPARIN SODIUM 30 MG: 30 INJECTION SUBCUTANEOUS at 16:35

## 2022-03-22 RX ADMIN — Medication 10 ML: at 09:25

## 2022-03-22 RX ADMIN — INSULIN DETEMIR 20 UNITS: 100 INJECTION, SOLUTION SUBCUTANEOUS at 22:10

## 2022-03-22 RX ADMIN — FLUOXETINE HYDROCHLORIDE 20 MG: 20 CAPSULE ORAL at 09:25

## 2022-03-22 RX ADMIN — CETIRIZINE HYDROCHLORIDE 10 MG: 10 TABLET, FILM COATED ORAL at 09:25

## 2022-03-23 LAB
GLUCOSE BLDC GLUCOMTR-MCNC: 107 MG/DL (ref 70–130)
GLUCOSE BLDC GLUCOMTR-MCNC: 107 MG/DL (ref 70–130)
GLUCOSE BLDC GLUCOMTR-MCNC: 123 MG/DL (ref 70–130)
GLUCOSE BLDC GLUCOMTR-MCNC: 153 MG/DL (ref 70–130)
HCT VFR BLD AUTO: 29.3 % (ref 34–46.6)
HGB BLD-MCNC: 8.7 G/DL (ref 12–15.9)

## 2022-03-23 PROCEDURE — 85014 HEMATOCRIT: CPT | Performed by: PHYSICIAN ASSISTANT

## 2022-03-23 PROCEDURE — 82962 GLUCOSE BLOOD TEST: CPT

## 2022-03-23 PROCEDURE — 25010000002 ENOXAPARIN PER 10 MG: Performed by: INTERNAL MEDICINE

## 2022-03-23 PROCEDURE — 85018 HEMOGLOBIN: CPT | Performed by: PHYSICIAN ASSISTANT

## 2022-03-23 PROCEDURE — 63710000001 INSULIN DETEMIR PER 5 UNITS: Performed by: INTERNAL MEDICINE

## 2022-03-23 PROCEDURE — 97110 THERAPEUTIC EXERCISES: CPT

## 2022-03-23 PROCEDURE — 63710000001 INSULIN ASPART PER 5 UNITS: Performed by: INTERNAL MEDICINE

## 2022-03-23 RX ADMIN — INSULIN DETEMIR 20 UNITS: 100 INJECTION, SOLUTION SUBCUTANEOUS at 21:03

## 2022-03-23 RX ADMIN — METFORMIN HYDROCHLORIDE 1000 MG: 500 TABLET ORAL at 08:29

## 2022-03-23 RX ADMIN — Medication 150 MG: at 20:50

## 2022-03-23 RX ADMIN — ENOXAPARIN SODIUM 30 MG: 30 INJECTION SUBCUTANEOUS at 05:24

## 2022-03-23 RX ADMIN — Medication 10 MG: at 22:52

## 2022-03-23 RX ADMIN — BUPROPION HYDROCHLORIDE 400 MG: 150 TABLET, EXTENDED RELEASE ORAL at 20:49

## 2022-03-23 RX ADMIN — FLUOXETINE HYDROCHLORIDE 20 MG: 20 CAPSULE ORAL at 08:30

## 2022-03-23 RX ADMIN — DOCUSATE SODIUM 100 MG: 100 CAPSULE ORAL at 08:30

## 2022-03-23 RX ADMIN — Medication 10 ML: at 20:50

## 2022-03-23 RX ADMIN — METFORMIN HYDROCHLORIDE 1000 MG: 500 TABLET ORAL at 17:56

## 2022-03-23 RX ADMIN — PANTOPRAZOLE SODIUM 40 MG: 40 TABLET, DELAYED RELEASE ORAL at 05:24

## 2022-03-23 RX ADMIN — CETIRIZINE HYDROCHLORIDE 10 MG: 10 TABLET, FILM COATED ORAL at 08:30

## 2022-03-23 RX ADMIN — OXYCODONE HYDROCHLORIDE AND ACETAMINOPHEN 1 TABLET: 10; 325 TABLET ORAL at 11:44

## 2022-03-23 RX ADMIN — Medication 10 ML: at 08:30

## 2022-03-23 RX ADMIN — ENOXAPARIN SODIUM 30 MG: 30 INJECTION SUBCUTANEOUS at 17:56

## 2022-03-23 RX ADMIN — DOCUSATE SODIUM 100 MG: 100 CAPSULE ORAL at 20:49

## 2022-03-23 RX ADMIN — Medication 150 MG: at 08:30

## 2022-03-23 RX ADMIN — HYDROXYZINE HYDROCHLORIDE 25 MG: 25 TABLET ORAL at 00:25

## 2022-03-23 RX ADMIN — INSULIN ASPART 3 UNITS: 100 INJECTION, SOLUTION INTRAVENOUS; SUBCUTANEOUS at 21:04

## 2022-03-24 LAB
ANION GAP SERPL CALCULATED.3IONS-SCNC: 11.6 MMOL/L (ref 5–15)
BASOPHILS # BLD AUTO: 0.05 10*3/MM3 (ref 0–0.2)
BASOPHILS NFR BLD AUTO: 1 % (ref 0–1.5)
BUN SERPL-MCNC: 17 MG/DL (ref 8–23)
BUN/CREAT SERPL: 27.9 (ref 7–25)
CALCIUM SPEC-SCNC: 8.9 MG/DL (ref 8.6–10.5)
CHLORIDE SERPL-SCNC: 98 MMOL/L (ref 98–107)
CO2 SERPL-SCNC: 22.4 MMOL/L (ref 22–29)
CREAT SERPL-MCNC: 0.61 MG/DL (ref 0.57–1)
DEPRECATED RDW RBC AUTO: 50.1 FL (ref 37–54)
EGFRCR SERPLBLD CKD-EPI 2021: 101.9 ML/MIN/1.73
EOSINOPHIL # BLD AUTO: 0.2 10*3/MM3 (ref 0–0.4)
EOSINOPHIL NFR BLD AUTO: 4.1 % (ref 0.3–6.2)
ERYTHROCYTE [DISTWIDTH] IN BLOOD BY AUTOMATED COUNT: 15.4 % (ref 12.3–15.4)
GLUCOSE BLDC GLUCOMTR-MCNC: 109 MG/DL (ref 70–130)
GLUCOSE BLDC GLUCOMTR-MCNC: 111 MG/DL (ref 70–130)
GLUCOSE BLDC GLUCOMTR-MCNC: 120 MG/DL (ref 70–130)
GLUCOSE BLDC GLUCOMTR-MCNC: 167 MG/DL (ref 70–130)
GLUCOSE SERPL-MCNC: 122 MG/DL (ref 65–99)
HCT VFR BLD AUTO: 29.8 % (ref 34–46.6)
HGB BLD-MCNC: 8.9 G/DL (ref 12–15.9)
IMM GRANULOCYTES # BLD AUTO: 0.03 10*3/MM3 (ref 0–0.05)
IMM GRANULOCYTES NFR BLD AUTO: 0.6 % (ref 0–0.5)
LYMPHOCYTES # BLD AUTO: 0.95 10*3/MM3 (ref 0.7–3.1)
LYMPHOCYTES NFR BLD AUTO: 19.7 % (ref 19.6–45.3)
MAGNESIUM SERPL-MCNC: 1.9 MG/DL (ref 1.6–2.4)
MCH RBC QN AUTO: 26.4 PG (ref 26.6–33)
MCHC RBC AUTO-ENTMCNC: 29.9 G/DL (ref 31.5–35.7)
MCV RBC AUTO: 88.4 FL (ref 79–97)
MONOCYTES # BLD AUTO: 0.64 10*3/MM3 (ref 0.1–0.9)
MONOCYTES NFR BLD AUTO: 13.3 % (ref 5–12)
NEUTROPHILS NFR BLD AUTO: 2.95 10*3/MM3 (ref 1.7–7)
NEUTROPHILS NFR BLD AUTO: 61.3 % (ref 42.7–76)
NRBC BLD AUTO-RTO: 0 /100 WBC (ref 0–0.2)
PLATELET # BLD AUTO: 474 10*3/MM3 (ref 140–450)
PMV BLD AUTO: 8.5 FL (ref 6–12)
POTASSIUM SERPL-SCNC: 4.1 MMOL/L (ref 3.5–5.2)
RBC # BLD AUTO: 3.37 10*6/MM3 (ref 3.77–5.28)
SODIUM SERPL-SCNC: 132 MMOL/L (ref 136–145)
WBC NRBC COR # BLD: 4.82 10*3/MM3 (ref 3.4–10.8)

## 2022-03-24 PROCEDURE — 87205 SMEAR GRAM STAIN: CPT | Performed by: NURSE PRACTITIONER

## 2022-03-24 PROCEDURE — 87070 CULTURE OTHR SPECIMN AEROBIC: CPT | Performed by: NURSE PRACTITIONER

## 2022-03-24 PROCEDURE — 85025 COMPLETE CBC W/AUTO DIFF WBC: CPT | Performed by: PHYSICIAN ASSISTANT

## 2022-03-24 PROCEDURE — 87186 SC STD MICRODIL/AGAR DIL: CPT | Performed by: NURSE PRACTITIONER

## 2022-03-24 PROCEDURE — 25010000002 ENOXAPARIN PER 10 MG: Performed by: INTERNAL MEDICINE

## 2022-03-24 PROCEDURE — 80048 BASIC METABOLIC PNL TOTAL CA: CPT | Performed by: PHYSICIAN ASSISTANT

## 2022-03-24 PROCEDURE — 25010000002 ONDANSETRON PER 1 MG: Performed by: INTERNAL MEDICINE

## 2022-03-24 PROCEDURE — 63710000001 INSULIN ASPART PER 5 UNITS: Performed by: INTERNAL MEDICINE

## 2022-03-24 PROCEDURE — 99307 SBSQ NF CARE SF MDM 10: CPT | Performed by: PHYSICIAN ASSISTANT

## 2022-03-24 PROCEDURE — 97530 THERAPEUTIC ACTIVITIES: CPT

## 2022-03-24 PROCEDURE — 25010000002 MAGNESIUM SULFATE IN D5W 1G/100ML (PREMIX) 1-5 GM/100ML-% SOLUTION: Performed by: INTERNAL MEDICINE

## 2022-03-24 PROCEDURE — 82962 GLUCOSE BLOOD TEST: CPT

## 2022-03-24 PROCEDURE — 63710000001 INSULIN DETEMIR PER 5 UNITS: Performed by: INTERNAL MEDICINE

## 2022-03-24 PROCEDURE — 83735 ASSAY OF MAGNESIUM: CPT | Performed by: PHYSICIAN ASSISTANT

## 2022-03-24 RX ORDER — SULFAMETHOXAZOLE AND TRIMETHOPRIM 800; 160 MG/1; MG/1
1 TABLET ORAL EVERY 12 HOURS SCHEDULED
Status: DISCONTINUED | OUTPATIENT
Start: 2022-03-24 | End: 2022-03-26 | Stop reason: HOSPADM

## 2022-03-24 RX ORDER — MAGNESIUM SULFATE 1 G/100ML
1 INJECTION INTRAVENOUS ONCE
Status: COMPLETED | OUTPATIENT
Start: 2022-03-24 | End: 2022-03-24

## 2022-03-24 RX ADMIN — METFORMIN HYDROCHLORIDE 1000 MG: 500 TABLET ORAL at 18:41

## 2022-03-24 RX ADMIN — INSULIN ASPART 3 UNITS: 100 INJECTION, SOLUTION INTRAVENOUS; SUBCUTANEOUS at 20:50

## 2022-03-24 RX ADMIN — METFORMIN HYDROCHLORIDE 1000 MG: 500 TABLET ORAL at 09:53

## 2022-03-24 RX ADMIN — BUPROPION HYDROCHLORIDE 400 MG: 150 TABLET, EXTENDED RELEASE ORAL at 20:44

## 2022-03-24 RX ADMIN — DOCUSATE SODIUM 100 MG: 100 CAPSULE ORAL at 20:44

## 2022-03-24 RX ADMIN — INSULIN DETEMIR 20 UNITS: 100 INJECTION, SOLUTION SUBCUTANEOUS at 20:51

## 2022-03-24 RX ADMIN — ONDANSETRON 4 MG: 2 INJECTION INTRAMUSCULAR; INTRAVENOUS at 05:56

## 2022-03-24 RX ADMIN — SULFAMETHOXAZOLE AND TRIMETHOPRIM 1 TABLET: 800; 160 TABLET ORAL at 09:53

## 2022-03-24 RX ADMIN — Medication 150 MG: at 08:23

## 2022-03-24 RX ADMIN — ENOXAPARIN SODIUM 30 MG: 30 INJECTION SUBCUTANEOUS at 17:34

## 2022-03-24 RX ADMIN — ENOXAPARIN SODIUM 30 MG: 30 INJECTION SUBCUTANEOUS at 05:49

## 2022-03-24 RX ADMIN — SULFAMETHOXAZOLE AND TRIMETHOPRIM 1 TABLET: 800; 160 TABLET ORAL at 20:44

## 2022-03-24 RX ADMIN — PANTOPRAZOLE SODIUM 40 MG: 40 TABLET, DELAYED RELEASE ORAL at 05:48

## 2022-03-24 RX ADMIN — CETIRIZINE HYDROCHLORIDE 10 MG: 10 TABLET, FILM COATED ORAL at 08:27

## 2022-03-24 RX ADMIN — FLUOXETINE HYDROCHLORIDE 20 MG: 20 CAPSULE ORAL at 09:53

## 2022-03-24 RX ADMIN — Medication 150 MG: at 20:44

## 2022-03-24 RX ADMIN — MAGNESIUM SULFATE HEPTAHYDRATE 1 G: 1 INJECTION, SOLUTION INTRAVENOUS at 16:31

## 2022-03-24 RX ADMIN — DOCUSATE SODIUM 100 MG: 100 CAPSULE ORAL at 08:22

## 2022-03-25 VITALS
OXYGEN SATURATION: 97 % | SYSTOLIC BLOOD PRESSURE: 119 MMHG | HEART RATE: 90 BPM | HEIGHT: 66 IN | DIASTOLIC BLOOD PRESSURE: 62 MMHG | RESPIRATION RATE: 16 BRPM | BODY MASS INDEX: 47.09 KG/M2 | WEIGHT: 293 LBS | TEMPERATURE: 98.5 F

## 2022-03-25 LAB
GLUCOSE BLDC GLUCOMTR-MCNC: 100 MG/DL (ref 70–130)
GLUCOSE BLDC GLUCOMTR-MCNC: 117 MG/DL (ref 70–130)
GLUCOSE BLDC GLUCOMTR-MCNC: 138 MG/DL (ref 70–130)
MAGNESIUM SERPL-MCNC: 2.2 MG/DL (ref 1.6–2.4)

## 2022-03-25 PROCEDURE — 83735 ASSAY OF MAGNESIUM: CPT | Performed by: INTERNAL MEDICINE

## 2022-03-25 PROCEDURE — 25010000002 ENOXAPARIN PER 10 MG: Performed by: INTERNAL MEDICINE

## 2022-03-25 PROCEDURE — 82962 GLUCOSE BLOOD TEST: CPT

## 2022-03-25 PROCEDURE — 99316 NF DSCHRG MGMT 30 MIN+: CPT | Performed by: PHYSICIAN ASSISTANT

## 2022-03-25 PROCEDURE — 97535 SELF CARE MNGMENT TRAINING: CPT

## 2022-03-25 RX ORDER — CYCLOBENZAPRINE HCL 5 MG
5 TABLET ORAL 3 TIMES DAILY PRN
Start: 2022-03-25

## 2022-03-25 RX ORDER — ACETAMINOPHEN 325 MG/1
650 TABLET ORAL EVERY 4 HOURS PRN
Start: 2022-03-25

## 2022-03-25 RX ORDER — HYDROXYZINE HYDROCHLORIDE 25 MG/1
25 TABLET, FILM COATED ORAL 3 TIMES DAILY PRN
Start: 2022-03-25

## 2022-03-25 RX ORDER — PSEUDOEPHEDRINE HCL 30 MG
100 TABLET ORAL 2 TIMES DAILY
Start: 2022-03-25

## 2022-03-25 RX ORDER — SULFAMETHOXAZOLE AND TRIMETHOPRIM 800; 160 MG/1; MG/1
1 TABLET ORAL EVERY 12 HOURS SCHEDULED
Qty: 18 TABLET | Refills: 0 | Status: SHIPPED | OUTPATIENT
Start: 2022-03-25 | End: 2022-04-03

## 2022-03-25 RX ORDER — IRON POLYSACCHARIDE COMPLEX 150 MG
150 CAPSULE ORAL 2 TIMES DAILY
Status: ON HOLD
Start: 2022-03-25 | End: 2022-06-09

## 2022-03-25 RX ORDER — FLUOXETINE 10 MG/1
20 CAPSULE ORAL DAILY
Status: ON HOLD
Start: 2022-03-25 | End: 2022-06-09

## 2022-03-25 RX ADMIN — SULFAMETHOXAZOLE AND TRIMETHOPRIM 1 TABLET: 800; 160 TABLET ORAL at 08:44

## 2022-03-25 RX ADMIN — ENOXAPARIN SODIUM 30 MG: 30 INJECTION SUBCUTANEOUS at 05:23

## 2022-03-25 RX ADMIN — Medication 10 ML: at 08:44

## 2022-03-25 RX ADMIN — METFORMIN HYDROCHLORIDE 1000 MG: 500 TABLET ORAL at 08:43

## 2022-03-25 RX ADMIN — PANTOPRAZOLE SODIUM 40 MG: 40 TABLET, DELAYED RELEASE ORAL at 05:23

## 2022-03-25 RX ADMIN — Medication 150 MG: at 08:43

## 2022-03-25 RX ADMIN — FLUOXETINE HYDROCHLORIDE 20 MG: 20 CAPSULE ORAL at 08:43

## 2022-03-25 RX ADMIN — CETIRIZINE HYDROCHLORIDE 10 MG: 10 TABLET, FILM COATED ORAL at 08:43

## 2022-03-25 RX ADMIN — ENOXAPARIN SODIUM 30 MG: 30 INJECTION SUBCUTANEOUS at 17:07

## 2022-03-25 RX ADMIN — METFORMIN HYDROCHLORIDE 1000 MG: 500 TABLET ORAL at 17:07

## 2022-03-26 LAB
BACTERIA SPEC AEROBE CULT: ABNORMAL
BACTERIA SPEC AEROBE CULT: ABNORMAL
GRAM STN SPEC: ABNORMAL
GRAM STN SPEC: ABNORMAL

## 2022-06-06 ENCOUNTER — HOSPITAL ENCOUNTER (OUTPATIENT)
Dept: GENERAL RADIOLOGY | Facility: HOSPITAL | Age: 62
Discharge: HOME | End: 2022-06-06

## 2022-06-06 ENCOUNTER — PRE-ADMISSION TESTING (OUTPATIENT)
Dept: PREADMISSION TESTING | Facility: HOSPITAL | Age: 62
End: 2022-06-06

## 2022-06-06 LAB
ANION GAP SERPL CALCULATED.3IONS-SCNC: 14.3 MMOL/L (ref 5–15)
BASOPHILS # BLD AUTO: 0.08 10*3/MM3 (ref 0–0.2)
BASOPHILS NFR BLD AUTO: 1.1 % (ref 0–1.5)
BUN SERPL-MCNC: 17 MG/DL (ref 8–23)
BUN/CREAT SERPL: 21 (ref 7–25)
CALCIUM SPEC-SCNC: 9.7 MG/DL (ref 8.6–10.5)
CHLORIDE SERPL-SCNC: 100 MMOL/L (ref 98–107)
CO2 SERPL-SCNC: 23.7 MMOL/L (ref 22–29)
CREAT SERPL-MCNC: 0.81 MG/DL (ref 0.57–1)
CRP SERPL-MCNC: 0.7 MG/DL (ref 0–0.5)
DEPRECATED RDW RBC AUTO: 53.7 FL (ref 37–54)
EGFRCR SERPLBLD CKD-EPI 2021: 82.7 ML/MIN/1.73
EOSINOPHIL # BLD AUTO: 0.29 10*3/MM3 (ref 0–0.4)
EOSINOPHIL NFR BLD AUTO: 3.8 % (ref 0.3–6.2)
ERYTHROCYTE [DISTWIDTH] IN BLOOD BY AUTOMATED COUNT: 17.3 % (ref 12.3–15.4)
ERYTHROCYTE [SEDIMENTATION RATE] IN BLOOD: 43 MM/HR (ref 0–30)
GLUCOSE SERPL-MCNC: 142 MG/DL (ref 65–99)
HCT VFR BLD AUTO: 33.6 % (ref 34–46.6)
HGB BLD-MCNC: 10.2 G/DL (ref 12–15.9)
IMM GRANULOCYTES # BLD AUTO: 0.04 10*3/MM3 (ref 0–0.05)
IMM GRANULOCYTES NFR BLD AUTO: 0.5 % (ref 0–0.5)
LYMPHOCYTES # BLD AUTO: 1.85 10*3/MM3 (ref 0.7–3.1)
LYMPHOCYTES NFR BLD AUTO: 24.5 % (ref 19.6–45.3)
MCH RBC QN AUTO: 25.6 PG (ref 26.6–33)
MCHC RBC AUTO-ENTMCNC: 30.4 G/DL (ref 31.5–35.7)
MCV RBC AUTO: 84.2 FL (ref 79–97)
MONOCYTES # BLD AUTO: 0.52 10*3/MM3 (ref 0.1–0.9)
MONOCYTES NFR BLD AUTO: 6.9 % (ref 5–12)
NEUTROPHILS NFR BLD AUTO: 4.77 10*3/MM3 (ref 1.7–7)
NEUTROPHILS NFR BLD AUTO: 63.2 % (ref 42.7–76)
NRBC BLD AUTO-RTO: 0 /100 WBC (ref 0–0.2)
PLATELET # BLD AUTO: 475 10*3/MM3 (ref 140–450)
PMV BLD AUTO: 9.5 FL (ref 6–12)
POTASSIUM SERPL-SCNC: 4.1 MMOL/L (ref 3.5–5.2)
RBC # BLD AUTO: 3.99 10*6/MM3 (ref 3.77–5.28)
SODIUM SERPL-SCNC: 138 MMOL/L (ref 136–145)
WBC NRBC COR # BLD: 7.55 10*3/MM3 (ref 3.4–10.8)

## 2022-06-06 PROCEDURE — 71046 X-RAY EXAM CHEST 2 VIEWS: CPT | Performed by: RADIOLOGY

## 2022-06-06 PROCEDURE — 86140 C-REACTIVE PROTEIN: CPT

## 2022-06-06 PROCEDURE — 85025 COMPLETE CBC W/AUTO DIFF WBC: CPT

## 2022-06-06 PROCEDURE — 36415 COLL VENOUS BLD VENIPUNCTURE: CPT

## 2022-06-06 PROCEDURE — 85652 RBC SED RATE AUTOMATED: CPT

## 2022-06-06 PROCEDURE — 80048 BASIC METABOLIC PNL TOTAL CA: CPT

## 2022-06-06 PROCEDURE — 71046 X-RAY EXAM CHEST 2 VIEWS: CPT

## 2022-06-06 NOTE — DISCHARGE INSTRUCTIONS

## 2022-06-07 ENCOUNTER — TRANSCRIBE ORDERS (OUTPATIENT)
Dept: LAB | Facility: HOSPITAL | Age: 62
End: 2022-06-07

## 2022-06-07 ENCOUNTER — LAB (OUTPATIENT)
Dept: LAB | Facility: HOSPITAL | Age: 62
End: 2022-06-07

## 2022-06-07 DIAGNOSIS — Z01.818 OTHER SPECIFIED PRE-OPERATIVE EXAMINATION: Primary | ICD-10-CM

## 2022-06-07 DIAGNOSIS — Z01.818 OTHER SPECIFIED PRE-OPERATIVE EXAMINATION: ICD-10-CM

## 2022-06-07 LAB — SARS-COV-2 RNA RESP QL NAA+PROBE: NOT DETECTED

## 2022-06-07 PROCEDURE — U0003 INFECTIOUS AGENT DETECTION BY NUCLEIC ACID (DNA OR RNA); SEVERE ACUTE RESPIRATORY SYNDROME CORONAVIRUS 2 (SARS-COV-2) (CORONAVIRUS DISEASE [COVID-19]), AMPLIFIED PROBE TECHNIQUE, MAKING USE OF HIGH THROUGHPUT TECHNOLOGIES AS DESCRIBED BY CMS-2020-01-R: HCPCS | Performed by: ORTHOPAEDIC SURGERY

## 2022-06-07 PROCEDURE — C9803 HOPD COVID-19 SPEC COLLECT: HCPCS

## 2022-06-08 ENCOUNTER — ANESTHESIA EVENT (OUTPATIENT)
Dept: PERIOP | Facility: HOSPITAL | Age: 62
End: 2022-06-08

## 2022-06-08 ENCOUNTER — ANESTHESIA (OUTPATIENT)
Dept: PERIOP | Facility: HOSPITAL | Age: 62
End: 2022-06-08

## 2022-06-08 ENCOUNTER — HOSPITAL ENCOUNTER (OUTPATIENT)
Facility: HOSPITAL | Age: 62
Discharge: HOME HEALTH | End: 2022-06-09
Attending: ORTHOPAEDIC SURGERY | Admitting: ORTHOPAEDIC SURGERY

## 2022-06-08 DIAGNOSIS — S72.002A CLOSED FRACTURE OF NECK OF LEFT FEMUR, INITIAL ENCOUNTER: Primary | ICD-10-CM

## 2022-06-08 DIAGNOSIS — T81.31XA POSTOPERATIVE WOUND DEHISCENCE: ICD-10-CM

## 2022-06-08 LAB
BASOPHILS # BLD AUTO: 0.08 10*3/MM3 (ref 0–0.2)
BASOPHILS NFR BLD AUTO: 1.2 % (ref 0–1.5)
DEPRECATED RDW RBC AUTO: 57.1 FL (ref 37–54)
EOSINOPHIL # BLD AUTO: 0.32 10*3/MM3 (ref 0–0.4)
EOSINOPHIL NFR BLD AUTO: 4.7 % (ref 0.3–6.2)
ERYTHROCYTE [DISTWIDTH] IN BLOOD BY AUTOMATED COUNT: 17.7 % (ref 12.3–15.4)
GLUCOSE BLDC GLUCOMTR-MCNC: 147 MG/DL (ref 70–130)
GLUCOSE BLDC GLUCOMTR-MCNC: 148 MG/DL (ref 70–130)
GLUCOSE BLDC GLUCOMTR-MCNC: 180 MG/DL (ref 70–130)
HBA1C MFR BLD: 6.5 % (ref 4.8–5.6)
HCT VFR BLD AUTO: 34.1 % (ref 34–46.6)
HGB BLD-MCNC: 10.1 G/DL (ref 12–15.9)
IMM GRANULOCYTES # BLD AUTO: 0.04 10*3/MM3 (ref 0–0.05)
IMM GRANULOCYTES NFR BLD AUTO: 0.6 % (ref 0–0.5)
LYMPHOCYTES # BLD AUTO: 1.16 10*3/MM3 (ref 0.7–3.1)
LYMPHOCYTES NFR BLD AUTO: 16.9 % (ref 19.6–45.3)
MCH RBC QN AUTO: 26.1 PG (ref 26.6–33)
MCHC RBC AUTO-ENTMCNC: 29.6 G/DL (ref 31.5–35.7)
MCV RBC AUTO: 88.1 FL (ref 79–97)
MONOCYTES # BLD AUTO: 0.55 10*3/MM3 (ref 0.1–0.9)
MONOCYTES NFR BLD AUTO: 8 % (ref 5–12)
NEUTROPHILS NFR BLD AUTO: 4.72 10*3/MM3 (ref 1.7–7)
NEUTROPHILS NFR BLD AUTO: 68.6 % (ref 42.7–76)
NRBC BLD AUTO-RTO: 0 /100 WBC (ref 0–0.2)
PLATELET # BLD AUTO: 354 10*3/MM3 (ref 140–450)
PMV BLD AUTO: 9.8 FL (ref 6–12)
RBC # BLD AUTO: 3.87 10*6/MM3 (ref 3.77–5.28)
WBC NRBC COR # BLD: 6.87 10*3/MM3 (ref 3.4–10.8)

## 2022-06-08 PROCEDURE — 83036 HEMOGLOBIN GLYCOSYLATED A1C: CPT | Performed by: PHYSICIAN ASSISTANT

## 2022-06-08 PROCEDURE — 25010000002 FENTANYL CITRATE (PF) 50 MCG/ML SOLUTION: Performed by: NURSE ANESTHETIST, CERTIFIED REGISTERED

## 2022-06-08 PROCEDURE — 25010000002 NEOSTIGMINE 10 MG/10ML SOLUTION: Performed by: NURSE ANESTHETIST, CERTIFIED REGISTERED

## 2022-06-08 PROCEDURE — 25010000002 HYDROMORPHONE 1 MG/ML SOLUTION: Performed by: ORTHOPAEDIC SURGERY

## 2022-06-08 PROCEDURE — 99214 OFFICE O/P EST MOD 30 MIN: CPT | Performed by: PHYSICIAN ASSISTANT

## 2022-06-08 PROCEDURE — 87147 CULTURE TYPE IMMUNOLOGIC: CPT | Performed by: ORTHOPAEDIC SURGERY

## 2022-06-08 PROCEDURE — G0378 HOSPITAL OBSERVATION PER HR: HCPCS

## 2022-06-08 PROCEDURE — 25010000002 PROPOFOL 10 MG/ML EMULSION: Performed by: NURSE ANESTHETIST, CERTIFIED REGISTERED

## 2022-06-08 PROCEDURE — 25010000002 MIDAZOLAM PER 1 MG: Performed by: NURSE ANESTHETIST, CERTIFIED REGISTERED

## 2022-06-08 PROCEDURE — 25010000002 ONDANSETRON PER 1 MG: Performed by: NURSE ANESTHETIST, CERTIFIED REGISTERED

## 2022-06-08 PROCEDURE — 87186 SC STD MICRODIL/AGAR DIL: CPT | Performed by: ORTHOPAEDIC SURGERY

## 2022-06-08 PROCEDURE — 87205 SMEAR GRAM STAIN: CPT | Performed by: ORTHOPAEDIC SURGERY

## 2022-06-08 PROCEDURE — 82962 GLUCOSE BLOOD TEST: CPT

## 2022-06-08 PROCEDURE — 87070 CULTURE OTHR SPECIMN AEROBIC: CPT | Performed by: ORTHOPAEDIC SURGERY

## 2022-06-08 PROCEDURE — 87176 TISSUE HOMOGENIZATION CULTR: CPT | Performed by: ORTHOPAEDIC SURGERY

## 2022-06-08 PROCEDURE — 25010000002 KETOROLAC TROMETHAMINE PER 15 MG: Performed by: NURSE ANESTHETIST, CERTIFIED REGISTERED

## 2022-06-08 PROCEDURE — 25010000002 CEFAZOLIN PER 500 MG: Performed by: ORTHOPAEDIC SURGERY

## 2022-06-08 PROCEDURE — 85025 COMPLETE CBC W/AUTO DIFF WBC: CPT | Performed by: PHYSICIAN ASSISTANT

## 2022-06-08 RX ORDER — IBUPROFEN 400 MG/1
400 TABLET ORAL EVERY 6 HOURS PRN
Status: DISCONTINUED | OUTPATIENT
Start: 2022-06-08 | End: 2022-06-09 | Stop reason: HOSPADM

## 2022-06-08 RX ORDER — KETOROLAC TROMETHAMINE 30 MG/ML
30 INJECTION, SOLUTION INTRAMUSCULAR; INTRAVENOUS EVERY 6 HOURS PRN
Status: COMPLETED | OUTPATIENT
Start: 2022-06-08 | End: 2022-06-08

## 2022-06-08 RX ORDER — NEOSTIGMINE METHYLSULFATE 1 MG/ML
INJECTION, SOLUTION INTRAVENOUS AS NEEDED
Status: DISCONTINUED | OUTPATIENT
Start: 2022-06-08 | End: 2022-06-08 | Stop reason: SURG

## 2022-06-08 RX ORDER — ONDANSETRON 2 MG/ML
4 INJECTION INTRAMUSCULAR; INTRAVENOUS AS NEEDED
Status: DISCONTINUED | OUTPATIENT
Start: 2022-06-08 | End: 2022-06-08 | Stop reason: HOSPADM

## 2022-06-08 RX ORDER — SODIUM CHLORIDE, SODIUM LACTATE, POTASSIUM CHLORIDE, CALCIUM CHLORIDE 600; 310; 30; 20 MG/100ML; MG/100ML; MG/100ML; MG/100ML
100 INJECTION, SOLUTION INTRAVENOUS ONCE AS NEEDED
Status: DISCONTINUED | OUTPATIENT
Start: 2022-06-08 | End: 2022-06-08 | Stop reason: HOSPADM

## 2022-06-08 RX ORDER — SODIUM CHLORIDE 0.9 % (FLUSH) 0.9 %
10 SYRINGE (ML) INJECTION EVERY 12 HOURS SCHEDULED
Status: DISCONTINUED | OUTPATIENT
Start: 2022-06-08 | End: 2022-06-08 | Stop reason: HOSPADM

## 2022-06-08 RX ORDER — NALOXONE HCL 0.4 MG/ML
0.4 VIAL (ML) INJECTION
Status: DISCONTINUED | OUTPATIENT
Start: 2022-06-08 | End: 2022-06-09 | Stop reason: HOSPADM

## 2022-06-08 RX ORDER — OXYCODONE AND ACETAMINOPHEN 7.5; 325 MG/1; MG/1
2 TABLET ORAL EVERY 4 HOURS PRN
Status: DISCONTINUED | OUTPATIENT
Start: 2022-06-08 | End: 2022-06-09 | Stop reason: HOSPADM

## 2022-06-08 RX ORDER — GLYCOPYRROLATE 0.2 MG/ML
INJECTION INTRAMUSCULAR; INTRAVENOUS AS NEEDED
Status: DISCONTINUED | OUTPATIENT
Start: 2022-06-08 | End: 2022-06-08 | Stop reason: SURG

## 2022-06-08 RX ORDER — ONDANSETRON 2 MG/ML
INJECTION INTRAMUSCULAR; INTRAVENOUS AS NEEDED
Status: DISCONTINUED | OUTPATIENT
Start: 2022-06-08 | End: 2022-06-08 | Stop reason: SURG

## 2022-06-08 RX ORDER — IPRATROPIUM BROMIDE AND ALBUTEROL SULFATE 2.5; .5 MG/3ML; MG/3ML
3 SOLUTION RESPIRATORY (INHALATION) ONCE AS NEEDED
Status: DISCONTINUED | OUTPATIENT
Start: 2022-06-08 | End: 2022-06-08 | Stop reason: HOSPADM

## 2022-06-08 RX ORDER — MIDAZOLAM HYDROCHLORIDE 1 MG/ML
1 INJECTION INTRAMUSCULAR; INTRAVENOUS
Status: DISCONTINUED | OUTPATIENT
Start: 2022-06-08 | End: 2022-06-08 | Stop reason: HOSPADM

## 2022-06-08 RX ORDER — FAMOTIDINE 10 MG/ML
INJECTION, SOLUTION INTRAVENOUS AS NEEDED
Status: DISCONTINUED | OUTPATIENT
Start: 2022-06-08 | End: 2022-06-08 | Stop reason: SURG

## 2022-06-08 RX ORDER — LIDOCAINE HYDROCHLORIDE 20 MG/ML
INJECTION, SOLUTION EPIDURAL; INFILTRATION; INTRACAUDAL; PERINEURAL AS NEEDED
Status: DISCONTINUED | OUTPATIENT
Start: 2022-06-08 | End: 2022-06-08 | Stop reason: SURG

## 2022-06-08 RX ORDER — FENTANYL CITRATE 50 UG/ML
INJECTION, SOLUTION INTRAMUSCULAR; INTRAVENOUS AS NEEDED
Status: DISCONTINUED | OUTPATIENT
Start: 2022-06-08 | End: 2022-06-08 | Stop reason: SURG

## 2022-06-08 RX ORDER — SODIUM CHLORIDE 0.9 % (FLUSH) 0.9 %
10 SYRINGE (ML) INJECTION AS NEEDED
Status: DISCONTINUED | OUTPATIENT
Start: 2022-06-08 | End: 2022-06-08 | Stop reason: HOSPADM

## 2022-06-08 RX ORDER — HEPARIN SODIUM 5000 [USP'U]/ML
5000 INJECTION, SOLUTION INTRAVENOUS; SUBCUTANEOUS EVERY 12 HOURS SCHEDULED
Status: DISCONTINUED | OUTPATIENT
Start: 2022-06-09 | End: 2022-06-09 | Stop reason: HOSPADM

## 2022-06-08 RX ORDER — MAGNESIUM HYDROXIDE 1200 MG/15ML
LIQUID ORAL AS NEEDED
Status: DISCONTINUED | OUTPATIENT
Start: 2022-06-08 | End: 2022-06-08 | Stop reason: HOSPADM

## 2022-06-08 RX ORDER — HYDROCODONE BITARTRATE AND ACETAMINOPHEN 10; 325 MG/1; MG/1
1 TABLET ORAL EVERY 6 HOURS PRN
Qty: 30 TABLET | Refills: 0 | Status: SHIPPED | OUTPATIENT
Start: 2022-06-08

## 2022-06-08 RX ORDER — KETOROLAC TROMETHAMINE 30 MG/ML
30 INJECTION, SOLUTION INTRAMUSCULAR; INTRAVENOUS EVERY 6 HOURS PRN
Status: DISCONTINUED | OUTPATIENT
Start: 2022-06-08 | End: 2022-06-09 | Stop reason: HOSPADM

## 2022-06-08 RX ORDER — MEPERIDINE HYDROCHLORIDE 25 MG/ML
12.5 INJECTION INTRAMUSCULAR; INTRAVENOUS; SUBCUTANEOUS
Status: DISCONTINUED | OUTPATIENT
Start: 2022-06-08 | End: 2022-06-08 | Stop reason: HOSPADM

## 2022-06-08 RX ORDER — FENTANYL CITRATE 50 UG/ML
50 INJECTION, SOLUTION INTRAMUSCULAR; INTRAVENOUS
Status: DISCONTINUED | OUTPATIENT
Start: 2022-06-08 | End: 2022-06-08 | Stop reason: HOSPADM

## 2022-06-08 RX ORDER — HYDROCODONE BITARTRATE AND ACETAMINOPHEN 7.5; 325 MG/1; MG/1
1 TABLET ORAL EVERY 4 HOURS PRN
Status: DISCONTINUED | OUTPATIENT
Start: 2022-06-08 | End: 2022-06-09 | Stop reason: HOSPADM

## 2022-06-08 RX ORDER — ONDANSETRON 2 MG/ML
4 INJECTION INTRAMUSCULAR; INTRAVENOUS EVERY 6 HOURS PRN
Status: DISCONTINUED | OUTPATIENT
Start: 2022-06-08 | End: 2022-06-09 | Stop reason: HOSPADM

## 2022-06-08 RX ORDER — ROCURONIUM BROMIDE 10 MG/ML
INJECTION, SOLUTION INTRAVENOUS AS NEEDED
Status: DISCONTINUED | OUTPATIENT
Start: 2022-06-08 | End: 2022-06-08 | Stop reason: SURG

## 2022-06-08 RX ORDER — FAMOTIDINE 20 MG/1
20 TABLET, FILM COATED ORAL DAILY
Status: DISCONTINUED | OUTPATIENT
Start: 2022-06-09 | End: 2022-06-09 | Stop reason: HOSPADM

## 2022-06-08 RX ORDER — MIDAZOLAM HYDROCHLORIDE 1 MG/ML
INJECTION INTRAMUSCULAR; INTRAVENOUS AS NEEDED
Status: DISCONTINUED | OUTPATIENT
Start: 2022-06-08 | End: 2022-06-08 | Stop reason: SURG

## 2022-06-08 RX ORDER — INSULIN ASPART 100 [IU]/ML
0-7 INJECTION, SOLUTION INTRAVENOUS; SUBCUTANEOUS
Status: DISCONTINUED | OUTPATIENT
Start: 2022-06-08 | End: 2022-06-09 | Stop reason: HOSPADM

## 2022-06-08 RX ORDER — AMOXICILLIN AND CLAVULANATE POTASSIUM 875; 125 MG/1; MG/1
1 TABLET, FILM COATED ORAL 2 TIMES DAILY
Qty: 60 TABLET | Refills: 1 | Status: SHIPPED | OUTPATIENT
Start: 2022-06-08

## 2022-06-08 RX ORDER — LABETALOL HYDROCHLORIDE 5 MG/ML
INJECTION, SOLUTION INTRAVENOUS AS NEEDED
Status: DISCONTINUED | OUTPATIENT
Start: 2022-06-08 | End: 2022-06-08 | Stop reason: SURG

## 2022-06-08 RX ORDER — SODIUM CHLORIDE, SODIUM LACTATE, POTASSIUM CHLORIDE, CALCIUM CHLORIDE 600; 310; 30; 20 MG/100ML; MG/100ML; MG/100ML; MG/100ML
INJECTION, SOLUTION INTRAVENOUS CONTINUOUS PRN
Status: DISCONTINUED | OUTPATIENT
Start: 2022-06-08 | End: 2022-06-08 | Stop reason: SURG

## 2022-06-08 RX ORDER — SODIUM CHLORIDE, SODIUM LACTATE, POTASSIUM CHLORIDE, CALCIUM CHLORIDE 600; 310; 30; 20 MG/100ML; MG/100ML; MG/100ML; MG/100ML
125 INJECTION, SOLUTION INTRAVENOUS ONCE
Status: COMPLETED | OUTPATIENT
Start: 2022-06-08 | End: 2022-06-08

## 2022-06-08 RX ORDER — ACETAMINOPHEN 650 MG/1
650 SUPPOSITORY RECTAL EVERY 4 HOURS PRN
Status: DISCONTINUED | OUTPATIENT
Start: 2022-06-08 | End: 2022-06-09 | Stop reason: HOSPADM

## 2022-06-08 RX ORDER — ACETAMINOPHEN 325 MG/1
650 TABLET ORAL EVERY 4 HOURS PRN
Status: DISCONTINUED | OUTPATIENT
Start: 2022-06-08 | End: 2022-06-09 | Stop reason: HOSPADM

## 2022-06-08 RX ORDER — SODIUM CHLORIDE 9 MG/ML
100 INJECTION, SOLUTION INTRAVENOUS CONTINUOUS
Status: DISCONTINUED | OUTPATIENT
Start: 2022-06-08 | End: 2022-06-09 | Stop reason: HOSPADM

## 2022-06-08 RX ORDER — NICOTINE POLACRILEX 4 MG
15 LOZENGE BUCCAL
Status: DISCONTINUED | OUTPATIENT
Start: 2022-06-08 | End: 2022-06-09 | Stop reason: HOSPADM

## 2022-06-08 RX ORDER — LOSARTAN POTASSIUM AND HYDROCHLOROTHIAZIDE 12.5; 5 MG/1; MG/1
1 TABLET ORAL DAILY
COMMUNITY

## 2022-06-08 RX ORDER — DEXTROSE MONOHYDRATE 25 G/50ML
25 INJECTION, SOLUTION INTRAVENOUS
Status: DISCONTINUED | OUTPATIENT
Start: 2022-06-08 | End: 2022-06-09 | Stop reason: HOSPADM

## 2022-06-08 RX ORDER — OXYCODONE HYDROCHLORIDE AND ACETAMINOPHEN 5; 325 MG/1; MG/1
1 TABLET ORAL ONCE AS NEEDED
Status: DISCONTINUED | OUTPATIENT
Start: 2022-06-08 | End: 2022-06-08 | Stop reason: HOSPADM

## 2022-06-08 RX ORDER — PROPOFOL 10 MG/ML
VIAL (ML) INTRAVENOUS AS NEEDED
Status: DISCONTINUED | OUTPATIENT
Start: 2022-06-08 | End: 2022-06-08 | Stop reason: SURG

## 2022-06-08 RX ADMIN — KETOROLAC TROMETHAMINE 30 MG: 30 INJECTION, SOLUTION INTRAMUSCULAR at 14:28

## 2022-06-08 RX ADMIN — ONDANSETRON 4 MG: 2 INJECTION INTRAMUSCULAR; INTRAVENOUS at 13:23

## 2022-06-08 RX ADMIN — GLYCOPYRROLATE 0.6 MG: 0.2 INJECTION INTRAMUSCULAR; INTRAVENOUS at 13:52

## 2022-06-08 RX ADMIN — LABETALOL HYDROCHLORIDE 5 MG: 5 INJECTION INTRAVENOUS at 13:57

## 2022-06-08 RX ADMIN — MIDAZOLAM 2 MG: 1 INJECTION INTRAMUSCULAR; INTRAVENOUS at 13:23

## 2022-06-08 RX ADMIN — FENTANYL CITRATE 50 MCG: 50 INJECTION INTRAMUSCULAR; INTRAVENOUS at 15:04

## 2022-06-08 RX ADMIN — SODIUM CHLORIDE, POTASSIUM CHLORIDE, SODIUM LACTATE AND CALCIUM CHLORIDE: 600; 310; 30; 20 INJECTION, SOLUTION INTRAVENOUS at 14:01

## 2022-06-08 RX ADMIN — SODIUM CHLORIDE 100 ML/HR: 9 INJECTION, SOLUTION INTRAVENOUS at 17:11

## 2022-06-08 RX ADMIN — PROPOFOL 50 MG: 10 INJECTION, EMULSION INTRAVENOUS at 13:54

## 2022-06-08 RX ADMIN — HYDROMORPHONE HYDROCHLORIDE 1 MG: 1 INJECTION, SOLUTION INTRAMUSCULAR; INTRAVENOUS; SUBCUTANEOUS at 17:11

## 2022-06-08 RX ADMIN — FENTANYL CITRATE 50 MCG: 50 INJECTION INTRAMUSCULAR; INTRAVENOUS at 14:27

## 2022-06-08 RX ADMIN — SODIUM CHLORIDE, POTASSIUM CHLORIDE, SODIUM LACTATE AND CALCIUM CHLORIDE: 600; 310; 30; 20 INJECTION, SOLUTION INTRAVENOUS at 13:23

## 2022-06-08 RX ADMIN — LIDOCAINE HYDROCHLORIDE 100 MG: 20 INJECTION, SOLUTION EPIDURAL; INFILTRATION; INTRACAUDAL; PERINEURAL at 13:28

## 2022-06-08 RX ADMIN — PROPOFOL 150 MG: 10 INJECTION, EMULSION INTRAVENOUS at 13:28

## 2022-06-08 RX ADMIN — NEOSTIGMINE 3 MG: 1 INJECTION INTRAVENOUS at 13:52

## 2022-06-08 RX ADMIN — ROCURONIUM BROMIDE 40 MG: 10 SOLUTION INTRAVENOUS at 13:28

## 2022-06-08 RX ADMIN — FAMOTIDINE 20 MG: 10 INJECTION INTRAVENOUS at 13:23

## 2022-06-08 RX ADMIN — SODIUM CHLORIDE, POTASSIUM CHLORIDE, SODIUM LACTATE AND CALCIUM CHLORIDE 125 ML/HR: 600; 310; 30; 20 INJECTION, SOLUTION INTRAVENOUS at 11:05

## 2022-06-08 RX ADMIN — FENTANYL CITRATE 100 MCG: 50 INJECTION INTRAMUSCULAR; INTRAVENOUS at 13:36

## 2022-06-08 RX ADMIN — CEFAZOLIN 3 G: 1 INJECTION, POWDER, FOR SOLUTION INTRAVENOUS at 20:38

## 2022-06-08 NOTE — CONSULTS
[facility] Medicine Services  CONSULT NOTE    Consults    Patient Identification:  Name:  Es Olivier  Age:  62 y.o.  Sex:  female  :  1960  MRN:  [id number]  Visit Number:  [id number]  Primary care provider:  Delilah Pizarro MD    Subjective       History of presenting illness:    Es Olivier is a 62 y.o. female admitted by Dr. Leung with known medical history of essential HTN, anemia, FRANCINE not on CPAP, IDDM, GERD, anxiety/depression.  Es Olivier is being evaluated by the Hospital Medicine Service at the request of Orthopedic services.     Patient was hospitalized at this facility from inpatient from 22 through 22 and swing bed from  22 through 22 for acute traumatic closed comminuted left proximal femur fracture status post ORIF on 2022 with later discharge from swing bed status to [facility] for short-term rehab.      In late May 2022, she did follow-up with orthopedic office and was noted to have tunneling noted at the last office visit of approximately 6.5 cm with active drainage noted at said time with patient recommended that wound be packed loosely with wound cultures obtained and found to be positive for Proteus she was started on Augmentin and it was recommended she undergo left hip irrigation debridement with wound VAC placement.    She was admitted to this facility today for above-mentioned procedure with consultation for medical management placed shortly afterward.  Mrs. Olivier is assessed resting comfortably in bed.  She denies chest pain, dyspnea, cough, and wheeze. She reports since her prior procedure she has been WBAT to her LLE with use of a walker to ambulate . She reports she is feeling well post-operatively.  She reports she has home health but is nervous about utilizing the wound vac at home.      ---------------------------------------------------------------------------------------------------------------------  Review of Systems   Constitutional: Negative for chills, fatigue and fever.   HENT: Negative for congestion and drooling.    Eyes: Negative for pain and discharge.   Respiratory: Negative for cough, shortness of breath and wheezing.    Cardiovascular: Negative for chest pain and leg swelling.   Gastrointestinal: Negative for abdominal distention, diarrhea, nausea and vomiting.   Endocrine: Negative for cold intolerance and polydipsia.   Genitourinary: Negative for difficulty urinating.   Musculoskeletal: Negative for arthralgias and gait problem.   Skin: Negative for color change and pallor.   Allergic/Immunologic: Negative for environmental allergies and food allergies.   Neurological: Negative for dizziness and headaches.   Hematological: Negative for adenopathy. Does not bruise/bleed easily.   Psychiatric/Behavioral: Negative for agitation and confusion.        Otherwise 10-system ROS reviewed and is negative except as mentioned in the HPI.  ---------------------------------------------------------------------------------------------------------------------   Past History:  Family History   Problem Relation Age of Onset   • Breast cancer Sister         20s   • Cancer Sister    • Breast cancer Sister         60s   • Bone cancer Mother         60   • Osteoarthritis Mother    • Diabetes Father    • Heart attack Father    • Heart disease Father    • Heart disease Brother      Past Medical History:   Diagnosis Date   • Arthritis    • Biliary dyskinesia     abnormal HIDA 11/2018 w/ EF 31%, symptomatic w/ N/V   • Closed fracture of proximal end of left femur (HCC) 02/25/2022   • Depression    • DM2 (diabetes mellitus, type 2) ([facility])     dx 1994, on insulin >5 years, A1c 7, associated neuropathy, no other complications   • Dyspepsia    • Dyspnea on exertion    • Elevated cholesterol    • Fatigue    •  "Fatty liver     dx on CT 2016   • GERD (gastroesophageal reflux disease)    • Heart disease     w/ cardiac clearance 2019, negative stress test 10/2017, EF 65%   • Hiatal hernia     \"small\" noted on UGI    • History of Helicobacter pylori infection     treated remotely   • Hyperlipidemia    • Hypertension    • Joint pain    • Morbid obesity with BMI of 45.0-49.9, adult (HCC)    • Sleep apnea     formerly on a device, no longer using, says just doesn't need it   • Urinary incontinence     no meds   • Vertigo    • Vitamin D deficiency      Past Surgical History:   Procedure Laterality Date   • BLADDER SURGERY      \"tack\", uncomplicated, but unsuccessful   • CATARACT EXTRACTION Left    • COLONOSCOPY      unremarkable   • DILATATION AND CURETTAGE     • ENDOSCOPY     • ORIF HIP FRACTURE Left 2022    Procedure: Left hip Open reduction and internal fixation.;  Surgeon: Jarrod Leung MD;  Location: [facility];  Service: Orthopedics;  Laterality: Left;   • TONSILLECTOMY       Social History     Socioeconomic History   • Marital status:    Tobacco Use   • Smoking status: Former Smoker     Years: 10.00     Types: Cigarettes     Quit date:      Years since quittin.4   • Smokeless tobacco: Never Used   Vaping Use   • Vaping Use: Never used   Substance and Sexual Activity   • Alcohol use: Not Currently   • Drug use: No   • Sexual activity: Defer     ---------------------------------------------------------------------------------------------------------------------   Allergies:  Mobic [meloxicam] and Lisinopril  ---------------------------------------------------------------------------------------------------------------------   Prior to Admission Medications     Prescriptions Last Dose Informant Patient Reported? Taking?    buPROPion SR (WELLBUTRIN SR) 200 MG 12 hr tablet 2022 Medication Bottle Yes Yes    Take 400 mg by mouth every night at bedtime. Prior to [facility] Admission, " Patient was on:   Script is wrote 1 BID but pt takes 2 QHS    cyclobenzaprine (FLEXERIL) 5 MG tablet 6/7/2022  No Yes    Take 1 tablet by mouth 3 (Three) Times a Day As Needed for Muscle Spasms.    FLUoxetine (PROzac) 10 MG capsule 6/7/2022  No Yes    Take 2 capsules by mouth Daily.    hydrOXYzine (ATARAX) 25 MG tablet 6/7/2022  No Yes    Take 1 tablet by mouth 3 (Three) Times a Day As Needed for Anxiety.    insulin aspart (novoLOG) 100 UNIT/ML injection 6/7/2022  No Yes    Inject 0-14 Units under the skin into the appropriate area as directed 4 (Four) Times a Day Before Meals & at Bedtime.    insulin detemir (LEVEMIR) 100 UNIT/ML injection 6/7/2022  No Yes    Inject 20 Units under the skin into the appropriate area as directed Every Night.    losartan-hydrochlorothiazide (HYZAAR) 100-25 MG per tablet 6/7/2022  Yes Yes    Take 1 tablet by mouth Daily. Pt unsure of mg    metFORMIN (GLUCOPHAGE) 1000 MG tablet 6/7/2022  No Yes    Take 1 tablet by mouth 2 (Two) Times a Day With Meals.    vitamin D (ERGOCALCIFEROL) 1.25 MG (47094 UT) capsule capsule 6/7/2022 Medication Bottle Yes Yes    Take 50,000 Units by mouth 1 (One) Time Per Week.    acetaminophen (TYLENOL) 325 MG tablet More than a month  No No    Take 2 tablets by mouth Every 4 (Four) Hours As Needed for Mild Pain .    docusate sodium 100 MG capsule Not Taking  No No    Take 1 capsule by mouth 2 (Two) Times a Day.    Patient not taking:  Reported on 6/8/2022    enoxaparin (LOVENOX) 30 MG/0.3ML solution syringe Not Taking  No No    Inject 0.3 mL under the skin into the appropriate area as directed Every 12 (Twelve) Hours. Indications: Prevention of Unwanted Clot in Veins    Patient not taking:  Reported on 6/8/2022    iron polysaccharides (NIFEREX) 150 MG capsule Not Taking  No No    Take 1 capsule by mouth 2 (Two) Times a Day.    Patient not taking:  Reported on 6/8/2022        Hospital Meds:  ceFAZolin, 3 g, Intravenous, Q8H  [START ON 6/9/2022] famotidine, 20  mg, Oral, Daily  [START ON 6/9/2022] heparin (porcine), 5,000 Units, Subcutaneous, Q12H  Insulin Aspart, 0-7 Units, Subcutaneous, TID AC      sodium chloride, 100 mL/hr      ---------------------------------------------------------------------------------------------------------------------     Objective     Vital Signs:  Temp:  [97.1 °F (36.2 °C)-98.2 °F (36.8 °C)] 97.6 °F (36.4 °C)  Heart Rate:  [66-86] 73  Resp:  [9-20] 20  BP: ()/(52-93) 119/63      06/08/22  1055   Weight: 132 kg (292 lb)     Body mass index is 47.13 kg/m².  ---------------------------------------------------------------------------------------------------------------------     Physical Exam  Vitals and nursing note reviewed.   Constitutional:       General: She is awake.      Appearance: Normal appearance.   HENT:      Head: Normocephalic and atraumatic.   Eyes:      General: Lids are normal.      Conjunctiva/sclera:      Right eye: Right conjunctiva is not injected.      Left eye: Left conjunctiva is not injected.   Cardiovascular:      Rate and Rhythm: Normal rate and regular rhythm.      Heart sounds: S1 normal and S2 normal.   Pulmonary:      Effort: No tachypnea or bradypnea.      Breath sounds: No decreased breath sounds, wheezing, rhonchi or rales.   Abdominal:      General: Bowel sounds are normal.      Palpations: Abdomen is soft.      Tenderness: There is no abdominal tenderness.   Musculoskeletal:      Right lower leg: No swelling. No edema.      Left lower leg: No swelling. No edema.      Comments:  Left hip with post-op dressing in place   Skin:     General: Skin is warm and moist.      Comments: Wound vac in place to left hip with serosanguinous drainage in tubing.  Post-op dressing in place   Neurological:      Mental Status: She is alert and oriented to person, place, and time.   Psychiatric:         Attention and Perception: Attention normal.         Mood and Affect: Mood normal.         Behavior: Behavior is  cooperative.         ---------------------------------------------------------------------------------------------------------------------   EKG:      Baseline EKG from prior hospitalization reviewed with NSR.     ---------------------------------------------------------------------------------------------------------------------   Results from last 7 days   Lab Units 06/06/22  1519   CRP mg/dL 0.70*   WBC 10*3/mm3 7.55   HEMOGLOBIN g/dL 10.2*   HEMATOCRIT % 33.6*   MCV fL 84.2   MCHC g/dL 30.4*   PLATELETS 10*3/mm3 475*         Results from last 7 days   Lab Units 06/06/22  1519   SODIUM mmol/L 138   POTASSIUM mmol/L 4.1   CHLORIDE mmol/L 100   CO2 mmol/L 23.7   BUN mg/dL 17   CREATININE mg/dL 0.81   CALCIUM mg/dL 9.7   GLUCOSE mg/dL 142*   Estimated Creatinine Clearance: 101.8 mL/min (by C-G formula based on SCr of 0.81 mg/dL).  No results found for: AMMONIA          Lab Results   Component Value Date    HGBA1C 6.80 (H) 02/25/2022     Lab Results   Component Value Date    TSH 8.590 (H) 02/25/2022    FREET4 1.13 02/25/2022     No results found for: PREGTESTUR, PREGSERUM, HCG, HCGQUANT  Pain Management Panel     Pain Management Panel Latest Ref Rng & Units 4/6/2016 4/7/2014    AMPHETAMINES SCREEN, URINE Negative Negative Negative    BARBITURATES SCREEN Negative Negative Negative    BENZODIAZEPINE SCREEN, URINE Negative Negative Negative    COCAINE SCREEN, URINE Negative Negative Negative    METHADONE SCREEN, URINE Negative Negative Negative        No results found for: BLOODCX  No results found for: URINECX  No results found for: WOUNDCX  No results found for: STOOLCX      ---------------------------------------------------------------------------------------------------------------------   Imaging Results (Last 7 Days)     ** No results found for the last 168 hours. **          I have personally reviewed the radiology images and read the final radiology report.        Assessment / Plan     Assessment and  Plan:    -Left postoperative hip wound dehiscence tunnelization status post excisional debridement and wound VAC placement:   · Post-op care per primary.   · NV checks.   · Wound Vac in place.   · PRN analgesics post-operatively per GS.     -Normocytic anemia  · Obtain CBC.   · 06/06 H&H reviewed.   · Repeat AM CBC.   · Feb/March 2022 anemia studies reviewed.   · Transfuse if hgb drops below 7g/dL.    -Essential hypertension  · Review home antihypertensive regimen.   · Vitals per hospital protocol.     -Obstructive sleep apnea not on CPAP  · Supportive care.     -Type 2 DM, ID:   • Given diabetes mellitus, fingerstick blood glucose monitoring has been ordered AC&HS.   • Sliding scale insulin has been ordered PRN.    • Hemoglobin a1c added to existing lab specimen.   • Home DM regimen to be reviewed upon availiability.   • Hypoglycemia protocol on board if needed.      GERD:   · Add Pepcid 20mg.      Depression:   · Continue home Prozac.      Thank you for the opportunity to participate in the care of your patient. Please do not hesitate to call with any questions or concerns.     Amaya Mcfarland PA-C  [facility] Medicine Team  06/08/22  16:43 EDT  Pager # [phone]  ---------------------------------------------------------------------------------------------------------------------

## 2022-06-08 NOTE — OP NOTE
Operative report  Preoperative diagnosis left postoperative hip wound dehiscence  Postoperative diagnosis the same  Surgical procedure excisional debridement left hip wound of lesion 2 x 4 cm with the deep tunnel including skin fascia and fatty layer and application of wound VAC device  After proper patient and limb identification bring to the operating room general anesthesia right lateral decubitus Betadine scrubbing and sterile draping.  Wound is on the lateral proximal aspect of the previous incision using sharp blade elliptic resection of the sinus tract going deep in the fatty layer.  Tunnelization include only the fatty layer without going deeper than the muscle.  And fascia.  Excision of fibrinous tissue with sharp blade and rongeur and curette washing with pulsatile lavage.  The lesion go deep by a nearly 15 cm.  Shaping the large sponge to fit the wound and then application and sealing the wound VAC which showed good seal with the suction.  Patient returned to recovery room in stable condition tissue was sent for culture.

## 2022-06-08 NOTE — ANESTHESIA PROCEDURE NOTES
Airway  Urgency: elective    Date/Time: 6/8/2022 1:29 PM  Airway not difficult    General Information and Staff    Patient location during procedure: OR  Anesthesiologist: Aaron Sage MD  CRNA/CAA: Ye Sanchez CRNA    Indications and Patient Condition  Indications for airway management: airway protection    Preoxygenated: yes  MILS not maintained throughout  Mask difficulty assessment: 0 - not attempted    Final Airway Details  Final airway type: endotracheal airway      Successful airway: ETT  Cuffed: yes   Successful intubation technique: direct laryngoscopy  Endotracheal tube insertion site: oral  Blade: Nicole  Blade size: 2  ETT size (mm): 7.5  Cormack-Lehane Classification: grade I - full view of glottis  Placement verified by: chest auscultation and capnometry   Measured from: lips  ETT/EBT  to lips (cm): 22  Number of attempts at approach: 1  Assessment: lips, teeth, and gum same as pre-op and atraumatic intubation    Additional Comments  Atraumatic ETT placement, dentition unchanged.

## 2022-06-08 NOTE — BRIEF OP NOTE
HIP INCISION AND DRAINAGE  Progress Note    Es Olivier  6/8/2022    Pre-op Diagnosis:   T81.40XA       Post-Op Diagnosis Codes:  Left hip postoperative wound dehiscence    Procedure/CPT® Codes:        Procedure(s):  LEFT HIP LAVAGE AND WOUND VAC PLACEMENT    Surgeon(s):  Jarrod Leung MD    Anesthesia: Choice    Staff:   Circulator: Rosalba Blake RN  Scrub Person: Licha Hernandez  Assistant: David Allen  Assistant: David Allen      Estimated Blood Loss: minimal    Urine Voided: * No values recorded between 6/8/2022  1:24 PM and 6/8/2022  2:08 PM *    Specimens:                Specimens     ID Source Type Tests Collected By Collected At Frozen?    1 Hip, Left Tissue · TISSUE / BONE CULTURE   Jarrod Leung MD 6/8/22 2020     This specimen was not marked as sent.                Drains: * No LDAs found *    Findings: Fatty necrosis with wound dehiscence        Complications: None    Assistant: David Allen  was responsible for performing the following activities: Retraction, Suction, Irrigation, Suturing and Placing Dressing and their skilled assistance was necessary for the success of this case.    Jarrod Leung MD     Date: 6/8/2022  Time: 14:16 EDT

## 2022-06-08 NOTE — PLAN OF CARE
Goal Outcome Evaluation:           Progress: no change  Outcome Evaluation: Pt was admitted from surgery. Vitals and assessment obtained. Wound vac in place. No changes to note at this time; will continue to monitor

## 2022-06-08 NOTE — ADDENDUM NOTE
Addendum  created 06/08/22 1519 by Eleni Mccollum CRNA    Intraprocedure Event edited, Intraprocedure Staff edited

## 2022-06-08 NOTE — ANESTHESIA PREPROCEDURE EVALUATION
Anesthesia Evaluation     Patient summary reviewed and Nursing notes reviewed   history of anesthetic complications:  NPO Solid Status: > 8 hours  NPO Liquid Status: > 8 hours           Airway   Mallampati: II  TM distance: >3 FB  Neck ROM: full  Dental    (+) partials    Pulmonary     breath sounds clear to auscultation  (+) shortness of breath, sleep apnea,   Cardiovascular   Exercise tolerance: good (4-7 METS)    Rhythm: regular  Rate: normal    (+) hypertension, hyperlipidemia,       Neuro/Psych  (+) dizziness/light headedness, psychiatric history,    GI/Hepatic/Renal/Endo    (+) obesity, morbid obesity, hiatal hernia, GERD,  liver disease fatty liver disease, diabetes mellitus,     Musculoskeletal (-) negative ROS    Abdominal   (+) obese,     Abdomen: soft.   Substance History - negative use     OB/GYN negative ob/gyn ROS         Other - negative ROS                       Anesthesia Plan    ASA 3     general     intravenous induction     Anesthetic plan, risks, benefits, and alternatives have been provided, discussed and informed consent has been obtained with: patient.  Use of blood products discussed with consented to blood products.   Plan discussed with CRNA.        CODE STATUS:

## 2022-06-08 NOTE — ANESTHESIA POSTPROCEDURE EVALUATION
Patient: Es Olivier    Procedure Summary     Date: 06/08/22 Room / Location: [facility] OR 03 /  [facility] OR    Anesthesia Start: 1323 Anesthesia Stop:     Procedure: LEFT HIP LAVAGE AND WOUND VAC PLACEMENT (Left Hip) Diagnosis: (T81.40XA)    Surgeons: Jarrod Leung MD Provider: Aaron Sage MD    Anesthesia Type: general ASA Status: 3          Anesthesia Type: general    Vitals  Vitals Value Taken Time   /63 06/08/22 1409   Temp 97.2 °F (36.2 °C) 06/08/22 1409   Pulse 66 06/08/22 1409   Resp 10 06/08/22 1409   SpO2 100 % 06/08/22 1409           Post Anesthesia Care and Evaluation    Patient location during evaluation: PACU  Patient participation: complete - patient participated  Level of consciousness: awake and alert  Pain score: 0  Pain management: adequate  Reason for not using neuraxial anesthesia or a peripheral nerve block: regional anesthesia not indicated:  Airway patency: patent  Anesthetic complications: No anesthetic complications  PONV Status: none  Cardiovascular status: hemodynamically stable  Respiratory status: nasal cannula  Hydration status: acceptable

## 2022-06-09 VITALS
HEART RATE: 82 BPM | HEIGHT: 66 IN | OXYGEN SATURATION: 98 % | TEMPERATURE: 98.2 F | WEIGHT: 292 LBS | SYSTOLIC BLOOD PRESSURE: 111 MMHG | BODY MASS INDEX: 46.93 KG/M2 | RESPIRATION RATE: 18 BRPM | DIASTOLIC BLOOD PRESSURE: 50 MMHG

## 2022-06-09 LAB
ANION GAP SERPL CALCULATED.3IONS-SCNC: 10.6 MMOL/L (ref 5–15)
BASOPHILS # BLD AUTO: 0.07 10*3/MM3 (ref 0–0.2)
BASOPHILS NFR BLD AUTO: 1.1 % (ref 0–1.5)
BUN SERPL-MCNC: 18 MG/DL (ref 8–23)
BUN/CREAT SERPL: 18 (ref 7–25)
CALCIUM SPEC-SCNC: 8.5 MG/DL (ref 8.6–10.5)
CHLORIDE SERPL-SCNC: 103 MMOL/L (ref 98–107)
CO2 SERPL-SCNC: 23.4 MMOL/L (ref 22–29)
CREAT SERPL-MCNC: 1 MG/DL (ref 0.57–1)
DEPRECATED RDW RBC AUTO: 55.6 FL (ref 37–54)
EGFRCR SERPLBLD CKD-EPI 2021: 64.2 ML/MIN/1.73
EOSINOPHIL # BLD AUTO: 0.33 10*3/MM3 (ref 0–0.4)
EOSINOPHIL NFR BLD AUTO: 5.3 % (ref 0.3–6.2)
ERYTHROCYTE [DISTWIDTH] IN BLOOD BY AUTOMATED COUNT: 17.4 % (ref 12.3–15.4)
GLUCOSE BLDC GLUCOMTR-MCNC: 125 MG/DL (ref 70–130)
GLUCOSE SERPL-MCNC: 153 MG/DL (ref 65–99)
HCT VFR BLD AUTO: 30.6 % (ref 34–46.6)
HGB BLD-MCNC: 9.2 G/DL (ref 12–15.9)
IMM GRANULOCYTES # BLD AUTO: 0.04 10*3/MM3 (ref 0–0.05)
IMM GRANULOCYTES NFR BLD AUTO: 0.6 % (ref 0–0.5)
LYMPHOCYTES # BLD AUTO: 1.71 10*3/MM3 (ref 0.7–3.1)
LYMPHOCYTES NFR BLD AUTO: 27.4 % (ref 19.6–45.3)
MCH RBC QN AUTO: 26 PG (ref 26.6–33)
MCHC RBC AUTO-ENTMCNC: 30.1 G/DL (ref 31.5–35.7)
MCV RBC AUTO: 86.4 FL (ref 79–97)
MONOCYTES # BLD AUTO: 0.64 10*3/MM3 (ref 0.1–0.9)
MONOCYTES NFR BLD AUTO: 10.3 % (ref 5–12)
NEUTROPHILS NFR BLD AUTO: 3.45 10*3/MM3 (ref 1.7–7)
NEUTROPHILS NFR BLD AUTO: 55.3 % (ref 42.7–76)
NRBC BLD AUTO-RTO: 0 /100 WBC (ref 0–0.2)
PLATELET # BLD AUTO: 350 10*3/MM3 (ref 140–450)
PMV BLD AUTO: 9.8 FL (ref 6–12)
POTASSIUM SERPL-SCNC: 4.2 MMOL/L (ref 3.5–5.2)
RBC # BLD AUTO: 3.54 10*6/MM3 (ref 3.77–5.28)
SODIUM SERPL-SCNC: 137 MMOL/L (ref 136–145)
WBC NRBC COR # BLD: 6.24 10*3/MM3 (ref 3.4–10.8)

## 2022-06-09 PROCEDURE — 97165 OT EVAL LOW COMPLEX 30 MIN: CPT

## 2022-06-09 PROCEDURE — 85025 COMPLETE CBC W/AUTO DIFF WBC: CPT | Performed by: PHYSICIAN ASSISTANT

## 2022-06-09 PROCEDURE — 63710000001 INSULIN ASPART PER 5 UNITS: Performed by: PHYSICIAN ASSISTANT

## 2022-06-09 PROCEDURE — 97161 PT EVAL LOW COMPLEX 20 MIN: CPT

## 2022-06-09 PROCEDURE — G0378 HOSPITAL OBSERVATION PER HR: HCPCS

## 2022-06-09 PROCEDURE — 25010000002 CEFAZOLIN PER 500 MG: Performed by: ORTHOPAEDIC SURGERY

## 2022-06-09 PROCEDURE — 25010000002 HEPARIN (PORCINE) PER 1000 UNITS: Performed by: ORTHOPAEDIC SURGERY

## 2022-06-09 PROCEDURE — 80048 BASIC METABOLIC PNL TOTAL CA: CPT | Performed by: PHYSICIAN ASSISTANT

## 2022-06-09 PROCEDURE — 82962 GLUCOSE BLOOD TEST: CPT

## 2022-06-09 RX ORDER — HEPARIN SODIUM 5000 [USP'U]/ML
5000 INJECTION, SOLUTION INTRAVENOUS; SUBCUTANEOUS EVERY 12 HOURS SCHEDULED
Qty: 60 ML | Refills: 0 | Status: SHIPPED | OUTPATIENT
Start: 2022-06-09 | End: 2022-06-09

## 2022-06-09 RX ORDER — POTASSIUM CHLORIDE 750 MG/1
10 TABLET, FILM COATED, EXTENDED RELEASE ORAL DAILY
COMMUNITY

## 2022-06-09 RX ORDER — SIMVASTATIN 40 MG
40 TABLET ORAL NIGHTLY
COMMUNITY

## 2022-06-09 RX ORDER — LANCETS 30 GAUGE
EACH MISCELLANEOUS
Qty: 100 EACH | Refills: 0 | Status: SHIPPED | OUTPATIENT
Start: 2022-06-09 | End: 2022-06-09 | Stop reason: SDUPTHER

## 2022-06-09 RX ORDER — FUROSEMIDE 20 MG/1
20 TABLET ORAL DAILY
COMMUNITY

## 2022-06-09 RX ORDER — SYRINGE WITH NEEDLE, 1 ML 25GX5/8"
1 SYRINGE, EMPTY DISPOSABLE MISCELLANEOUS 2 TIMES DAILY
Qty: 60 EACH | Refills: 0 | Status: SHIPPED | OUTPATIENT
Start: 2022-06-09

## 2022-06-09 RX ORDER — LANCETS 30 GAUGE
EACH MISCELLANEOUS
Qty: 100 EACH | Refills: 0 | Status: SHIPPED | OUTPATIENT
Start: 2022-06-09

## 2022-06-09 RX ORDER — SYRINGE WITH NEEDLE, 1 ML 25GX5/8"
1 SYRINGE, EMPTY DISPOSABLE MISCELLANEOUS 2 TIMES DAILY
Qty: 60 EACH | Refills: 0 | Status: SHIPPED | OUTPATIENT
Start: 2022-06-09 | End: 2022-06-09 | Stop reason: SDUPTHER

## 2022-06-09 RX ORDER — BLOOD-GLUCOSE METER
KIT MISCELLANEOUS
Qty: 1 EACH | Refills: 0 | Status: SHIPPED | OUTPATIENT
Start: 2022-06-09 | End: 2022-06-09 | Stop reason: SDUPTHER

## 2022-06-09 RX ORDER — ENOXAPARIN SODIUM 100 MG/ML
40 INJECTION SUBCUTANEOUS
Qty: 12 ML | Refills: 0 | Status: SHIPPED | OUTPATIENT
Start: 2022-06-09 | End: 2022-06-09

## 2022-06-09 RX ORDER — HEPARIN SODIUM 5000 [USP'U]/ML
5000 INJECTION, SOLUTION INTRAVENOUS; SUBCUTANEOUS EVERY 12 HOURS SCHEDULED
Qty: 60 ML | Refills: 0 | Status: SHIPPED | OUTPATIENT
Start: 2022-06-09

## 2022-06-09 RX ORDER — HEPARIN SODIUM 5000 [USP'U]/ML
5000 INJECTION, SOLUTION INTRAVENOUS; SUBCUTANEOUS EVERY 12 HOURS SCHEDULED
Qty: 60 ML | Refills: 0 | Status: SHIPPED | OUTPATIENT
Start: 2022-06-09 | End: 2022-06-09 | Stop reason: SDUPTHER

## 2022-06-09 RX ORDER — SYRINGE-NEEDLE,INSULIN,0.5 ML 27GX1/2"
1 SYRINGE, EMPTY DISPOSABLE MISCELLANEOUS
Qty: 100 EACH | Refills: 0 | Status: SHIPPED | OUTPATIENT
Start: 2022-06-09 | End: 2022-06-09 | Stop reason: SDUPTHER

## 2022-06-09 RX ORDER — METFORMIN HYDROCHLORIDE 500 MG/1
2000 TABLET, EXTENDED RELEASE ORAL NIGHTLY
COMMUNITY

## 2022-06-09 RX ORDER — FLUOXETINE HYDROCHLORIDE 20 MG/1
20 CAPSULE ORAL DAILY
COMMUNITY

## 2022-06-09 RX ORDER — SYRINGE-NEEDLE,INSULIN,0.5 ML 27GX1/2"
1 SYRINGE, EMPTY DISPOSABLE MISCELLANEOUS
Qty: 100 EACH | Refills: 0 | Status: SHIPPED | OUTPATIENT
Start: 2022-06-09

## 2022-06-09 RX ORDER — BLOOD-GLUCOSE METER
KIT MISCELLANEOUS
Qty: 1 EACH | Refills: 0 | Status: SHIPPED | OUTPATIENT
Start: 2022-06-09

## 2022-06-09 RX ADMIN — INSULIN ASPART 2 UNITS: 100 INJECTION, SOLUTION INTRAVENOUS; SUBCUTANEOUS at 08:38

## 2022-06-09 RX ADMIN — ACETAMINOPHEN 650 MG: 325 TABLET ORAL at 11:29

## 2022-06-09 RX ADMIN — FAMOTIDINE 20 MG: 20 TABLET, FILM COATED ORAL at 08:38

## 2022-06-09 RX ADMIN — HEPARIN SODIUM 5000 UNITS: 5000 INJECTION INTRAVENOUS; SUBCUTANEOUS at 08:38

## 2022-06-09 RX ADMIN — CEFAZOLIN 3 G: 1 INJECTION, POWDER, FOR SOLUTION INTRAVENOUS at 03:50

## 2022-06-09 RX ADMIN — CEFAZOLIN 3 G: 1 INJECTION, POWDER, FOR SOLUTION INTRAVENOUS at 11:26

## 2022-06-09 RX ADMIN — ACETAMINOPHEN 650 MG: 325 TABLET ORAL at 07:28

## 2022-06-09 RX ADMIN — SODIUM CHLORIDE 100 ML/HR: 9 INJECTION, SOLUTION INTRAVENOUS at 03:50

## 2022-06-09 NOTE — CASE MANAGEMENT/SOCIAL WORK
Discharge Planning Assessment   Ji     Patient Name: Es Olivier  MRN: [id number]  Today's Date: 6/9/2022    Admit Date: 6/8/2022     Discharge Needs Assessment     Row Name 06/09/22 1013       Living Environment    People in Home spouse    Name(s) of People in Home Kwasi Olivier (spouse)    Current Living Arrangements home    Primary Care Provided by self    Provides Primary Care For no one    Quality of Family Relationships helpful;involved;supportive       Transition Planning    Patient/Family Anticipates Transition to home with family    Patient/Family Anticipated Services at Transition home health care       Discharge Needs Assessment    Equipment Currently Used at Home walker, standard;wheelchair;wheelchair, motorized    Concerns to be Addressed discharge planning               Discharge Plan     Row Name 06/09/22 1027       Plan    Plan Pt was admitted on 06/08/22. SS received MD consult for Pt uses home health at home and has a wound vac. SS spoke with Pt at bedside on this date. Pt lives with her spouse, Kwasi and plans to return home at discharge. Pt utilizes [facility]. Pt has a rolling walker, wheelchair, bedside commode, lift chair and motorized wheelchair via unknown provider. Pt's PCP is Delilah Pizarro. [facility]    Final Discharge Disposition Code 06 - home with home health care    Final Note Pt is being discharged home with existing [facility] and wound vac. Pt is aware and agreeable to discharge. SS faxed clinical update to fax [phone].  provided lead RN malina report number [facility]  [phone].  left message for wound care RN to return SS call, checking on status of wound vac, awaiting return phone call.    11:56am:  spoke with wound care RN, wound vac has been arranged for home. notified lead RN. Pt's family to provide transport.                 Karlee Borjas

## 2022-06-09 NOTE — DISCHARGE INSTR - APPOINTMENTS
Follow-up with Dr Leung on 6/22/22 at 3:45 pm. Office number is [phone]    Follow-up with Dr Pizarro on 6/16/22 at 10:15 am.  Office number is [phone]

## 2022-06-09 NOTE — PLAN OF CARE
Goal Outcome Evaluation:  Plan of Care Reviewed With: patient        Progress: improving     Patient has been comfortable this shift, has denied pain. Wound vac in place and running. No acute changes.

## 2022-06-09 NOTE — PLAN OF CARE
Goal Outcome Evaluation:              Outcome Evaluation: Pt admitted with wound dehiscence, demonstrates much improved independence since swing bed admission with mobility, grossly MI with RW. D/C rec for home with home health which was SS suggestion/plan. PT discussed OP PT however pt states it's difficult to get in vehicle. Pt also verbally given a few hip flexion strengthening exercises to answer pt question.

## 2022-06-09 NOTE — DISCHARGE PLACEMENT REQUEST
"Jenn Muro (62 y.o. Female)             Date of Birth   1960    Social Security Number       Address   [address] [place] [address]    Home Phone   [phone]    MRN   [id number]       Restorationist   Alevism    Marital Status                               Admission Date   6/8/22    Admission Type   Elective    Admitting Provider   Jarrod Leung MD    Attending Provider   Jarrod Leung MD    Department, Room/Bed   [address], 3332/1P       Discharge Date       Dis[place] Dis[place] [facility] [place]                               Attending Provider: Jarrod Leung MD    Allergies: Mobic [Meloxicam], Lisinopril    Isolation: None   Infection: None   Code Status: CPR   Advance Care Planning Activity    Ht: 167.6 cm (66\")   Wt: 132 kg (292 lb)    Admission Cmt: None   Principal Problem: None                Active Insurance as of 6/8/2022     Primary Coverage     Payor Plan Insurance Group Employer/Plan Group    [employer] [place] MEDICARE REPLACEMENT [facility]      Payor Plan Address Payor Plan Phone Number Payor Plan Fax Number Effective Dates    [address] [address] [phone]  10/1/2020 - None Entered    [facility] [address]       Subscriber Name Subscriber Birth Date Member ID       JENN MURO 1960 [id number]                 Emergency Contacts      (Rel.) Home Phone Work Phone Mobile Phone    Brynn Mane (Daughter) [phone] -- --    Mikki Muro (Daughter) [phone] -- [phone]          After Visit Summary    Jenn Muro  MRN: [id number]    Icon Date   6/8/2022 - 6/9/2022   Icon Location   [address]     Icon Checklist header      Your Next Steps      Icon do   Do     Icon Checkbox     these medications from [address] [address] [address] [place] [address] - [phone]  - [phone] " FX  1 amoxicillin-clavulanate  2 heparin (porcine)  3 HYDROcodone-acetaminophen      Icon read   Read     Icon Checkbox    Read these attachments  1 Wound Dehiscence (English)  2 Surgical Wound Debridement (English)  3 Negative Pressure Wound Therapy Home Guide (English)  4 Acetaminophen; Hydrocodone Capsules or Tablets (English)  5 Amoxicillin; Clavulanic Acid Tablets (English)  6 Heparin injection (English)      [email or web address]    View your After Visit Summary and more online at [email or web address]       After Visit Summary    Instructions     Icon medication changes this visit             Your medications have changed    Icon medications to start taking   START taking:   amoxicillin-clavulanate (Augmentin)      heparin (porcine)      HYDROcodone-acetaminophen (NORCO)       Icon medications to stop taking   STOP taking:   enoxaparin 30 MG/0.3ML solution syringe (LOVENOX)       Review your updated medication list below.    Your Next Steps  Icon do   Do  Icon read   Read    COVID-19 Vaccination Information  Why Get Vaccinated?  Building defenses against COVID-19 is a team effort, and you are a brumfield part of that team. Getting the COVID-19 vaccine adds one more layer of protection for you, your coworkers, and family. Here are ways you can build people’s confidence in the COVID-19 vaccines in your community and at home.     · Get vaccinated and enroll in the vAirusafe text messaging program to help CDC monitor vaccine safety.   · Tell others why you are getting vaccinated and encourage them to get vaccinated. Share your success story.    · Learn how to have conversations about COVID-19 vaccine with coworkers, family, and friends.  · https://www.cdc.gov/coronavirus/2019-ncov/vaccines/index.html     How do I schedule an appointment for a vaccine?  https://www.vaccines.gov/ helps you find locations that carry COVID-19 vaccines and their contact information. Because every location handles appointments  differently, you will need to schedule your appointment directly with the location you choose.          If you have any questions about your recovery, please call the [facility] Nurse Call Center at [phone]. A registered nurse is available 24 hours a day 7 days a week to assist you.   If you have any COVID-19 related questions, please call [phone].                       Icon Orders placed today                    Other instructions    Discharge Follow-up with Specified Provider: Dr. Leung; 2 Weeks             What's Next    What's Next          Follow up with Delilah Pizarro  [id number]   [place] [address]  [phone]    Icon Orders placed today                    Additional Information        Follow-up with Dr Leung on 6/22/22 at 3:45 pm. Office number is [phone]     Follow-up with Dr Pizarro on 6/16/22 at 10:15 am.  Office number is [phone]                       Your Allergies  Date Reviewed: 6/9/2022  Your Allergies   Allergen Reactions   Mobic (Meloxicam) GI Intolerance       Lisinopril Cough         Patient Belongings Returned      Document Return of Belongings Flowsheet    Were the patient bedside belongings sent home? Yes   Medications Retrieved from Pharmacy & Sent Home N/A   Belongings Sent to Safe --   Belongings sent with: --   Belongings Retrieved from Security & Sent Home N/A           [employer] Signup    Our records indicate that you have an active [facility] [employer] account.     You can view your After Visit Summary by going to [email or web address] and logging in with your [employer] username and password.  If you don't have a [employer] username and password but a parent or guardian has access to your record, the parent or guardian should login with their own [employer] username and password and access your record to view the After Visit Summary.     If you have questions, you can email [email or web address] or call [phone] or toll free  number [phone] to talk to our MyChart staff.  Remember, [employer] is NOT to be used for urgent needs.  For medical emergencies, dial 911.       Medication List    Medication List     Morning Afternoon Evening Bedtime As Needed    acetaminophen 325 MG tablet  Commonly known as: TYLENOL  Take 2 tablets by mouth Every 4 (Four) Hours As Needed for Mild Pain .  Last time this was given: 650 mg on June 9, 2022  7:28 AM        Icon medications to start taking   amoxicillin-clavulanate 875-125 MG per tablet  Commonly known as: Augmentin  Take 1 tablet by mouth 2 (Two) Times a Day.         buPROPion  MG 12 hr tablet  Commonly known as: WELLBUTRIN SR  Take 400 mg by mouth every night at bedtime. Prior to [facility] Admission, Patient was on:   Script is wrote 1 BID but pt takes 2 QHS         cyclobenzaprine 5 MG tablet  Commonly known as: FLEXERIL  Take 1 tablet by mouth 3 (Three) Times a Day As Needed for Muscle Spasms.         docusate sodium 100 MG capsule  Take 1 capsule by mouth 2 (Two) Times a Day.         FLUoxetine 10 MG capsule  Commonly known as: PROzac  Take 2 capsules by mouth Daily.        Icon medications to start taking   heparin (porcine) 5000 UNIT/ML injection  Inject 1 mL under the skin into the appropriate area as directed Every 12 (Twelve) Hours. Indications: Prophylaxis of Venous Thromboembolism  For: Prophylaxis of Venous Thromboembolism  Last time this was given: 5,000 Units on June 9, 2022  8:38 AM        Icon medications to start taking   HYDROcodone-acetaminophen  MG per tablet  Commonly known as: NORCO  Take 1 tablet by mouth Every 6 (Six) Hours As Needed for Moderate Pain .  For diagnoses: Broken hip         hydrOXYzine 25 MG tablet  Commonly known as: ATARAX  Take 1 tablet by mouth 3 (Three) Times a Day As Needed for Anxiety.         insulin aspart 100 UNIT/ML injection  Commonly known as: novoLOG  Inject 0-14 Units under the skin into the appropriate area as directed 4 (Four) Times a  Day Before Meals & at Bedtime.  Last time this was given: Ask your nurse or doctor         insulin detemir 100 UNIT/ML injection  Commonly known as: LEVEMIR  Inject 20 Units under the skin into the appropriate area as directed Every Night.         iron polysaccharides 150 MG capsule  Commonly known as: NIFEREX  Take 1 capsule by mouth 2 (Two) Times a Day.         losartan-hydrochlorothiazide 100-25 MG per tablet  Commonly known as: HYZAAR  Take 1 tablet by mouth Daily. Pt unsure of mg         metFORMIN 1000 MG tablet  Commonly known as: GLUCOPHAGE  Take 1 tablet by mouth 2 (Two) Times a Day With Meals.         vitamin D 1.25 MG (65909 UT) capsule capsule  Commonly known as: ERGOCALCIFEROL  Take 50,000 Units by mouth 1 (One) Time Per Week.            Where to  your medications     Icon medication  information                     these medications at [address] [address] [facility] [address] [facility] [address] [facility] [phone] [facility] [phone] FX     amoxicillin-clavulanate • heparin (porcine) • HYDROcodone-acetaminophen    Address[facility] [address] [facility] [address] [facility] [address]   Phone: [phone]       [address] [address]  Phone:  [phone]  Fax:  [phone]        Patient: ROOM: [address] [place] [address]   Es Olivier MRN:  [id number]   [address] [place] [address] :  1960  SSN:    Phone: [phone] Sex:  F   PCP: Delilah Pizarro                 Emergency Contact Information      Name Relation Home Work Mobile     Brynn Mane Daughter [phone]         Mikki Olivier Daughter [phone]          INSURANCE PAYOR PLAN GROUP # SUBSCRIBER ID   Primary:    [facility] MEDICARE REPLACEMENT [id number]   [id number]   Admitting Diagnosis: Postoperative wound dehiscence [T81.31XA]  Order Date:  2022        Inpatient Case Management  Consult       (Order ID: [id number])      Diagnosis:         Priority:  Routine Expected Date:   Expiration Date:        Interval:   Count:    Reason for Consult? Pt uses home health at home and has a wound vac        Authorizing Provider:Jarrod Leung MD  Authorizing Provider's NPI: [id number]  Order Entered By: Sia Shepherd RN 2022  3:47 PM     Electronically signed by: Jarrod Leung MD 2022  3:47 PM     [address] [address]  Phone:  [phone]  Fax:  [phone]        Patient: ROOM: [address] [place] [address]   Es Olivier MRN:  [id number]   [address] [place] [address] :  1960  SSN:    Phone: [phone] Sex:  F   PCP: Delilah Pizarro                 Emergency Contact Information      Name Relation Home Work Mobile     Brynn Mane Daughter [phone]         Mikki Olivier Daughter [phone]          INSURANCE PAYOR PLAN GROUP # SUBSCRIBER ID   Primary:    [facility] MEDICARE REPLACEMENT [id number]   [id number]   Admitting Diagnosis: Postoperative wound dehiscence [T81.31XA]  Order Date:  2022        Case Management  Consult       (Order ID: [id number])     Diagnosis:         Priority:  Routine Expected Date:   Expiration Date:        Interval:   Count:    Comments: Change wound vac dressing per protocol every 72 hours. Maintain current settings.  Reason for Consult? Wound vac        Authorizing Provider:Melody Guerin APRN  Authorizing Provider's NPI: [id number]  Order Entered By: Melody Guerin APRN 2022  9:08 AM     Electronically signed by: Melody Guerin APRN 2022  9:08 AM            History & Physical      H&P signed by [employer], [facility] at 22 9094      Attestation signed by Jarrod Leung MD at 22 6900    I have reviewed this documentation and agree.             Scan on 2022 by [employer], [facility]: [facility], [facility], 2022          Electronically signed by  Jarrod Leung MD at 06/08/22 0753         Current Facility-Administered Medications   Medication Dose Route Frequency Provider Last Rate Last Admin   • acetaminophen (TYLENOL) tablet 650 mg  650 mg Oral Q4H PRN Jarrod Leung MD   650 mg at 06/09/22 0728    Or   • acetaminophen (TYLENOL) suppository 650 mg  650 mg Rectal Q4H PRN Jarrod Leung MD       • ceFAZolin (ANCEF) 3 g in sodium chloride 0.9 % 100 mL IVPB  3 g Intravenous Q8H Jarrod Leung MD   Stopped at 06/09/22 0600   • dextrose (D50W) (25 g/50 mL) IV injection 25 g  25 g Intravenous Q15 Min PRN Amaya Mcfarland PA-C       • dextrose (GLUTOSE) oral gel 15 g  15 g Oral Q15 Min PRN Amaya Mcfarland PA-C       • famotidine (PEPCID) tablet 20 mg  20 mg Oral Daily Amaya Mcfarland PA-C   20 mg at 06/09/22 0838   • glucagon (human recombinant) (GLUCAGEN DIAGNOSTIC) injection 1 mg  1 mg Intramuscular Q15 Min PRN Amaya Mcfarland PA-C       • heparin (porcine) 5000 UNIT/ML injection 5,000 Units  5,000 Units Subcutaneous Q12H Jarrod Leung MD   5,000 Units at 06/09/22 0838   • HYDROcodone-acetaminophen (NORCO) 7.5-325 MG per tablet 1 tablet  1 tablet Oral Q4H PRN Jarrod Leung MD       • HYDROmorphone (DILAUDID) injection 1 mg  1 mg Intravenous Q2H PRN Jarrod Leung MD   1 mg at 06/08/22 1711    And   • naloxone (NARCAN) injection 0.4 mg  0.4 mg Intravenous Q5 Min PRN Jarrod Leung MD       • ibuprofen (ADVIL,MOTRIN) tablet 400 mg  400 mg Oral Q6H PRN Jarrod Leung MD       • Insulin Aspart (novoLOG) injection 0-7 Units  0-7 Units Subcutaneous TID AC Amaya Mcfarland PA-C   2 Units at 06/09/22 0838   • ketorolac (TORADOL) injection 30 mg  30 mg Intravenous Q6H PRN Jarrod Leung MD       • ondansetron (ZOFRAN) injection 4 mg  4 mg Intravenous Q6H PRN Jarrod Leung MD       • oxyCODONE-acetaminophen (PERCOCET) 7.5-325 MG per tablet 2 tablet  2 tablet Oral Q4H PRN Jarrod Leung MD        • sodium chloride 0.9 % infusion  100 mL/hr Intravenous Continuous Jarrod Leung  mL/hr at 22 0600 100 mL/hr at 22 0600        Operative/Procedure Notes (most recent note)      Jarrod Leung MD at 22 1340        Operative report  Preoperative diagnosis left postoperative hip wound dehiscence  Postoperative diagnosis the same  Surgical procedure excisional debridement left hip wound of lesion 2 x 4 cm with the deep tunnel including skin fascia and fatty layer and application of wound VAC device  After proper patient and limb identification bring to the operating room general anesthesia right lateral decubitus Betadine scrubbing and sterile draping.  Wound is on the lateral proximal aspect of the previous incision using sharp blade elliptic resection of the sinus tract going deep in the fatty layer.  Tunnelization include only the fatty layer without going deeper than the muscle.  And fascia.  Excision of fibrinous tissue with sharp blade and rongeur and curette washing with pulsatile lavage.  The lesion go deep by a nearly 15 cm.  Shaping the large sponge to fit the wound and then application and sealing the wound VAC which showed good seal with the suction.  Patient returned to recovery room in stable condition tissue was sent for culture.    Electronically signed by Jarrod Leung MD at 22 1420       Physician Progress Notes (most recent note)    No notes of this type exist for this encounter.            Consult Notes (most recent note)      Amaya Mcfarland PA-C at 22 1605     Attestation signed by Blanca Mandel MD at 22 1701    I have reviewed this documentation and agree.                      [facility] Medicine Services  CONSULT NOTE    Consults    Patient Identification:  Name:  Es Olivier  Age:  62 y.o.  Sex:  female  :  1960  MRN:  [id number]  Visit Number:  [id number]  Primary care provider:  Moira  Delilah JUAREZ MD    Subjective       History of presenting illness:    Es Olivier is a 62 y.o. female admitted by Dr. Leung with known medical history of essential HTN, anemia, FRANCINE not on CPAP, IDDM, GERD, anxiety/depression.  Es Olivier is being evaluated by the Hospital Medicine Service at the request of Orthopedic services.     Patient was hospitalized at this facility from inpatient from 02/25/22 through 03/09/22 and swing bed from  03/09/22 through 03/25/22 for acute traumatic closed comminuted left proximal femur fracture status post ORIF on February 26, 2022 with later discharge from swing bed status to [facility] for short-term rehab.      In late May 2022, she did follow-up with orthopedic office and was noted to have tunneling noted at the last office visit of approximately 6.5 cm with active drainage noted at said time with patient recommended that wound be packed loosely with wound cultures obtained and found to be positive for Proteus she was started on Augmentin and it was recommended she undergo left hip irrigation debridement with wound VAC placement.    She was admitted to this facility today for above-mentioned procedure with consultation for medical management placed shortly afterward.  Mrs. Olivier is assessed resting comfortably in bed.  She denies chest pain, dyspnea, cough, and wheeze. She reports since her prior procedure she has been WBAT to her LLE with use of a walker to ambulate . She reports she is feeling well post-operatively.  She reports she has home health but is nervous about utilizing the wound vac at home.     ---------------------------------------------------------------------------------------------------------------------  Review of Systems   Constitutional: Negative for chills, fatigue and fever.   HENT: Negative for congestion and drooling.    Eyes: Negative for pain and discharge.   Respiratory: Negative for cough, shortness of breath and wheezing.   "  Cardiovascular: Negative for chest pain and leg swelling.   Gastrointestinal: Negative for abdominal distention, diarrhea, nausea and vomiting.   Endocrine: Negative for cold intolerance and polydipsia.   Genitourinary: Negative for difficulty urinating.   Musculoskeletal: Negative for arthralgias and gait problem.   Skin: Negative for color change and pallor.   Allergic/Immunologic: Negative for environmental allergies and food allergies.   Neurological: Negative for dizziness and headaches.   Hematological: Negative for adenopathy. Does not bruise/bleed easily.   Psychiatric/Behavioral: Negative for agitation and confusion.        Otherwise 10-system ROS reviewed and is negative except as mentioned in the HPI.  ---------------------------------------------------------------------------------------------------------------------   Past History:  Family History   Problem Relation Age of Onset   • Breast cancer Sister         20s   • Cancer Sister    • Breast cancer Sister         60s   • Bone cancer Mother         60   • Osteoarthritis Mother    • Diabetes Father    • Heart attack Father    • Heart disease Father    • Heart disease Brother      Past Medical History:   Diagnosis Date   • Arthritis    • Biliary dyskinesia     abnormal HIDA 11/2018 w/ EF 31%, symptomatic w/ N/V   • Closed fracture of proximal end of left femur ([facility]) 02/25/2022   • Depression    • DM2 (diabetes mellitus, type 2) ([facility])     dx 1994, on insulin >5 years, A1c 7, associated neuropathy, no other complications   • Dyspepsia    • Dyspnea on exertion    • Elevated cholesterol    • Fatigue    • Fatty liver     dx on CT 2016   • GERD (gastroesophageal reflux disease)    • Heart disease     w/ cardiac clearance 4/2019, negative stress test 10/2017, EF 65%   • Hiatal hernia     \"small\" noted on UGI 2014   • History of Helicobacter pylori infection     treated remotely   • Hyperlipidemia    • Hypertension    • Joint pain    • Morbid obesity with BMI " "of 45.0-49.9, adult ([facility])    • Sleep apnea     formerly on a device, no longer using, says just doesn't need it   • Urinary incontinence     no meds   • Vertigo    • Vitamin D deficiency      Past Surgical History:   Procedure Laterality Date   • BLADDER SURGERY      \"tack\", uncomplicated, but unsuccessful   • CATARACT EXTRACTION Left    • COLONOSCOPY      unremarkable   • DILATATION AND CURETTAGE     • ENDOSCOPY     • ORIF HIP FRACTURE Left 2022    Procedure: Left hip Open reduction and internal fixation.;  Surgeon: Jarrod Leung MD;  [facility]  Service: Orthopedics;  Laterality: Left;   • TONSILLECTOMY       Social History     Socioeconomic History   • Marital status:    Tobacco Use   • Smoking status: Former Smoker     Years: 10.00     Types: Cigarettes     Quit date:      Years since quittin.4   • Smokeless tobacco: Never Used   Vaping Use   • Vaping Use: Never used   Substance and Sexual Activity   • Alcohol use: Not Currently   • Drug use: No   • Sexual activity: Defer     ---------------------------------------------------------------------------------------------------------------------   Allergies:  Mobic [meloxicam] and Lisinopril  ---------------------------------------------------------------------------------------------------------------------   Prior to Admission Medications     Prescriptions Last Dose Informant Patient Reported? Taking?    buPROPion SR (WELLBUTRIN SR) 200 MG 12 hr tablet 2022 Medication Bottle Yes Yes    Take 400 mg by mouth every night at bedtime. Prior to [facility] Admission, Patient was on:   Script is wrote 1 BID but pt takes 2 QHS    cyclobenzaprine (FLEXERIL) 5 MG tablet 2022  No Yes    Take 1 tablet by mouth 3 (Three) Times a Day As Needed for Muscle Spasms.    FLUoxetine (PROzac) 10 MG capsule 2022  No Yes    Take 2 capsules by mouth Daily.    hydrOXYzine (ATARAX) 25 MG tablet 2022  No Yes    Take 1 tablet by " mouth 3 (Three) Times a Day As Needed for Anxiety.    insulin aspart (novoLOG) 100 UNIT/ML injection 6/7/2022  No Yes    Inject 0-14 Units under the skin into the appropriate area as directed 4 (Four) Times a Day Before Meals & at Bedtime.    insulin detemir (LEVEMIR) 100 UNIT/ML injection 6/7/2022  No Yes    Inject 20 Units under the skin into the appropriate area as directed Every Night.    losartan-hydrochlorothiazide (HYZAAR) 100-25 MG per tablet 6/7/2022  Yes Yes    Take 1 tablet by mouth Daily. Pt unsure of mg    metFORMIN (GLUCOPHAGE) 1000 MG tablet 6/7/2022  No Yes    Take 1 tablet by mouth 2 (Two) Times a Day With Meals.    vitamin D (ERGOCALCIFEROL) 1.25 MG (17388 UT) capsule capsule 6/7/2022 Medication Bottle Yes Yes    Take 50,000 Units by mouth 1 (One) Time Per Week.    acetaminophen (TYLENOL) 325 MG tablet More than a month  No No    Take 2 tablets by mouth Every 4 (Four) Hours As Needed for Mild Pain .    docusate sodium 100 MG capsule Not Taking  No No    Take 1 capsule by mouth 2 (Two) Times a Day.    Patient not taking:  Reported on 6/8/2022    enoxaparin (LOVENOX) 30 MG/0.3ML solution syringe Not Taking  No No    Inject 0.3 mL under the skin into the appropriate area as directed Every 12 (Twelve) Hours. Indications: Prevention of Unwanted Clot in Veins    Patient not taking:  Reported on 6/8/2022    iron polysaccharides (NIFEREX) 150 MG capsule Not Taking  No No    Take 1 capsule by mouth 2 (Two) Times a Day.    Patient not taking:  Reported on 6/8/2022        Hospital Meds:  ceFAZolin, 3 g, Intravenous, Q8H  [START ON 6/9/2022] famotidine, 20 mg, Oral, Daily  [START ON 6/9/2022] heparin (porcine), 5,000 Units, Subcutaneous, Q12H  Insulin Aspart, 0-7 Units, Subcutaneous, TID AC      sodium chloride, 100 mL/hr      ---------------------------------------------------------------------------------------------------------------------     Objective     Vital Signs:  Temp:  [97.1 °F (36.2 °C)-98.2 °F  (36.8 °C)] 97.6 °F (36.4 °C)  Heart Rate:  [66-86] 73  Resp:  [9-20] 20  BP: ()/(52-93) 119/63      06/08/22  1055   Weight: 132 kg (292 lb)     Body mass index is 47.13 kg/m².  ---------------------------------------------------------------------------------------------------------------------     Physical Exam  Vitals and nursing note reviewed.   Constitutional:       General: She is awake.      Appearance: Normal appearance.   HENT:      Head: Normocephalic and atraumatic.   Eyes:      General: Lids are normal.      Conjunctiva/sclera:      Right eye: Right conjunctiva is not injected.      Left eye: Left conjunctiva is not injected.   Cardiovascular:      Rate and Rhythm: Normal rate and regular rhythm.      Heart sounds: S1 normal and S2 normal.   Pulmonary:      Effort: No tachypnea or bradypnea.      Breath sounds: No decreased breath sounds, wheezing, rhonchi or rales.   Abdominal:      General: Bowel sounds are normal.      Palpations: Abdomen is soft.      Tenderness: There is no abdominal tenderness.   Musculoskeletal:      Right lower leg: No swelling. No edema.      Left lower leg: No swelling. No edema.      Comments:  Left hip with post-op dressing in place   Skin:     General: Skin is warm and moist.      Comments: Wound vac in place to left hip with serosanguinous drainage in tubing.  Post-op dressing in place   Neurological:      Mental Status: She is alert and oriented to person, place, and time.   Psychiatric:         Attention and Perception: Attention normal.         Mood and Affect: Mood normal.         Behavior: Behavior is cooperative.         ---------------------------------------------------------------------------------------------------------------------   EKG:      Baseline EKG from prior hospitalization reviewed with NSR.     ---------------------------------------------------------------------------------------------------------------------   Results from last 7 days   Lab  Units 06/06/22  1519   CRP mg/dL 0.70*   WBC 10*3/mm3 7.55   HEMOGLOBIN g/dL 10.2*   HEMATOCRIT % 33.6*   MCV fL 84.2   MCHC g/dL 30.4*   PLATELETS 10*3/mm3 475*         Results from last 7 days   Lab Units 06/06/22  1519   SODIUM mmol/L 138   POTASSIUM mmol/L 4.1   CHLORIDE mmol/L 100   CO2 mmol/L 23.7   BUN mg/dL 17   CREATININE mg/dL 0.81   CALCIUM mg/dL 9.7   GLUCOSE mg/dL 142*   Estimated Creatinine Clearance: 101.8 mL/min (by C-G formula based on SCr of 0.81 mg/dL).  No results found for: AMMONIA          Lab Results   Component Value Date    HGBA1C 6.80 (H) 02/25/2022     Lab Results   Component Value Date    TSH 8.590 (H) 02/25/2022    FREET4 1.13 02/25/2022     No results found for: PREGTESTUR, PREGSERUM, HCG, HCGQUANT  Pain Management Panel     Pain Management Panel Latest Ref Rng & Units 4/6/2016 4/7/2014    AMPHETAMINES SCREEN, URINE Negative Negative Negative    BARBITURATES SCREEN Negative Negative Negative    BENZODIAZEPINE SCREEN, URINE Negative Negative Negative    COCAINE SCREEN, URINE Negative Negative Negative    METHADONE SCREEN, URINE Negative Negative Negative        No results found for: BLOODCX  No results found for: URINECX  No results found for: WOUNDCX  No results found for: STOOLCX      ---------------------------------------------------------------------------------------------------------------------   Imaging Results (Last 7 Days)     ** No results found for the last 168 hours. **          I have personally reviewed the radiology images and read the final radiology report.        Assessment / Plan     Assessment and Plan:    -Left postoperative hip wound dehiscence tunnelization status post excisional debridement and wound VAC placement:   · Post-op care per primary.   · NV checks.   · Wound Vac in place.   · PRN analgesics post-operatively per GS.     -Normocytic anemia  · Obtain CBC.   · 06/06 H&H reviewed.   · Repeat AM CBC.   · Feb/March 2022 anemia studies reviewed.   · Transfuse  if hgb drops below 7g/dL.    -Essential hypertension  · Review home antihypertensive regimen.   · Vitals per hospital protocol.     -Obstructive sleep apnea not on CPAP  · Supportive care.     -Type 2 DM, ID:   • Given diabetes mellitus, fingerstick blood glucose monitoring has been ordered AC&HS.   • Sliding scale insulin has been ordered PRN.    • Hemoglobin a1c added to existing lab specimen.   • Home DM regimen to be reviewed upon availiability.   • Hypoglycemia protocol on board if needed.      GERD:   · Add Pepcid 20mg.      Depression:   · Continue home Prozac.      Thank you for the opportunity to participate in the care of your patient. Please do not hesitate to call with any questions or concerns.     Amaya Mcfarland PA-C  [facility] Medicine Team  22  16:43 EDT  Pager # [phone]  ---------------------------------------------------------------------------------------------------------------------       Electronically signed by Blanca Mandel MD at 22 1701       Physical Therapy Notes (most recent note)    No notes exist for this encounter.            Occupational Therapy Notes (most recent note)      Judith Guerin, ISABEL at 22 0948          Patient Name: Es Olivier  : 1960    MRN: [id number]                              Today's Date: 2022       Admit Date: 2022    Visit Dx:     ICD-10-CM ICD-9-CM   1. Closed fracture of neck of left femur, initial encounter ([facility])  S72.002A 820.8   2. Postoperative wound dehiscence  T81.31XA 998.32     Patient Active Problem List   Diagnosis   • Biliary dyskinesia   • Heart disease   • Hypertension   • DM2 (diabetes mellitus, type 2) ([facility])   • Hyperlipidemia   • Fatigue   • Dyspepsia   • Dyspnea on exertion   • Morbid obesity with BMI of 45.0-49.9, adult (HCC)   • Urinary incontinence   • Depression   • GERD (gastroesophageal reflux disease)   • History of Helicobacter pylori infection   • Vitamin D deficiency   • Joint pain  "  • Sleep apnea   • Hiatal hernia   • Fatty liver   • Closed fracture of proximal end of left femur (HCC)   • Closed fracture of neck of left femur (HCC)   • Postoperative wound dehiscence     Past Medical History:   Diagnosis Date   • Arthritis    • Biliary dyskinesia     abnormal HIDA 11/2018 w/ EF 31%, symptomatic w/ N/V   • Closed fracture of proximal end of left femur (HCC) 02/25/2022   • Depression    • DM2 (diabetes mellitus, type 2) ([facility])     dx 1994, on insulin >5 years, A1c 7, associated neuropathy, no other complications   • Dyspepsia    • Dyspnea on exertion    • Elevated cholesterol    • Fatigue    • Fatty liver     dx on CT 2016   • GERD (gastroesophageal reflux disease)    • Heart disease     w/ cardiac clearance 4/2019, negative stress test 10/2017, EF 65%   • Hiatal hernia     \"small\" noted on UGI 2014   • History of Helicobacter pylori infection     treated remotely   • Hyperlipidemia    • Hypertension    • Joint pain    • Morbid obesity with BMI of 45.0-49.9, adult ([facility])    • Sleep apnea     formerly on a device, no longer using, says just doesn't need it   • Urinary incontinence     no meds   • Vertigo    • Vitamin D deficiency      Past Surgical History:   Procedure Laterality Date   • BLADDER SURGERY  2013    \"tack\", uncomplicated, but unsuccessful   • CATARACT EXTRACTION Left    • COLONOSCOPY  2015    unremarkable   • DILATATION AND CURETTAGE  1987   • ENDOSCOPY     • ORIF HIP FRACTURE Left 02/26/2022    Procedure: Left hip Open reduction and internal fixation.;  Surgeon: Jarrod Leung MD;  Location: [facility];  Service: Orthopedics;  Laterality: Left;   • TONSILLECTOMY  1984      General Information     Row Name 06/09/22 0938          OT Time and Intention    Document Type evaluation  -LM     Mode of Treatment occupational therapy  -LM     Row Name 06/09/22 0938          General Information    Patient Profile Reviewed yes  -LM     Prior Level of Function ADL's;min assist:;transfer;all " household mobility  -LM     Existing Precautions/Restrictions fall  wound vac  -LM     Barriers to Rehab previous functional deficit  -LM     Row Name 06/09/22 0938          Living Environment    People in Home grandchild(breana);spouse  -LM     Row Name 06/09/22 0938          Cognition    Orientation Status (Cognition) oriented x 4  -LM     Row Name 06/09/22 0938          Safety Issues, Functional Mobility    Impairments Affecting Function (Mobility) balance;endurance/activity tolerance;other (see comments)  wound care  -LM           User Key  (r) = Recorded By, (t) = Taken By, (c) = Cosigned By    Initials Name Provider Type    LM Judith Guerin, OT Occupational Therapist                 Mobility/ADL's     Row Name 06/09/22 0939          Activities of Daily Living    BADL Assessment/Intervention bathing;upper body dressing;lower body dressing;grooming;feeding;toileting  -LM     Row Name 06/09/22 0939          Bathing Assessment/Intervention    [place] Level (Bathing) minimum assist (75% patient effort)  -LM     Row Name 06/09/22 0939          Upper Body Dressing Assessment/Training    [place] Level (Upper Body Dressing) set up  -LM     Row Name 06/09/22 0939          Lower Body Dressing Assessment/Training    [place] Level (Lower Body Dressing) minimum assist (75% patient effort);moderate assist (50% patient effort)  -LM     Row Name 06/09/22 0939          Grooming Assessment/Training    [place] Level (Grooming) set up  -LM     Row Name 06/09/22 0939          Self-Feeding Assessment/Training    [place] Level (Feeding) set up  -LM     Row Name 06/09/22 0939          Toileting Assessment/Training    [place] Level (Toileting) minimum assist (75% patient effort)  -LM           User Key  (r) = Recorded By, (t) = Taken By, (c) = Cosigned By    Initials Name Provider Type    LM Judith Guerin, OT Occupational Therapist               Obj/Interventions     Row Name 06/09/22 0940          Sensory  Assessment (Somatosensory)    Sensory Assessment (Somatosensory) sensation intact  -     Row Name 06/09/22 0940          Vision Assessment/Intervention    Visual Impairment/Limitations WFL  -LM     Row Name 06/09/22 0940          Range of Motion Comprehensive    General Range of Motion no range of motion deficits identified  -[facility] Name 06/09/22 0940          Strength Comprehensive (MMT)    General Manual Muscle Testing (MMT) Assessment no strength deficits identified  -           User Key  (r) = Recorded By, (t) = Taken By, (c) = Cosigned By    Initials Name Provider Type    LM Judith Guerin, OT Occupational Therapist               Goals/Plan     [facility] Name 06/09/22 0943          Transfer Goal 1 (OT)    Activity/Assistive Device (Transfer Goal 1, OT) transfers, all;commode, 3-in-1  -LM     [place] Level/Cues Needed (Transfer Goal 1, OT) contact guard required;minimum assist (75% or more patient effort)  -LM     Time Frame (Transfer Goal 1, OT) by discharge  -[facility] Name 06/09/22 0943          Problem Specific Goal 1 (OT)    Problem Specific Goal 1 (OT) Increase fxl activity tolerance to F+/G- to assist with badl and fxl mobility  -LM     Time Frame (Problem Specific Goal 1, OT) by discharge  -[facility] Name 06/09/22 0943          Therapy Assessment/Plan (OT)    Planned Therapy Interventions (OT) activity tolerance training;BADL retraining;adaptive equipment training;patient/caregiver education/training;transfer/mobility retraining;strengthening exercise;ROM/therapeutic exercise  -LM           User Key  (r) = Recorded By, (t) = Taken By, (c) = Cosigned By    Initials Name Provider Type    Judith Rouse OT Occupational Therapist               Clinical Impression     Row Name 06/09/22 0940          Plan of Care Review    Plan of Care Reviewed With patient  -[facility] Name 06/09/22 0940          Therapy Assessment/Plan (OT)    Patient/Family Therapy Goal Statement (OT) return to plof  -LM      Criteria for Skilled Therapeutic Interventions Met (OT) yes;meets criteria;skilled treatment is necessary  -LM     Therapy Frequency (OT) other (see comments)  prn and/or to monitor fxl progress  -LM     Row Name 06/09/22 0940          Therapy Plan Review/Discharge Plan (OT)    Anticipated Discharge Disposition (OT) home with assist;home with home health  -LM     Row Name 06/09/22 0940          Positioning and Restraints    Post Treatment Position bed  -LM     In Bed call light within reach;encouraged to call for assist  -LM           User Key  (r) = Recorded By, (t) = Taken By, (c) = Cosigned By    Initials Name Provider Type    LM Judith Guerin, OT Occupational Therapist               Outcome Measures    No documentation.                 Occupational Therapy Education                 Title: PT OT SLP Therapies (Done)     Topic: Occupational Therapy (Done)     Point: ADL training (Done)     Description:   Instruct learner(s) on proper safety adaptation and remediation techniques during self care or transfers.   Instruct in proper use of assistive devices.              Learning Progress Summary           Patient Acceptance, TB,E, VU,DU by  at 6/8/2022 1548                   Point: Precautions (Done)     Description:   Instruct learner(s) on prescribed precautions during self-care and functional transfers.              Learning Progress Summary           Patient Acceptance, TB,E, VU,DU by  at 6/8/2022 1548                               User Key     Initials Effective Dates Name Provider Type Discipline     08/05/21 -  Sia Shepherd, RN Registered Nurse Nurse              OT Recommendation and Plan  Planned Therapy Interventions (OT): activity tolerance training, BADL retraining, adaptive equipment training, patient/caregiver education/training, transfer/mobility retraining, strengthening exercise, ROM/therapeutic exercise  Therapy Frequency (OT): other (see comments) (prn and/or to monitor fxl  progress)  Plan of Care Review  Plan of Care Reviewed With: patient     Time Calculation:     Therapy Charges for Today     Code Description Service Date Service Provider Modifiers Qty    [id number] HC OT EVAL LOW COMPLEXITY 4 2022 Judith Guerin OT GO 1               Judith Guerin OT  2022    Electronically signed by Judith Guerin OT at 22 0948          Discharge Summary      Melody Guerin APRN at 22 0908          Patient Identification:  Name:  Es Olivier  Age:  62 y.o.  Sex:  female  :  1960  MRN:  [id number]  Visit Number:  [id number]    Date of Admission: 2022  Date of Discharge:  2022     PCP: Delilah Pizarro MD    DISCHARGE DIAGNOSIS  1. Left postoperative hip wound dehiscence with fat necrosis    CONSULTS   Hospitalist - medical management    PROCEDURES PERFORMED  Excisional debridement left hip wound 2 x 4 cm with deep tunneling including skin fascia and fatty layer, application of wound VAC device    HOSPITAL COURSE  Patient is a 62 y.o. female was admitted [facility] secondary to left postoperative hip wound dehiscence with fat necrosis. Please see the admitting history and physical for further details.    Patient was taken to the operating room by Dr. Leung for excisional debridement left hip and application of wound VAC device, please see operative note for more details. Patient returned to the recovery room in stable condition and was subsequently placed on medical surgical floor for post operative orders. Patient hospitalization was uneventful and on post operative day #1 patient was deemed stable for discharge home. Case Management was consulted for wound vac changes every 72 hours per protocol with home health at discharge. Patient will be maintained on current wound vac settings. Patient will follow up with Dr. Leung in 2 weeks. Patient was discharged home oral Augmentin BID and pain management. Patient verbalized  understanding of treatment plan.     VITAL SIGNS:      06/08/22  1055   Weight: 132 kg (292 lb)     Body mass index is 47.13 kg/m².    PHYSICAL EXAM:    Physical exam:  Constitutional:  Well-developed and well-nourished.  No respiratory distress.      HENT:  Head: Normocephalic and atraumatic. Bilateral PERRLA.   Neck:  Neck supple. No lymphadenopathy.   Cardiovascular:  Normal rate  Pulmonary/Chest:  No respiratory distress  Abdominal:  Soft, no tenderness.   Musculoskeletal: Lateral wound vac in place. Seal is in good condition, no leaking identified. Periwound clean dry and intact. No cyanosis noted. Minimally tender to palpate over wound bed. Neurovascularly intact LLE. + pedal pulses. Cap refill <3 seconds.   Neurological:  Alert and oriented to person, place, and time.   Skin:  Skin is warm and dry  Psychiatric:  Normal mood and affect  Peripheral vascular:  No edema     DISCHARGE DISPOSITION   Stable    DISCHARGE MEDICATIONS:     Discharge Medications      New Medications      Instructions Start Date   amoxicillin-clavulanate 875-125 MG per tablet  Commonly known as: Augmentin   1 tablet, Oral, 2 Times Daily      heparin (porcine) 5000 UNIT/ML injection   5,000 Units, Subcutaneous, Every 12 Hours Scheduled      HYDROcodone-acetaminophen  MG per tablet  Commonly known as: NORCO   1 tablet, Oral, Every 6 Hours PRN         Continue These Medications      Instructions Start Date   acetaminophen 325 MG tablet  Commonly known as: TYLENOL   650 mg, Oral, Every 4 Hours PRN      buPROPion  MG 12 hr tablet  Commonly known as: WELLBUTRIN SR   400 mg, Oral, Every Night at Bedtime, Prior to [employer] Admission, Patient was on:  Script is wrote 1 BID but pt takes 2 QHS       cyclobenzaprine 5 MG tablet  Commonly known as: FLEXERIL   5 mg, Oral, 3 Times Daily PRN      docusate sodium 100 MG capsule   100 mg, Oral, 2 Times Daily      FLUoxetine 10 MG capsule  Commonly known as: PROzac   20 mg, Oral, Daily       hydrOXYzine 25 MG tablet  Commonly known as: ATARAX   25 mg, Oral, 3 Times Daily PRN      insulin aspart 100 UNIT/ML injection  Commonly known as: novoLOG   0-14 Units, Subcutaneous, 4 Times Daily Before Meals & Nightly      insulin detemir 100 UNIT/ML injection  Commonly known as: LEVEMIR   20 Units, Subcutaneous, Nightly      iron polysaccharides 150 MG capsule  Commonly known as: NIFEREX   150 mg, Oral, 2 Times Daily      losartan-hydrochlorothiazide 100-25 MG per tablet  Commonly known as: HYZAAR   1 tablet, Oral, Daily, Pt unsure of mg      metFORMIN 1000 MG tablet  Commonly known as: GLUCOPHAGE   1,000 mg, Oral, 2 Times Daily With Meals      vitamin D 1.25 MG (17354 UT) capsule capsule  Commonly known as: ERGOCALCIFEROL   50,000 Units, Oral, Weekly         Stop These Medications    enoxaparin 30 MG/0.3ML solution syringe  Commonly known as: LOVENOX          TEST  RESULTS PENDING AT DISCHARGE  Pending Labs     Order Current Status    Tissue / Bone Culture - Tissue, Hip, Left Preliminary result               JAMMIE Barrera  06/09/22  09:20 EDT    Electronically signed by Melody Guerin APRN at 06/09/22 0920       Discharge Order (From admission, onward)     Start     Ordered    06/09/22 0918  Discharge patient  Once        Expected Discharge Date: 06/09/22    Expected Discharge Time: Midday    Discharge Disposition: [place] [facility]    Physician of Record for Attribution - Please select from Treatment Team: FANY GUTIERREZ [[id number]]    Review needed by CMO to determine Physician of Record: No       Question Answer Comment   Physician of Record for Attribution - Please select from Treatment Team FANY GUTIERREZ    Review needed by CMO to determine Physician of Record No        06/09/22 0920

## 2022-06-09 NOTE — THERAPY EVALUATION
"Acute Care - Physical Therapy Initial Evaluation   [place]     Patient Name: Es Olivier  : 1960  MRN: [id number]  Today's Date: 2022   Onset of Illness/Injury or Date of Surgery: 22  Visit Dx:     ICD-10-CM ICD-9-CM   1. Closed fracture of neck of left femur, initial encounter ([facility])  S72.002A 820.8   2. Postoperative wound dehiscence  T81.31XA 998.32     Patient Active Problem List   Diagnosis   • Biliary dyskinesia   • Heart disease   • Hypertension   • DM2 (diabetes mellitus, type 2) ([facility])   • Hyperlipidemia   • Fatigue   • Dyspepsia   • Dyspnea on exertion   • Morbid obesity with BMI of 45.0-49.9, adult ([facility])   • Urinary incontinence   • Depression   • GERD (gastroesophageal reflux disease)   • History of Helicobacter pylori infection   • Vitamin D deficiency   • Joint pain   • Sleep apnea   • Hiatal hernia   • Fatty liver   • Closed fracture of proximal end of left femur ([facility])   • Closed fracture of neck of left femur ([facility])   • Postoperative wound dehiscence     Past Medical History:   Diagnosis Date   • Arthritis    • Biliary dyskinesia     abnormal HIDA 2018 w/ EF 31%, symptomatic w/ N/V   • Closed fracture of proximal end of left femur ([facility]) 2022   • Depression    • DM2 (diabetes mellitus, type 2) ([facility])     dx , on insulin >5 years, A1c 7, associated neuropathy, no other complications   • Dyspepsia    • Dyspnea on exertion    • Elevated cholesterol    • Fatigue    • Fatty liver     dx on CT    • GERD (gastroesophageal reflux disease)    • Heart disease     w/ cardiac clearance 2019, negative stress test 10/2017, EF 65%   • Hiatal hernia     \"small\" noted on UGI    • History of Helicobacter pylori infection     treated remotely   • Hyperlipidemia    • Hypertension    • Joint pain    • Morbid obesity with BMI of 45.0-49.9, adult ([facility])    • Sleep apnea     formerly on a device, no longer using, says just doesn't need it   • Urinary incontinence     no meds   • " "Vertigo    • Vitamin D deficiency      Past Surgical History:   Procedure Laterality Date   • BLADDER SURGERY  2013    \"tack\", uncomplicated, but unsuccessful   • CATARACT EXTRACTION Left    • COLONOSCOPY  2015    unremarkable   • DILATATION AND CURETTAGE  1987   • ENDOSCOPY     • ORIF HIP FRACTURE Left 02/26/2022    Procedure: Left hip Open reduction and internal fixation.;  Surgeon: Jarrod Leung MD;  Location: [facility];  Service: Orthopedics;  Laterality: Left;   • TONSILLECTOMY  1984     PT Assessment (last 12 hours)     PT Evaluation and Treatment     Row Name 06/09/22 1112          Physical Therapy Time and Intention    Document Type evaluation  -     Mode of Treatment physical therapy  -     Patient Effort good  -     Comment Pt seen prior to D/C from facility this date for PT assessment. Pt states she lives at home with  and 16 yo grandson. Pt was grossly independent PLOF, has recently be using  RW at home for ambulation.  -     Row Name 06/09/22 1112          General Information    Patient Profile Reviewed yes  -     Onset of Illness/Injury or Date of Surgery 06/08/22  -     Referring Physician Xochitl  -     Patient Observations alert;cooperative;agree to therapy  -     General Observations of Patient Pt seen supine in hospital bed with wound vac on L LE, pleasant, willing to work with therapy for evaluation/assessment  -     Prior Level of Function independent:  -     Equipment Currently Used at Home other (see comments);walker, rolling  lately RW, however PLOF independent  -     Pertinent History of Current Functional Problem L hip surgical fixation  -     Row Name 06/09/22 1112          Previous Level of Function/Home Environm    Household Ambulation, Premorbid Functional Level independent  -     Row Name 06/09/22 1112          Living Environment    Current Living Arrangements home  -     People in Home spouse;grandchild(breana)  -     Row Name 06/09/22 1112    "       Range of Motion Comprehensive    Comment, General Range of Motion AROM grossly WFL, potential soft tissue approximation/limitation  -[facility] Name 06/09/22 1112          Strength Comprehensive (MMT)    Comment, General Manual Muscle Testing (MMT) Assessment Gross range 4 to 5/5  -[facility] Name 06/09/22 1112          Mobility    Extremity Weight-bearing Status left lower extremity  -     Left Lower Extremity (Weight-bearing Status) weight-bearing as tolerated (WBAT)  -[facility] Name 06/09/22 1112          Bed Mobility    Bed Mobility supine-sit  -     Supine-Sit Stark (Bed Mobility) modified independence;independent  -[facility] Name 06/09/22 1112          Transfers    Transfers sit-stand transfer;stand-sit transfer  -     Sit-Stand Stark (Transfers) independent;modified independence  -     Stand-Sit Stark (Transfers) independent;modified independence  -[facility] Name 06/09/22 1112          Sit-Stand Transfer    Assistive Device (Sit-Stand Transfers) walker, front-wheeled  -[facility] Name 06/09/22 1112          Stand-Sit Transfer    Assistive Device (Stand-Sit Transfers) walker, front-wheeled  -[facility] Name 06/09/22 1112          Gait/Stairs (Locomotion)    Gait/Stairs Locomotion gait/ambulation assistive device  -     [place] Level (Gait) modified independence;set up  set up with lines/tubes  -     Assistive Device (Gait) walker front-wheelrakel  -     Distance in Feet (Gait) Grossly 2-3', for demonstration of ability to ambulate  -[facility] Name 06/09/22 1112          Balance    Balance Assessment standing static balance  -     Static Standing Balance modified independence;other (see comments)  grossly WFL  -     Position/Device Used, Standing Balance supported;walker, front-wheeled  -     Row Name             Wound 06/08/22 1355 Left anterior greater trochanter Incision    Wound - Properties Group Placement Date: 06/08/22  -LC Placement Time: 1355  -  Present on Hospital Admission: Y  -LC Side: Left  -LC Orientation: anterior  -LC Location: greater trochanter  -LC Primary Wound Type: Incision  -LC     Retired Wound - Properties Group Placement Date: 06/08/22  -LC Placement Time: 1355  -LC Present on Hospital Admission: Y  -LC Side: Left  -LC Orientation: anterior  -LC Location: greater trochanter  -LC Primary Wound Type: Incision  -LC     Retired Wound - Properties Group Date first assessed: 06/08/22  - Time first assessed: 1355  -LC Present on Hospital Admission: Y  -LC Side: Left  -LC Location: greater trochanter  -LC Primary Wound Type: Incision  -LC     Row Name 06/09/22 1112          Plan of Care Review    Outcome Evaluation Pt admitted with wound dehiscence, demonstrates much improved independence since swing bed admission with mobility, grossly MI with RW. D/C rec for home with home health which was SS suggestion/plan. PT discussed OP PT however pt states it's difficult to get in vehicle.  -     Row Name 06/09/22 1112          Positioning and Restraints    Pre-Treatment Position in bed  -     Post Treatment Position bed  -     In Bed sitting EOB  -     Row Name 06/09/22 1112          Therapy Assessment/Plan (PT)    Therapy Frequency (PT) evaluation only  -           User Key  (r) = Recorded By, (t) = Taken By, (c) = Cosigned By    Initials Name Provider Type    Jessica Rodriguez, PT Physical Therapist    Rosalba Worrell RN Registered Nurse                  PT Recommendation and Plan  Therapy Frequency (PT): evaluation only  Outcome Evaluation: Pt admitted with wound dehiscence, demonstrates much improved independence since swing bed admission with mobility, grossly MI with RW. D/C rec for home with home health which was SS suggestion/plan. PT discussed OP PT however pt states it's difficult to get in vehicle.       Time Calculation:    PT Charges     Row Name 06/09/22 1122             Time Calculation    PT Received On 06/09/22  -ALEKSANDRA             User Key  (r) = Recorded By, (t) = Taken By, (c) = Cosigned By    Initials Name Provider Type     Jessica Aquino, PT Physical Therapist              Therapy Charges for Today     Code Description Service Date Service Provider Modifiers Qty    [id number]  PT EVAL LOW COMPLEXITY 1 6/9/2022 Jessica Aquino, PT GP 1               Jessica Aquino, PT  6/9/2022

## 2022-06-09 NOTE — THERAPY EVALUATION
"Patient Name: Es Olivier  : 1960    MRN: [id number]                              Today's Date: 2022       Admit Date: 2022    Visit Dx:     ICD-10-CM ICD-9-CM   1. Closed fracture of neck of left femur, initial encounter ([facility])  S72.002A 820.8   2. Postoperative wound dehiscence  T81.31XA 998.32     Patient Active Problem List   Diagnosis   • Biliary dyskinesia   • Heart disease   • Hypertension   • DM2 (diabetes mellitus, type 2) ([facility])   • Hyperlipidemia   • Fatigue   • Dyspepsia   • Dyspnea on exertion   • Morbid obesity with BMI of 45.0-49.9, adult ([facility])   • Urinary incontinence   • Depression   • GERD (gastroesophageal reflux disease)   • History of Helicobacter pylori infection   • Vitamin D deficiency   • Joint pain   • Sleep apnea   • Hiatal hernia   • Fatty liver   • Closed fracture of proximal end of left femur ([facility])   • Closed fracture of neck of left femur ([facility])   • Postoperative wound dehiscence     Past Medical History:   Diagnosis Date   • Arthritis    • Biliary dyskinesia     abnormal HIDA 2018 w/ EF 31%, symptomatic w/ N/V   • Closed fracture of proximal end of left femur ([facility]) 2022   • Depression    • DM2 (diabetes mellitus, type 2) ([facility])     dx , on insulin >5 years, A1c 7, associated neuropathy, no other complications   • Dyspepsia    • Dyspnea on exertion    • Elevated cholesterol    • Fatigue    • Fatty liver     dx on CT    • GERD (gastroesophageal reflux disease)    • Heart disease     w/ cardiac clearance 2019, negative stress test 10/2017, EF 65%   • Hiatal hernia     \"small\" noted on UGI    • History of Helicobacter pylori infection     treated remotely   • Hyperlipidemia    • Hypertension    • Joint pain    • Morbid obesity with BMI of 45.0-49.9, adult ([facility])    • Sleep apnea     formerly on a device, no longer using, says just doesn't need it   • Urinary incontinence     no meds   • Vertigo    • Vitamin D deficiency      Past Surgical History: " "  Procedure Laterality Date   • BLADDER SURGERY  2013    \"tack\", uncomplicated, but unsuccessful   • CATARACT EXTRACTION Left    • COLONOSCOPY  2015    unremarkable   • DILATATION AND CURETTAGE  1987   • ENDOSCOPY     • ORIF HIP FRACTURE Left 02/26/2022    Procedure: Left hip Open reduction and internal fixation.;  Surgeon: Jarrod Leung MD;  Location: [facility];  Service: Orthopedics;  Laterality: Left;   • TONSILLECTOMY  1984      General Information     Row Name 06/09/22 0938          OT Time and Intention    Document Type evaluation  -LM     Mode of Treatment occupational therapy  -LM     Row Name 06/09/22 0938          General Information    Patient Profile Reviewed yes  -LM     Prior Level of Function ADL's;min assist:;transfer;all household mobility  -LM     Existing Precautions/Restrictions fall  wound vac  -LM     Barriers to Rehab previous functional deficit  -LM     Row Name 06/09/22 0938          Living Environment    People in Home grandchild(breana);spouse  -LM     Row Name 06/09/22 0938          Cognition    Orientation Status (Cognition) oriented x 4  -LM     Row Name 06/09/22 0938          Safety Issues, Functional Mobility    Impairments Affecting Function (Mobility) balance;endurance/activity tolerance;other (see comments)  wound care  -LM           User Key  (r) = Recorded By, (t) = Taken By, (c) = Cosigned By    Initials Name Provider Type    LM Judith Guerin, OT Occupational Therapist                 Mobility/ADL's     Row Name 06/09/22 0939          Activities of Daily Living    BADL Assessment/Intervention bathing;upper body dressing;lower body dressing;grooming;feeding;toileting  -LM     Row Name 06/09/22 0939          Bathing Assessment/Intervention    [place] Level (Bathing) minimum assist (75% patient effort)  -LM     Row Name 06/09/22 0939          Upper Body Dressing Assessment/Training    [place] Level (Upper Body Dressing) set up  -LM     Row Name 06/09/22 0939          " Lower Body Dressing Assessment/Training    [place] Level (Lower Body Dressing) minimum assist (75% patient effort);moderate assist (50% patient effort)  -     Row Name 06/09/22 0939          Grooming Assessment/Training    [place] Level (Grooming) set up  -     Row Name 06/09/22 0939          Self-Feeding Assessment/Training    [place] Level (Feeding) set up  -     Row Name 06/09/22 0939          Toileting Assessment/Training    [place] Level (Toileting) minimum assist (75% patient effort)  -           User Key  (r) = Recorded By, (t) = Taken By, (c) = Cosigned By    Initials Name Provider Type     Judith Guerin, OT Occupational Therapist               Obj/Interventions     Row Name 06/09/22 0940          Sensory Assessment (Somatosensory)    Sensory Assessment (Somatosensory) sensation intact  -     Row Name 06/09/22 0940          Vision Assessment/Intervention    Visual Impairment/Limitations WFL  -     Row Name 06/09/22 0940          Range of Motion Comprehensive    General Range of Motion no range of motion deficits identified  -     Row Name 06/09/22 0940          Strength Comprehensive (MMT)    General Manual Muscle Testing (MMT) Assessment no strength deficits identified  -           User Key  (r) = Recorded By, (t) = Taken By, (c) = Cosigned By    Initials Name Provider Type     Judith Guerin, OT Occupational Therapist               Goals/Plan     Row Name 06/09/22 0943          Transfer Goal 1 (OT)    Activity/Assistive Device (Transfer Goal 1, OT) transfers, all;commode, 3-in-1  -LM     [place] Level/Cues Needed (Transfer Goal 1, OT) contact guard required;minimum assist (75% or more patient effort)  -LM     Time Frame (Transfer Goal 1, OT) by discharge  -     Row Name 06/09/22 0943          Problem Specific Goal 1 (OT)    Problem Specific Goal 1 (OT) Increase fxl activity tolerance to F+/G- to assist with badl and fxl mobility  -LM     Time Frame (Problem  Specific Goal 1, OT) by discharge  -LM     Row Name 06/09/22 0943          Therapy Assessment/Plan (OT)    Planned Therapy Interventions (OT) activity tolerance training;BADL retraining;adaptive equipment training;patient/caregiver education/training;transfer/mobility retraining;strengthening exercise;ROM/therapeutic exercise  -LM           User Key  (r) = Recorded By, (t) = Taken By, (c) = Cosigned By    Initials Name Provider Type    Judith Rouse, OT Occupational Therapist               Clinical Impression     Row Name 06/09/22 0940          Plan of Care Review    Plan of Care Reviewed With patient  -LM     Row Name 06/09/22 0940          Therapy Assessment/Plan (OT)    Patient/Family Therapy Goal Statement (OT) return to plof  -LM     Criteria for Skilled Therapeutic Interventions Met (OT) yes;meets criteria;skilled treatment is necessary  -LM     Therapy Frequency (OT) other (see comments)  prn and/or to monitor fxl progress  -LM     Row Name 06/09/22 0940          Therapy Plan Review/Discharge Plan (OT)    Anticipated Discharge Disposition (OT) home with assist;home with home health  -     Row Name 06/09/22 0940          Positioning and Restraints    Post Treatment Position bed  -LM     In Bed call light within reach;encouraged to call for assist  -LM           User Key  (r) = Recorded By, (t) = Taken By, (c) = Cosigned By    Initials Name Provider Type    Judith Rouse, OT Occupational Therapist               Outcome Measures    No documentation.                 Occupational Therapy Education                 Title: PT OT SLP Therapies (Done)     Topic: Occupational Therapy (Done)     Point: ADL training (Done)     Description:   Instruct learner(s) on proper safety adaptation and remediation techniques during self care or transfers.   Instruct in proper use of assistive devices.              Learning Progress Summary           Patient Acceptance, TB,E, VU,DU by BD at 6/8/2022 7138                    Point: Precautions (Done)     Description:   Instruct learner(s) on prescribed precautions during self-care and functional transfers.              Learning Progress Summary           Patient Acceptance, TB,E, VU,DU by  at 6/8/2022 1548                               User Key     Initials Effective Dates Name Provider Type Discipline     08/05/21 -  Sia Shepherd, RN Registered Nurse Nurse              OT Recommendation and Plan  Planned Therapy Interventions (OT): activity tolerance training, BADL retraining, adaptive equipment training, patient/caregiver education/training, transfer/mobility retraining, strengthening exercise, ROM/therapeutic exercise  Therapy Frequency (OT): other (see comments) (prn and/or to monitor fxl progress)  Plan of Care Review  Plan of Care Reviewed With: patient     Time Calculation:     Therapy Charges for Today     Code Description Service Date Service Provider Modifiers Qty    [id number]  OT EVAL LOW COMPLEXITY 4 6/9/2022 Judith Guerin, OT GO 1               Judith Guerin OT  6/9/2022

## 2022-06-09 NOTE — DISCHARGE SUMMARY
Patient Identification:  Name:  Es Olivier  Age:  62 y.o.  Sex:  female  :  1960  MRN:  [id number]  Visit Number:  [id number]    Date of Admission: 2022  Date of Discharge:  2022     PCP: Delilah Pizarro MD    DISCHARGE DIAGNOSIS  1. Left postoperative hip wound dehiscence with fat necrosis    CONSULTS   Hospitalist - medical management    PROCEDURES PERFORMED  Excisional debridement left hip wound 2 x 4 cm with deep tunneling including skin fascia and fatty layer, application of wound VAC device    HOSPITAL COURSE  Patient is a 62 y.o. female was admitted to [facility] secondary to left postoperative hip wound dehiscence with fat necrosis. Please see the admitting history and physical for further details.    Patient was taken to the operating room by Dr. Leung for excisional debridement left hip and application of wound VAC device, please see operative note for more details. Patient returned to the recovery room in stable condition and was subsequently placed on medical surgical floor for post operative orders. Patient hospitalization was uneventful and on post operative day #1 patient was deemed stable for discharge home. Case Management was consulted for wound vac changes every 72 hours per protocol with home health at discharge. Patient will be maintained on current wound vac settings. Patient will follow up with Dr. Leung in 2 weeks. Patient was discharged home oral Augmentin BID and pain management. Patient verbalized understanding of treatment plan.     VITAL SIGNS:      22  1055   Weight: 132 kg (292 lb)     Body mass index is 47.13 kg/m².    PHYSICAL EXAM:    Physical exam:  Constitutional:  Well-developed and well-nourished.  No respiratory distress.      HENT:  Head: Normocephalic and atraumatic. Bilateral PERRLA.   Neck:  Neck supple. No lymphadenopathy.   Cardiovascular:  Normal rate  Pulmonary/Chest:  No respiratory distress  Abdominal:  Soft, no  tenderness.   Musculoskeletal: Lateral wound vac in place. Seal is in good condition, no leaking identified. Periwound clean dry and intact. No cyanosis noted. Minimally tender to palpate over wound bed. Neurovascularly intact LLE. + pedal pulses. Cap refill <3 seconds.   Neurological:  Alert and oriented to person, place, and time.   Skin:  Skin is warm and dry  Psychiatric:  Normal mood and affect  Peripheral vascular:  No edema     DISCHARGE DISPOSITION   Stable    DISCHARGE MEDICATIONS:     Discharge Medications      New Medications      Instructions Start Date   amoxicillin-clavulanate 875-125 MG per tablet  Commonly known as: Augmentin   1 tablet, Oral, 2 Times Daily      heparin (porcine) 5000 UNIT/ML injection   5,000 Units, Subcutaneous, Every 12 Hours Scheduled      HYDROcodone-acetaminophen  MG per tablet  Commonly known as: NORCO   1 tablet, Oral, Every 6 Hours PRN         Continue These Medications      Instructions Start Date   acetaminophen 325 MG tablet  Commonly known as: TYLENOL   650 mg, Oral, Every 4 Hours PRN      buPROPion  MG 12 hr tablet  Commonly known as: WELLBUTRIN SR   400 mg, Oral, Every Night at Bedtime, Prior to [facility] Admission, Patient was on:  Script is wrote 1 BID but pt takes 2 QHS       cyclobenzaprine 5 MG tablet  Commonly known as: FLEXERIL   5 mg, Oral, 3 Times Daily PRN      docusate sodium 100 MG capsule   100 mg, Oral, 2 Times Daily      FLUoxetine 10 MG capsule  Commonly known as: PROzac   20 mg, Oral, Daily      hydrOXYzine 25 MG tablet  Commonly known as: ATARAX   25 mg, Oral, 3 Times Daily PRN      insulin aspart 100 UNIT/ML injection  Commonly known as: novoLOG   0-14 Units, Subcutaneous, 4 Times Daily Before Meals & Nightly      insulin detemir 100 UNIT/ML injection  Commonly known as: LEVEMIR   20 Units, Subcutaneous, Nightly      iron polysaccharides 150 MG capsule  Commonly known as: NIFEREX   150 mg, Oral, 2 Times Daily       losartan-hydrochlorothiazide 100-25 MG per tablet  Commonly known as: HYZAAR   1 tablet, Oral, Daily, Pt unsure of mg      metFORMIN 1000 MG tablet  Commonly known as: GLUCOPHAGE   1,000 mg, Oral, 2 Times Daily With Meals      vitamin D 1.25 MG (91740 UT) capsule capsule  Commonly known as: ERGOCALCIFEROL   50,000 Units, Oral, Weekly         Stop These Medications    enoxaparin 30 MG/0.3ML solution syringe  Commonly known as: LOVENOX          TEST  RESULTS PENDING AT DISCHARGE  Pending Labs     Order Current Status    Tissue / Bone Culture - Tissue, Hip, Left Preliminary result               JAMMIE Barrera  06/09/22  09:20 EDT

## 2022-06-15 LAB
BACTERIA SPEC AEROBE CULT: ABNORMAL
GRAM STN SPEC: ABNORMAL

## 2022-07-08 ENCOUNTER — HOSPITAL ENCOUNTER (OUTPATIENT)
Dept: WOUND CARE | Facility: HOSPITAL | Age: 62
Discharge: HOME | End: 2022-07-08
Admitting: NURSE PRACTITIONER

## 2022-07-08 VITALS
HEART RATE: 87 BPM | RESPIRATION RATE: 16 BRPM | BODY MASS INDEX: 46.93 KG/M2 | HEIGHT: 66 IN | SYSTOLIC BLOOD PRESSURE: 124 MMHG | TEMPERATURE: 98 F | DIASTOLIC BLOOD PRESSURE: 60 MMHG | WEIGHT: 292 LBS

## 2022-07-08 DIAGNOSIS — T14.8XXA SURGICAL WOUND PRESENT: Primary | ICD-10-CM

## 2022-07-08 PROCEDURE — G0463 HOSPITAL OUTPT CLINIC VISIT: HCPCS

## 2022-07-08 NOTE — PROGRESS NOTES
Wound Clinic Note  Patient Identification:  Name:  Es Olivier  Age:  62 y.o.  Sex:  female  :  1960  MRN:  [id number]   Visit Number:  [id number]  Primary Care Physician:  Delilah Pizarro MD     Subjective     Chief complaint:     Left leg wound    History of presenting illness:     Patient is a 62 y.o. female with past medical history significant for diabetes, and obesity. Presented today for evaluation of left hip wound. Wound has been present since 2022 when he undergone ORIF. Area was healing well. Upon further chart review appears that on 2022 during follow-up with ortho she was noted to have a large around of tunneling and was advised on need of  for excisional debridement left hip and application of wound VAC device by Dr. Leung. This was completed on 2022. She had been receiving home health until recently reportedly unable to accept insurance. She is requesting to have wound vac changes completed here until home health can be set up. She repots mild pain to the site. Denies any fever or chills. She did arrive with packing to site as she did not have assistance for re-application. She was prescribed Augmetin X3 weeks on 2022.   ---------------------------------------------------------------------------------------------------------------------   Review of Systems   Constitutional: Negative for chills and fever.   HENT: Negative for congestion and rhinorrhea.    Eyes: Negative for pain and redness.   Respiratory: Negative for shortness of breath.    Cardiovascular: Negative for chest pain and leg swelling.   Gastrointestinal: Negative for diarrhea, nausea and vomiting.   Genitourinary: Negative for difficulty urinating and frequency.   Musculoskeletal: Negative for gait problem.   Skin: Positive for wound.   Neurological: Negative for dizziness and weakness.   Hematological: Does not bruise/bleed easily.   Psychiatric/Behavioral: Negative for confusion.     "  ---------------------------------------------------------------------------------------------------------------------   Past Medical History:   Diagnosis Date   • Arthritis    • Biliary dyskinesia     abnormal HIDA 11/2018 w/ EF 31%, symptomatic w/ N/V   • Closed fracture of proximal end of left femur ([facility]) 02/25/2022   • Depression    • DM2 (diabetes mellitus, type 2) ([facility])     dx 1994, on insulin >5 years, A1c 7, associated neuropathy, no other complications   • Dyspepsia    • Dyspnea on exertion    • Elevated cholesterol    • Fatigue    • Fatty liver     dx on CT 2016   • GERD (gastroesophageal reflux disease)    • Heart disease     w/ cardiac clearance 4/2019, negative stress test 10/2017, EF 65%   • Hiatal hernia     \"small\" noted on UGI 2014   • History of Helicobacter pylori infection     treated remotely   • Hyperlipidemia    • Hypertension    • Joint pain    • Morbid obesity with BMI of 45.0-49.9, adult ([facility])    • Sleep apnea     formerly on a device, no longer using, says just doesn't need it   • Urinary incontinence     no meds   • Vertigo    • Vitamin D deficiency      Past Surgical History:   Procedure Laterality Date   • BLADDER SURGERY  2013    \"tack\", uncomplicated, but unsuccessful   • CATARACT EXTRACTION Left    • COLONOSCOPY  2015    unremarkable   • DILATATION AND CURETTAGE  1987   • ENDOSCOPY     • INCISION AND DRAINAGE HIP Left 6/8/2022    Procedure: LEFT HIP LAVAGE AND WOUND VAC PLACEMENT;  Surgeon: Jarrod Leung MD;  Location: [facility] OR;  Service: Orthopedics;  Laterality: Left;   • ORIF HIP FRACTURE Left 02/26/2022    Procedure: Left hip Open reduction and internal fixation.;  Surgeon: Jarrod Leung MD;  Location: [facility] OR;  Service: Orthopedics;  Laterality: Left;   • TONSILLECTOMY  1984     Family History   Problem Relation Age of Onset   • Breast cancer Sister         20s   • Cancer Sister    • Breast cancer Sister         60s   • Bone cancer Mother         60   • " Osteoarthritis Mother    • Diabetes Father    • Heart attack Father    • Heart disease Father    • Heart disease Brother      Social History     Socioeconomic History   • Marital status:    Tobacco Use   • Smoking status: Former Smoker     Years: 10.00     Types: Cigarettes     Quit date:      Years since quittin.5   • Smokeless tobacco: Never Used   Vaping Use   • Vaping Use: Never used   Substance and Sexual Activity   • Alcohol use: Not Currently   • Drug use: No   • Sexual activity: Defer     ---------------------------------------------------------------------------------------------------------------------   Allergies:  Mobic [meloxicam] and Lisinopril  ---------------------------------------------------------------------------------------------------------------------  Objective     ---------------------------------------------------------------------------------------------------------------------   Vital Signs:  Temp:  [98 °F (36.7 °C)] 98 °F (36.7 °C)  Heart Rate:  [87] 87  Resp:  [16] 16  BP: (124)/(60) 124/60  No data found.  There were no vitals filed for this visit.     on   ;      Body mass index is 47.13 kg/m².  Wt Readings from Last 3 Encounters:   22 132 kg (292 lb)   22 132 kg (292 lb)   22 (!) 146 kg (322 lb 3.2 oz)       ---------------------------------------------------------------------------------------------------------------------   Physical Exam  Constitutional: Vital sign were reviewed (temperature, pulse, respiration, and blood pressure) and found to be within expected limits, general appearance was assessed and the patient was found to be in no distress and calm and comfortable appears  Eyes:lids and lashes normal, pupils equal, round, reactive to light  Ears, Nose, Mouth, Throat:hearing intact and neck normal  Neck: shows normal range of motion without pain, and no evidence of trauma or deformity  and trachea is midline   Respiratory: Normal  respiratory effort and symmetrical chest expansion  Cardiovascular:pulses normal, and intact distal pulses dorsal-pedis  palpable and posteria-tib   palpable1+ lower bilateral  Gastrointestinal:no masses and no tenderness  Musculoskeletal: inspection of digits and nails shows normal findings  and no edema   Neurologic: Mental Status orientated to person, place, time and situation, Speech normal content and execusion  Psychiatric: Normal.  Skin: Temperature:normal turgor and temperatureColor: normal, no cyanosis, jaundice, pallor or bruising, Moisture: moist,Nails: pink nail beds, Hair:thinning to lower extremities .  Physical Exam  Wound Assessment: Location: left hip  Changes since last exam: this is initial exam  Etiology and classification: surgical wound dehiscence (primary closure has failed in one or more areas which are now open)  Wound bed structures/characteristics: full-thickness (subcutaneous tissue is exposed in at least a portion of the wound)  Drainage characteristics: moderate, serous drainage  Edges moist  Periwound characteristics: some mositure-realted inflammation  Perfusion characteristics: NA  Bioburden characteristics:no bioburden is present in wound bed     Wound Goal (s):Closure, Free of infection, No further symptoms and Reduction of contamination  Assessment & Plan      Closed fracture of neck of left femur ([facility]) [S72.002A], Postoperative wound dehiscence [T81.31XA]- will continue with wound vac therapy at this time. Due to lack of home health assistance at this time patient will need to be seen 3X/week for dressing changes. Will plan to change twice a week once she is re-evaluated by Orthopedic surgery on Monday 07/11/2022    DM2 (diabetes mellitus, type 2) ([facility]) [E11.9]- recommend adequate glycemic control to help promote wound healing. Poor glycemic control increases risk of infection, loss of limb and premature death.    Morbid obesity with BMI of 45.0-49.9, adult ([facility]) [E66.01,  Z68.42]- An obese person?is at greater risk for wound infection and dehiscence or evisceration.    Clinical Impression:Moderate Complexity    Follow-up: 3Xweek at this time     Wound Vac Application  Today's procedure is for the application of NPWT to Closed fracture of neck of left femur ([facility]) [S72.002A], Postoperative wound dehiscence [T81.31XA]. The area was prepped and draped in the usual manner. Antiseptic skin prep and aseptic techniques were utilized. The wound vac was placed using the 's specific instructions and was placed using clean techniques , The wound Measures 2.2cm x 7.5cm x 5.5cm , There is moderate. The drainage is serous. The wound bed has 80% red, granulation tissue.  The wound bed has 20% pink granulation tissue , The wound bed has 0% slough/necrotic tissue.  There is SQ tissue exposed.  Appropriate consent obtained. I have informed patient of the risks and benefits of this procedure including retained dressings which can require surgical debridement, bleeding, and infection , The existing dressing was removed and the wound assessed for infection. Wound was irrigated with normal saline. Skin prep applied to periwound skin to prevent skin stripping , Wound filled with black foam 1 piece(s) , Wound was filled with white foam x 0 pieces(s) , White VAC foam used to fill tunneling/undermining , Bridging technique used to treat multiple wounds or bridge away from gwen prominences/to offload , Dressing sealed with VAC drape , Canister changed and dated , Suction set at 125 mmHg continously. Patient educated on operation of the NPWT, how to disconnect, 2 hour off limit, active bleeding, trouble shooting for blockage and leak alarms.8    JAMMIE Hull   [facility]- [facility]  07/08/22  08:45 EDT

## 2022-07-13 ENCOUNTER — HOSPITAL ENCOUNTER (OUTPATIENT)
Dept: WOUND CARE | Facility: HOSPITAL | Age: 62
Discharge: HOME | End: 2022-07-13

## 2022-07-13 VITALS
DIASTOLIC BLOOD PRESSURE: 78 MMHG | HEART RATE: 80 BPM | TEMPERATURE: 97.6 F | SYSTOLIC BLOOD PRESSURE: 144 MMHG | RESPIRATION RATE: 18 BRPM

## 2022-07-13 DIAGNOSIS — T14.8XXA SURGICAL WOUND PRESENT: Primary | ICD-10-CM

## 2022-07-15 ENCOUNTER — HOSPITAL ENCOUNTER (OUTPATIENT)
Dept: WOUND CARE | Facility: HOSPITAL | Age: 62
Discharge: HOME | End: 2022-07-15

## 2022-07-15 VITALS
TEMPERATURE: 97.9 F | DIASTOLIC BLOOD PRESSURE: 62 MMHG | SYSTOLIC BLOOD PRESSURE: 137 MMHG | RESPIRATION RATE: 20 BRPM | HEART RATE: 92 BPM

## 2022-07-15 DIAGNOSIS — T14.8XXA SURGICAL WOUND PRESENT: Primary | ICD-10-CM

## 2022-07-18 ENCOUNTER — TRANSCRIBE ORDERS (OUTPATIENT)
Dept: WOUND CARE | Facility: HOSPITAL | Age: 62
End: 2022-07-18

## 2022-07-18 ENCOUNTER — APPOINTMENT (OUTPATIENT)
Dept: WOUND CARE | Facility: HOSPITAL | Age: 62
End: 2022-07-18

## 2022-07-18 DIAGNOSIS — T81.31XA DEHISCENCE OF CLOSURE OF SKIN, INITIAL ENCOUNTER: Primary | ICD-10-CM

## 2022-07-19 ENCOUNTER — HOSPITAL ENCOUNTER (OUTPATIENT)
Dept: WOUND CARE | Facility: HOSPITAL | Age: 62
Discharge: HOME | End: 2022-07-19

## 2022-07-19 VITALS
TEMPERATURE: 97.3 F | SYSTOLIC BLOOD PRESSURE: 112 MMHG | HEART RATE: 81 BPM | RESPIRATION RATE: 18 BRPM | DIASTOLIC BLOOD PRESSURE: 48 MMHG

## 2022-07-19 DIAGNOSIS — T14.8XXA SURGICAL WOUND PRESENT: Primary | ICD-10-CM

## 2022-07-20 ENCOUNTER — APPOINTMENT (OUTPATIENT)
Dept: WOUND CARE | Facility: HOSPITAL | Age: 62
End: 2022-07-20

## 2022-07-22 ENCOUNTER — HOSPITAL ENCOUNTER (OUTPATIENT)
Dept: WOUND CARE | Facility: HOSPITAL | Age: 62
Discharge: HOME | End: 2022-07-22
Admitting: NURSE PRACTITIONER

## 2022-07-22 VITALS
SYSTOLIC BLOOD PRESSURE: 134 MMHG | BODY MASS INDEX: 47.09 KG/M2 | HEIGHT: 66 IN | TEMPERATURE: 97.7 F | HEART RATE: 91 BPM | DIASTOLIC BLOOD PRESSURE: 58 MMHG | WEIGHT: 293 LBS

## 2022-07-22 DIAGNOSIS — T81.31XD POSTOPERATIVE WOUND DEHISCENCE, SUBSEQUENT ENCOUNTER: ICD-10-CM

## 2022-07-22 PROCEDURE — 97602 WOUND(S) CARE NON-SELECTIVE: CPT

## 2022-07-23 NOTE — PROGRESS NOTES
Wound Clinic Note  Patient Identification:  Name:  Es Olivier  Age:  62 y.o.  Sex:  female  :  1960  MRN:  [id number]   Visit Number:  [id number]  Primary Care Physician:  Delilah Pizarro MD     Subjective     Chief complaint:     Left leg wound    History of presenting illness:     Patient is a 62 y.o. female with past medical history significant for diabetes, and obesity. Presented today for evaluation of left hip wound. Wound has been present since 2022 when he undergone ORIF. Area was healing well. Upon further chart review appears that on 2022 during follow-up with ortho she was noted to have a large around of tunneling and was advised on need of  for excisional debridement left hip and application of wound VAC device by Dr. Leung. This was completed on 2022. She had been receiving home health until recently reportedly unable to accept insurance. She is requesting to have wound vac changes completed here until home health can be set up. She repots mild pain to the site. Denies any fever or chills. She did arrive with packing to site as she did not have assistance for re-application. She was prescribed Augmetin X3 weeks on 2022.     Interval history:   2022: Patient seen in clinic today for follow-up to left hip wound. She continues with wound vac to area. Area without slough, necrosis or evidence of cellulitis. She denies any complications related to current wound treatment.   ---------------------------------------------------------------------------------------------------------------------   Review of Systems   Constitutional: Negative for chills and fever.   HENT: Negative for congestion and rhinorrhea.    Respiratory: Negative for shortness of breath.    Cardiovascular: Negative for chest pain and leg swelling.   Gastrointestinal: Negative for diarrhea, nausea and vomiting.   Musculoskeletal: Negative for gait problem.   Skin: Positive for wound.  "  Neurological: Negative for weakness.   Hematological: Does not bruise/bleed easily.      ---------------------------------------------------------------------------------------------------------------------   Past Medical History:   Diagnosis Date   • Arthritis    • Biliary dyskinesia     abnormal HIDA 11/2018 w/ EF 31%, symptomatic w/ N/V   • Closed fracture of proximal end of left femur ([facility]) 02/25/2022   • Depression    • DM2 (diabetes mellitus, type 2) ([facility])     dx 1994, on insulin >5 years, A1c 7, associated neuropathy, no other complications   • Dyspepsia    • Dyspnea on exertion    • Elevated cholesterol    • Fatigue    • Fatty liver     dx on CT 2016   • GERD (gastroesophageal reflux disease)    • Heart disease     w/ cardiac clearance 4/2019, negative stress test 10/2017, EF 65%   • Hiatal hernia     \"small\" noted on UGI 2014   • History of Helicobacter pylori infection     treated remotely   • Hyperlipidemia    • Hypertension    • Joint pain    • Morbid obesity with BMI of 45.0-49.9, adult ([facility])    • Sleep apnea     formerly on a device, no longer using, says just doesn't need it   • Urinary incontinence     no meds   • Vertigo    • Vitamin D deficiency      Past Surgical History:   Procedure Laterality Date   • BLADDER SURGERY  2013    \"tack\", uncomplicated, but unsuccessful   • CATARACT EXTRACTION Left    • COLONOSCOPY  2015    unremarkable   • DILATATION AND CURETTAGE  1987   • ENDOSCOPY     • INCISION AND DRAINAGE HIP Left 6/8/2022    Procedure: LEFT HIP LAVAGE AND WOUND VAC PLACEMENT;  Surgeon: Jarrod Leung MD;  Location: [facility] OR;  Service: Orthopedics;  Laterality: Left;   • ORIF HIP FRACTURE Left 02/26/2022    Procedure: Left hip Open reduction and internal fixation.;  Surgeon: Jarrod Leung MD;  Location: [facility] OR;  Service: Orthopedics;  Laterality: Left;   • TONSILLECTOMY  1984     Family History   Problem Relation Age of Onset   • Breast cancer Sister         20s   • Cancer " Sister    • Breast cancer Sister         60s   • Bone cancer Mother         60   • Osteoarthritis Mother    • Diabetes Father    • Heart attack Father    • Heart disease Father    • Heart disease Brother      Social History     Socioeconomic History   • Marital status:    Tobacco Use   • Smoking status: Former Smoker     Years: 10.00     Types: Cigarettes     Quit date:      Years since quittin.5   • Smokeless tobacco: Never Used   Vaping Use   • Vaping Use: Never used   Substance and Sexual Activity   • Alcohol use: Not Currently   • Drug use: No   • Sexual activity: Defer     ---------------------------------------------------------------------------------------------------------------------   Allergies:  Mobic [meloxicam] and Lisinopril  ---------------------------------------------------------------------------------------------------------------------  Objective     ---------------------------------------------------------------------------------------------------------------------   Vital Signs:     No data found.  There were no vitals filed for this visit.     on   ;      There is no height or weight on file to calculate BMI.  Wt Readings from Last 3 Encounters:   07/22/22 133 kg (294 lb)   22 132 kg (292 lb)   22 132 kg (292 lb)       ---------------------------------------------------------------------------------------------------------------------   Physical Exam  Constitutional: Vital sign were reviewed (temperature, pulse, respiration, and blood pressure) and found to be within expected limits, general appearance was assessed and the patient was found to be in no distress and calm and comfortable appears  Skin: Temperature:normal turgor and temperatureColor: normal, no cyanosis, jaundice, pallor or bruising, Moisture: moist,Nails: pink nail beds, Hair:thinning to lower extremities .  Physical Exam  Wound Assessment: Location: left hip  Changes since last exam: surface area  has diminished  and granulation has improved   Etiology and classification: surgical wound dehiscence (primary closure has failed in one or more areas which are now open)  Wound bed structures/characteristics: full-thickness (subcutaneous tissue is exposed in at least a portion of the wound), granulation tissue covers , 50-75% of wound bed   Drainage characteristics: moderate, serous drainage  Edges moist  Periwound characteristics: some mositure-realted inflammation  Perfusion characteristics: NA  Bioburden characteristics:no bioburden is present in wound bed     Wound Goal (s):Closure, Free of infection, No further symptoms and Reduction of contamination  Assessment & Plan      Closed fracture of neck of left femur ([facility]) [S72.002A], Postoperative wound dehiscence [T81.31XA]- will continue with wound vac therapy at this time. See nursing documentation for application details. Due to lack of home health assistance at this time patient will need to be seen 3X/week for dressing changes. Will plan to change twice a week once she is re-evaluated by Orthopedic surgery on Monday 07/11/2022    DM2 (diabetes mellitus, type 2) ([facility]) [E11.9]- recommend adequate glycemic control to help promote wound healing. Poor glycemic control increases risk of infection, loss of limb and premature death.    Morbid obesity with BMI of 45.0-49.9, adult ([facility]) [E66.01, Z68.42]- An obese person?is at greater risk for wound infection and dehiscence or evisceration.    Clinical Impression:Moderate Complexity    Follow-up: 3Xweek at this time     JAMMIE Hull   [facility]- [facility]  07/13/2022  [id number]

## 2022-07-25 ENCOUNTER — APPOINTMENT (OUTPATIENT)
Dept: WOUND CARE | Facility: HOSPITAL | Age: 62
End: 2022-07-25

## 2022-07-25 ENCOUNTER — HOSPITAL ENCOUNTER (OUTPATIENT)
Dept: WOUND CARE | Facility: HOSPITAL | Age: 62
Discharge: HOME | End: 2022-07-25
Admitting: NURSE PRACTITIONER

## 2022-07-25 VITALS
DIASTOLIC BLOOD PRESSURE: 80 MMHG | HEART RATE: 72 BPM | TEMPERATURE: 98 F | SYSTOLIC BLOOD PRESSURE: 137 MMHG | RESPIRATION RATE: 17 BRPM

## 2022-07-25 DIAGNOSIS — T81.31XD POSTOPERATIVE WOUND DEHISCENCE, SUBSEQUENT ENCOUNTER: Primary | ICD-10-CM

## 2022-07-25 DIAGNOSIS — T14.8XXA SURGICAL WOUND PRESENT: ICD-10-CM

## 2022-07-25 PROCEDURE — 97602 WOUND(S) CARE NON-SELECTIVE: CPT

## 2022-07-25 NOTE — ADDENDUM NOTE
Encounter addended by: Brynn Zamora RN on: 7/25/2022 11:33 AM   Actions taken: LDA properties accepted, Actions taken from a BestPractice Advisory, Care Plan modified, Flowsheet accepted, Visit diagnoses modified, Charge Capture section accepted

## 2022-07-25 NOTE — PROGRESS NOTES
Wound Clinic Note  Patient Identification:  Name:  Es Olivier  Age:  62 y.o.  Sex:  female  :  1960  MRN:  [id number]   Visit Number:  [id number]  Primary Care Physician:  Delilah Pizarro MD     Subjective     Chief complaint:     Left leg wound    History of presenting illness:     Patient is a 62 y.o. female with past medical history significant for diabetes, and obesity. Presented today for evaluation of left hip wound. Wound has been present since 2022 when he undergone ORIF. Area was healing well. Upon further chart review appears that on 2022 during follow-up with ortho she was noted to have a large around of tunneling and was advised on need of  for excisional debridement left hip and application of wound VAC device by Dr. Leung. This was completed on 2022. She had been receiving home health until recently reportedly unable to accept insurance. She is requesting to have wound vac changes completed here until home health can be set up. She repots mild pain to the site. Denies any fever or chills. She did arrive with packing to site as she did not have assistance for re-application. She was prescribed Augmetin X3 weeks on 2022.     Interval history:   2022: Patient seen in clinic today for follow-up to left hip wound. She continues with wound vac to area. Area without slough, necrosis or evidence of cellulitis. She denies any complications related to current wound treatment.     2022: Patient seen in clinic today for follow-up to left hip wound. DC wound vac on Friday. She is tolerating current treatment. Denies any changes or odor to drainage. Denies any fever or chills.   ---------------------------------------------------------------------------------------------------------------------   Review of Systems   Constitutional: Negative for chills and fever.   HENT: Negative for congestion and rhinorrhea.    Respiratory: Negative for shortness of breath.   "  Cardiovascular: Negative for chest pain and leg swelling.   Gastrointestinal: Negative for diarrhea, nausea and vomiting.   Musculoskeletal: Negative for gait problem.   Skin: Positive for wound.   Neurological: Negative for weakness.   Hematological: Does not bruise/bleed easily.      ---------------------------------------------------------------------------------------------------------------------   Past Medical History:   Diagnosis Date   • Arthritis    • Biliary dyskinesia     abnormal HIDA 11/2018 w/ EF 31%, symptomatic w/ N/V   • Closed fracture of proximal end of left femur (formerly [facility]) 02/25/2022   • Depression    • DM2 (diabetes mellitus, type 2) (formerly [facility])     dx 1994, on insulin >5 years, A1c 7, associated neuropathy, no other complications   • Dyspepsia    • Dyspnea on exertion    • Elevated cholesterol    • Fatigue    • Fatty liver     dx on CT 2016   • GERD (gastroesophageal reflux disease)    • Heart disease     w/ cardiac clearance 4/2019, negative stress test 10/2017, EF 65%   • Hiatal hernia     \"small\" noted on UGI 2014   • History of Helicobacter pylori infection     treated remotely   • Hyperlipidemia    • Hypertension    • Joint pain    • Morbid obesity with BMI of 45.0-49.9, adult (formerly [facility])    • Sleep apnea     formerly on a device, no longer using, says just doesn't need it   • Urinary incontinence     no meds   • Vertigo    • Vitamin D deficiency      Past Surgical History:   Procedure Laterality Date   • BLADDER SURGERY  2013    \"tack\", uncomplicated, but unsuccessful   • CATARACT EXTRACTION Left    • COLONOSCOPY  2015    unremarkable   • DILATATION AND CURETTAGE  1987   • ENDOSCOPY     • INCISION AND DRAINAGE HIP Left 6/8/2022    Procedure: LEFT HIP LAVAGE AND WOUND VAC PLACEMENT;  Surgeon: Jarrod Leung MD;  Location: [facility];  Service: Orthopedics;  Laterality: Left;   • ORIF HIP FRACTURE Left 02/26/2022    Procedure: Left hip Open reduction and internal fixation.;  Surgeon: Xochitl" MD Jarrod;  Location: [facility] OR;  Service: Orthopedics;  Laterality: Left;   • TONSILLECTOMY       Family History   Problem Relation Age of Onset   • Breast cancer Sister         20s   • Cancer Sister    • Breast cancer Sister         60s   • Bone cancer Mother         60   • Osteoarthritis Mother    • Diabetes Father    • Heart attack Father    • Heart disease Father    • Heart disease Brother      Social History     Socioeconomic History   • Marital status:    Tobacco Use   • Smoking status: Former Smoker     Years: 10.00     Types: Cigarettes     Quit date:      Years since quittin.5   • Smokeless tobacco: Never Used   Vaping Use   • Vaping Use: Never used   Substance and Sexual Activity   • Alcohol use: Not Currently   • Drug use: No   • Sexual activity: Defer     ---------------------------------------------------------------------------------------------------------------------   Allergies:  Mobic [meloxicam] and Lisinopril  ---------------------------------------------------------------------------------------------------------------------  Objective     ---------------------------------------------------------------------------------------------------------------------   Vital Signs:  Temp:  [98 °F (36.7 °C)] 98 °F (36.7 °C)  Heart Rate:  [72] 72  Resp:  [17] 17  BP: (137)/(80) 137/80  No data found.  There were no vitals filed for this visit.     on   ;      There is no height or weight on file to calculate BMI.  Wt Readings from Last 3 Encounters:   22 133 kg (294 lb)   22 132 kg (292 lb)   22 132 kg (292 lb)       ---------------------------------------------------------------------------------------------------------------------   Physical Exam  Constitutional: Vital sign were reviewed (temperature, pulse, respiration, and blood pressure) and found to be within expected limits, general appearance was assessed and the patient was found to be in no distress and calm  and comfortable appears  Skin: Temperature:normal turgor and temperatureColor: normal, no cyanosis, jaundice, pallor or bruising, Moisture: moist,Nails: pink nail beds, Hair:thinning to lower extremities .  Physical Exam  Wound Assessment: Location: left hip  Changes since last exam: surface area has diminished  and granulation has improved continues to improve   Etiology and classification: surgical wound dehiscence (primary closure has failed in one or more areas which are now open)  Wound bed structures/characteristics: full-thickness (subcutaneous tissue is exposed in at least a portion of the wound), granulation tissue covers , 50-75% of wound bed   Drainage characteristics: moderate, serous drainage  Edges moist  Periwound characteristics: some mositure-realted inflammation  Perfusion characteristics: NA  Bioburden characteristics:no bioburden is present in wound bed     Wound Goal (s):Closure, Free of infection, No further symptoms and Reduction of contamination  Assessment & Plan      Closed fracture of neck of left femur ([facility]) [S72.002A], Postoperative wound dehiscence [T81.31XA]- Wound vac  DC on 07/22/2022. Will continue to pack with honeygel moistened gauze and secure with silicone border dressing.    DM2 (diabetes mellitus, type 2) ([facility]) [E11.9]- recommend adequate glycemic control to help promote wound healing. Poor glycemic control increases risk of infection, loss of limb and premature death.    Morbid obesity with BMI of 45.0-49.9, adult ([facility]) [E66.01, Z68.42]- An obese person?is at greater risk for wound infection and dehiscence or evisceration.    Clinical Impression:Moderate Complexity    Follow-up: Friday per patient request, will follow weekly afer     JAMMIE Hull   [facility]- [facility]  07/25/2022  [id number]

## 2022-07-25 NOTE — PROGRESS NOTES
Patient here for nurse visit for wound vac change to left hip. No new issues or concerns reported from patient or nursing staff.

## 2022-07-26 ENCOUNTER — APPOINTMENT (OUTPATIENT)
Dept: WOUND CARE | Facility: HOSPITAL | Age: 62
End: 2022-07-26

## 2022-07-27 ENCOUNTER — APPOINTMENT (OUTPATIENT)
Dept: WOUND CARE | Facility: HOSPITAL | Age: 62
End: 2022-07-27

## 2022-07-28 NOTE — PROGRESS NOTES
Wound Clinic Note  Patient Identification:  Name:  Es Olivier  Age:  62 y.o.  Sex:  female  :  1960  MRN:  [id number]   Visit Number:  [id number]  Primary Care Physician:  Delilah Pizarro MD     Subjective     Chief complaint:     Left leg wound    History of presenting illness:     Patient is a 62 y.o. female with past medical history significant for diabetes, and obesity. Presented today for evaluation of left hip wound. Wound has been present since 2022 when he undergone ORIF. Area was healing well. Upon further chart review appears that on 2022 during follow-up with ortho she was noted to have a large around of tunneling and was advised on need of  for excisional debridement left hip and application of wound VAC device by Dr. Leung. This was completed on 2022. She had been receiving home health until recently reportedly unable to accept insurance. She is requesting to have wound vac changes completed here until home health can be set up. She repots mild pain to the site. Denies any fever or chills. She did arrive with packing to site as she did not have assistance for re-application. She was prescribed Augmetin X3 weeks on 2022.     Interval history:   2022: Patient seen in clinic today for follow-up to left hip wound. She continues with wound vac to area. Area without slough, necrosis or evidence of cellulitis. She denies any complications related to current wound treatment.     2022: Patient seen in clinic today for follow-up to to left hip wound. She continues to tolerate current treatment with wound vac. No complications reported. Wound with increased granulation. Denies any pain to site. Denies any fever or chill.s   ---------------------------------------------------------------------------------------------------------------------   Review of Systems   Constitutional: Negative for chills and fever.   HENT: Negative for congestion and rhinorrhea.   "  Respiratory: Negative for shortness of breath.    Cardiovascular: Negative for chest pain and leg swelling.   Gastrointestinal: Negative for diarrhea, nausea and vomiting.   Musculoskeletal: Negative for gait problem.   Skin: Positive for wound.   Neurological: Negative for weakness.   Hematological: Does not bruise/bleed easily.      ---------------------------------------------------------------------------------------------------------------------   Past Medical History:   Diagnosis Date   • Arthritis    • Biliary dyskinesia     abnormal HIDA 11/2018 w/ EF 31%, symptomatic w/ N/V   • Closed fracture of proximal end of left femur ([facility]) 02/25/2022   • Depression    • DM2 (diabetes mellitus, type 2) ([facility])     dx 1994, on insulin >5 years, A1c 7, associated neuropathy, no other complications   • Dyspepsia    • Dyspnea on exertion    • Elevated cholesterol    • Fatigue    • Fatty liver     dx on CT 2016   • GERD (gastroesophageal reflux disease)    • Heart disease     w/ cardiac clearance 4/2019, negative stress test 10/2017, EF 65%   • Hiatal hernia     \"small\" noted on UGI 2014   • History of Helicobacter pylori infection     treated remotely   • Hyperlipidemia    • Hypertension    • Joint pain    • Morbid obesity with BMI of 45.0-49.9, adult ([facility])    • Sleep apnea     formerly on a device, no longer using, says just doesn't need it   • Urinary incontinence     no meds   • Vertigo    • Vitamin D deficiency      Past Surgical History:   Procedure Laterality Date   • BLADDER SURGERY  2013    \"tack\", uncomplicated, but unsuccessful   • CATARACT EXTRACTION Left    • COLONOSCOPY  2015    unremarkable   • DILATATION AND CURETTAGE  1987   • ENDOSCOPY     • INCISION AND DRAINAGE HIP Left 6/8/2022    Procedure: LEFT HIP LAVAGE AND WOUND VAC PLACEMENT;  Surgeon: Jarrod Leung MD;  Location: [facility];  Service: Orthopedics;  Laterality: Left;   • ORIF HIP FRACTURE Left 02/26/2022    Procedure: Left hip Open reduction " and internal fixation.;  Surgeon: Jarrod Leung MD;  Location: [facility];  Service: Orthopedics;  Laterality: Left;   • TONSILLECTOMY       Family History   Problem Relation Age of Onset   • Breast cancer Sister         20s   • Cancer Sister    • Breast cancer Sister         60s   • Bone cancer Mother         60   • Osteoarthritis Mother    • Diabetes Father    • Heart attack Father    • Heart disease Father    • Heart disease Brother      Social History     Socioeconomic History   • Marital status:    Tobacco Use   • Smoking status: Former Smoker     Years: 10.00     Types: Cigarettes     Quit date:      Years since quittin.5   • Smokeless tobacco: Never Used   Vaping Use   • Vaping Use: Never used   Substance and Sexual Activity   • Alcohol use: Not Currently   • Drug use: No   • Sexual activity: Defer     ---------------------------------------------------------------------------------------------------------------------   Allergies:  Mobic [meloxicam] and Lisinopril  ---------------------------------------------------------------------------------------------------------------------  Objective     ---------------------------------------------------------------------------------------------------------------------   Vital Signs:     No data found.  There were no vitals filed for this visit.     on   ;      There is no height or weight on file to calculate BMI.  Wt Readings from Last 3 Encounters:   22 133 kg (294 lb)   22 132 kg (292 lb)   22 132 kg (292 lb)       ---------------------------------------------------------------------------------------------------------------------   Physical Exam  Constitutional: Vital sign were reviewed (temperature, pulse, respiration, and blood pressure) and found to be within expected limits, general appearance was assessed and the patient was found to be in no distress and calm and comfortable appears  Skin: Temperature:normal turgor  and temperatureColor: normal, no cyanosis, jaundice, pallor or bruising, Moisture: moist,Nails: pink nail beds, Hair:thinning to lower extremities .  Physical Exam  Wound Assessment: Location: left hip  Changes since last exam: surface area has diminished  and granulation has improved continues to improve  Etiology and classification: surgical wound dehiscence (primary closure has failed in one or more areas which are now open)  Wound bed structures/characteristics: full-thickness (subcutaneous tissue is exposed in at least a portion of the wound), granulation tissue covers , 50-75% of wound bed   Drainage characteristics: moderate, serous drainage  Edges moist  Periwound characteristics: some mositure-realted inflammation  Perfusion characteristics: NA  Bioburden characteristics:no bioburden is present in wound bed     Wound Goal (s):Closure, Free of infection, No further symptoms and Reduction of contamination  Assessment & Plan      Closed fracture of neck of left femur ([facility]) [S72.002A], Postoperative wound dehiscence [T81.31XA]- will continue with wound vac therapy at this time. See nursing documentation for application details. Due to lack of home health assistance at this time patient will need to be seen 3X/week for dressing changes. Will plan to change twice a week once she is re-evaluated by Orthopedic surgery on Monday 07/11/2022    DM2 (diabetes mellitus, type 2) ([facility]) [E11.9]- recommend adequate glycemic control to help promote wound healing. Poor glycemic control increases risk of infection, loss of limb and premature death.    Morbid obesity with BMI of 45.0-49.9, adult ([facility]) [E66.01, Z68.42]- An obese person?is at greater risk for wound infection and dehiscence or evisceration.    Clinical Impression:Moderate Complexity    Follow-up: 3Xweek at this time     JAMMIE Hull   [facility]- [facility]  07/19/2022  [id number]

## 2022-07-29 ENCOUNTER — HOSPITAL ENCOUNTER (OUTPATIENT)
Dept: WOUND CARE | Facility: HOSPITAL | Age: 62
Discharge: HOME | End: 2022-07-29
Admitting: NURSE PRACTITIONER

## 2022-07-29 ENCOUNTER — TELEPHONE (OUTPATIENT)
Dept: SOCIAL WORK | Facility: HOSPITAL | Age: 62
End: 2022-07-29

## 2022-07-29 VITALS
TEMPERATURE: 97.6 F | SYSTOLIC BLOOD PRESSURE: 121 MMHG | DIASTOLIC BLOOD PRESSURE: 57 MMHG | HEART RATE: 77 BPM | RESPIRATION RATE: 18 BRPM

## 2022-07-29 DIAGNOSIS — T14.8XXA SURGICAL WOUND PRESENT: Primary | ICD-10-CM

## 2022-07-29 DIAGNOSIS — T81.31XD POSTOPERATIVE WOUND DEHISCENCE, SUBSEQUENT ENCOUNTER: ICD-10-CM

## 2022-07-29 PROCEDURE — 97602 WOUND(S) CARE NON-SELECTIVE: CPT

## 2022-07-29 NOTE — TELEPHONE ENCOUNTER
SS received call from former Pt's daughter, Brynn who states she would like someone from [facility] to call her re: billing issue. Former Pt was billed 3,000.00 SS contacted [facility] representative Melissa of concerns.

## 2022-08-01 ENCOUNTER — APPOINTMENT (OUTPATIENT)
Dept: WOUND CARE | Facility: HOSPITAL | Age: 62
End: 2022-08-01

## 2022-08-05 ENCOUNTER — APPOINTMENT (OUTPATIENT)
Dept: WOUND CARE | Facility: HOSPITAL | Age: 62
End: 2022-08-05

## 2022-08-07 NOTE — PROGRESS NOTES
Wound Clinic Note  Patient Identification:  Name:  Es Olivier  Age:  62 y.o.  Sex:  female  :  1960  MRN:  [id number]   Visit Number:  [id number]  Primary Care Physician:  Delilah Pizarro MD     Subjective     Chief complaint:     Left leg wound    History of presenting illness:     Patient is a 62 y.o. female with past medical history significant for diabetes, and obesity. Presented today for evaluation of left hip wound. Wound has been present since 2022 when he undergone ORIF. Area was healing well. Upon further chart review appears that on 2022 during follow-up with ortho she was noted to have a large around of tunneling and was advised on need of  for excisional debridement left hip and application of wound VAC device by Dr. Leung. This was completed on 2022. She had been receiving home health until recently reportedly unable to accept insurance. She is requesting to have wound vac changes completed here until home health can be set up. She repots mild pain to the site. Denies any fever or chills. She did arrive with packing to site as she did not have assistance for re-application. She was prescribed Augmetin X3 weeks on 2022.     Interval history:   2022: Patient seen in clinic today for follow-up to left hip wound. She continues with wound vac to area. Area without slough, necrosis or evidence of cellulitis. She denies any complications related to current wound treatment.     2022: Patient seen in clinic today for follow-up to left hip wound. DC wound vac on Friday. She is tolerating current treatment. Denies any changes or odor to drainage. Denies any fever or chills.     2022:Patient seen in clinic today for follow-up to left hip wound. . She is tolerating current treatment. Denies any changes or odor to drainage. Denies any fever or chills. Wound with increased  "granulation.    ---------------------------------------------------------------------------------------------------------------------   Review of Systems   Constitutional: Negative for chills and fever.   HENT: Negative for congestion and rhinorrhea.    Respiratory: Negative for shortness of breath.    Cardiovascular: Negative for chest pain and leg swelling.   Gastrointestinal: Negative for diarrhea, nausea and vomiting.   Musculoskeletal: Negative for gait problem.   Skin: Positive for wound.   Neurological: Negative for weakness.   Hematological: Does not bruise/bleed easily.      ---------------------------------------------------------------------------------------------------------------------   Past Medical History:   Diagnosis Date   • Arthritis    • Biliary dyskinesia     abnormal HIDA 11/2018 w/ EF 31%, symptomatic w/ N/V   • Closed fracture of proximal end of left femur ([facility]) 02/25/2022   • Depression    • DM2 (diabetes mellitus, type 2) ([facility])     dx 1994, on insulin >5 years, A1c 7, associated neuropathy, no other complications   • Dyspepsia    • Dyspnea on exertion    • Elevated cholesterol    • Fatigue    • Fatty liver     dx on CT 2016   • GERD (gastroesophageal reflux disease)    • Heart disease     w/ cardiac clearance 4/2019, negative stress test 10/2017, EF 65%   • Hiatal hernia     \"small\" noted on UGI 2014   • History of Helicobacter pylori infection     treated remotely   • Hyperlipidemia    • Hypertension    • Joint pain    • Morbid obesity with BMI of 45.0-49.9, adult ([facility])    • Sleep apnea     formerly on a device, no longer using, says just doesn't need it   • Urinary incontinence     no meds   • Vertigo    • Vitamin D deficiency      Past Surgical History:   Procedure Laterality Date   • BLADDER SURGERY  2013    \"tack\", uncomplicated, but unsuccessful   • CATARACT EXTRACTION Left    • COLONOSCOPY  2015    unremarkable   • DILATATION AND CURETTAGE  1987   • ENDOSCOPY     • INCISION AND " DRAINAGE HIP Left 2022    Procedure: LEFT HIP LAVAGE AND WOUND VAC PLACEMENT;  Surgeon: Jarrod Leung MD;  Location:  [facility] OR;  Service: Orthopedics;  Laterality: Left;   • ORIF HIP FRACTURE Left 2022    Procedure: Left hip Open reduction and internal fixation.;  Surgeon: Jarrod Leung MD;  Location:  [facility] OR;  Service: Orthopedics;  Laterality: Left;   • TONSILLECTOMY       Family History   Problem Relation Age of Onset   • Breast cancer Sister         20s   • Cancer Sister    • Breast cancer Sister         60s   • Bone cancer Mother         60   • Osteoarthritis Mother    • Diabetes Father    • Heart attack Father    • Heart disease Father    • Heart disease Brother      Social History     Socioeconomic History   • Marital status:    Tobacco Use   • Smoking status: Former Smoker     Years: 10.00     Types: Cigarettes     Quit date:      Years since quittin.6   • Smokeless tobacco: Never Used   Vaping Use   • Vaping Use: Never used   Substance and Sexual Activity   • Alcohol use: Not Currently   • Drug use: No   • Sexual activity: Defer     ---------------------------------------------------------------------------------------------------------------------   Allergies:  Mobic [meloxicam] and Lisinopril  ---------------------------------------------------------------------------------------------------------------------  Objective     ---------------------------------------------------------------------------------------------------------------------   Vital Signs:     No data found.  There were no vitals filed for this visit.     on   ;      There is no height or weight on file to calculate BMI.  Wt Readings from Last 3 Encounters:   22 133 kg (294 lb)   22 132 kg (292 lb)   22 132 kg (292 lb)       ---------------------------------------------------------------------------------------------------------------------   Physical Exam  Constitutional: Vital  sign were reviewed (temperature, pulse, respiration, and blood pressure) and found to be within expected limits, general appearance was assessed and the patient was found to be in no distress and calm and comfortable appears  Skin: Temperature:normal turgor and temperatureColor: normal, no cyanosis, jaundice, pallor or bruising, Moisture: moist,Nails: pink nail beds, Hair:thinning to lower extremities .  Physical Exam  Wound Assessment: Location: left hip  Changes since last exam: surface area has diminished  and granulation has improved    Etiology and classification: surgical wound dehiscence (primary closure has failed in one or more areas which are now open)  Wound bed structures/characteristics: full-thickness (subcutaneous tissue is exposed in at least a portion of the wound), granulation tissue covers , 50-75% of wound bed   Drainage characteristics: moderate, serous drainage  Edges moist  Periwound characteristics: some mositure-realted inflammation  Perfusion characteristics: NA  Bioburden characteristics:no bioburden is present in wound bed     Wound Goal (s):Closure, Free of infection, No further symptoms and Reduction of contamination  Assessment & Plan      Closed fracture of neck of left femur ([facility]) [S72.002A], Postoperative wound dehiscence [T81.31XA]- Wound vac  DC on 07/22/2022. Will continue to pack with honeygel moistened gauze and secure with silicone border dressing.    DM2 (diabetes mellitus, type 2) ([facility]) [E11.9]- recommend adequate glycemic control to help promote wound healing. Poor glycemic control increases risk of infection, loss of limb and premature death.    Morbid obesity with BMI of 45.0-49.9, adult ([facility]) [E66.01, Z68.42]- An obese person?is at greater risk for wound infection and dehiscence or evisceration.    Clinical Impression:Moderate Complexity    Follow-up: Friday per patient request, will follow weekly afJAMMIE Wilson   [facility]- [facility]  Ji  07/29/2022  Sissy

## 2022-08-10 ENCOUNTER — HOSPITAL ENCOUNTER (OUTPATIENT)
Dept: WOUND CARE | Facility: HOSPITAL | Age: 62
Discharge: HOME | End: 2022-08-10
Admitting: NURSE PRACTITIONER

## 2022-08-10 VITALS
TEMPERATURE: 98.7 F | RESPIRATION RATE: 18 BRPM | SYSTOLIC BLOOD PRESSURE: 144 MMHG | DIASTOLIC BLOOD PRESSURE: 63 MMHG | HEART RATE: 82 BPM

## 2022-08-10 DIAGNOSIS — T14.8XXA SURGICAL WOUND PRESENT: Primary | ICD-10-CM

## 2022-08-10 PROCEDURE — 97602 WOUND(S) CARE NON-SELECTIVE: CPT

## 2022-08-10 NOTE — PROGRESS NOTES
Wound Clinic Note  Patient Identification:  Name:  Es Olivier  Age:  62 y.o.  Sex:  female  :  1960  MRN:  [id number]   Visit Number:  [id number]  Primary Care Physician:  Delilah Pizarro MD     Subjective     Chief complaint:     Left leg wound    History of presenting illness:     Patient is a 62 y.o. female with past medical history significant for diabetes, and obesity. Presented today for evaluation of left hip wound. Wound has been present since 2022 when he undergone ORIF. Area was healing well. Upon further chart review appears that on 2022 during follow-up with ortho she was noted to have a large around of tunneling and was advised on need of  for excisional debridement left hip and application of wound VAC device by Dr. Leung. This was completed on 2022. She had been receiving home health until recently reportedly unable to accept insurance. She is requesting to have wound vac changes completed here until home health can be set up. She repots mild pain to the site. Denies any fever or chills. She did arrive with packing to site as she did not have assistance for re-application. She was prescribed Augmetin X3 weeks on 2022.     Interval history:   2022: Patient seen in clinic today for follow-up to left hip wound. She continues with wound vac to area. Area without slough, necrosis or evidence of cellulitis. She denies any complications related to current wound treatment.     2022: Patient seen in clinic today for follow-up to left hip wound. DC wound vac on Friday. She is tolerating current treatment. Denies any changes or odor to drainage. Denies any fever or chills.     2022:Patient seen in clinic today for follow-up to left hip wound. . She is tolerating current treatment. Denies any changes or odor to drainage. Denies any fever or chills. Wound with increased granulation.    08/10/2022: Patient seen in clinic today for follow-up to left hip  "wound. Wound area is granulating well. Denies any fever or chills. No new issues or concerns. Recently seen by surgeon and no concerns reported. Denies any new issues or concerns.   ---------------------------------------------------------------------------------------------------------------------   Review of Systems   Constitutional: Negative for chills and fever.   HENT: Negative for congestion and rhinorrhea.    Respiratory: Negative for shortness of breath.    Cardiovascular: Negative for chest pain and leg swelling.   Gastrointestinal: Negative for diarrhea, nausea and vomiting.   Musculoskeletal: Negative for gait problem.   Skin: Positive for wound.   Neurological: Negative for weakness.   Hematological: Does not bruise/bleed easily.      ---------------------------------------------------------------------------------------------------------------------   Past Medical History:   Diagnosis Date   • Arthritis    • Biliary dyskinesia     abnormal HIDA 11/2018 w/ EF 31%, symptomatic w/ N/V   • Closed fracture of proximal end of left femur ([facility]) 02/25/2022   • Depression    • DM2 (diabetes mellitus, type 2) ([facility])     dx 1994, on insulin >5 years, A1c 7, associated neuropathy, no other complications   • Dyspepsia    • Dyspnea on exertion    • Elevated cholesterol    • Fatigue    • Fatty liver     dx on CT 2016   • GERD (gastroesophageal reflux disease)    • Heart disease     w/ cardiac clearance 4/2019, negative stress test 10/2017, EF 65%   • Hiatal hernia     \"small\" noted on UGI 2014   • History of Helicobacter pylori infection     treated remotely   • Hyperlipidemia    • Hypertension    • Joint pain    • Morbid obesity with BMI of 45.0-49.9, adult (HCC)    • Sleep apnea     formerly on a device, no longer using, says just doesn't need it   • Urinary incontinence     no meds   • Vertigo    • Vitamin D deficiency      Past Surgical History:   Procedure Laterality Date   • BLADDER SURGERY  2013    \"tack\", " uncomplicated, but unsuccessful   • CATARACT EXTRACTION Left    • COLONOSCOPY      unremarkable   • DILATATION AND CURETTAGE     • ENDOSCOPY     • INCISION AND DRAINAGE HIP Left 2022    Procedure: LEFT HIP LAVAGE AND WOUND VAC PLACEMENT;  Surgeon: Jarrod Leung MD;  Location: [facility] OR;  Service: Orthopedics;  Laterality: Left;   • ORIF HIP FRACTURE Left 2022    Procedure: Left hip Open reduction and internal fixation.;  Surgeon: Jarrod Leung MD;  Location: [facility] OR;  Service: Orthopedics;  Laterality: Left;   • TONSILLECTOMY       Family History   Problem Relation Age of Onset   • Breast cancer Sister         20s   • Cancer Sister    • Breast cancer Sister         60s   • Bone cancer Mother         60   • Osteoarthritis Mother    • Diabetes Father    • Heart attack Father    • Heart disease Father    • Heart disease Brother      Social History     Socioeconomic History   • Marital status:    Tobacco Use   • Smoking status: Former Smoker     Years: 10.00     Types: Cigarettes     Quit date:      Years since quittin.6   • Smokeless tobacco: Never Used   Vaping Use   • Vaping Use: Never used   Substance and Sexual Activity   • Alcohol use: Not Currently   • Drug use: No   • Sexual activity: Defer     ---------------------------------------------------------------------------------------------------------------------   Allergies:  Mobic [meloxicam] and Lisinopril  ---------------------------------------------------------------------------------------------------------------------  Objective     ---------------------------------------------------------------------------------------------------------------------   Vital Signs:  Temp:  [98.7 °F (37.1 °C)] 98.7 °F (37.1 °C)  Heart Rate:  [82] 82  Resp:  [18] 18  BP: (144)/(63) 144/63  No data found.  There were no vitals filed for this visit.     on   ;      There is no height or weight on file to calculate BMI.  Wt  Readings from Last 3 Encounters:   07/22/22 133 kg (294 lb)   07/08/22 132 kg (292 lb)   06/08/22 132 kg (292 lb)       ---------------------------------------------------------------------------------------------------------------------   Physical Exam  Constitutional: Vital sign were reviewed (temperature, pulse, respiration, and blood pressure) and found to be within expected limits, general appearance was assessed and the patient was found to be in no distress and calm and comfortable appears  Skin: Temperature:normal turgor and temperatureColor: normal, no cyanosis, jaundice, pallor or bruising, Moisture: moist,Nails: pink nail beds, Hair:thinning to lower extremities .  Physical Exam  Wound Assessment: Location: left hip  Changes since last exam: surface area has diminished  and granulation has improved    Etiology and classification: surgical wound dehiscence (primary closure has failed in one or more areas which are now open)  Wound bed structures/characteristics: full-thickness (subcutaneous tissue is exposed in at least a portion of the wound), granulation tissue covers , 50-75% of wound bed   Drainage characteristics: moderate, serous drainage  Edges moist  Periwound characteristics: some mositure-realted inflammation  Perfusion characteristics: NA  Bioburden characteristics:no bioburden is present in wound bed     Wound Goal (s):Closure, Free of infection, No further symptoms and Reduction of contamination  Assessment & Plan      Closed fracture of neck of left femur ([facility]) [S72.002A], Postoperative wound dehiscence [T81.31XA]- Wound vac  DC on 07/22/2022. Will continue to pack with honeygel moistened gauze and secure with silicone border dressing.    DM2 (diabetes mellitus, type 2) ([facility]) [E11.9]- recommend adequate glycemic control to help promote wound healing. Poor glycemic control increases risk of infection, loss of limb and premature death.    Morbid obesity with BMI of 45.0-49.9, adult (HCC)  [E66.01, Z68.42]- An obese person?is at greater risk for wound infection and dehiscence or evisceration.    Clinical Impression:Moderate Complexity    Follow-up: Friday per patient request, will follow weekly afJAMMIE Wilson   [facility]  08/10/2022  1030

## 2022-08-24 ENCOUNTER — APPOINTMENT (OUTPATIENT)
Dept: WOUND CARE | Facility: HOSPITAL | Age: 62
End: 2022-08-24

## 2022-08-31 ENCOUNTER — APPOINTMENT (OUTPATIENT)
Dept: WOUND CARE | Facility: HOSPITAL | Age: 62
End: 2022-08-31

## 2022-09-08 ENCOUNTER — HOSPITAL ENCOUNTER (OUTPATIENT)
Dept: WOUND CARE | Facility: HOSPITAL | Age: 62
Discharge: HOME | End: 2022-09-08
Admitting: NURSE PRACTITIONER

## 2022-09-08 VITALS
RESPIRATION RATE: 18 BRPM | TEMPERATURE: 98.7 F | DIASTOLIC BLOOD PRESSURE: 56 MMHG | SYSTOLIC BLOOD PRESSURE: 138 MMHG | HEART RATE: 64 BPM

## 2022-09-08 DIAGNOSIS — T81.31XD POSTOPERATIVE WOUND DEHISCENCE, SUBSEQUENT ENCOUNTER: Primary | ICD-10-CM

## 2022-09-08 PROCEDURE — 97602 WOUND(S) CARE NON-SELECTIVE: CPT

## 2022-09-15 NOTE — PROGRESS NOTES
Wound Clinic Note  Patient Identification:  Name:  Es Olivier  Age:  62 y.o.  Sex:  female  :  1960  MRN:  [id number]   Visit Number:  [id number]  Primary Care Physician:  Delilah Pizarro MD     Subjective     Chief complaint:     Left leg wound    History of presenting illness:     Patient is a 62 y.o. female with past medical history significant for diabetes, and obesity. Presented today for evaluation of left hip wound. Wound has been present since 2022 when he undergone ORIF. Area was healing well. Upon further chart review appears that on 2022 during follow-up with ortho she was noted to have a large around of tunneling and was advised on need of  for excisional debridement left hip and application of wound VAC device by Dr. Leung. This was completed on 2022. She had been receiving home health until recently reportedly unable to accept insurance. She is requesting to have wound vac changes completed here until home health can be set up. She repots mild pain to the site. Denies any fever or chills. She did arrive with packing to site as she did not have assistance for re-application. She was prescribed Augmetin X3 weeks on 2022.     Interval history:   2022: Patient seen in clinic today for follow-up to left hip wound. She continues with wound vac to area. Area without slough, necrosis or evidence of cellulitis. She denies any complications related to current wound treatment.     2022: Patient seen in clinic today for follow-up to left hip wound. DC wound vac on Friday. She is tolerating current treatment. Denies any changes or odor to drainage. Denies any fever or chills.     2022:Patient seen in clinic today for follow-up to left hip wound. . She is tolerating current treatment. Denies any changes or odor to drainage. Denies any fever or chills. Wound with increased granulation.    08/10/2022: Patient seen in clinic today for follow-up to left hip  "wound. Wound area is granulating well. Denies any fever or chills. No new issues or concerns. Recently seen by surgeon and no concerns reported. Denies any new issues or concerns.     09/08/2022: Patient seen in clinic today for follow-up to left hip wound. Small opening,wound has significantly improved from prior exam. She denies any complications related to current wound treatment. Denies any fever or chills.   ---------------------------------------------------------------------------------------------------------------------   Review of Systems   Constitutional: Negative for chills and fever.   HENT: Negative for congestion and rhinorrhea.    Respiratory: Negative for shortness of breath.    Cardiovascular: Negative for chest pain and leg swelling.   Gastrointestinal: Negative for diarrhea, nausea and vomiting.   Musculoskeletal: Negative for gait problem.   Skin: Positive for wound.   Neurological: Negative for weakness.   Hematological: Does not bruise/bleed easily.      ---------------------------------------------------------------------------------------------------------------------   Past Medical History:   Diagnosis Date   • Arthritis    • Biliary dyskinesia     abnormal HIDA 11/2018 w/ EF 31%, symptomatic w/ N/V   • Closed fracture of proximal end of left femur (HCC) 02/25/2022   • Depression    • DM2 (diabetes mellitus, type 2) (HCC)     dx 1994, on insulin >5 years, A1c 7, associated neuropathy, no other complications   • Dyspepsia    • Dyspnea on exertion    • Elevated cholesterol    • Fatigue    • Fatty liver     dx on CT 2016   • GERD (gastroesophageal reflux disease)    • Heart disease     w/ cardiac clearance 4/2019, negative stress test 10/2017, EF 65%   • Hiatal hernia     \"small\" noted on UGI 2014   • History of Helicobacter pylori infection     treated remotely   • Hyperlipidemia    • Hypertension    • Joint pain    • Morbid obesity with BMI of 45.0-49.9, adult (HCC)    • Sleep apnea     " "formerly on a device, no longer using, says just doesn't need it   • Urinary incontinence     no meds   • Vertigo    • Vitamin D deficiency      Past Surgical History:   Procedure Laterality Date   • BLADDER SURGERY      \"tack\", uncomplicated, but unsuccessful   • CATARACT EXTRACTION Left    • COLONOSCOPY      unremarkable   • DILATATION AND CURETTAGE     • ENDOSCOPY     • INCISION AND DRAINAGE HIP Left 2022    Procedure: LEFT HIP LAVAGE AND WOUND VAC PLACEMENT;  Surgeon: Jarrod Leung MD;  Location: [facility] OR;  Service: Orthopedics;  Laterality: Left;   • ORIF HIP FRACTURE Left 2022    Procedure: Left hip Open reduction and internal fixation.;  Surgeon: Jarrod Leung MD;  Location: [facility] OR;  Service: Orthopedics;  Laterality: Left;   • TONSILLECTOMY       Family History   Problem Relation Age of Onset   • Breast cancer Sister         20s   • Cancer Sister    • Breast cancer Sister         60s   • Bone cancer Mother         60   • Osteoarthritis Mother    • Diabetes Father    • Heart attack Father    • Heart disease Father    • Heart disease Brother      Social History     Socioeconomic History   • Marital status:    Tobacco Use   • Smoking status: Former Smoker     Years: 10.00     Types: Cigarettes     Quit date:      Years since quittin.7   • Smokeless tobacco: Never Used   Vaping Use   • Vaping Use: Never used   Substance and Sexual Activity   • Alcohol use: Not Currently   • Drug use: No   • Sexual activity: Defer     ---------------------------------------------------------------------------------------------------------------------   Allergies:  Mobic [meloxicam] and Lisinopril  ---------------------------------------------------------------------------------------------------------------------  Objective     ---------------------------------------------------------------------------------------------------------------------   Vital Signs:     No data " found.  There were no vitals filed for this visit.     on   ;      There is no height or weight on file to calculate BMI.  Wt Readings from Last 3 Encounters:   07/22/22 133 kg (294 lb)   07/08/22 132 kg (292 lb)   06/08/22 132 kg (292 lb)       ---------------------------------------------------------------------------------------------------------------------   Physical Exam  Constitutional: Vital sign were reviewed (temperature, pulse, respiration, and blood pressure) and found to be within expected limits, general appearance was assessed and the patient was found to be in no distress and calm and comfortable appears  Skin: Temperature:normal turgor and temperatureColor: normal, no cyanosis, jaundice, pallor or bruising, Moisture: moist,Nails: pink nail beds, Hair:thinning to lower extremities .  Physical Exam  Wound Assessment: Location: left hip  Changes since last exam: surface area has diminished  and granulation has improved  continue to improve  Etiology and classification: surgical wound dehiscence (primary closure has failed in one or more areas which are now open)  Wound bed structures/characteristics: full-thickness (subcutaneous tissue is exposed in at least a portion of the wound), granulation tissue covers , 50-75% of wound bed   Drainage characteristics: moderate, serous drainage  Edges moist  Periwound characteristics: some mositure-realted inflammation  Perfusion characteristics: NA  Bioburden characteristics:no bioburden is present in wound bed     Wound Goal (s):Closure, Free of infection, No further symptoms and Reduction of contamination  Assessment & Plan      Closed fracture of neck of left femur ([facility]) [S72.002A], Postoperative wound dehiscence [T81.31XA]- Wound vac  DC on 07/22/2022. Will continue  with honeygel moistened gauze and secure with silicone border dressing.    DM2 (diabetes mellitus, type 2) ([facility]) [E11.9]- recommend adequate glycemic control to help promote wound healing. Poor  glycemic control increases risk of infection, loss of limb and premature death.    Morbid obesity with BMI of 45.0-49.9, adult (HCC) [E66.01, Z68.42]- An obese person?is at greater risk for wound infection and dehiscence or evisceration.    Clinical Impression:Moderate Complexity    Follow-up: Friday per patient request, will follow weekly afJAMMIE Wilson   [facility]- [facility]  09/08/2022  1100

## 2022-09-22 ENCOUNTER — HOSPITAL ENCOUNTER (OUTPATIENT)
Dept: WOUND CARE | Facility: HOSPITAL | Age: 62
Discharge: HOME | End: 2022-09-22
Admitting: SURGERY

## 2022-09-22 VITALS
RESPIRATION RATE: 18 BRPM | DIASTOLIC BLOOD PRESSURE: 62 MMHG | SYSTOLIC BLOOD PRESSURE: 131 MMHG | HEART RATE: 94 BPM | TEMPERATURE: 98.7 F

## 2022-09-22 DIAGNOSIS — T81.31XD POSTOPERATIVE WOUND DEHISCENCE, SUBSEQUENT ENCOUNTER: Primary | ICD-10-CM

## 2022-09-22 PROCEDURE — 97602 WOUND(S) CARE NON-SELECTIVE: CPT

## 2022-09-22 NOTE — PROGRESS NOTES
Wound Clinic Note  Patient Identification:  Name:  Es Olivier  Age:  62 y.o.  Sex:  female  :  1960  MRN:  [id number]   Visit Number:  [id number]  Primary Care Physician:  Delilah Pizarro MD     Subjective     Chief complaint:     Left leg wound    History of presenting illness:     Patient is a 62 y.o. female with past medical history significant for diabetes, and obesity. Presented today for evaluation of left hip wound. Wound has been present since 2022 when he undergone ORIF. Area was healing well. Upon further chart review appears that on 2022 during follow-up with ortho she was noted to have a large around of tunneling and was advised on need of  for excisional debridement left hip and application of wound VAC device by Dr. Leung. This was completed on 2022. She had been receiving home health until recently reportedly unable to accept insurance. She is requesting to have wound vac changes completed here until home health can be set up. She repots mild pain to the site. Denies any fever or chills. She did arrive with packing to site as she did not have assistance for re-application. She was prescribed Augmetin X3 weeks on 2022.     Interval history:   2022: Patient seen in clinic today for follow-up to left hip wound. She continues with wound vac to area. Area without slough, necrosis or evidence of cellulitis. She denies any complications related to current wound treatment.     2022: Patient seen in clinic today for follow-up to left hip wound. DC wound vac on Friday. She is tolerating current treatment. Denies any changes or odor to drainage. Denies any fever or chills.     2022:Patient seen in clinic today for follow-up to left hip wound. . She is tolerating current treatment. Denies any changes or odor to drainage. Denies any fever or chills. Wound with increased granulation.    08/10/2022: Patient seen in clinic today for follow-up to left hip  wound. Wound area is granulating well. Denies any fever or chills. No new issues or concerns. Recently seen by surgeon and no concerns reported. Denies any new issues or concerns.     09/08/2022: Patient seen in clinic today for follow-up to left hip wound. Small opening,wound has significantly improved from prior exam. She denies any complications related to current wound treatment. Denies any fever or chills.     9/22/22  NAD noted. Chronic non healing surgical wound left hip. Nearing full closure per report. No new acute issues reported. Tolerating the honey dressings. No other new associated signs or symptoms reported.  ---------------------------------------------------------------------------------------------------------------------   Review of Systems   Constitutional: Negative for chills and fever.   HENT: Negative for congestion and rhinorrhea.    Respiratory: Negative for shortness of breath and wheezing.    Cardiovascular: Negative for chest pain and leg swelling.   Gastrointestinal: Negative for diarrhea, nausea and vomiting.   Endocrine: Negative for cold intolerance and heat intolerance.   Genitourinary: Negative for difficulty urinating and dysuria.   Musculoskeletal: Negative for arthralgias and joint swelling.   Skin: Positive for wound. Negative for color change.   Allergic/Immunologic: Negative for environmental allergies.   Neurological: Negative for weakness and headaches.   Hematological: Negative for adenopathy. Does not bruise/bleed easily.   Psychiatric/Behavioral: Negative for agitation and confusion.      ---------------------------------------------------------------------------------------------------------------------   Past Medical History:   Diagnosis Date   • Arthritis    • Biliary dyskinesia     abnormal HIDA 11/2018 w/ EF 31%, symptomatic w/ N/V   • Closed fracture of proximal end of left femur ([facility]) 02/25/2022   • Depression    • DM2 (diabetes mellitus, type 2) ([facility])     dx 1994,  "on insulin >5 years, A1c 7, associated neuropathy, no other complications   • Dyspepsia    • Dyspnea on exertion    • Elevated cholesterol    • Fatigue    • Fatty liver     dx on CT    • GERD (gastroesophageal reflux disease)    • Heart disease     w/ cardiac clearance 2019, negative stress test 10/2017, EF 65%   • Hiatal hernia     \"small\" noted on UGI    • History of Helicobacter pylori infection     treated remotely   • Hyperlipidemia    • Hypertension    • Joint pain    • Morbid obesity with BMI of 45.0-49.9, adult (HCC)    • Sleep apnea     formerly on a device, no longer using, says just doesn't need it   • Urinary incontinence     no meds   • Vertigo    • Vitamin D deficiency      Past Surgical History:   Procedure Laterality Date   • BLADDER SURGERY      \"tack\", uncomplicated, but unsuccessful   • CATARACT EXTRACTION Left    • COLONOSCOPY      unremarkable   • DILATATION AND CURETTAGE     • ENDOSCOPY     • INCISION AND DRAINAGE HIP Left 2022    Procedure: LEFT HIP LAVAGE AND WOUND VAC PLACEMENT;  Surgeon: Jarrod Leung MD;  Location: [facility];  Service: Orthopedics;  Laterality: Left;   • ORIF HIP FRACTURE Left 2022    Procedure: Left hip Open reduction and internal fixation.;  Surgeon: Jarrod Leung MD;  Location: [facility] OR;  Service: Orthopedics;  Laterality: Left;   • TONSILLECTOMY       Family History   Problem Relation Age of Onset   • Breast cancer Sister         20s   • Cancer Sister    • Breast cancer Sister         60s   • Bone cancer Mother         60   • Osteoarthritis Mother    • Diabetes Father    • Heart attack Father    • Heart disease Father    • Heart disease Brother      Social History     Socioeconomic History   • Marital status:    Tobacco Use   • Smoking status: Former Smoker     Years: 10.00     Types: Cigarettes     Quit date:      Years since quittin.7   • Smokeless tobacco: Never Used   Vaping Use   • Vaping Use: Never " used   Substance and Sexual Activity   • Alcohol use: Not Currently   • Drug use: No   • Sexual activity: Defer     ---------------------------------------------------------------------------------------------------------------------   Allergies:  Mobic [meloxicam] and Lisinopril  ---------------------------------------------------------------------------------------------------------------------  Objective     ---------------------------------------------------------------------------------------------------------------------   Vital Signs:     BP  131/62      Pulse  94     Resp  18     Temp  98.7 F (37.1 C)         ---------------------------------------------------------------------------------------------------------------------   Physical Exam    Physical Exam  Constitutional:       General: She is not in acute distress.     Appearance: She is obese. She is not toxic-appearing.   HENT:      Head: Normocephalic and atraumatic.   Cardiovascular:      Rate and Rhythm: Normal rate and regular rhythm.   Pulmonary:      Effort: Pulmonary effort is normal.   Abdominal:      General: There is no distension.      Tenderness: There is no abdominal tenderness.   Musculoskeletal:      Cervical back: Normal range of motion and neck supple.   Skin:     General: Skin is warm and dry.      Comments: Surgical wound left hip. Small pinpoint area remains open. No purulence or malodor noted. No cellulitis noted.   Neurological:      General: No focal deficit present.      Mental Status: She is alert.   Psychiatric:         Mood and Affect: Mood normal.         Behavior: Behavior normal.       Assessment & Plan      Recommend adequate hydration, along with protein and vitamin intake. Open wounds can serve as a nidus for infection. Continue to monitor.    Surgical aftercare  - continue with honeygel moistened gauze and secure with silicone border dressing to the left hip    DM2 (diabetes mellitus, type 2)  -recommend age appropriate  glycemic control. Vital for wound healing.    Follow-up: 4 weeks per patient request. To ER prior if acute issues arise.    Terrence Aviles PA-C

## 2022-10-21 ENCOUNTER — APPOINTMENT (OUTPATIENT)
Dept: WOUND CARE | Facility: HOSPITAL | Age: 62
End: 2022-10-21

## 2024-05-20 ENCOUNTER — HOSPITAL ENCOUNTER (OUTPATIENT)
Dept: GENERAL RADIOLOGY | Facility: HOSPITAL | Age: 64
Discharge: HOME | End: 2024-05-20
Admitting: INTERNAL MEDICINE
Payer: MEDICARE

## 2024-05-20 ENCOUNTER — TRANSCRIBE ORDERS (OUTPATIENT)
Dept: ADMINISTRATIVE | Facility: HOSPITAL | Age: 64
End: 2024-05-20
Payer: MEDICARE

## 2024-05-20 DIAGNOSIS — R10.9 STOMACH ACHE: ICD-10-CM

## 2024-05-20 DIAGNOSIS — R10.9 STOMACH ACHE: Primary | ICD-10-CM

## 2024-05-20 PROCEDURE — 74018 RADEX ABDOMEN 1 VIEW: CPT

## 2024-05-22 ENCOUNTER — TRANSCRIBE ORDERS (OUTPATIENT)
Dept: ADMINISTRATIVE | Facility: HOSPITAL | Age: 64
End: 2024-05-22
Payer: MEDICARE

## 2024-05-22 DIAGNOSIS — R10.9 ABDOMINAL PAIN, UNSPECIFIED ABDOMINAL LOCATION: Primary | ICD-10-CM

## 2024-05-23 ENCOUNTER — APPOINTMENT (OUTPATIENT)
Dept: GENERAL RADIOLOGY | Facility: HOSPITAL | Age: 64
End: 2024-05-23
Payer: MEDICARE

## 2024-05-23 ENCOUNTER — HOSPITAL ENCOUNTER (INPATIENT)
Facility: HOSPITAL | Age: 64
LOS: 3 days | Discharge: HOME | End: 2024-05-28
Attending: STUDENT IN AN ORGANIZED HEALTH CARE EDUCATION/TRAINING PROGRAM | Admitting: INTERNAL MEDICINE
Payer: MEDICARE

## 2024-05-23 ENCOUNTER — APPOINTMENT (OUTPATIENT)
Dept: CT IMAGING | Facility: HOSPITAL | Age: 64
End: 2024-05-23
Payer: MEDICARE

## 2024-05-23 DIAGNOSIS — K82.8 BILIARY DYSKINESIA: ICD-10-CM

## 2024-05-23 DIAGNOSIS — I51.9 HEART DISEASE: ICD-10-CM

## 2024-05-23 DIAGNOSIS — R07.2 PRECORDIAL PAIN: ICD-10-CM

## 2024-05-23 DIAGNOSIS — R07.9 CHEST PAIN, UNSPECIFIED TYPE: Primary | ICD-10-CM

## 2024-05-23 LAB
ALBUMIN SERPL-MCNC: 4.3 G/DL (ref 3.5–5.2)
ALBUMIN/GLOB SERPL: 1.7 G/DL
ALP SERPL-CCNC: 58 U/L (ref 39–117)
ALT SERPL W P-5'-P-CCNC: 11 U/L (ref 1–33)
ANION GAP SERPL CALCULATED.3IONS-SCNC: 12.7 MMOL/L (ref 5–15)
AST SERPL-CCNC: 14 U/L (ref 1–32)
BASOPHILS # BLD AUTO: 0.06 10*3/MM3 (ref 0–0.2)
BASOPHILS NFR BLD AUTO: 1 % (ref 0–1.5)
BILIRUB SERPL-MCNC: 0.3 MG/DL (ref 0–1.2)
BUN SERPL-MCNC: 22 MG/DL (ref 8–23)
BUN/CREAT SERPL: 25 (ref 7–25)
CALCIUM SPEC-SCNC: 9.8 MG/DL (ref 8.6–10.5)
CHLORIDE SERPL-SCNC: 100 MMOL/L (ref 98–107)
CO2 SERPL-SCNC: 26.3 MMOL/L (ref 22–29)
CREAT SERPL-MCNC: 0.88 MG/DL (ref 0.57–1)
DEPRECATED RDW RBC AUTO: 42 FL (ref 37–54)
EGFRCR SERPLBLD CKD-EPI 2021: 73.9 ML/MIN/1.73
EOSINOPHIL # BLD AUTO: 0.34 10*3/MM3 (ref 0–0.4)
EOSINOPHIL NFR BLD AUTO: 5.6 % (ref 0.3–6.2)
ERYTHROCYTE [DISTWIDTH] IN BLOOD BY AUTOMATED COUNT: 12.3 % (ref 12.3–15.4)
GEN 5 2HR TROPONIN T REFLEX: 15 NG/L
GLOBULIN UR ELPH-MCNC: 2.5 GM/DL
GLUCOSE BLDC GLUCOMTR-MCNC: 117 MG/DL (ref 70–130)
GLUCOSE SERPL-MCNC: 119 MG/DL (ref 65–99)
HCT VFR BLD AUTO: 41.6 % (ref 34–46.6)
HGB BLD-MCNC: 13.6 G/DL (ref 12–15.9)
HOLD SPECIMEN: NORMAL
HOLD SPECIMEN: NORMAL
IMM GRANULOCYTES # BLD AUTO: 0.03 10*3/MM3 (ref 0–0.05)
IMM GRANULOCYTES NFR BLD AUTO: 0.5 % (ref 0–0.5)
LYMPHOCYTES # BLD AUTO: 1.63 10*3/MM3 (ref 0.7–3.1)
LYMPHOCYTES NFR BLD AUTO: 26.8 % (ref 19.6–45.3)
MCH RBC QN AUTO: 30.2 PG (ref 26.6–33)
MCHC RBC AUTO-ENTMCNC: 32.7 G/DL (ref 31.5–35.7)
MCV RBC AUTO: 92.4 FL (ref 79–97)
MONOCYTES # BLD AUTO: 0.48 10*3/MM3 (ref 0.1–0.9)
MONOCYTES NFR BLD AUTO: 7.9 % (ref 5–12)
NEUTROPHILS NFR BLD AUTO: 3.54 10*3/MM3 (ref 1.7–7)
NEUTROPHILS NFR BLD AUTO: 58.2 % (ref 42.7–76)
NRBC BLD AUTO-RTO: 0 /100 WBC (ref 0–0.2)
PLATELET # BLD AUTO: 308 10*3/MM3 (ref 140–450)
PMV BLD AUTO: 10.5 FL (ref 6–12)
POTASSIUM SERPL-SCNC: 3.6 MMOL/L (ref 3.5–5.2)
PROT SERPL-MCNC: 6.8 G/DL (ref 6–8.5)
RBC # BLD AUTO: 4.5 10*6/MM3 (ref 3.77–5.28)
SODIUM SERPL-SCNC: 139 MMOL/L (ref 136–145)
TROPONIN T DELTA: 2 NG/L
TROPONIN T SERPL HS-MCNC: 13 NG/L
WBC NRBC COR # BLD AUTO: 6.08 10*3/MM3 (ref 3.4–10.8)
WHOLE BLOOD HOLD COAG: NORMAL
WHOLE BLOOD HOLD SPECIMEN: NORMAL

## 2024-05-23 PROCEDURE — G0378 HOSPITAL OBSERVATION PER HR: HCPCS

## 2024-05-23 PROCEDURE — 93005 ELECTROCARDIOGRAM TRACING: CPT | Performed by: STUDENT IN AN ORGANIZED HEALTH CARE EDUCATION/TRAINING PROGRAM

## 2024-05-23 PROCEDURE — 74178 CT ABD&PLV WO CNTR FLWD CNTR: CPT

## 2024-05-23 PROCEDURE — 84484 ASSAY OF TROPONIN QUANT: CPT | Performed by: STUDENT IN AN ORGANIZED HEALTH CARE EDUCATION/TRAINING PROGRAM

## 2024-05-23 PROCEDURE — 36415 COLL VENOUS BLD VENIPUNCTURE: CPT

## 2024-05-23 PROCEDURE — 93010 ELECTROCARDIOGRAM REPORT: CPT | Performed by: INTERNAL MEDICINE

## 2024-05-23 PROCEDURE — 71045 X-RAY EXAM CHEST 1 VIEW: CPT

## 2024-05-23 PROCEDURE — 85025 COMPLETE CBC W/AUTO DIFF WBC: CPT | Performed by: STUDENT IN AN ORGANIZED HEALTH CARE EDUCATION/TRAINING PROGRAM

## 2024-05-23 PROCEDURE — 25510000001 IOPAMIDOL 61 % SOLUTION: Performed by: STUDENT IN AN ORGANIZED HEALTH CARE EDUCATION/TRAINING PROGRAM

## 2024-05-23 PROCEDURE — 71045 X-RAY EXAM CHEST 1 VIEW: CPT | Performed by: RADIOLOGY

## 2024-05-23 PROCEDURE — 80053 COMPREHEN METABOLIC PANEL: CPT | Performed by: STUDENT IN AN ORGANIZED HEALTH CARE EDUCATION/TRAINING PROGRAM

## 2024-05-23 PROCEDURE — 82948 REAGENT STRIP/BLOOD GLUCOSE: CPT

## 2024-05-23 PROCEDURE — 74178 CT ABD&PLV WO CNTR FLWD CNTR: CPT | Performed by: RADIOLOGY

## 2024-05-23 PROCEDURE — 99223 1ST HOSP IP/OBS HIGH 75: CPT | Performed by: INTERNAL MEDICINE

## 2024-05-23 PROCEDURE — 99285 EMERGENCY DEPT VISIT HI MDM: CPT

## 2024-05-23 PROCEDURE — 25010000002 HEPARIN (PORCINE) PER 1000 UNITS: Performed by: INTERNAL MEDICINE

## 2024-05-23 RX ORDER — SODIUM CHLORIDE 0.9 % (FLUSH) 0.9 %
10 SYRINGE (ML) INJECTION AS NEEDED
Status: DISCONTINUED | OUTPATIENT
Start: 2024-05-23 | End: 2024-05-28

## 2024-05-23 RX ORDER — PANTOPRAZOLE SODIUM 40 MG/10ML
40 INJECTION, POWDER, LYOPHILIZED, FOR SOLUTION INTRAVENOUS EVERY 12 HOURS SCHEDULED
Status: DISCONTINUED | OUTPATIENT
Start: 2024-05-23 | End: 2024-05-29 | Stop reason: HOSPADM

## 2024-05-23 RX ORDER — INSULIN LISPRO 100 [IU]/ML
2-7 INJECTION, SOLUTION INTRAVENOUS; SUBCUTANEOUS
Status: DISCONTINUED | OUTPATIENT
Start: 2024-05-23 | End: 2024-05-29 | Stop reason: HOSPADM

## 2024-05-23 RX ORDER — CYCLOBENZAPRINE HCL 10 MG
10 TABLET ORAL 3 TIMES DAILY PRN
COMMUNITY

## 2024-05-23 RX ORDER — NICOTINE POLACRILEX 4 MG
15 LOZENGE BUCCAL
Status: DISCONTINUED | OUTPATIENT
Start: 2024-05-23 | End: 2024-05-29 | Stop reason: HOSPADM

## 2024-05-23 RX ORDER — GLUCAGON 1 MG/ML
1 KIT INJECTION
Status: DISCONTINUED | OUTPATIENT
Start: 2024-05-23 | End: 2024-05-29 | Stop reason: HOSPADM

## 2024-05-23 RX ORDER — BISACODYL 10 MG
10 SUPPOSITORY, RECTAL RECTAL DAILY PRN
Status: DISCONTINUED | OUTPATIENT
Start: 2024-05-23 | End: 2024-05-29 | Stop reason: HOSPADM

## 2024-05-23 RX ORDER — SODIUM CHLORIDE 9 MG/ML
40 INJECTION, SOLUTION INTRAVENOUS AS NEEDED
Status: DISCONTINUED | OUTPATIENT
Start: 2024-05-23 | End: 2024-05-28

## 2024-05-23 RX ORDER — ONDANSETRON 4 MG/1
4 TABLET, ORALLY DISINTEGRATING ORAL EVERY 6 HOURS PRN
COMMUNITY

## 2024-05-23 RX ORDER — SODIUM CHLORIDE 0.9 % (FLUSH) 0.9 %
10 SYRINGE (ML) INJECTION EVERY 12 HOURS SCHEDULED
Status: DISCONTINUED | OUTPATIENT
Start: 2024-05-23 | End: 2024-05-28

## 2024-05-23 RX ORDER — HYDROXYZINE HYDROCHLORIDE 10 MG/1
10 TABLET, FILM COATED ORAL 3 TIMES DAILY PRN
COMMUNITY

## 2024-05-23 RX ORDER — ASPIRIN 81 MG/1
324 TABLET, CHEWABLE ORAL ONCE
Status: COMPLETED | OUTPATIENT
Start: 2024-05-23 | End: 2024-05-23

## 2024-05-23 RX ORDER — POLYETHYLENE GLYCOL 3350 17 G/17G
17 POWDER, FOR SOLUTION ORAL DAILY PRN
Status: DISCONTINUED | OUTPATIENT
Start: 2024-05-23 | End: 2024-05-29 | Stop reason: HOSPADM

## 2024-05-23 RX ORDER — FLUOXETINE HYDROCHLORIDE 40 MG/1
40 CAPSULE ORAL DAILY
COMMUNITY

## 2024-05-23 RX ORDER — AMOXICILLIN 250 MG
2 CAPSULE ORAL 2 TIMES DAILY PRN
Status: DISCONTINUED | OUTPATIENT
Start: 2024-05-23 | End: 2024-05-29 | Stop reason: HOSPADM

## 2024-05-23 RX ORDER — BISACODYL 5 MG/1
5 TABLET, DELAYED RELEASE ORAL DAILY PRN
Status: DISCONTINUED | OUTPATIENT
Start: 2024-05-23 | End: 2024-05-29 | Stop reason: HOSPADM

## 2024-05-23 RX ORDER — OXYBUTYNIN CHLORIDE 5 MG/1
5 TABLET, EXTENDED RELEASE ORAL DAILY
COMMUNITY

## 2024-05-23 RX ORDER — DEXTROSE MONOHYDRATE 25 G/50ML
25 INJECTION, SOLUTION INTRAVENOUS
Status: DISCONTINUED | OUTPATIENT
Start: 2024-05-23 | End: 2024-05-29 | Stop reason: HOSPADM

## 2024-05-23 RX ORDER — HEPARIN SODIUM 5000 [USP'U]/ML
5000 INJECTION, SOLUTION INTRAVENOUS; SUBCUTANEOUS EVERY 8 HOURS SCHEDULED
Status: DISCONTINUED | OUTPATIENT
Start: 2024-05-23 | End: 2024-05-29 | Stop reason: HOSPADM

## 2024-05-23 RX ADMIN — Medication 10 ML: at 21:20

## 2024-05-23 RX ADMIN — ASPIRIN 324 MG: 81 TABLET, CHEWABLE ORAL at 10:47

## 2024-05-23 RX ADMIN — HEPARIN SODIUM 5000 UNITS: 5000 INJECTION INTRAVENOUS; SUBCUTANEOUS at 21:20

## 2024-05-23 RX ADMIN — IOPAMIDOL 70 ML: 612 INJECTION, SOLUTION INTRAVENOUS at 13:43

## 2024-05-23 RX ADMIN — PANTOPRAZOLE SODIUM 40 MG: 40 INJECTION, POWDER, LYOPHILIZED, FOR SOLUTION INTRAVENOUS at 18:10

## 2024-05-23 NOTE — ED PROVIDER NOTES
Subjective   History of Present Illness  Patient is a 64-year-old female presents today complaining of epigastric lower anterior chest pain.  Patient reports she had an episode on Monday where she had severe pain rating to her back and states that she became nauseated.  Patient reports she also had some diaphoresis.  Patient denies any shortness of breath at that time.  Patient reports that she had been fine since then but states that this morning she had another episode very similar.  She states that she had pain in her epigastric lower chest.  She states she went to her primary care providers and they recommended she come to the ER for further evaluation.  Patient reports that currently the pain is only a 5 out of 10.  She reports pain is sharp and states it is in the epigastric lower chest area.  Patient does report 1 episode of vomiting.  Patient denies any hematemesis.  Patient denies any cough.  Patient denies any fever.  Patient does report some nausea but states that it currently is subsided.        Review of Systems   Constitutional: Negative.    HENT: Negative.     Eyes: Negative.    Respiratory: Negative.     Cardiovascular:  Positive for chest pain.   Gastrointestinal:  Positive for abdominal pain, nausea and vomiting.   Endocrine: Negative.    Genitourinary: Negative.    Musculoskeletal: Negative.    Skin: Negative.    Allergic/Immunologic: Negative.    Neurological: Negative.    Hematological: Negative.    Psychiatric/Behavioral: Negative.         Past Medical History:   Diagnosis Date    Arthritis     Biliary dyskinesia     abnormal HIDA 11/2018 w/ EF 31%, symptomatic w/ N/V    Closed fracture of proximal end of left femur 02/25/2022    Depression     DM2 (diabetes mellitus, type 2)     dx 1994, on insulin >5 years, A1c 7, associated neuropathy, no other complications    Dyspepsia     Dyspnea on exertion     Elevated cholesterol     Fatigue     Fatty liver     dx on CT 2016    GERD (gastroesophageal  "reflux disease)     Heart disease     w/ cardiac clearance 2019, negative stress test 10/2017, EF 65%    Hiatal hernia     \"small\" noted on UGI     History of Helicobacter pylori infection     treated remotely    Hyperlipidemia     Hypertension     Joint pain     Morbid obesity with BMI of 45.0-49.9, adult     Sleep apnea     formerly on a device, no longer using, says just doesn't need it    Urinary incontinence     no meds    Vertigo     Vitamin D deficiency        Allergies   Allergen Reactions    Mobic [Meloxicam] GI Intolerance    Lisinopril Cough       Past Surgical History:   Procedure Laterality Date    BLADDER SURGERY      \"tack\", uncomplicated, but unsuccessful    CATARACT EXTRACTION Left     COLONOSCOPY      unremarkable    DILATATION AND CURETTAGE  1987    ENDOSCOPY      INCISION AND DRAINAGE HIP Left 2022    Procedure: LEFT HIP LAVAGE AND WOUND VAC PLACEMENT;  Surgeon: Jarrod Leung MD;  Location: [facility];  Service: Orthopedics;  Laterality: Left;    ORIF HIP FRACTURE Left 2022    Procedure: Left hip Open reduction and internal fixation.;  Surgeon: Jarrod Leung MD;  Location: [facility];  Service: Orthopedics;  Laterality: Left;    TONSILLECTOMY         Family History   Problem Relation Age of Onset    Breast cancer Sister         20s    Cancer Sister     Breast cancer Sister         60s    Bone cancer Mother         60    Osteoarthritis Mother     Diabetes Father     Heart attack Father     Heart disease Father     Heart disease Brother        Social History     Socioeconomic History    Marital status:    Tobacco Use    Smoking status: Former     Current packs/day: 0.00     Types: Cigarettes     Start date:      Quit date:      Years since quittin.4    Smokeless tobacco: Never   Vaping Use    Vaping status: Never Used   Substance and Sexual Activity    Alcohol use: Not Currently    Drug use: No    Sexual activity: Defer           Objective "   Physical Exam  Vitals and nursing note reviewed.   Constitutional:       Appearance: She is well-developed.   HENT:      Head: Normocephalic.      Right Ear: External ear normal.      Left Ear: External ear normal.   Eyes:      Conjunctiva/sclera: Conjunctivae normal.      Pupils: Pupils are equal, round, and reactive to light.   Cardiovascular:      Rate and Rhythm: Normal rate and regular rhythm.      Heart sounds: Normal heart sounds.   Pulmonary:      Effort: Pulmonary effort is normal.      Breath sounds: Normal breath sounds.   Abdominal:      General: Bowel sounds are normal.      Palpations: Abdomen is soft.   Musculoskeletal:         General: Normal range of motion.      Cervical back: Normal range of motion and neck supple.   Skin:     General: Skin is warm and dry.      Capillary Refill: Capillary refill takes less than 2 seconds.   Neurological:      Mental Status: She is alert and oriented to person, place, and time.   Psychiatric:         Behavior: Behavior normal.         Thought Content: Thought content normal.         Procedures           ED Course  ED Course as of 05/25/24 2115   Thu May 23, 2024   1019 ECG 12 Lead Chest Pain  Normal sinus rhythm, rate 84, QTc 437, no acute ST or T wave changes [CW]      ED Course User Index  [CW] Raffi Hoskins,                                              Medical Decision Making  Problems Addressed:  Chest pain, unspecified type: complicated acute illness or injury    Amount and/or Complexity of Data Reviewed  Labs: ordered.  Radiology: ordered.  ECG/medicine tests: ordered. Decision-making details documented in ED Course.    Risk  OTC drugs.  Prescription drug management.  Decision regarding hospitalization.        Final diagnoses:   Chest pain, unspecified type       ED Disposition  ED Disposition       ED Disposition   Decision to Admit    Condition   --    Comment   Level of Care: Telemetry [5]   Diagnosis: Chest pain [939658]   Admitting  Physician: CHINEDU EASON [[id number]]   Attending Physician: CHINEDU EASON [[id number]]                 No follow-up provider specified.       Medication List        ASK your doctor about these medications      cyclobenzaprine 10 MG tablet  Commonly known as: FLEXERIL  Ask about: Which instructions should I use?     FLUoxetine 40 MG capsule  Commonly known as: PROzac  Ask about: Which instructions should I use?     hydrOXYzine 10 MG tablet  Commonly known as: ATARAX  Ask about: Which instructions should I use?                 Renard Oro P, APRN  05/25/24 [id number]

## 2024-05-23 NOTE — Clinical Note
Hemostasis started on the right radial artery. R-Band was used in achieving hemostasis. Radial compression device applied to vessel. Hemostasis achieved successfully. Closure device additional comment: 15ML OF AIR

## 2024-05-23 NOTE — H&P
[facility] HOSPITALIST HISTORY AND PHYSICAL    Patient Identification:  Name:  Es Olivier  Age:  64 y.o.  Sex:  female  :  1960  MRN:  [id number]   Admit Date: 2024   Visit Number:  [id number]  Room number:  406/06  Primary Care Physician:  Delilah Pizarro MD     Subjective     Chief complaint:    Chief Complaint   Patient presents with    Chest Pain     History of presenting illness:   64F Former Smoker, Morbid Obesity by BMI PMH Arthritis, Depression, Fatty Liver, History H Pylori, GERD, Obstructive Sleep Apnea, Diabetes Mellitus Type II, Hypertension, Hyperlipidemia, Coronary Artery Disease, presented to [facility] emergency room  with complaints of chest pain.  Upon arrival patient afebrile, heart rate 84, respiratory rate 17, blood pressure 126/70, satting 98% on room air.  Labs showed CBC normal, CMP normal, HS Troponins 13->15.  EKG showed normal sinus rhythm, possible old anterior infarct. CXR showed no acute cardiopulmonary processes. CT Abdomen/Pelvis showed 3mm urinary bladder stone. Emergency room provider gave Aspirin 324. Hospital Medicine consulted for admission.  Patient endorses chest pain earlier in the week when drinking coffee, notes has had H pylori before on testing but never treated, reports  got her Maalox which helped some, though now again today with recurrent chest pain, epigastric though does have significant cardiac risk factors and mildly elevated HS Troponin on repeat.    ---------------------------------------------------------------------------------------------------------------------   Review of Systems   Constitutional: Negative.    HENT: Negative.     Eyes: Negative.    Respiratory:  Positive for chest tightness.    Cardiovascular:  Positive for chest pain. Negative for leg swelling.   Gastrointestinal:  Positive for abdominal pain.   Endocrine: Negative.    Genitourinary: Negative.    Musculoskeletal: Negative.    Skin:  "Negative.    Allergic/Immunologic: Negative.    Neurological: Negative.    Hematological: Negative.    Psychiatric/Behavioral: Negative.       ---------------------------------------------------------------------------------------------------------------------   Past Medical History:   Diagnosis Date    Arthritis     Biliary dyskinesia     abnormal HIDA 11/2018 w/ EF 31%, symptomatic w/ N/V    Closed fracture of proximal end of left femur 02/25/2022    Depression     DM2 (diabetes mellitus, type 2)     dx 1994, on insulin >5 years, A1c 7, associated neuropathy, no other complications    Dyspepsia     Dyspnea on exertion     Elevated cholesterol     Fatigue     Fatty liver     dx on CT 2016    GERD (gastroesophageal reflux disease)     Heart disease     w/ cardiac clearance 4/2019, negative stress test 10/2017, EF 65%    Hiatal hernia     \"small\" noted on UGI 2014    History of Helicobacter pylori infection     treated remotely    Hyperlipidemia     Hypertension     Joint pain     Morbid obesity with BMI of 45.0-49.9, adult     Sleep apnea     formerly on a device, no longer using, says just doesn't need it    Urinary incontinence     no meds    Vertigo     Vitamin D deficiency      Past Surgical History:   Procedure Laterality Date    BLADDER SURGERY  2013    \"tack\", uncomplicated, but unsuccessful    CATARACT EXTRACTION Left     COLONOSCOPY  2015    unremarkable    DILATATION AND CURETTAGE  1987    ENDOSCOPY      INCISION AND DRAINAGE HIP Left 6/8/2022    Procedure: LEFT HIP LAVAGE AND WOUND VAC PLACEMENT;  Surgeon: Jarrod Leung MD;  Location: [facility] OR;  Service: Orthopedics;  Laterality: Left;    ORIF HIP FRACTURE Left 02/26/2022    Procedure: Left hip Open reduction and internal fixation.;  Surgeon: Jarrod Leung MD;  Location: [facility] OR;  Service: Orthopedics;  Laterality: Left;    TONSILLECTOMY  1984     Family History   Problem Relation Age of Onset    Breast cancer Sister         20s    Cancer " Sister     Breast cancer Sister         60s    Bone cancer Mother         60    Osteoarthritis Mother     Diabetes Father     Heart attack Father     Heart disease Father     Heart disease Brother      Social History     Socioeconomic History    Marital status:    Tobacco Use    Smoking status: Former     Current packs/day: 0.00     Types: Cigarettes     Start date:      Quit date:      Years since quittin.4    Smokeless tobacco: Never   Vaping Use    Vaping status: Never Used   Substance and Sexual Activity    Alcohol use: Not Currently    Drug use: No    Sexual activity: Defer     ---------------------------------------------------------------------------------------------------------------------   Allergies:  Mobic [meloxicam] and Lisinopril  ---------------------------------------------------------------------------------------------------------------------   Medications below are reported home medications pulling from within the system; at this time, these medications have not been reconciled unless otherwise specified and are in the verification process for further verifcation as current home medications.    Prior to Admission Medications       Prescriptions Last Dose Informant Patient Reported? Taking?    acetaminophen (TYLENOL) 325 MG tablet   No No    Take 2 tablets by mouth Every 4 (Four) Hours As Needed for Mild Pain .    amoxicillin-clavulanate (Augmentin) 875-125 MG per tablet   No No    Take 1 tablet by mouth 2 (Two) Times a Day.    buPROPion SR (WELLBUTRIN SR) 200 MG 12 hr tablet  Medication Bottle Yes No    Take 400 mg by mouth every night at bedtime.    cyclobenzaprine (FLEXERIL) 5 MG tablet   No No    Take 1 tablet by mouth 3 (Three) Times a Day As Needed for Muscle Spasms.    docusate sodium 100 MG capsule  Self No No    Take 1 capsule by mouth 2 (Two) Times a Day.    FLUoxetine (PROzac) 20 MG capsule  Pharmacy Yes No    Take 20 mg by mouth Daily.    furosemide (LASIX) 20 MG  "tablet  Pharmacy Yes No    Take 20 mg by mouth Daily.    glucose blood test strip   No No    Use to test blood glucose prior to meals. Formulary Compliance Approval. Diagnosis: Type 2 Diabetes - Insulin Dependent    glucose monitor monitoring kit   No No    Use to test blood glucose prior to meals. Formulary Compliance Approval. Diagnosis: Type 2 Diabetes - Insulin Dependent    Heparin Sodium, Porcine, (heparin, porcine,) 5000 UNIT/ML injection   No No    Inject 1 mL under the skin into the appropriate area as directed Every 12 (Twelve) Hours. Indications: Prophylaxis of Venous Thromboembolism    HYDROcodone-acetaminophen (NORCO)  MG per tablet   No No    Take 1 tablet by mouth Every 6 (Six) Hours As Needed for Moderate Pain .    hydrOXYzine (ATARAX) 25 MG tablet   No No    Take 1 tablet by mouth 3 (Three) Times a Day As Needed for Anxiety.    insulin aspart (novoLOG FLEXPEN) 100 UNIT/ML solution pen-injector sc pen  Self Yes No    Inject 4 Units under the skin into the appropriate area as directed 3 (Three) Times a Day With Meals.    insulin detemir (LEVEMIR) 100 UNIT/ML injection   No No    Inject 20 Units under the skin into the appropriate area as directed Every Night.    Insulin Syringe 30G X 1/2\" 1 ML misc   No No    Inject 1 each under the skin into the appropriate area as directed 3 (Three) Times a Day Before Meals. Formulary Compliance Approval    Lancets misc   No No    Use to test blood glucose prior to meals. Formulary Compliance Approval. Diagnosis: Type 2 Diabetes - Insulin Dependent    losartan-hydrochlorothiazide (HYZAAR) 50-12.5 MG per tablet  Pharmacy Yes No    Take 1 tablet by mouth Daily.    metFORMIN ER (GLUCOPHAGE-XR) 500 MG 24 hr tablet  Pharmacy Yes No    Take 2,000 mg by mouth Every Night.    potassium chloride 10 MEQ CR tablet  Pharmacy Yes No    Take 10 mEq by mouth Daily.    simvastatin (ZOCOR) 40 MG tablet  Pharmacy Yes No    Take 40 mg by mouth Every Night.    Syringe/Needle, " "Disp, (B-D 3CC LUER-MARYAN SYR 26GX5/8\") 26G X 5/8\" 3 ML misc   No No    Use 2 (Two) Times a Day. To be used with heparin    vitamin D (ERGOCALCIFEROL) 1.25 MG (65328 UT) capsule capsule  Medication Bottle Yes No    Take 50,000 Units by mouth 1 (One) Time Per Week.          Objective     Vital Signs:  Temp:  [97.2 °F (36.2 °C)] 97.2 °F (36.2 °C)  Heart Rate:  [68-85] 76  Resp:  [17] 17  BP: (112-133)/(53-83) 112/83    Mean Arterial Pressure (Non-Invasive) for the past 24 hrs (Last 3 readings):   Noninvasive MAP (mmHg)   05/23/24 1551 94   05/23/24 1215 86   05/23/24 1200 66     SpO2:  [92 %-98 %] 97 %  on   ;      Body mass index is 47.33 kg/m².    Wt Readings from Last 3 Encounters:   05/23/24 133 kg (293 lb 3.4 oz)   07/22/22 133 kg (294 lb)   07/08/22 132 kg (292 lb)      ---------------------------------------------------------------------------------------------------------------------   Physical Exam:  Constitutional:  Well-developed and well-nourished.  No acute distress.  Mild discomfort      HENT:  Head:  Normocephalic and atraumatic.  Mouth:  Moist mucous membranes.    Eyes:  Conjunctivae and EOM are normal. No scleral icterus.    Neck:  Neck supple.  No JVD present.    Cardiovascular:  Normal rate, regular rhythm and normal heart sounds with no murmur.  Pulmonary/Chest:  No respiratory distress, no wheezes, no crackles, with normal breath sounds and good air movement.  Abdominal:  Soft. No distension and no tenderness.   Musculoskeletal:  No tenderness, and no deformity.  No red or swollen joints anywhere.    Neurological:  Alert and oriented to person, place, and time.  No cranial nerve deficit.    Skin:  Skin is warm and dry. No rash noted. No pallor.   Peripheral vascular:  No clubbing, no cyanosis, no edema.  Psychiatric: Appropriate mood and affect  Edited by: Mack Lam MD at 5/23/2024 " 1452  ---------------------------------------------------------------------------------------------------------------------  EKG:    ECG 12 Lead Chest Pain   Preliminary Result   Test Reason : Chest Pain   Blood Pressure :   */*   mmHG   Vent. Rate :  84 BPM     Atrial Rate :  84 BPM      P-R Int : 134 ms          QRS Dur :  80 ms       QT Int : 370 ms       P-R-T Axes :  36  10 102 degrees      QTc Int : 437 ms      Normal sinus rhythm   Cannot rule out Anterior infarct , age undetermined   Abnormal ECG   When compared with ECG of 25-FEB-2022 10:55,   Minimal criteria for Anterior infarct are now present      Referred By: RUBEN           Confirmed By:         Telemetry:  normal sinus rhythm, no significant ST changes    I have personally looked at both the EKG and the telemetry strips.    Last echocardiogram:  Results for orders placed during the hospital encounter of 02/25/22    Adult Transthoracic Echo Complete W/ Cont if Necessary Per Protocol    Interpretation Summary  · Estimated left ventricular EF = 65% Left ventricular ejection fraction appears to be 61 - 65%. Left ventricular systolic function is normal.  · Left ventricular diastolic function was normal.  · Estimated right ventricular systolic pressure from tricuspid regurgitation is normal (<35 mmHg).  · No significant mitral or aortic regurgitation  · No pericardial effusion  · No previous echo  · Mild aortic valve stenosis is present.    --------------------------------------------------------------------------------------------------------------------  Labs:  Results from last 7 days   Lab Units 05/23/24  1054   WBC 10*3/mm3 6.08   HEMOGLOBIN g/dL 13.6   HEMATOCRIT % 41.6   MCV fL 92.4   MCHC g/dL 32.7   PLATELETS 10*3/mm3 308         Results from last 7 days   Lab Units 05/23/24  1054   SODIUM mmol/L 139   POTASSIUM mmol/L 3.6   CHLORIDE mmol/L 100   CO2 mmol/L 26.3   BUN mg/dL 22   CREATININE mg/dL 0.88   CALCIUM mg/dL 9.8   GLUCOSE mg/dL 119*  "  ALBUMIN g/dL 4.3   BILIRUBIN mg/dL 0.3   ALK PHOS U/L 58   AST (SGOT) U/L 14   ALT (SGPT) U/L 11   Estimated Creatinine Clearance: 91.7 mL/min (by C-G formula based on SCr of 0.88 mg/dL).  No results found for: \"AMMONIA\"  Results from last 7 days   Lab Units 05/23/24  1252 05/23/24  1054   HSTROP T ng/L 15* 13         No results found for: \"HGBA1C\", \"POCGLU\"  Lab Results   Component Value Date    TSH 8.590 (H) 02/25/2022    FREET4 1.13 02/25/2022     No results found for: \"PREGTESTUR\", \"PREGSERUM\", \"HCG\", \"HCGQUANT\"  Pain Management Panel  More data may exist         Latest Ref Rng & Units 4/6/2016 4/7/2014   Pain Management Panel   Amphetamine, Urine Qual Negative Negative  Negative    Barbiturates Screen, Urine Negative Negative  Negative    Benzodiazepine Screen, Urine Negative Negative  Negative    Cocaine Screen, Urine Negative Negative  Negative    Methadone Screen , Urine Negative Negative  Negative      Brief Urine Lab Results       None          No results found for: \"BLOODCX\"  No results found for: \"URINECX\"  No results found for: \"WOUNDCX\"  No results found for: \"STOOLCX\"    I have personally looked at the labs and they are summarized above.  ----------------------------------------------------------------------------------------------------------------------  Detailed radiology reports for the last 24 hours:    Imaging Results (Last 24 Hours)       Procedure Component Value Units Date/Time    CT Abdomen Pelvis With & Without Contrast [[id number]] Collected: 05/23/24 1406     Updated: 05/23/24 1410    Narrative:      PROCEDURE: CT ABDOMEN PELVIS W WO CONTRAST-     HISTORY: upper abdominal pain     COMPARISON:  None .     TECHNIQUE: Multiple axial CT images were obtained from the lung bases  through the pubic symphysis before and following the administration of  Isovue-300. . This study was performed with techniques to keep radiation  doses as low as reasonably achievable (ALARA). Individualized " dose  reduction techniques using automated exposure control or adjustment of  mA and/or kV according to the patient size were employed.     FINDINGS:     ABDOMEN: The lung bases are clear are clear. The heart is normal in  size. The liver is normal. The spleen is unremarkable. No adrenal mass  is present.  The pancreas is unremarkable. Precontrast images reveal no  evidence of nephrolithiasis. The kidneys are normal. The aorta is normal  in caliber. The mesenteric vasculature is patent. There is no free fluid  or adenopathy. The small bowel is unremarkable with no evidence of  obstruction.     PELVIS: The appendix is normal. The colon is unremarkable with no wall  thickening or focal mass. There is a 3 mm stone in the dependent  portions of the urinary bladder. There is no significant free fluid or  adenopathy. Bone windows reveal no lytic or destructive lesions.  Postsurgical changes are present in the left hip.     .       Impression:      3 mm stone in the dependent portions of the urinary bladder.  The exam is otherwise unremarkable.        This report was finalized on 5/23/2024 2:08 PM by Jonny Wilson M.D..       XR Chest 1 View [[id number]] Collected: 05/23/24 1056     Updated: 05/23/24 1058    Narrative:      PROCEDURE: XR CHEST 1 VW-       HISTORY: Chest Pain Protocol     COMPARISON: 6/6/2022.     FINDINGS: The heart is normal in size. The mediastinum is unremarkable.  The lungs are clear. There is no pneumothorax. There are no acute  osseous abnormalities.       Impression:      No acute cardiopulmonary process.        This report was finalized on 5/23/2024 10:56 AM by Jonny Wilson M.D..             I have personally looked at the radiology images and read the final radiology report.    Assessment & Plan    64F Former Smoker, Morbid Obesity by BMI PMH Arthritis, Depression, Fatty Liver, History H Pylori, GERD, Obstructive Sleep Apnea, Diabetes Mellitus Type II, Hypertension, Hyperlipidemia,  Coronary Artery Disease, presented to [facility] emergency room 5/23 with complaints of chest pain.      #Atypical Chest Pain, Acute Coronary Syndrome ruled out  #Hypertension/Hyperlipidemia/Coronary Artery Disease  - Labs showed CBC normal, CMP normal, HS Troponins 13->15  - EKG showed normal sinus rhythm, possible old anterior infarct  - Status post Aspirin 325mg in emergency room   - Review home medications and resume as indicated   - Will trend troponins and repeat EKG to assess for dynamic changes  - Will order complete echocardiogram for further assessment  - Will order Nuclear Stress Test for the AM   - Will make NPO at midnight  - Will admit to telemetry for continued monitoring, strict I/O's, daily weights, trend heart rate and blood pressure    #Gastroesophageal Reflux Disease/History H Pylori  - Will consult General Surgery on recommendations for inpatient vs outpatient EGD, continue IV PPI twice daily, follow up cardiac workup above.     #Dependent Diabetes Mellitus Type II, unknown control, unknown complications  - Holding home oral agents, continue FSBG and SSI, add long acting as indicated.    #Bladder Stone  - Noted on CT, no obstruction or renal failure noted; Follow up outpatient Urology     #Obstructive Sleep Apnea   - Non-adherent with home CPAP  - PCP will need to refer for updated outpatient sleep study    #Anxiety/Depression  - Continue home regimen pending medication reconciliation     #Former Smoker  - Recommended cessation, nicotine replacement therapy as needed    #Morbid Obesity by BMI  - Body mass index is 47.33 kg/m².; complicates all aspects of care    F: Oral  E: Monitor & Replace as needed   N: Cardiac, NPO @ MN   PPx: [facility]  Code: Full Code    Dispo: Pending workup and clinical improvement    *This patient is considered high risk secondary to chest pain, elevated HS troponins, requiring further workup with cardiac imaging.     Edited by: Mack Lam MD at 5/23/2024  1602    Mack Lam MD  [facility]  05/23/24  16:02 EDT

## 2024-05-23 NOTE — PLAN OF CARE
Goal Outcome Evaluation:  Plan of Care Reviewed With: patient  Progress: no change   Pt admitted this shift. Assessment and admission completed. Pt resting in bed, watching television. Pt has tolerated all interventions. No complaints/concerns. No acute distress noted. Call light within reach. Plan of care ongoing.

## 2024-05-24 ENCOUNTER — APPOINTMENT (OUTPATIENT)
Dept: NUCLEAR MEDICINE | Facility: HOSPITAL | Age: 64
End: 2024-05-24
Payer: MEDICARE

## 2024-05-24 ENCOUNTER — APPOINTMENT (OUTPATIENT)
Dept: CARDIOLOGY | Facility: HOSPITAL | Age: 64
End: 2024-05-24
Payer: MEDICARE

## 2024-05-24 LAB
ALBUMIN SERPL-MCNC: 3.6 G/DL (ref 3.5–5.2)
ALBUMIN/GLOB SERPL: 1.6 G/DL
ALP SERPL-CCNC: 53 U/L (ref 39–117)
ALT SERPL W P-5'-P-CCNC: 9 U/L (ref 1–33)
ANION GAP SERPL CALCULATED.3IONS-SCNC: 11.7 MMOL/L (ref 5–15)
AST SERPL-CCNC: 13 U/L (ref 1–32)
BH CV NUCLEAR PRIOR STUDY: 3
BH CV REST NUCLEAR ISOTOPE DOSE: 10.5 MCI
BH CV STRESS BP STAGE 1: NORMAL
BH CV STRESS COMMENTS STAGE 1: NORMAL
BH CV STRESS DOSE REGADENOSON STAGE 1: 0.4
BH CV STRESS DURATION MIN STAGE 1: 0
BH CV STRESS DURATION SEC STAGE 1: 10
BH CV STRESS HR STAGE 1: 101
BH CV STRESS NUCLEAR ISOTOPE DOSE: 30.5 MCI
BH CV STRESS PROTOCOL 1: NORMAL
BH CV STRESS RECOVERY BP: NORMAL MMHG
BH CV STRESS RECOVERY HR: 97 BPM
BH CV STRESS STAGE 1: 1
BILIRUB SERPL-MCNC: 0.3 MG/DL (ref 0–1.2)
BUN SERPL-MCNC: 18 MG/DL (ref 8–23)
BUN/CREAT SERPL: 20.9 (ref 7–25)
CALCIUM SPEC-SCNC: 9.1 MG/DL (ref 8.6–10.5)
CHLORIDE SERPL-SCNC: 102 MMOL/L (ref 98–107)
CO2 SERPL-SCNC: 26.3 MMOL/L (ref 22–29)
CREAT SERPL-MCNC: 0.86 MG/DL (ref 0.57–1)
DEPRECATED RDW RBC AUTO: 41.8 FL (ref 37–54)
EGFRCR SERPLBLD CKD-EPI 2021: 76 ML/MIN/1.73
ERYTHROCYTE [DISTWIDTH] IN BLOOD BY AUTOMATED COUNT: 12.3 % (ref 12.3–15.4)
GLOBULIN UR ELPH-MCNC: 2.2 GM/DL
GLUCOSE BLDC GLUCOMTR-MCNC: 122 MG/DL (ref 70–130)
GLUCOSE BLDC GLUCOMTR-MCNC: 126 MG/DL (ref 70–130)
GLUCOSE BLDC GLUCOMTR-MCNC: 203 MG/DL (ref 70–130)
GLUCOSE BLDC GLUCOMTR-MCNC: 204 MG/DL (ref 70–130)
GLUCOSE SERPL-MCNC: 134 MG/DL (ref 65–99)
HCT VFR BLD AUTO: 36.4 % (ref 34–46.6)
HGB BLD-MCNC: 12.1 G/DL (ref 12–15.9)
LV EF NUC BP: 76 %
MAXIMAL PREDICTED HEART RATE: 157 BPM
MCH RBC QN AUTO: 30.7 PG (ref 26.6–33)
MCHC RBC AUTO-ENTMCNC: 33.2 G/DL (ref 31.5–35.7)
MCV RBC AUTO: 92.4 FL (ref 79–97)
PERCENT MAX PREDICTED HR: 64.33 %
PLATELET # BLD AUTO: 287 10*3/MM3 (ref 140–450)
PMV BLD AUTO: 11.1 FL (ref 6–12)
POTASSIUM SERPL-SCNC: 3.5 MMOL/L (ref 3.5–5.2)
PROT SERPL-MCNC: 5.8 G/DL (ref 6–8.5)
QT INTERVAL: 370 MS
QTC INTERVAL: 437 MS
RBC # BLD AUTO: 3.94 10*6/MM3 (ref 3.77–5.28)
SODIUM SERPL-SCNC: 140 MMOL/L (ref 136–145)
STRESS BASELINE BP: NORMAL MMHG
STRESS BASELINE HR: 90 BPM
STRESS PERCENT HR: 76 %
STRESS POST PEAK BP: NORMAL MMHG
STRESS POST PEAK HR: 101 BPM
STRESS TARGET HR: 133 BPM
WBC NRBC COR # BLD AUTO: 7.26 10*3/MM3 (ref 3.4–10.8)

## 2024-05-24 PROCEDURE — 80053 COMPREHEN METABOLIC PANEL: CPT | Performed by: INTERNAL MEDICINE

## 2024-05-24 PROCEDURE — A9500 TC99M SESTAMIBI: HCPCS | Performed by: INTERNAL MEDICINE

## 2024-05-24 PROCEDURE — 25010000002 REGADENOSON 0.4 MG/5ML SOLUTION: Performed by: INTERNAL MEDICINE

## 2024-05-24 PROCEDURE — 0 TECHNETIUM SESTAMIBI: Performed by: INTERNAL MEDICINE

## 2024-05-24 PROCEDURE — 25010000002 HEPARIN (PORCINE) PER 1000 UNITS: Performed by: INTERNAL MEDICINE

## 2024-05-24 PROCEDURE — 63710000001 INSULIN LISPRO (HUMAN) PER 5 UNITS

## 2024-05-24 PROCEDURE — 78452 HT MUSCLE IMAGE SPECT MULT: CPT

## 2024-05-24 PROCEDURE — G0378 HOSPITAL OBSERVATION PER HR: HCPCS

## 2024-05-24 PROCEDURE — 93018 CV STRESS TEST I&R ONLY: CPT | Performed by: INTERNAL MEDICINE

## 2024-05-24 PROCEDURE — 85027 COMPLETE CBC AUTOMATED: CPT | Performed by: INTERNAL MEDICINE

## 2024-05-24 PROCEDURE — 78452 HT MUSCLE IMAGE SPECT MULT: CPT | Performed by: INTERNAL MEDICINE

## 2024-05-24 PROCEDURE — 82948 REAGENT STRIP/BLOOD GLUCOSE: CPT

## 2024-05-24 PROCEDURE — 93017 CV STRESS TEST TRACING ONLY: CPT

## 2024-05-24 RX ORDER — CYCLOBENZAPRINE HCL 10 MG
10 TABLET ORAL 3 TIMES DAILY PRN
Status: DISCONTINUED | OUTPATIENT
Start: 2024-05-24 | End: 2024-05-29 | Stop reason: HOSPADM

## 2024-05-24 RX ORDER — REGADENOSON 0.08 MG/ML
0.4 INJECTION, SOLUTION INTRAVENOUS
Status: COMPLETED | OUTPATIENT
Start: 2024-05-24 | End: 2024-05-24

## 2024-05-24 RX ORDER — ATORVASTATIN CALCIUM 20 MG/1
20 TABLET, FILM COATED ORAL NIGHTLY
Status: DISCONTINUED | OUTPATIENT
Start: 2024-05-24 | End: 2024-05-29 | Stop reason: HOSPADM

## 2024-05-24 RX ORDER — OXYBUTYNIN CHLORIDE 5 MG/1
5 TABLET, EXTENDED RELEASE ORAL DAILY
Status: DISCONTINUED | OUTPATIENT
Start: 2024-05-24 | End: 2024-05-29 | Stop reason: HOSPADM

## 2024-05-24 RX ORDER — LOSARTAN POTASSIUM 50 MG/1
50 TABLET ORAL
Status: DISCONTINUED | OUTPATIENT
Start: 2024-05-24 | End: 2024-05-29 | Stop reason: HOSPADM

## 2024-05-24 RX ORDER — HYDROXYZINE HYDROCHLORIDE 10 MG/1
10 TABLET, FILM COATED ORAL 3 TIMES DAILY PRN
Status: DISCONTINUED | OUTPATIENT
Start: 2024-05-24 | End: 2024-05-29 | Stop reason: HOSPADM

## 2024-05-24 RX ORDER — BUPROPION HYDROCHLORIDE 100 MG/1
200 TABLET, EXTENDED RELEASE ORAL 2 TIMES DAILY
Status: DISCONTINUED | OUTPATIENT
Start: 2024-05-24 | End: 2024-05-29 | Stop reason: HOSPADM

## 2024-05-24 RX ORDER — FLUOXETINE HYDROCHLORIDE 20 MG/1
40 CAPSULE ORAL DAILY
Status: DISCONTINUED | OUTPATIENT
Start: 2024-05-24 | End: 2024-05-29 | Stop reason: HOSPADM

## 2024-05-24 RX ORDER — ONDANSETRON 4 MG/1
4 TABLET, ORALLY DISINTEGRATING ORAL EVERY 6 HOURS PRN
Status: DISCONTINUED | OUTPATIENT
Start: 2024-05-24 | End: 2024-05-29 | Stop reason: HOSPADM

## 2024-05-24 RX ORDER — HYDROCHLOROTHIAZIDE 25 MG/1
12.5 TABLET ORAL
Status: DISCONTINUED | OUTPATIENT
Start: 2024-05-24 | End: 2024-05-29 | Stop reason: HOSPADM

## 2024-05-24 RX ORDER — FUROSEMIDE 20 MG/1
20 TABLET ORAL DAILY
Status: DISCONTINUED | OUTPATIENT
Start: 2024-05-24 | End: 2024-05-29 | Stop reason: HOSPADM

## 2024-05-24 RX ADMIN — REGADENOSON 0.4 MG: 0.08 INJECTION, SOLUTION INTRAVENOUS at 08:30

## 2024-05-24 RX ADMIN — Medication 10 ML: at 09:40

## 2024-05-24 RX ADMIN — LOSARTAN POTASSIUM 50 MG: 50 TABLET, FILM COATED ORAL at 12:08

## 2024-05-24 RX ADMIN — FUROSEMIDE 20 MG: 20 TABLET ORAL at 12:08

## 2024-05-24 RX ADMIN — INSULIN LISPRO 3 UNITS: 100 INJECTION, SOLUTION INTRAVENOUS; SUBCUTANEOUS at 11:49

## 2024-05-24 RX ADMIN — PANTOPRAZOLE SODIUM 40 MG: 40 INJECTION, POWDER, LYOPHILIZED, FOR SOLUTION INTRAVENOUS at 21:49

## 2024-05-24 RX ADMIN — HEPARIN SODIUM 5000 UNITS: 5000 INJECTION INTRAVENOUS; SUBCUTANEOUS at 21:49

## 2024-05-24 RX ADMIN — HYDROCHLOROTHIAZIDE 12.5 MG: 25 TABLET ORAL at 12:08

## 2024-05-24 RX ADMIN — FLUOXETINE HYDROCHLORIDE 40 MG: 20 CAPSULE ORAL at 12:08

## 2024-05-24 RX ADMIN — CYCLOBENZAPRINE HYDROCHLORIDE 10 MG: 10 TABLET, FILM COATED ORAL at 21:49

## 2024-05-24 RX ADMIN — TECHNETIUM TC 99M SESTAMIBI 1 DOSE: 1 INJECTION INTRAVENOUS at 07:15

## 2024-05-24 RX ADMIN — BUPROPION HYDROCHLORIDE 200 MG: 100 TABLET, EXTENDED RELEASE ORAL at 12:08

## 2024-05-24 RX ADMIN — Medication 10 ML: at 21:50

## 2024-05-24 RX ADMIN — INSULIN LISPRO 3 UNITS: 100 INJECTION, SOLUTION INTRAVENOUS; SUBCUTANEOUS at 21:57

## 2024-05-24 RX ADMIN — TECHNETIUM TC 99M SESTAMIBI 1 DOSE: 1 INJECTION INTRAVENOUS at 08:30

## 2024-05-24 RX ADMIN — PANTOPRAZOLE SODIUM 40 MG: 40 INJECTION, POWDER, LYOPHILIZED, FOR SOLUTION INTRAVENOUS at 09:40

## 2024-05-24 RX ADMIN — BUPROPION HYDROCHLORIDE 200 MG: 100 TABLET, EXTENDED RELEASE ORAL at 21:49

## 2024-05-24 RX ADMIN — OXYBUTYNIN CHLORIDE 5 MG: 5 TABLET, EXTENDED RELEASE ORAL at 12:08

## 2024-05-24 RX ADMIN — HEPARIN SODIUM 5000 UNITS: 5000 INJECTION INTRAVENOUS; SUBCUTANEOUS at 06:21

## 2024-05-24 RX ADMIN — ATORVASTATIN CALCIUM 20 MG: 20 TABLET, FILM COATED ORAL at 21:49

## 2024-05-24 NOTE — PROGRESS NOTES
[facility] HOSPITALIST PROGRESS NOTE     Patient Identification:  Name:  Es Olivier  Age:  64 y.o.  Sex:  female  :  1960  MRN:  [id number]  Visit Number:  [id number]  ROOM: [address] [place] [address]     Primary Care Provider:  Delilah Pizarro MD    Length of stay in inpatient status:  0    Subjective     Chief Compliant:    Chief Complaint   Patient presents with    Chest Pain     History of Presenting Illness:    Patient remains ill but stable today, no acute events overnight, no new complaints, denies any fevers or chills, has stress and echocardiogram pending, also General Surgery with tentative plans for EGD, symptoms improved, home medications resumed, prognosis guarded, follow up results and specialist recommendations.   Objective     Current Hospital Meds:  atorvastatin, 20 mg, Oral, Nightly  buPROPion SR, 200 mg, Oral, BID  FLUoxetine, 40 mg, Oral, Daily  furosemide, 20 mg, Oral, Daily  heparin (porcine), 5,000 Units, Subcutaneous, Q8H  losartan, 50 mg, Oral, Q24H   And  hydroCHLOROthiazide, 12.5 mg, Oral, Q24H  insulin lispro, 2-7 Units, Subcutaneous, 4x Daily AC & at Bedtime  oxybutynin XL, 5 mg, Oral, Daily  pantoprazole, 40 mg, Intravenous, Q12H  sodium chloride, 10 mL, Intravenous, Q12H    ----------------------------------------------------------------------------------------------------------------------  Vital Signs:  Temp:  [97.3 °F (36.3 °C)-98.1 °F (36.7 °C)] 97.3 °F (36.3 °C)  Heart Rate:  [69-86] 86  Resp:  [18-20] 18  BP: (103-136)/(53-87) 117/67  SpO2:  [92 %-98 %] 96 %  on   ;   Device (Oxygen Therapy): room air  Body mass index is 47.33 kg/m².      Intake/Output Summary (Last 24 hours) at 2024 1123  Last data filed at 2024 1920  Gross per 24 hour   Intake 120 ml   Output --   Net 120 ml      ----------------------------------------------------------------------------------------------------------------------  Physical exam:  Constitutional:  Well-developed and  "well-nourished.  No acute distress. Improved discomfort      HENT:  Head:  Normocephalic and atraumatic.  Mouth:  Moist mucous membranes.    Eyes:  Conjunctivae and EOM are normal. No scleral icterus.    Neck:  Neck supple.  No JVD present.    Cardiovascular:  Normal rate, regular rhythm and normal heart sounds with no murmur.  Pulmonary/Chest:  No respiratory distress, no wheezes, no crackles, with normal breath sounds and good air movement.  Abdominal:  Soft. No distension and no tenderness.   Musculoskeletal:  No tenderness, and no deformity.  No red or swollen joints anywhere.    Neurological:  Alert and oriented to person, place, and time.  No gross focal deficits   Skin:  Skin is warm and dry. No rash noted. No pallor.   Peripheral vascular:  No clubbing, no cyanosis, no edema.  Psychiatric: Appropriate mood and affect  Edited by: Mack Lam MD at 5/24/2024 1121  ----------------------------------------------------------------------------------------------------------------------  WBC/HGB/HCT/PLT   7.26/12.1/36.4/287 (05/24 0117)  BUN/CREAT/GLUC/ALT/AST/STEPH/LIP    18/0.86/134/9/13/--/-- (05/24 0117)  LYTES - Na/K/Cl/CO2: 140/3.5/102/26.3 (05/24 0117)     No results found for: \"URINECX\"  No results found for: \"BLOODCX\"    I have personally looked at the labs and they are summarized above.  ----------------------------------------------------------------------------------------------------------------------  Detailed radiology reports for the last 24 hours:  CT Abdomen Pelvis With & Without Contrast    Result Date: 5/23/2024  3 mm stone in the dependent portions of the urinary bladder. The exam is otherwise unremarkable.   This report was finalized on 5/23/2024 2:08 PM by Jonny Wilson M.D..      XR Chest 1 View    Result Date: 5/23/2024  No acute cardiopulmonary process.   This report was finalized on 5/23/2024 10:56 AM by Jonny Wilson M.D..     Assessment & Plan    64F Former Smoker, Morbid " Obesity by BMI PMH Arthritis, Depression, Fatty Liver, History H Pylori, GERD, Obstructive Sleep Apnea, Diabetes Mellitus Type II, Hypertension, Hyperlipidemia, Coronary Artery Disease, presented to [facility] emergency room 5/23 with complaints of chest pain.      #Atypical Chest Pain, Acute Coronary Syndrome ruled out  #Hypertension/Hyperlipidemia/Coronary Artery Disease  - Labs showed CBC normal, CMP normal, HS Troponins 13->15  - EKG showed normal sinus rhythm, possible old anterior infarct  - Status post Aspirin 325mg in emergency room   - Continue home regimen  - Follow up Stress and Echocardiogram  - Monit on telemetry, strict I/O's, daily weights, trend heart rate and blood pressure    #Gastroesophageal Reflux Disease/History H Pylori  - Continue IV PPI twice daily, General Surgery consulted with tentative plans for EGD    #Dependent Diabetes Mellitus Type II, unknown control, unknown complications  - Holding home oral agents, continue FSBG and SSI, add long acting as indicated.    #Bladder Stone  - Noted on CT, no obstruction or renal failure noted; Follow up outpatient Urology     #Obstructive Sleep Apnea   - Non-adherent with home CPAP  - PCP will need to refer for updated outpatient sleep study    #Anxiety/Depression  - Continue home regimen    #Former Smoker  - Recommended cessation, nicotine replacement therapy as needed    #Morbid Obesity by BMI  - Body mass index is 47.33 kg/m².; complicates all aspects of care    F: NPO   E: Monitor & Replace as needed   N: NPO Diet NPO Type: Strict NPO  Diet: Cardiac; Healthy Heart (2-3 Na+); Fluid Consistency: Thin (IDDSI 0)   PPx: [facility]  Code Status (Patient has no pulse and is not breathing): CPR  Medical Interventions (Patient has pulse or is breathing): Full Support    Dispo: Pending workup and clinical improvement    *This patient is considered high risk secondary to chest pain, elevated HS troponins, requiring further workup with cardiac imaging.      Edited by: Mack Lam MD at 5/24/2024 1123  [facility]

## 2024-05-24 NOTE — PLAN OF CARE
Goal Outcome Evaluation:  Plan of Care Reviewed With: patient   Patient resting in bed. No acute signs or symptoms of distress. No complaints at this time. Denies chest pain. Patient has remained NPO since midnight for stress test this AM. IV patent. Patient has ambulated frequently this shift to bathroom. No acute events overnight. Plan to return home on discharge. Observation status continued. Will continue to follow with plan of care.

## 2024-05-25 ENCOUNTER — APPOINTMENT (OUTPATIENT)
Dept: CARDIOLOGY | Facility: HOSPITAL | Age: 64
End: 2024-05-25
Payer: MEDICARE

## 2024-05-25 LAB
ALBUMIN SERPL-MCNC: 3.8 G/DL (ref 3.5–5.2)
ALBUMIN/GLOB SERPL: 1.8 G/DL
ALP SERPL-CCNC: 54 U/L (ref 39–117)
ALT SERPL W P-5'-P-CCNC: 10 U/L (ref 1–33)
ANION GAP SERPL CALCULATED.3IONS-SCNC: 11.8 MMOL/L (ref 5–15)
AST SERPL-CCNC: 13 U/L (ref 1–32)
BH CV ECHO MEAS - AI P1/2T: 996.6 MSEC
BH CV ECHO MEAS - AO MAX PG: 8.8 MMHG
BH CV ECHO MEAS - AO MEAN PG: 4 MMHG
BH CV ECHO MEAS - AO ROOT DIAM: 2.9 CM
BH CV ECHO MEAS - AO V2 MAX: 148 CM/SEC
BH CV ECHO MEAS - AO V2 VTI: 29.1 CM
BH CV ECHO MEAS - AVA(I,D): 1.99 CM2
BH CV ECHO MEAS - EDV(CUBED): 76.8 ML
BH CV ECHO MEAS - EDV(MOD-SP2): 59.2 ML
BH CV ECHO MEAS - EDV(MOD-SP4): 54.1 ML
BH CV ECHO MEAS - EF(MOD-BP): 59.4 %
BH CV ECHO MEAS - EF(MOD-SP2): 57.1 %
BH CV ECHO MEAS - EF(MOD-SP4): 60.3 %
BH CV ECHO MEAS - ESV(CUBED): 19.5 ML
BH CV ECHO MEAS - ESV(MOD-SP2): 25.4 ML
BH CV ECHO MEAS - ESV(MOD-SP4): 21.5 ML
BH CV ECHO MEAS - FS: 36.7 %
BH CV ECHO MEAS - IVS/LVPW: 0.83 CM
BH CV ECHO MEAS - IVSD: 0.78 CM
BH CV ECHO MEAS - LA DIMENSION: 3.2 CM
BH CV ECHO MEAS - LAT PEAK E' VEL: 10.2 CM/SEC
BH CV ECHO MEAS - LV DIASTOLIC VOL/BSA (35-75): 25.4 CM2
BH CV ECHO MEAS - LV MASS(C)D: 112.9 GRAMS
BH CV ECHO MEAS - LV MAX PG: 4.1 MMHG
BH CV ECHO MEAS - LV MEAN PG: 2 MMHG
BH CV ECHO MEAS - LV SYSTOLIC VOL/BSA (12-30): 10.1 CM2
BH CV ECHO MEAS - LV V1 MAX: 101 CM/SEC
BH CV ECHO MEAS - LV V1 VTI: 22.7 CM
BH CV ECHO MEAS - LVIDD: 4.3 CM
BH CV ECHO MEAS - LVIDS: 2.7 CM
BH CV ECHO MEAS - LVOT AREA: 2.5 CM2
BH CV ECHO MEAS - LVOT DIAM: 1.8 CM
BH CV ECHO MEAS - LVPWD: 0.93 CM
BH CV ECHO MEAS - MED PEAK E' VEL: 6.2 CM/SEC
BH CV ECHO MEAS - MV A MAX VEL: 75.8 CM/SEC
BH CV ECHO MEAS - MV DEC SLOPE: 218 CM/SEC2
BH CV ECHO MEAS - MV DEC TIME: 0.31 SEC
BH CV ECHO MEAS - MV E MAX VEL: 66.8 CM/SEC
BH CV ECHO MEAS - MV E/A: 0.88
BH CV ECHO MEAS - PA ACC TIME: 0.16 SEC
BH CV ECHO MEAS - SV(LVOT): 57.8 ML
BH CV ECHO MEAS - SV(MOD-SP2): 33.8 ML
BH CV ECHO MEAS - SV(MOD-SP4): 32.6 ML
BH CV ECHO MEAS - SVI(LVOT): 27.1 ML/M2
BH CV ECHO MEAS - SVI(MOD-SP2): 15.9 ML/M2
BH CV ECHO MEAS - SVI(MOD-SP4): 15.3 ML/M2
BH CV ECHO MEAS - TAPSE (>1.6): 1.73 CM
BH CV ECHO MEAS - TR MAX PG: 16.6 MMHG
BH CV ECHO MEAS - TR MAX VEL: 204 CM/SEC
BH CV ECHO MEASUREMENTS AVERAGE E/E' RATIO: 8.15
BH CV XLRA - TDI S': 11.2 CM/SEC
BILIRUB SERPL-MCNC: 0.2 MG/DL (ref 0–1.2)
BUN SERPL-MCNC: 18 MG/DL (ref 8–23)
BUN/CREAT SERPL: 17.8 (ref 7–25)
CALCIUM SPEC-SCNC: 8.7 MG/DL (ref 8.6–10.5)
CHLORIDE SERPL-SCNC: 101 MMOL/L (ref 98–107)
CO2 SERPL-SCNC: 26.2 MMOL/L (ref 22–29)
CREAT SERPL-MCNC: 1.01 MG/DL (ref 0.57–1)
DEPRECATED RDW RBC AUTO: 42.6 FL (ref 37–54)
EGFRCR SERPLBLD CKD-EPI 2021: 62.7 ML/MIN/1.73
ERYTHROCYTE [DISTWIDTH] IN BLOOD BY AUTOMATED COUNT: 12.5 % (ref 12.3–15.4)
GLOBULIN UR ELPH-MCNC: 2.1 GM/DL
GLUCOSE BLDC GLUCOMTR-MCNC: 130 MG/DL (ref 70–130)
GLUCOSE BLDC GLUCOMTR-MCNC: 131 MG/DL (ref 70–130)
GLUCOSE BLDC GLUCOMTR-MCNC: 147 MG/DL (ref 70–130)
GLUCOSE BLDC GLUCOMTR-MCNC: 187 MG/DL (ref 70–130)
GLUCOSE SERPL-MCNC: 134 MG/DL (ref 65–99)
HCT VFR BLD AUTO: 36.6 % (ref 34–46.6)
HGB BLD-MCNC: 12.3 G/DL (ref 12–15.9)
MCH RBC QN AUTO: 31.1 PG (ref 26.6–33)
MCHC RBC AUTO-ENTMCNC: 33.6 G/DL (ref 31.5–35.7)
MCV RBC AUTO: 92.7 FL (ref 79–97)
PLATELET # BLD AUTO: 283 10*3/MM3 (ref 140–450)
PMV BLD AUTO: 10.9 FL (ref 6–12)
POTASSIUM SERPL-SCNC: 3.3 MMOL/L (ref 3.5–5.2)
POTASSIUM SERPL-SCNC: 3.8 MMOL/L (ref 3.5–5.2)
PROT SERPL-MCNC: 5.9 G/DL (ref 6–8.5)
RBC # BLD AUTO: 3.95 10*6/MM3 (ref 3.77–5.28)
SODIUM SERPL-SCNC: 139 MMOL/L (ref 136–145)
WBC NRBC COR # BLD AUTO: 6.75 10*3/MM3 (ref 3.4–10.8)

## 2024-05-25 PROCEDURE — 93306 TTE W/DOPPLER COMPLETE: CPT | Performed by: INTERNAL MEDICINE

## 2024-05-25 PROCEDURE — 63710000001 INSULIN LISPRO (HUMAN) PER 5 UNITS

## 2024-05-25 PROCEDURE — 85027 COMPLETE CBC AUTOMATED: CPT | Performed by: INTERNAL MEDICINE

## 2024-05-25 PROCEDURE — 84132 ASSAY OF SERUM POTASSIUM: CPT

## 2024-05-25 PROCEDURE — 99221 1ST HOSP IP/OBS SF/LOW 40: CPT | Performed by: SURGERY

## 2024-05-25 PROCEDURE — 93306 TTE W/DOPPLER COMPLETE: CPT

## 2024-05-25 PROCEDURE — 80053 COMPREHEN METABOLIC PANEL: CPT | Performed by: INTERNAL MEDICINE

## 2024-05-25 PROCEDURE — 82948 REAGENT STRIP/BLOOD GLUCOSE: CPT

## 2024-05-25 PROCEDURE — 25010000002 HEPARIN (PORCINE) PER 1000 UNITS: Performed by: INTERNAL MEDICINE

## 2024-05-25 PROCEDURE — 25010000002 POTASSIUM CHLORIDE 10 MEQ/100ML SOLUTION

## 2024-05-25 PROCEDURE — 99222 1ST HOSP IP/OBS MODERATE 55: CPT | Performed by: INTERNAL MEDICINE

## 2024-05-25 RX ORDER — ASPIRIN 81 MG/1
81 TABLET ORAL DAILY
Status: DISCONTINUED | OUTPATIENT
Start: 2024-05-25 | End: 2024-05-29 | Stop reason: HOSPADM

## 2024-05-25 RX ORDER — POTASSIUM CHLORIDE 7.45 MG/ML
10 INJECTION INTRAVENOUS
Status: DISPENSED | OUTPATIENT
Start: 2024-05-25 | End: 2024-05-25

## 2024-05-25 RX ADMIN — HYDROCHLOROTHIAZIDE 12.5 MG: 25 TABLET ORAL at 13:03

## 2024-05-25 RX ADMIN — OXYBUTYNIN CHLORIDE 5 MG: 5 TABLET, EXTENDED RELEASE ORAL at 13:03

## 2024-05-25 RX ADMIN — ATORVASTATIN CALCIUM 20 MG: 20 TABLET, FILM COATED ORAL at 21:01

## 2024-05-25 RX ADMIN — LOSARTAN POTASSIUM 50 MG: 50 TABLET, FILM COATED ORAL at 13:03

## 2024-05-25 RX ADMIN — FUROSEMIDE 20 MG: 20 TABLET ORAL at 13:03

## 2024-05-25 RX ADMIN — PANTOPRAZOLE SODIUM 40 MG: 40 INJECTION, POWDER, LYOPHILIZED, FOR SOLUTION INTRAVENOUS at 21:01

## 2024-05-25 RX ADMIN — FLUOXETINE HYDROCHLORIDE 40 MG: 20 CAPSULE ORAL at 13:03

## 2024-05-25 RX ADMIN — BUPROPION HYDROCHLORIDE 200 MG: 100 TABLET, EXTENDED RELEASE ORAL at 13:03

## 2024-05-25 RX ADMIN — INSULIN LISPRO 2 UNITS: 100 INJECTION, SOLUTION INTRAVENOUS; SUBCUTANEOUS at 17:28

## 2024-05-25 RX ADMIN — PANTOPRAZOLE SODIUM 40 MG: 40 INJECTION, POWDER, LYOPHILIZED, FOR SOLUTION INTRAVENOUS at 13:03

## 2024-05-25 RX ADMIN — POTASSIUM CHLORIDE 10 MEQ: 7.46 INJECTION, SOLUTION INTRAVENOUS at 04:22

## 2024-05-25 RX ADMIN — Medication 10 ML: at 21:01

## 2024-05-25 RX ADMIN — ASPIRIN 81 MG: 81 TABLET, COATED ORAL at 13:03

## 2024-05-25 RX ADMIN — Medication 10 ML: at 13:04

## 2024-05-25 RX ADMIN — BUPROPION HYDROCHLORIDE 200 MG: 100 TABLET, EXTENDED RELEASE ORAL at 21:01

## 2024-05-25 RX ADMIN — HEPARIN SODIUM 5000 UNITS: 5000 INJECTION INTRAVENOUS; SUBCUTANEOUS at 21:01

## 2024-05-25 RX ADMIN — POTASSIUM CHLORIDE 10 MEQ: 7.46 INJECTION, SOLUTION INTRAVENOUS at 03:32

## 2024-05-25 RX ADMIN — HEPARIN SODIUM 5000 UNITS: 5000 INJECTION INTRAVENOUS; SUBCUTANEOUS at 05:51

## 2024-05-25 NOTE — CONSULTS
[facility]   Consult Note    Patient Name: Es Olivier  : 1960  MRN: [id number]  Primary Care Physician:  Delilah Pizarro MD  Referring Physician: No ref. provider found  Date of admission: 2024    Consults  Subjective   Subjective     Reason for Consult/ Chief Complaint: Chest pain    Chest Pain   Pertinent negatives include no abdominal pain, cough, dizziness, fever, headaches, nausea, palpitations, shortness of breath, vomiting or weakness.   Pertinent negatives for past medical history include no seizures.     Es Olivier is a 64 y.o. female with intermittent episodes of chest and epigastric pain.  She reports a history of H. pylori that was never treated.  She has been on PPI therapy since admission.  Cardiac workup so far negative though stress test was intermediate.  Final cardiology recommendations pending.  No current pain or discomfort.  Pain is in the epigastrium and she states she has discomfort with swallowing at times.    Review of Systems   Constitutional:  Negative for activity change, appetite change, chills and fever.   HENT:  Negative for sore throat and trouble swallowing.    Eyes:  Negative for visual disturbance.   Respiratory:  Negative for cough and shortness of breath.    Cardiovascular:  Positive for chest pain. Negative for palpitations.   Gastrointestinal:  Negative for abdominal distention, abdominal pain, blood in stool, constipation, diarrhea, nausea and vomiting.   Endocrine: Negative for cold intolerance and heat intolerance.   Genitourinary:  Negative for dysuria.   Musculoskeletal:  Negative for joint swelling.   Skin:  Negative for color change, rash and wound.   Allergic/Immunologic: Negative for immunocompromised state.   Neurological:  Negative for dizziness, seizures, weakness and headaches.   Hematological:  Negative for adenopathy. Does not bruise/bleed easily.   Psychiatric/Behavioral:  Negative for agitation and confusion.         Personal History  "    Past Medical History:   Diagnosis Date    Arthritis     Biliary dyskinesia     abnormal HIDA 11/2018 w/ EF 31%, symptomatic w/ N/V    Closed fracture of proximal end of left femur 02/25/2022    Depression     DM2 (diabetes mellitus, type 2)     dx 1994, on insulin >5 years, A1c 7, associated neuropathy, no other complications    Dyspepsia     Dyspnea on exertion     Elevated cholesterol     Fatigue     Fatty liver     dx on CT 2016    GERD (gastroesophageal reflux disease)     Heart disease     w/ cardiac clearance 4/2019, negative stress test 10/2017, EF 65%    Hiatal hernia     \"small\" noted on UGI 2014    History of Helicobacter pylori infection     treated remotely    Hyperlipidemia     Hypertension     Joint pain     Morbid obesity with BMI of 45.0-49.9, adult     Sleep apnea     formerly on a device, no longer using, says just doesn't need it    Urinary incontinence     no meds    Vertigo     Vitamin D deficiency        Past Surgical History:   Procedure Laterality Date    BLADDER SURGERY  2013    \"tack\", uncomplicated, but unsuccessful    CATARACT EXTRACTION Left     COLONOSCOPY  2015    unremarkable    DILATATION AND CURETTAGE  1987    ENDOSCOPY      INCISION AND DRAINAGE HIP Left 6/8/2022    Procedure: LEFT HIP LAVAGE AND WOUND VAC PLACEMENT;  Surgeon: Jarrod Leung MD;  Location: [facility];  Service: Orthopedics;  Laterality: Left;    ORIF HIP FRACTURE Left 02/26/2022    Procedure: Left hip Open reduction and internal fixation.;  Surgeon: Jarrod Leung MD;  Location: [facility];  Service: Orthopedics;  Laterality: Left;    TONSILLECTOMY  1984       Family History: family history includes Bone cancer in her mother; Breast cancer in her sister and sister; Cancer in her sister; Diabetes in her father; Heart attack in her father; Heart disease in her brother and father; Osteoarthritis in her mother. Otherwise pertinent FHx was reviewed and not pertinent to current issue.    Social History:  " reports that she quit smoking about 40 years ago. Her smoking use included cigarettes. She started smoking about 50 years ago. She has never used smokeless tobacco. She reports that she does not currently use alcohol. She reports that she does not use drugs.    Home Medications:   Cyanocobalamin, FLUoxetine, Semaglutide (1 MG/DOSE), buPROPion SR, cyclobenzaprine, furosemide, hydrOXYzine, insulin regular, losartan-hydrochlorothiazide, metFORMIN ER, ondansetron ODT, oxybutynin XL, and simvastatin    Allergies:  Allergies   Allergen Reactions    Mobic [Meloxicam] GI Intolerance    Lisinopril Cough       Objective    Objective     Vitals:  Temp:  [97.3 °F (36.3 °C)-98.9 °F (37.2 °C)] 97.3 °F (36.3 °C)  Heart Rate:  [71-82] 74  Resp:  [16-18] 16  BP: (102-130)/(51-88) 115/63    Physical Exam  Constitutional:       Appearance: She is well-developed.   HENT:      Head: Normocephalic and atraumatic.   Eyes:      Conjunctiva/sclera: Conjunctivae normal.      Pupils: Pupils are equal, round, and reactive to light.   Neck:      Thyroid: No thyromegaly.      Vascular: No JVD.      Trachea: No tracheal deviation.   Cardiovascular:      Rate and Rhythm: Normal rate and regular rhythm.      Heart sounds: No murmur heard.     No friction rub. No gallop.   Pulmonary:      Effort: Pulmonary effort is normal.      Breath sounds: Normal breath sounds.   Abdominal:      General: There is no distension.      Palpations: Abdomen is soft. There is no hepatomegaly or splenomegaly.      Tenderness: There is no abdominal tenderness.      Hernia: No hernia is present.   Musculoskeletal:         General: No deformity. Normal range of motion.      Cervical back: Neck supple.   Skin:     General: Skin is warm and dry.   Neurological:      Mental Status: She is alert and oriented to person, place, and time.         Result Review    Result Review:  I have personally reviewed the results from the time of this admission to 5/25/2024 10:00 EDT and  agree with these findings:  [x]  Laboratory list / accordion  []  Microbiology  [x]  Radiology  []  EKG/Telemetry   []  Cardiology/Vascular   []  Pathology  []  Old records  []  Other:        Assessment & Plan   Assessment / Plan     Brief Patient Summary:  Es Olivier is a 64 y.o. female who presents with epigastric pain pending final cardiology clearance but likely related to possible ulcer or gastritis/GERD.  Symptoms have somewhat improved and no current pain is noted.  She was on no PPI therapy prior to admission but has been initiated on PPI therapy.    Active Hospital Problems:  Active Hospital Problems    Diagnosis     **Chest pain      Plan:   Continue PPI therapy  Given her lack of symptomatology currently patient may undergo outpatient EGD and will follow-up in 1 week after discharge pending cardiac recommendations.    Khang Shore MD

## 2024-05-25 NOTE — CONSULTS
Consults  Date of Admit: 5/23/2024  Date of Consult: 05/25/24  No ref. provider found  Es Olivier  1960  Consulting Physician: Dr. Fajardo    Cardiology consultation    Reason for consultation: chest pain  Assessment:  Precordial pain in setting of abnormal stress test with small sized mildly severe area of ischemia in inferior wall  Diabetes mellitus  Essential hypertension  Sleep apnea, noncompliant      Recommendations:  Continue Lipitor, losartan.  Start aspirin 81 mg.  Given chest pains now in setting of abnormal stress will plan for cath on Tuesday.         History of Present Illness    Subjective     Chief Complaint   Patient presents with    Chest Pain       Es Olivier is a 64 y.o. female with past medical history significant for diabetes mellitus, essential hypertension, dyslipidemia, sleep apnea, GERD.  Patient presented to [facility] emergency department due to chest pains that she report to us that occurred earlier in the week prior to arrival after drinking coffee.  Upon arrival she did have mildly elevated highly sensitive troponin she was further admitted and had a stress test performed and echocardiogram.  Stress test did reveal a small mildly severe area of ischemia in inferior wall and cardiology was consulted for further evaluation.  Speaking to normal she reports she has been chest pain-free since her admission.  Her highly sensitive troponins was fairly nonsignificant going from 13-15            Past Medical History:   Diagnosis Date    Arthritis     Biliary dyskinesia     abnormal HIDA 11/2018 w/ EF 31%, symptomatic w/ N/V    Closed fracture of proximal end of left femur 02/25/2022    Depression     DM2 (diabetes mellitus, type 2)     dx 1994, on insulin >5 years, A1c 7, associated neuropathy, no other complications    Dyspepsia     Dyspnea on exertion     Elevated cholesterol     Fatigue     Fatty liver     dx on CT 2016    GERD (gastroesophageal reflux disease)      "Heart disease     w/ cardiac clearance 2019, negative stress test 10/2017, EF 65%    Hiatal hernia     \"small\" noted on UGI     History of Helicobacter pylori infection     treated remotely    Hyperlipidemia     Hypertension     Joint pain     Morbid obesity with BMI of 45.0-49.9, adult     Sleep apnea     formerly on a device, no longer using, says just doesn't need it    Urinary incontinence     no meds    Vertigo     Vitamin D deficiency      Past Surgical History:   Procedure Laterality Date    BLADDER SURGERY      \"tack\", uncomplicated, but unsuccessful    CATARACT EXTRACTION Left     COLONOSCOPY      unremarkable    DILATATION AND CURETTAGE  1987    ENDOSCOPY      INCISION AND DRAINAGE HIP Left 2022    Procedure: LEFT HIP LAVAGE AND WOUND VAC PLACEMENT;  Surgeon: Jarrod Leung MD;  Location: [facility] OR;  Service: Orthopedics;  Laterality: Left;    ORIF HIP FRACTURE Left 2022    Procedure: Left hip Open reduction and internal fixation.;  Surgeon: Jarrod Leung MD;  Location: [facility] OR;  Service: Orthopedics;  Laterality: Left;    TONSILLECTOMY       Family History   Problem Relation Age of Onset    Breast cancer Sister         20s    Cancer Sister     Breast cancer Sister         60s    Bone cancer Mother         60    Osteoarthritis Mother     Diabetes Father     Heart attack Father     Heart disease Father     Heart disease Brother      Social History     Tobacco Use    Smoking status: Former     Current packs/day: 0.00     Types: Cigarettes     Start date:      Quit date:      Years since quittin.4    Smokeless tobacco: Never   Vaping Use    Vaping status: Never Used   Substance Use Topics    Alcohol use: Not Currently    Drug use: No     Medications Prior to Admission   Medication Sig Dispense Refill Last Dose    buPROPion SR (WELLBUTRIN SR) 200 MG 12 hr tablet Take 1 tablet by mouth 2 (Two) Times a Day.   2024    cyclobenzaprine (FLEXERIL) 10 MG tablet " Take 1 tablet by mouth 3 (Three) Times a Day As Needed for Muscle Spasms.   Past Week    FLUoxetine (PROzac) 40 MG capsule Take 1 capsule by mouth Daily.   5/22/2024    furosemide (LASIX) 20 MG tablet Take 1 tablet by mouth Daily.   5/22/2024    hydrOXYzine (ATARAX) 10 MG tablet Take 1 tablet by mouth 3 (Three) Times a Day As Needed for Anxiety.   Past Week    insulin regular (humuLIN R,novoLIN R) 100 UNIT/ML injection Inject 10 Units under the skin into the appropriate area as directed 3 (Three) Times a Day Before Meals.   5/22/2024    losartan-hydrochlorothiazide (HYZAAR) 50-12.5 MG per tablet Take 1 tablet by mouth Daily.   5/22/2024    metFORMIN ER (GLUCOPHAGE-XR) 500 MG 24 hr tablet Take 4 tablets by mouth Every Night.   5/22/2024    ondansetron ODT (ZOFRAN-ODT) 4 MG disintegrating tablet Place 1 tablet on the tongue Every 6 (Six) Hours As Needed for Nausea or Vomiting.   Past Week    oxybutynin XL (DITROPAN-XL) 5 MG 24 hr tablet Take 1 tablet by mouth Daily.   5/22/2024    Semaglutide, 1 MG/DOSE, (OZEMPIC) 4 MG/3ML solution pen-injector Inject 1 mg under the skin into the appropriate area as directed 1 (One) Time Per Week.   5/23/2024    simvastatin (ZOCOR) 40 MG tablet Take 1 tablet by mouth Every Night.   5/22/2024    Cyanocobalamin 1000 MCG/ML kit Inject 1 mL as directed Every 30 (Thirty) Days.   5/7/2024     Allergies:  Mobic [meloxicam] and Lisinopril      Current Facility-Administered Medications:     atorvastatin (LIPITOR) tablet 20 mg, 20 mg, Oral, Nightly, Mack Lam MD, 20 mg at 05/24/24 2149    sennosides-docusate (PERICOLACE) 8.6-50 MG per tablet 2 tablet, 2 tablet, Oral, BID PRN **AND** polyethylene glycol (MIRALAX) packet 17 g, 17 g, Oral, Daily PRN **AND** bisacodyl (DULCOLAX) EC tablet 5 mg, 5 mg, Oral, Daily PRN **AND** bisacodyl (DULCOLAX) suppository 10 mg, 10 mg, Rectal, Daily PRN, Mack Lam MD    buPROPion SR (WELLBUTRIN SR) 12 hr tablet 200 mg, 200 mg, Oral, BID, Mack Lam,  MD, 200 mg at 05/24/24 2149    Calcium Replacement - Follow Nurse / BPA Driven Protocol, , Does not apply, PRN, Gene Mane APRN    cyclobenzaprine (FLEXERIL) tablet 10 mg, 10 mg, Oral, TID PRN, Mack Lam MD, 10 mg at 05/24/24 2149    dextrose (D50W) (25 g/50 mL) IV injection 25 g, 25 g, Intravenous, Q15 Min PRN, Karen Armando PA-C    dextrose (GLUTOSE) oral gel 15 g, 15 g, Oral, Q15 Min PRN, Karen Armando PA-C    FLUoxetine (PROzac) capsule 40 mg, 40 mg, Oral, Daily, Mack Lam MD, 40 mg at 05/24/24 1208    furosemide (LASIX) tablet 20 mg, 20 mg, Oral, Daily, Mack Lam MD, 20 mg at 05/24/24 1208    glucagon HCl (Diagnostic) injection 1 mg, 1 mg, Intramuscular, Q15 Min PRN, Karen Armando PA-C    heparin (porcine) 5000 UNIT/ML injection 5,000 Units, 5,000 Units, Subcutaneous, Q8H, Mack Lam MD, 5,000 Units at 05/25/24 0551    losartan (COZAAR) tablet 50 mg, 50 mg, Oral, Q24H, 50 mg at 05/24/24 1208 **AND** hydroCHLOROthiazide tablet 12.5 mg, 12.5 mg, Oral, Q24H, Mack Lam MD, 12.5 mg at 05/24/24 1208    hydrOXYzine (ATARAX) tablet 10 mg, 10 mg, Oral, TID PRN, Mack Lam MD    Insulin Lispro (humaLOG) injection 2-7 Units, 2-7 Units, Subcutaneous, 4x Daily AC & at Bedtime, Karen Armando PA-C, 3 Units at 05/24/24 2157    Magnesium Cardiology Dose Replacement - Follow Nurse / BPA Driven Protocol, , Does not apply, PRN, Gene Mane APRN    ondansetron ODT (ZOFRAN-ODT) disintegrating tablet 4 mg, 4 mg, Translingual, Q6H PRN, Mack Lam MD    oxybutynin XL (DITROPAN-XL) 24 hr tablet 5 mg, 5 mg, Oral, Daily, Mack Lam MD, 5 mg at 05/24/24 1208    pantoprazole (PROTONIX) injection 40 mg, 40 mg, Intravenous, Q12H, Mack Lam MD, 40 mg at 05/24/24 2140    Phosphorus Replacement - Follow Nurse / BPA Driven Protocol, , Does not apply, Alhaji MENA Carl, APRN    Potassium Replacement - Follow Nurse / BPA Driven Protocol, , Does not apply, Alhaji MENA Carl, APRN    sodium chloride  0.9 % flush 10 mL, 10 mL, Intravenous, PRN, Raffi Hoskins,     sodium chloride 0.9 % flush 10 mL, 10 mL, Intravenous, Q12H, Mack Lam MD, 10 mL at 05/24/24 2150    sodium chloride 0.9 % flush 10 mL, 10 mL, Intravenous, PRN, Mack Lam MD    sodium chloride 0.9 % infusion 40 mL, 40 mL, Intravenous, PRN, Mack Lam MD    Review of Systems   Constitutional:  Negative for diaphoresis and fatigue.   Respiratory:  Positive for shortness of breath. Negative for chest tightness.    Cardiovascular:  Negative for chest pain and leg swelling.   Gastrointestinal:  Negative for abdominal pain and blood in stool.   Genitourinary:  Negative for hematuria.   Musculoskeletal:  Negative for arthralgias and back pain.   Neurological:  Negative for dizziness and light-headedness.   Hematological:  Negative for adenopathy. Does not bruise/bleed easily.   Psychiatric/Behavioral:  Negative for agitation and behavioral problems.          Objective      Vital Signs  Temp:  [97.3 °F (36.3 °C)-98.9 °F (37.2 °C)] 97.3 °F (36.3 °C)  Heart Rate:  [71-82] 78  Resp:  [16-18] 18  BP: (102-130)/(51-88) 116/67  Body mass index is 37.57 kg/m².    Intake/Output Summary (Last 24 hours) at 5/25/2024 1208  Last data filed at 5/25/2024 0800  Gross per 24 hour   Intake 360 ml   Output --   Net 360 ml       Physical Exam  Constitutional:       Appearance: Normal appearance.   Cardiovascular:      Rate and Rhythm: Normal rate and regular rhythm.   Pulmonary:      Effort: Pulmonary effort is normal.      Breath sounds: Normal breath sounds.   Abdominal:      General: Abdomen is flat.      Palpations: Abdomen is soft.   Musculoskeletal:         General: No swelling or tenderness.   Skin:     General: Skin is warm and dry.   Neurological:      General: No focal deficit present.      Mental Status: She is alert and oriented to person, place, and time.   Psychiatric:         Mood and Affect: Mood normal.         Behavior: Behavior normal.  "          Results Review:   I reviewed the patient's new clinical results.  Results from last 7 days   Lab Units 05/23/24  1252 05/23/24  1054   HSTROP T ng/L 15* 13     Results from last 7 days   Lab Units 05/25/24  0148 05/24/24  0117 05/23/24  1054   WBC 10*3/mm3 6.75 7.26 6.08   HEMOGLOBIN g/dL 12.3 12.1 13.6   PLATELETS 10*3/mm3 283 287 308     Results from last 7 days   Lab Units 05/25/24  0148 05/24/24  0117 05/23/24  1054   SODIUM mmol/L 139 140 139   POTASSIUM mmol/L 3.3* 3.5 3.6   CHLORIDE mmol/L 101 102 100   CO2 mmol/L 26.2 26.3 26.3   BUN mg/dL 18 18 22   CREATININE mg/dL 1.01* 0.86 0.88   CALCIUM mg/dL 8.7 9.1 9.8   GLUCOSE mg/dL 134* 134* 119*   ALT (SGPT) U/L 10 9 11   AST (SGOT) U/L 13 13 14     Lab Results   Component Value Date    INR 0.92 07/01/2015     Lab Results   Component Value Date    MG 2.2 03/25/2022    MG 1.9 03/24/2022    MG 2.2 03/03/2022     Lab Results   Component Value Date    TSH 8.590 (H) 02/25/2022    CHLPL 131 05/09/2019    TRIG 132 05/09/2019    HDL 47 05/09/2019    LDL 58 05/09/2019      No results found for: \"BNP\"    EKG:     Imaging Results (Last 72 Hours)       Procedure Component Value Units Date/Time    CT Abdomen Pelvis With & Without Contrast [[id number]] Collected: 05/23/24 1406     Updated: 05/23/24 1410    Narrative:      PROCEDURE: CT ABDOMEN PELVIS W WO CONTRAST-     HISTORY: upper abdominal pain     COMPARISON:  None .     TECHNIQUE: Multiple axial CT images were obtained from the lung bases  through the pubic symphysis before and following the administration of  Isovue-300. . This study was performed with techniques to keep radiation  doses as low as reasonably achievable (ALARA). Individualized dose  reduction techniques using automated exposure control or adjustment of  mA and/or kV according to the patient size were employed.     FINDINGS:     ABDOMEN: The lung bases are clear are clear. The heart is normal in  size. The liver is normal. The spleen is " unremarkable. No adrenal mass  is present.  The pancreas is unremarkable. Precontrast images reveal no  evidence of nephrolithiasis. The kidneys are normal. The aorta is normal  in caliber. The mesenteric vasculature is patent. There is no free fluid  or adenopathy. The small bowel is unremarkable with no evidence of  obstruction.     PELVIS: The appendix is normal. The colon is unremarkable with no wall  thickening or focal mass. There is a 3 mm stone in the dependent  portions of the urinary bladder. There is no significant free fluid or  adenopathy. Bone windows reveal no lytic or destructive lesions.  Postsurgical changes are present in the left hip.     .       Impression:      3 mm stone in the dependent portions of the urinary bladder.  The exam is otherwise unremarkable.        This report was finalized on 5/23/2024 2:08 PM by Jonny Wilson M.D..       XR Chest 1 View [[id number]] Collected: 05/23/24 1056     Updated: 05/23/24 1058    Narrative:      PROCEDURE: XR CHEST 1 VW-       HISTORY: Chest Pain Protocol     COMPARISON: 6/6/2022.     FINDINGS: The heart is normal in size. The mediastinum is unremarkable.  The lungs are clear. There is no pneumothorax. There are no acute  osseous abnormalities.       Impression:      No acute cardiopulmonary process.        This report was finalized on 5/23/2024 10:56 AM by Jonny Wilson M.D..                Thank you very much for asking us to be involved in this patient's care.  We will follow along with you.    Basim Turpin PA-C   05/25/24  12:08 EDT    Electronically signed by Basim Turpin PA-C, 05/25/24, 12:08 PM EDT.     Patient seen and examined on rounds.  Chart reviewed.  Agree with charting and assessment and plan of physician assistant.  On exam  Patient is alert awake and oriented in no distress  Chest no wheezing or crackles  Regular rate rhythm, normal S1-S2  Abdomen distended nontender  No edema  Assessment and plan  #1 Cardiac.   Patient with multiple risk factors for heart disease with history of type 2 diabetes hypertension hyperlipidemia came in with complaints of chest pain.  Patient had minimally elevated high-sensitivity troponin on admission.  She underwent stress test today which was abnormal for mild ischemia.    Will schedule for cath for Tuesday.  Aggressive risk factor modification for coronary disease to continue.  .Electronically signed by Pradip Fajardo MD, 05/25/24, 4:33 PM EDT.

## 2024-05-25 NOTE — PROGRESS NOTES
[facility] HOSPITALIST PROGRESS NOTE     Patient Identification:  Name:  Es Olivier  Age:  64 y.o.  Sex:  female  :  1960  MRN:  [id number]  Visit Number:  [id number]  ROOM: [address] [place] [address]     Primary Care Provider:  Delilah Pizarro MD    Length of stay in inpatient status:  0    Subjective     Chief Compliant:    Chief Complaint   Patient presents with    Chest Pain     History of Presenting Illness:    Patient remains ill but stable today, no acute events overnight, no new complaints, denies any fevers or chills, symptoms have improved, though also now with abnormal stress testing, Cardiology consulted and recommendations pending, discussed with Dr. Shore and will defer EGD inpatient unless becomes more symptomatic, will leave NPO for now, follow up if Cardiology feels CTA vs LHC is warranted.   Objective     Current Hospital Meds:  atorvastatin, 20 mg, Oral, Nightly  buPROPion SR, 200 mg, Oral, BID  FLUoxetine, 40 mg, Oral, Daily  furosemide, 20 mg, Oral, Daily  heparin (porcine), 5,000 Units, Subcutaneous, Q8H  losartan, 50 mg, Oral, Q24H   And  hydroCHLOROthiazide, 12.5 mg, Oral, Q24H  insulin lispro, 2-7 Units, Subcutaneous, 4x Daily AC & at Bedtime  oxybutynin XL, 5 mg, Oral, Daily  pantoprazole, 40 mg, Intravenous, Q12H  sodium chloride, 10 mL, Intravenous, Q12H    ----------------------------------------------------------------------------------------------------------------------  Vital Signs:  Temp:  [97.3 °F (36.3 °C)-98.9 °F (37.2 °C)] 97.3 °F (36.3 °C)  Heart Rate:  [71-82] 74  Resp:  [16-18] 16  BP: (102-130)/(51-88) 115/63  SpO2:  [95 %-96 %] 96 %  on   ;   Device (Oxygen Therapy): room air  Body mass index is 37.57 kg/m².      Intake/Output Summary (Last 24 hours) at 2024 1032  Last data filed at 2024 0800  Gross per 24 hour   Intake 720 ml   Output --   Net 720 ml     "  ----------------------------------------------------------------------------------------------------------------------  Physical exam:  Constitutional:  Well-developed and well-nourished.  No acute distress. Improved discomfort      HENT:  Head:  Normocephalic and atraumatic.  Mouth:  Moist mucous membranes.    Eyes:  Conjunctivae and EOM are normal. No scleral icterus.    Neck:  Neck supple.  No JVD present.    Cardiovascular:  Normal rate, regular rhythm and normal heart sounds with no murmur.  Pulmonary/Chest:  No respiratory distress, no wheezes, on room air   Abdominal:  Soft. No distension and no tenderness.   Musculoskeletal:  No tenderness, and no deformity.  No red or swollen joints anywhere.    Neurological:  Alert and oriented to person, place, and time.  No gross focal deficits   Skin:  Skin is warm and dry. No rash noted. No pallor.   Peripheral vascular:  No clubbing, no cyanosis, no edema.  Psychiatric: Appropriate mood and affect  Edited by: Mack Lam MD at 5/25/2024 1032  ----------------------------------------------------------------------------------------------------------------------  WBC/HGB/HCT/PLT   6.75/12.3/36.6/283 (05/25 0148)  BUN/CREAT/GLUC/ALT/AST/STEPH/LIP    18/1.01/134/10/13/--/-- (05/25 0148)  LYTES - Na/K/Cl/CO2: 139/3.3*/101/26.2 (05/25 0148)     No results found for: \"URINECX\"  No results found for: \"BLOODCX\"    I have personally looked at the labs and they are summarized above.  ----------------------------------------------------------------------------------------------------------------------  Detailed radiology reports for the last 24 hours:  CT Abdomen Pelvis With & Without Contrast    Result Date: 5/23/2024  3 mm stone in the dependent portions of the urinary bladder. The exam is otherwise unremarkable.   This report was finalized on 5/23/2024 2:08 PM by Jonny Wilson M.D..      XR Chest 1 View    Result Date: 5/23/2024  No acute cardiopulmonary process.   This " report was finalized on 5/23/2024 10:56 AM by Jonny Wilson M.D..     Assessment & Plan    64F Former Smoker, Morbid Obesity by BMI PMH Arthritis, Depression, Fatty Liver, History H Pylori, GERD, Obstructive Sleep Apnea, Diabetes Mellitus Type II, Hypertension, Hyperlipidemia, Coronary Artery Disease, presented to [facility] emergency room 5/23 with complaints of chest pain.      #Atypical Chest Pain, Acute Coronary Syndrome ruled out  #Hypertension/Hyperlipidemia/Coronary Artery Disease  - Labs showed CBC normal, CMP normal, HS Troponins 13->15  - EKG showed normal sinus rhythm, possible old anterior infarct  - Status post Aspirin 325mg in emergency room   - Continue home regimen  - Echocardiogram pending read, stress test read as intermediate study, showed small sized, mildly severe area of ischemia in inferior wall, Cardiology consulted and appreciate recommendations, will leave NPO for now  - Monit on telemetry, strict I/O's, daily weights, trend heart rate and blood pressure    #Gastroesophageal Reflux Disease/History H Pylori  - Continue IV PPI twice daily, General Surgery consulted with tentative plans for EGD as outpatient at this time with abnormal stress and no current symptoms     #Dependent Diabetes Mellitus Type II, unknown control, unknown complications  - Holding home oral agents, continue FSBG and SSI, add long acting as indicated.    #Bladder Stone  - Noted on CT, no obstruction or renal failure noted; Follow up outpatient Urology     #Obstructive Sleep Apnea   - Non-adherent with home CPAP  - PCP will need to refer for updated outpatient sleep study    #Anxiety/Depression  - Continue home regimen    #Former Smoker  - Recommended cessation, nicotine replacement therapy as needed    #Morbid Obesity by BMI  - Body mass index is 47.33 kg/m².; complicates all aspects of care    F: NPO   E: Monitor & Replace as needed   N: NPO Diet NPO Type: Strict NPO  Diet: Cardiac; Healthy Heart (2-3  Na+); Fluid Consistency: Thin (IDDSI 0)   PPx: SQH  Code Status (Patient has no pulse and is not breathing): CPR  Medical Interventions (Patient has pulse or is breathing): Full Support    Dispo: Pending workup and clinical improvement    *This patient is considered high risk secondary to chest pain, elevated HS troponins, requiring further workup with cardiac imaging.     Edited by: Mack Lam MD at 5/25/2024 1032  [facility]

## 2024-05-25 NOTE — PLAN OF CARE
Problem: Adult Inpatient Plan of Care  Goal: Plan of Care Review  Outcome: Ongoing, Progressing   Goal Outcome Evaluation:  Plan of Care Reviewed With: patient   Patient resting in bed. No acute signs or symptoms of distress. Complaint of bilateral leg cramps throughout shift. PRN medication provided with minimal relief shown. Potassium replaced. Patient has remained NPO since midnight for EGD this AM. Consent signed. Surgical bath completed. Patient has frequently ambulated this shift. Observation status continued. Plan to return home on discharge. Will continue to follow with plan of care.     Addendum 0430: Patient refusing to complete IV potassium replacement. Patient requesting to finish replacement with oral potassium after surgery. [place] AGUDELO aware. No new orders received. Will continue to closely monitor patient. Will pass along to oncoming shift.

## 2024-05-26 LAB
ALBUMIN SERPL-MCNC: 3.7 G/DL (ref 3.5–5.2)
ALBUMIN/GLOB SERPL: 1.6 G/DL
ALP SERPL-CCNC: 58 U/L (ref 39–117)
ALT SERPL W P-5'-P-CCNC: 12 U/L (ref 1–33)
ANION GAP SERPL CALCULATED.3IONS-SCNC: 8.9 MMOL/L (ref 5–15)
AST SERPL-CCNC: 16 U/L (ref 1–32)
BILIRUB SERPL-MCNC: 0.3 MG/DL (ref 0–1.2)
BUN SERPL-MCNC: 19 MG/DL (ref 8–23)
BUN/CREAT SERPL: 18.3 (ref 7–25)
CALCIUM SPEC-SCNC: 8.8 MG/DL (ref 8.6–10.5)
CHLORIDE SERPL-SCNC: 105 MMOL/L (ref 98–107)
CO2 SERPL-SCNC: 26.1 MMOL/L (ref 22–29)
CREAT SERPL-MCNC: 1.04 MG/DL (ref 0.57–1)
DEPRECATED RDW RBC AUTO: 42.7 FL (ref 37–54)
EGFRCR SERPLBLD CKD-EPI 2021: 60.5 ML/MIN/1.73
ERYTHROCYTE [DISTWIDTH] IN BLOOD BY AUTOMATED COUNT: 12.3 % (ref 12.3–15.4)
GLOBULIN UR ELPH-MCNC: 2.3 GM/DL
GLUCOSE BLDC GLUCOMTR-MCNC: 125 MG/DL (ref 70–130)
GLUCOSE BLDC GLUCOMTR-MCNC: 131 MG/DL (ref 70–130)
GLUCOSE BLDC GLUCOMTR-MCNC: 183 MG/DL (ref 70–130)
GLUCOSE BLDC GLUCOMTR-MCNC: 189 MG/DL (ref 70–130)
GLUCOSE SERPL-MCNC: 156 MG/DL (ref 65–99)
HCT VFR BLD AUTO: 37.9 % (ref 34–46.6)
HGB BLD-MCNC: 12.1 G/DL (ref 12–15.9)
MCH RBC QN AUTO: 30 PG (ref 26.6–33)
MCHC RBC AUTO-ENTMCNC: 31.9 G/DL (ref 31.5–35.7)
MCV RBC AUTO: 94 FL (ref 79–97)
PLATELET # BLD AUTO: 281 10*3/MM3 (ref 140–450)
PMV BLD AUTO: 10.1 FL (ref 6–12)
POTASSIUM SERPL-SCNC: 4.2 MMOL/L (ref 3.5–5.2)
PROT SERPL-MCNC: 6 G/DL (ref 6–8.5)
RBC # BLD AUTO: 4.03 10*6/MM3 (ref 3.77–5.28)
SODIUM SERPL-SCNC: 140 MMOL/L (ref 136–145)
WBC NRBC COR # BLD AUTO: 6.44 10*3/MM3 (ref 3.4–10.8)

## 2024-05-26 PROCEDURE — 25010000002 HEPARIN (PORCINE) PER 1000 UNITS: Performed by: INTERNAL MEDICINE

## 2024-05-26 PROCEDURE — 63710000001 INSULIN LISPRO (HUMAN) PER 5 UNITS

## 2024-05-26 PROCEDURE — 80053 COMPREHEN METABOLIC PANEL: CPT | Performed by: INTERNAL MEDICINE

## 2024-05-26 PROCEDURE — 82948 REAGENT STRIP/BLOOD GLUCOSE: CPT

## 2024-05-26 PROCEDURE — 99232 SBSQ HOSP IP/OBS MODERATE 35: CPT | Performed by: INTERNAL MEDICINE

## 2024-05-26 PROCEDURE — 85027 COMPLETE CBC AUTOMATED: CPT | Performed by: INTERNAL MEDICINE

## 2024-05-26 RX ADMIN — PANTOPRAZOLE SODIUM 40 MG: 40 INJECTION, POWDER, LYOPHILIZED, FOR SOLUTION INTRAVENOUS at 08:24

## 2024-05-26 RX ADMIN — BUPROPION HYDROCHLORIDE 200 MG: 100 TABLET, EXTENDED RELEASE ORAL at 21:07

## 2024-05-26 RX ADMIN — BUPROPION HYDROCHLORIDE 200 MG: 100 TABLET, EXTENDED RELEASE ORAL at 08:24

## 2024-05-26 RX ADMIN — FLUOXETINE HYDROCHLORIDE 40 MG: 20 CAPSULE ORAL at 08:23

## 2024-05-26 RX ADMIN — HEPARIN SODIUM 5000 UNITS: 5000 INJECTION INTRAVENOUS; SUBCUTANEOUS at 05:00

## 2024-05-26 RX ADMIN — HEPARIN SODIUM 5000 UNITS: 5000 INJECTION INTRAVENOUS; SUBCUTANEOUS at 14:58

## 2024-05-26 RX ADMIN — OXYBUTYNIN CHLORIDE 5 MG: 5 TABLET, EXTENDED RELEASE ORAL at 08:23

## 2024-05-26 RX ADMIN — FUROSEMIDE 20 MG: 20 TABLET ORAL at 08:23

## 2024-05-26 RX ADMIN — INSULIN LISPRO 2 UNITS: 100 INJECTION, SOLUTION INTRAVENOUS; SUBCUTANEOUS at 17:24

## 2024-05-26 RX ADMIN — LOSARTAN POTASSIUM 50 MG: 50 TABLET, FILM COATED ORAL at 08:23

## 2024-05-26 RX ADMIN — INSULIN LISPRO 2 UNITS: 100 INJECTION, SOLUTION INTRAVENOUS; SUBCUTANEOUS at 20:54

## 2024-05-26 RX ADMIN — ATORVASTATIN CALCIUM 20 MG: 20 TABLET, FILM COATED ORAL at 21:07

## 2024-05-26 RX ADMIN — PANTOPRAZOLE SODIUM 40 MG: 40 INJECTION, POWDER, LYOPHILIZED, FOR SOLUTION INTRAVENOUS at 21:07

## 2024-05-26 RX ADMIN — HYDROCHLOROTHIAZIDE 12.5 MG: 25 TABLET ORAL at 08:23

## 2024-05-26 RX ADMIN — ASPIRIN 81 MG: 81 TABLET, COATED ORAL at 08:24

## 2024-05-26 RX ADMIN — HEPARIN SODIUM 5000 UNITS: 5000 INJECTION INTRAVENOUS; SUBCUTANEOUS at 20:54

## 2024-05-26 RX ADMIN — Medication 10 ML: at 08:24

## 2024-05-26 NOTE — PROGRESS NOTES
[facility] HOSPITALIST PROGRESS NOTE     Patient Identification:  Name:  Es Olivier  Age:  64 y.o.  Sex:  female  :  1960  MRN:  [id number]  Visit Number:  [id number]  ROOM: [address] [place] [address]     Primary Care Provider:  Delilah Pizarro MD    Length of stay in inpatient status:  1    Subjective     Chief Compliant:    Chief Complaint   Patient presents with    Chest Pain     History of Presenting Illness:    Patient remains ill but stable today, no acute events overnight, no new complaints, denies any fevers or chills, Cardiology consulted and following for abnormal stress testing, plan for LHC on Tuesday, General Surgery consulted and following peripherally, planning for outpatient endoscopy unless GI symptoms worsen.   Objective     Current Hospital Meds:  aspirin, 81 mg, Oral, Daily  atorvastatin, 20 mg, Oral, Nightly  buPROPion SR, 200 mg, Oral, BID  FLUoxetine, 40 mg, Oral, Daily  furosemide, 20 mg, Oral, Daily  heparin (porcine), 5,000 Units, Subcutaneous, Q8H  losartan, 50 mg, Oral, Q24H   And  hydroCHLOROthiazide, 12.5 mg, Oral, Q24H  insulin lispro, 2-7 Units, Subcutaneous, 4x Daily AC & at Bedtime  oxybutynin XL, 5 mg, Oral, Daily  pantoprazole, 40 mg, Intravenous, Q12H  sodium chloride, 10 mL, Intravenous, Q12H    ----------------------------------------------------------------------------------------------------------------------  Vital Signs:  Temp:  [96.7 °F (35.9 °C)-98.3 °F (36.8 °C)] 97.9 °F (36.6 °C)  Heart Rate:  [77-97] 92  Resp:  [18] 18  BP: ()/(52-69) 118/62  SpO2:  [97 %-99 %] 99 %  on   ;   Device (Oxygen Therapy): room air  Body mass index is 37.01 kg/m².      Intake/Output Summary (Last 24 hours) at 2024 1036  Last data filed at 2024 0800  Gross per 24 hour   Intake 1200 ml   Output --   Net 1200 ml      ----------------------------------------------------------------------------------------------------------------------  Physical exam:  Constitutional:   "Well-developed and well-nourished.  No acute distress. Improved discomfort      HENT:  Head:  Normocephalic and atraumatic.  Mouth:  Moist mucous membranes.    Eyes:  Conjunctivae and EOM are normal. No scleral icterus.    Neck:  Neck supple.  No JVD present.    Cardiovascular:  Normal rate, regular rhythm and normal heart sounds with no murmur.  Pulmonary/Chest:  No respiratory distress, no wheezes, on room air   Abdominal:  Soft. No distension and no tenderness.   Musculoskeletal:  No tenderness, and no deformity.  No red or swollen joints anywhere.    Neurological:  Alert and oriented to person, place, and time.  No gross focal deficits   Skin:  Skin is warm and dry. No rash noted. No pallor.   Peripheral vascular:  No clubbing, no cyanosis, no edema.  Psychiatric: Appropriate mood and affect    *Examination stable today 5/26  Edited by: Mack Lam MD at 5/26/2024 1035  ----------------------------------------------------------------------------------------------------------------------  WBC/HGB/HCT/PLT   6.44/12.1/37.9/281 (05/26 0103)  BUN/CREAT/GLUC/ALT/AST/STEPH/LIP    19/1.04/156/12/16/--/-- (05/26 0103)  LYTES - Na/K/Cl/CO2: 140/4.2/105/26.1 (05/26 0103)     No results found for: \"URINECX\"  No results found for: \"BLOODCX\"    I have personally looked at the labs and they are summarized above.  ----------------------------------------------------------------------------------------------------------------------  Detailed radiology reports for the last 24 hours:  No radiology results for the last day  Assessment & Plan    64F Former Smoker, Morbid Obesity by BMI PMH Arthritis, Depression, Fatty Liver, History H Pylori, GERD, Obstructive Sleep Apnea, Diabetes Mellitus Type II, Hypertension, Hyperlipidemia, Coronary Artery Disease, presented to [facility] emergency room 5/23 with complaints of chest pain.      #Atypical Chest Pain, Acute Coronary Syndrome ruled " out  #Hypertension/Hyperlipidemia/Coronary Artery Disease  - Labs showed CBC normal, CMP normal, HS Troponins 13->15  - EKG showed normal sinus rhythm, possible old anterior infarct  - Echocardiogram showed LVEF 56-60%, diastolic dysfunction   - Stress test showed intermediate study, showed small sized, mildly severe area of ischemia in inferior wall  - Cardiology consulted and following, plan for [facility] on Tuesday  - Continue Aspirin 81, statin  - Continue ARB, Hydrochlorothiazide, oral lasix, titrate as indicated   - Monit on telemetry, strict I/O's, daily weights, trend heart rate and blood pressure    #Gastroesophageal Reflux Disease/History H Pylori  - Continue IV PPI twice daily, General Surgery consulted with tentative plans for EGD as outpatient at this time with no current symptoms     #Dependent Diabetes Mellitus Type II, unknown control, unknown complications  - Holding home oral agents, continue FSBG and SSI, add long acting as indicated.    #Bladder Stone  - Noted on CT, no obstruction or renal failure noted; Follow up outpatient Urology     #Obstructive Sleep Apnea   - Non-adherent with home CPAP  - PCP will need to refer for updated outpatient sleep study    #Anxiety/Depression  - Continue home regimen    #Former Smoker  - Recommended cessation, nicotine replacement therapy as needed    #Morbid Obesity by BMI  - Body mass index is 47.33 kg/m².; complicates all aspects of care    F: Oral  E: Monitor & Replace as needed   N: Diet: Regular/House; Fluid Consistency: Thin (IDDSI 0)   Diet: Cardiac; Healthy Heart (2-3 Na+); Fluid Consistency: Thin (IDDSI 0)   PPx: SQH  Code Status (Patient has no pulse and is not breathing): CPR  Medical Interventions (Patient has pulse or is breathing): Full Support    Dispo: Pending clinical improvement, [facility] Tuesday     *This patient is considered high risk secondary to chest pain, elevated HS troponins, requiring further workup with cardiac imaging.     Edited by: Genaro  MD Mack at 5/26/2024 1036  [facility]

## 2024-05-26 NOTE — PLAN OF CARE
Problem: Adult Inpatient Plan of Care  Goal: Plan of Care Review  Outcome: Ongoing, Progressing   Goal Outcome Evaluation:  Plan of Care Reviewed With: patient   Patient resting in bed. No acute signs or symptoms of distress. No complaints at this time. Denies chest pain. No acute events overnight noted. Patient has frequently ambulated this shift to bathroom. IV patent. Plan for heart cath possibly Tuesday 5/28. Plan to return home on discharge. Call light within reach. Will continue to follow with plan of care.

## 2024-05-26 NOTE — PROGRESS NOTES
LOS: 1 day     Name: Es Olivier  Age/Sex: 64 y.o. female  :  1960        PCP: Delilah Pizarro MD    Principal Problem:    Chest pain      Following for: Chest pain    Interval history: Resting comfortably in bed denies any chest pains overnight.    Subjective     ROS    Vital Signs  Vitals:    24 0357 24 0500 24 0823 24 1111   BP: 108/61 92/52 118/62 110/67   BP Location: Left arm Left arm Right arm Right arm   Patient Position: Lying Lying Sitting Lying   Pulse: 80 96 92 74   Resp: 18 18  18   Temp: 98 °F (36.7 °C) 97.9 °F (36.6 °C)  98.4 °F (36.9 °C)   TempSrc: Oral Oral  Oral   SpO2: 99% 99%  99%   Weight:  104 kg (229 lb 4.8 oz)     Height:         Body mass index is 37.01 kg/m².      Intake/Output Summary (Last 24 hours) at 2024 1217  Last data filed at 2024 0800  Gross per 24 hour   Intake 1200 ml   Output --   Net 1200 ml       Constitutional:       General: Not in acute distress.     Appearance: Healthy appearance. Well-developed and not in distress. Not diaphoretic.   Eyes:      Conjunctiva/sclera: Conjunctivae normal.      Pupils: Pupils are equal, round, and reactive to light.   HENT:      Head: Normocephalic and atraumatic.   Neck:      Vascular: No carotid bruit or JVD.   Pulmonary:      Effort: Pulmonary effort is normal. No respiratory distress.      Breath sounds: Normal breath sounds.   Cardiovascular:      Normal rate. Regular rhythm.   Edema:     Peripheral edema absent.   Skin:     General: Skin is cool.   Neurological:      Mental Status: Alert, oriented to person, place, and time and oriented to person, place and time.         Results Review:     Results from last 7 days   Lab Units 24  0103 24  0148 24  0117 24  1054   WBC 10*3/mm3 6.44 6.75 7.26 6.08   HEMOGLOBIN g/dL 12.1 12.3 12.1 13.6   PLATELETS 10*3/mm3 281 283 287 308     Results from last 7 days   Lab Units 24  0103 24  1356 24  0148  05/24/24  0117 05/23/24  1054   SODIUM mmol/L 140  --  139 140 139   POTASSIUM mmol/L 4.2 3.8 3.3* 3.5 3.6   CHLORIDE mmol/L 105  --  101 102 100   CO2 mmol/L 26.1  --  26.2 26.3 26.3   BUN mg/dL 19  --  18 18 22   CREATININE mg/dL 1.04*  --  1.01* 0.86 0.88   CALCIUM mg/dL 8.8  --  8.7 9.1 9.8   GLUCOSE mg/dL 156*  --  134* 134* 119*   ALT (SGPT) U/L 12  --  10 9 11   AST (SGOT) U/L 16  --  13 13 14     Results from last 7 days   Lab Units 05/23/24  1252 05/23/24  1054   HSTROP T ng/L 15* 13             I reviewed the patient's new clinical results.  I reviewed the patient's new imaging results and agree with the interpretation.  I personally viewed and interpreted the patient's EKG/Telemetry data    Medication Review:   aspirin, 81 mg, Oral, Daily  atorvastatin, 20 mg, Oral, Nightly  buPROPion SR, 200 mg, Oral, BID  FLUoxetine, 40 mg, Oral, Daily  furosemide, 20 mg, Oral, Daily  heparin (porcine), 5,000 Units, Subcutaneous, Q8H  losartan, 50 mg, Oral, Q24H   And  hydroCHLOROthiazide, 12.5 mg, Oral, Q24H  insulin lispro, 2-7 Units, Subcutaneous, 4x Daily AC & at Bedtime  oxybutynin XL, 5 mg, Oral, Daily  pantoprazole, 40 mg, Intravenous, Q12H  sodium chloride, 10 mL, Intravenous, Q12H           Assessment:  Precordial pain in setting of abnormal stress test with small sized mildly severe area of ischemia in inferior wall  Diabetes mellitus  Essential hypertension  Sleep apnea, noncompliant      Recommendations:  Continue aspirin, Lipitor, losartan.  Plan is to proceed with left heart catheterization on Tuesday.    I discussed the patients findings and my recommendations with patient and family    Basim Turpin PA-C  05/26/24  12:17 EDT  Electronically signed by Basim Turpin PA-C, 05/26/24, 12:24 PM EDT.     Patient seen and examined on rounds.  Agree with patient's charting, assessment plan of MORGAN.    Patient with abnormal stress test.  Left heart cath scheduled for Tuesday.    Continue current therapy.    On  exam    Alert awake and oriented in no distress, sensorimotor system are intact   mucous membranes are moist  Chest no wheezing or crackles  Regular rate rhythm, normal S1-S2  Abdomen distended nontender  No edema  .Electronically signed by Pradip Fajardo MD, 05/26/24, 2:00 PM EDT.

## 2024-05-26 NOTE — PLAN OF CARE
Goal Outcome Evaluation:    Pt is resting in bed with no s/s of distress noted. VSS. IV access maintained. Pt ambulating on unit throughout shift, tolerating well. Will continue with plan of care.

## 2024-05-27 LAB
GLUCOSE BLDC GLUCOMTR-MCNC: 126 MG/DL (ref 70–130)
GLUCOSE BLDC GLUCOMTR-MCNC: 158 MG/DL (ref 70–130)
GLUCOSE BLDC GLUCOMTR-MCNC: 188 MG/DL (ref 70–130)
GLUCOSE BLDC GLUCOMTR-MCNC: 273 MG/DL (ref 70–130)

## 2024-05-27 PROCEDURE — 99232 SBSQ HOSP IP/OBS MODERATE 35: CPT | Performed by: INTERNAL MEDICINE

## 2024-05-27 PROCEDURE — 63710000001 INSULIN LISPRO (HUMAN) PER 5 UNITS

## 2024-05-27 PROCEDURE — 25010000002 HEPARIN (PORCINE) PER 1000 UNITS: Performed by: INTERNAL MEDICINE

## 2024-05-27 PROCEDURE — 82948 REAGENT STRIP/BLOOD GLUCOSE: CPT

## 2024-05-27 RX ADMIN — HYDROCHLOROTHIAZIDE 12.5 MG: 25 TABLET ORAL at 08:41

## 2024-05-27 RX ADMIN — Medication 10 ML: at 08:42

## 2024-05-27 RX ADMIN — HEPARIN SODIUM 5000 UNITS: 5000 INJECTION INTRAVENOUS; SUBCUTANEOUS at 13:31

## 2024-05-27 RX ADMIN — INSULIN LISPRO 2 UNITS: 100 INJECTION, SOLUTION INTRAVENOUS; SUBCUTANEOUS at 12:04

## 2024-05-27 RX ADMIN — PANTOPRAZOLE SODIUM 40 MG: 40 INJECTION, POWDER, LYOPHILIZED, FOR SOLUTION INTRAVENOUS at 20:23

## 2024-05-27 RX ADMIN — PANTOPRAZOLE SODIUM 40 MG: 40 INJECTION, POWDER, LYOPHILIZED, FOR SOLUTION INTRAVENOUS at 08:42

## 2024-05-27 RX ADMIN — DOCUSATE SODIUM 50 MG AND SENNOSIDES 8.6 MG 2 TABLET: 8.6; 5 TABLET, FILM COATED ORAL at 13:31

## 2024-05-27 RX ADMIN — BISACODYL 5 MG: 5 TABLET, COATED ORAL at 20:24

## 2024-05-27 RX ADMIN — POLYETHYLENE GLYCOL (3350) 17 G: 17 POWDER, FOR SOLUTION ORAL at 13:31

## 2024-05-27 RX ADMIN — BUPROPION HYDROCHLORIDE 200 MG: 100 TABLET, EXTENDED RELEASE ORAL at 20:24

## 2024-05-27 RX ADMIN — ASPIRIN 81 MG: 81 TABLET, COATED ORAL at 08:41

## 2024-05-27 RX ADMIN — OXYBUTYNIN CHLORIDE 5 MG: 5 TABLET, EXTENDED RELEASE ORAL at 08:41

## 2024-05-27 RX ADMIN — HEPARIN SODIUM 5000 UNITS: 5000 INJECTION INTRAVENOUS; SUBCUTANEOUS at 05:57

## 2024-05-27 RX ADMIN — FUROSEMIDE 20 MG: 20 TABLET ORAL at 08:41

## 2024-05-27 RX ADMIN — ATORVASTATIN CALCIUM 20 MG: 20 TABLET, FILM COATED ORAL at 20:24

## 2024-05-27 RX ADMIN — LOSARTAN POTASSIUM 50 MG: 50 TABLET, FILM COATED ORAL at 08:41

## 2024-05-27 RX ADMIN — INSULIN LISPRO 4 UNITS: 100 INJECTION, SOLUTION INTRAVENOUS; SUBCUTANEOUS at 20:23

## 2024-05-27 RX ADMIN — HEPARIN SODIUM 5000 UNITS: 5000 INJECTION INTRAVENOUS; SUBCUTANEOUS at 20:23

## 2024-05-27 RX ADMIN — FLUOXETINE HYDROCHLORIDE 40 MG: 20 CAPSULE ORAL at 08:41

## 2024-05-27 RX ADMIN — BUPROPION HYDROCHLORIDE 200 MG: 100 TABLET, EXTENDED RELEASE ORAL at 08:41

## 2024-05-27 NOTE — PLAN OF CARE
Goal Outcome Evaluation:   Pt resting in bed. No acute s/s of acute distress. VSS. Will continue to follow plan of care.

## 2024-05-27 NOTE — H&P (VIEW-ONLY)
"    [facility] HOSPITALIST PROGRESS NOTE     Patient Identification:  Name:  Es Olivier  Age:  64 y.o.  Sex:  female  :  1960  MRN:  [id number]  Visit Number:  [id number]  ROOM: [address] [place] [address]     Primary Care Provider:  Delilah Pizarro MD    Length of stay in inpatient status:  2    Subjective     Chief Compliant:    Chief Complaint   Patient presents with    Chest Pain       History of Presenting Illness:    Patient denies any further chest pain. Awaits [facility] in AM. Denies any new concerns. No family bedside.     ROS:  Otherwise 10 point ROS negative other than documented above in HPI.     Objective     Current Hospital Meds:aspirin, 81 mg, Oral, Daily  atorvastatin, 20 mg, Oral, Nightly  buPROPion SR, 200 mg, Oral, BID  FLUoxetine, 40 mg, Oral, Daily  furosemide, 20 mg, Oral, Daily  heparin (porcine), 5,000 Units, Subcutaneous, Q8H  losartan, 50 mg, Oral, Q24H   And  hydroCHLOROthiazide, 12.5 mg, Oral, Q24H  insulin lispro, 2-7 Units, Subcutaneous, 4x Daily AC & at Bedtime  oxybutynin XL, 5 mg, Oral, Daily  pantoprazole, 40 mg, Intravenous, Q12H  sodium chloride, 10 mL, Intravenous, Q12H         Current Antimicrobial Therapy:  Anti-Infectives (From admission, onward)      None          Current Diuretic Therapy:  Diuretics (From admission, onward)      Ordered     Dose/Rate Route Frequency Start Stop    24 1122  furosemide (LASIX) tablet 20 mg        Ordering Provider: Mack Lam MD    20 mg Oral Daily 24 1200      24 1122  hydroCHLOROthiazide tablet 12.5 mg        Ordering Provider: Mack Lam MD   Placed in \"And\" Linked Group    12.5 mg Oral Every 24 Hours Scheduled 24 1200            ----------------------------------------------------------------------------------------------------------------------  Vital Signs:  Temp:  [98 °F (36.7 °C)-98.5 °F (36.9 °C)] 98.2 °F (36.8 °C)  Heart Rate:  [] 75  Resp:  [18] 18  BP: (106-144)/(51-67) 107/63  SpO2:  [98 %-99 " %] 99 %  on   ;   Device (Oxygen Therapy): room air  Body mass index is 37.74 kg/m².    Wt Readings from Last 3 Encounters:   05/27/24 106 kg (233 lb 12.8 oz)   07/22/22 133 kg (294 lb)   07/08/22 132 kg (292 lb)     Intake & Output (last 3 days)         05/24 0701 05/25 0700 05/25 0701 05/26 0700 05/26 0701 05/27 0700 05/27 0701 05/28 0700    P.O.  480    Total Intake(mL/kg) 720 (6.8) 720 (6.9) 1560 (14.7) 480 (4.5)    Net +720 +720 +1560 +480            Urine Unmeasured Occurrence 6 x 4 x 3 x     Stool Unmeasured Occurrence 0 x             Diet: Regular/House; Fluid Consistency: Thin (IDDSI 0)  ----------------------------------------------------------------------------------------------------------------------  Physical exam:  Constitutional:  Well-developed and well-nourished.  No respiratory distress.      HENT:  Head:  Normocephalic and atraumatic.  Mouth:  Moist mucous membranes.    Eyes:  Conjunctivae and EOM are normal. No scleral icterus.    Neck:  Neck supple.  No JVD present.    Cardiovascular:  Normal rate, regular rhythm and normal heart sounds with no murmur.  Pulmonary/Chest:  No respiratory distress, no wheezes, no crackles, with normal breath sounds and good air movement.  Abdominal:  Soft.  Bowel sounds are normal.  No distension and no tenderness.   Musculoskeletal:  No edema, no tenderness, and no deformity.  No red or swollen joints anywhere.    Neurological:  Alert and oriented to person, place, and time.  No cranial nerve deficit.  No tongue deviation.  No facial droop.  No slurred speech.   Skin:  Skin is warm and dry. No rash noted. No pallor.   Peripheral vascular:  Pulses in all 4 extremities with no clubbing, no cyanosis, no edema.  ----------------------------------------------------------------------------------------------------------------------  Tele:   "  ----------------------------------------------------------------------------------------------------------------------  Results from last 7 days   Lab Units 05/26/24  0103 05/25/24  0148 05/24/24  0117   WBC 10*3/mm3 6.44 6.75 7.26   HEMOGLOBIN g/dL 12.1 12.3 12.1   HEMATOCRIT % 37.9 36.6 36.4   MCV fL 94.0 92.7 92.4   MCHC g/dL 31.9 33.6 33.2   PLATELETS 10*3/mm3 281 283 287         Results from last 7 days   Lab Units 05/26/24  0103 05/25/24  1356 05/25/24  0148 05/24/24  0117   SODIUM mmol/L 140  --  139 140   POTASSIUM mmol/L 4.2 3.8 3.3* 3.5   CHLORIDE mmol/L 105  --  101 102   CO2 mmol/L 26.1  --  26.2 26.3   BUN mg/dL 19  --  18 18   CREATININE mg/dL 1.04*  --  1.01* 0.86   CALCIUM mg/dL 8.8  --  8.7 9.1   GLUCOSE mg/dL 156*  --  134* 134*   ALBUMIN g/dL 3.7  --  3.8 3.6   BILIRUBIN mg/dL 0.3  --  0.2 0.3   ALK PHOS U/L 58  --  54 53   AST (SGOT) U/L 16  --  13 13   ALT (SGPT) U/L 12  --  10 9   Estimated Creatinine Clearance: 68.2 mL/min (A) (by C-G formula based on SCr of 1.04 mg/dL (H)).  No results found for: \"AMMONIA\"  Results from last 7 days   Lab Units 05/23/24  1252 05/23/24  1054   HSTROP T ng/L 15* 13             Glucose   Date/Time Value Ref Range Status   05/27/2024 1100 188 (H) 70 - 130 mg/dL Final   05/27/2024 0646 126 70 - 130 mg/dL Final   05/26/2024 1911 183 (H) 70 - 130 mg/dL Final   05/26/2024 1635 189 (H) 70 - 130 mg/dL Final   05/26/2024 1112 131 (H) 70 - 130 mg/dL Final   05/26/2024 0652 125 70 - 130 mg/dL Final   05/25/2024 1933 147 (H) 70 - 130 mg/dL Final   05/25/2024 1702 187 (H) 70 - 130 mg/dL Final     Lab Results   Component Value Date    TSH 8.590 (H) 02/25/2022    FREET4 1.13 02/25/2022     No results found for: \"PREGTESTUR\", \"PREGSERUM\", \"HCG\", \"HCGQUANT\"  Pain Management Panel  More data may exist         Latest Ref Rng & Units 4/6/2016 4/7/2014   Pain Management Panel   Amphetamine, Urine Qual Negative Negative  Negative    Barbiturates Screen, Urine Negative Negative  " "Negative    Benzodiazepine Screen, Urine Negative Negative  Negative    Cocaine Screen, Urine Negative Negative  Negative    Methadone Screen , Urine Negative Negative  Negative      Brief Urine Lab Results       None          No results found for: \"BLOODCX\"  No results found for: \"URINECX\"  No results found for: \"WOUNDCX\"  No results found for: \"STOOLCX\"  No results found for: \"RESPCX\"  No results found for: \"AFBCX\"        I have personally looked at the labs and they are summarized above.  ----------------------------------------------------------------------------------------------------------------------  Detailed radiology reports for the last 24 hours:    Imaging Results (Last 24 Hours)       ** No results found for the last 24 hours. **          Assessment & Plan      #Atypical Chest Pain, Acute Coronary Syndrome ruled out  #Hypertension/Hyperlipidemia/Coronary Artery Disease  - Labs showed CBC normal, CMP normal, HS Troponins 13->15  - EKG showed normal sinus rhythm, possible old anterior infarct  - Echocardiogram showed LVEF 56-60%, diastolic dysfunction   - Stress test showed intermediate study, showed small sized, mildly severe area of ischemia in inferior wall  - Cardiology consulted and following, plan for [facility] on Tuesday  - Continue Aspirin 81, statin  - Continue ARB, Hydrochlorothiazide, oral lasix, titrate as indicated. Hypertension well controlled.   - Monit on telemetry, strict I/O's, daily weights, trend heart rate and blood pressure     #Gastroesophageal Reflux Disease/History H Pylori  - Continue IV PPI twice daily, General Surgery consulted with tentative plans for EGD as outpatient at this time with no current symptoms      #Dependent Diabetes Mellitus Type II, unknown control, unknown complications  - Holding home oral agents, continue FSBG and SSI, add long acting as indicated.     #Bladder Stone  - Noted on CT, no obstruction or renal failure noted; Follow up outpatient Urology    "   #Obstructive Sleep Apnea   - Non-adherent with home CPAP  - PCP will need to refer for updated outpatient sleep study     #Anxiety/Depression  - Continue home regimen     #Former Smoker  - Recommended cessation, nicotine replacement therapy as needed     #Morbid Obesity by BMI  - Body mass index is 47.33 kg/m².; complicates all aspects of care     F: Oral  E: Monitor & Replace as needed   N: Diet: Regular/House; Fluid Consistency: Thin (IDDSI 0)   Diet: Cardiac; Healthy Heart (2-3 Na+); Fluid Consistency: Thin (IDDSI 0)   PPx: SQH  Code Status (Patient has no pulse and is not breathing): CPR  Medical Interventions (Patient has pulse or is breathing): Full Support     Dispo: Pending clinical improvement, [facility] Tuesday        VTE Prophylaxis:   Mechanical Order History:        Ordered        05/24/24 0747  Place Sequential Compression Device  Once            05/24/24 0747  Maintain Sequential Compression Device  Continuous                          Pharmalogical Order History:        Ordered     Dose Route Frequency Stop    05/23/24 1700  heparin (porcine) 5000 UNIT/ML injection 5,000 Units         5,000 Units SC Every 8 Hours Scheduled --                    Jair Valdes MD  [facility] Hospitalist  05/27/24  14:59 EDT

## 2024-05-27 NOTE — PLAN OF CARE
Goal Outcome Evaluation:                   Pt currently resting in bed. No s/s of acute distress noted at this time. No complaints verbalized at this time. Plan of care ongoing.

## 2024-05-27 NOTE — PROGRESS NOTES
LOS: 2 days     Name: Es Olivier  Age/Sex: 64 y.o. female  :  1960        PCP: Delilah Pizarro MD    Principal Problem:    Chest pain        Following for: chest pain    Interval history: Resting comfortably in bed denies any chest pains overnight or shortness of breath.    Subjective     ROS    Vital Signs  Vitals:    24 0258 24 0500 24 0642 24 0900   BP: 144/55  125/65 107/63   BP Location: Right arm  Left arm Left arm   Patient Position: Lying  Lying Lying   Pulse: 102  73 75   Resp: 18  18 18   Temp: 98 °F (36.7 °C)  98.1 °F (36.7 °C) 98.2 °F (36.8 °C)   TempSrc: Oral  Oral Oral   SpO2: 98%  99% 99%   Weight:  106 kg (233 lb 12.8 oz)     Height:         Body mass index is 37.74 kg/m².      Intake/Output Summary (Last 24 hours) at 2024 1246  Last data filed at 2024 1742  Gross per 24 hour   Intake 1080 ml   Output --   Net 1080 ml       Constitutional:       General: Not in acute distress.     Appearance: Healthy appearance. Well-developed and not in distress. Not diaphoretic.   Eyes:      Conjunctiva/sclera: Conjunctivae normal.      Pupils: Pupils are equal, round, and reactive to light.   HENT:      Head: Normocephalic and atraumatic.   Neck:      Vascular: No carotid bruit or JVD.   Pulmonary:      Effort: Pulmonary effort is normal. No respiratory distress.      Breath sounds: Normal breath sounds.   Cardiovascular:      Normal rate. Regular rhythm.   Edema:     Peripheral edema absent.   Skin:     General: Skin is cool.   Neurological:      Mental Status: Alert, oriented to person, place, and time and oriented to person, place and time.         Results Review:     Results from last 7 days   Lab Units 24  0103 24  0148 24  0117 24  1054   WBC 10*3/mm3 6.44 6.75 7.26 6.08   HEMOGLOBIN g/dL 12.1 12.3 12.1 13.6   PLATELETS 10*3/mm3 281 283 287 308     Results from last 7 days   Lab Units 24  0103 24  1356 24  0148  05/24/24  0117 05/23/24  1054   SODIUM mmol/L 140  --  139 140 139   POTASSIUM mmol/L 4.2 3.8 3.3* 3.5 3.6   CHLORIDE mmol/L 105  --  101 102 100   CO2 mmol/L 26.1  --  26.2 26.3 26.3   BUN mg/dL 19  --  18 18 22   CREATININE mg/dL 1.04*  --  1.01* 0.86 0.88   CALCIUM mg/dL 8.8  --  8.7 9.1 9.8   GLUCOSE mg/dL 156*  --  134* 134* 119*   ALT (SGPT) U/L 12  --  10 9 11   AST (SGOT) U/L 16  --  13 13 14     Results from last 7 days   Lab Units 05/23/24  1252 05/23/24  1054   HSTROP T ng/L 15* 13             I reviewed the patient's new clinical results.  I reviewed the patient's new imaging results and agree with the interpretation.  I personally viewed and interpreted the patient's EKG/Telemetry data    Medication Review:   aspirin, 81 mg, Oral, Daily  atorvastatin, 20 mg, Oral, Nightly  buPROPion SR, 200 mg, Oral, BID  FLUoxetine, 40 mg, Oral, Daily  furosemide, 20 mg, Oral, Daily  heparin (porcine), 5,000 Units, Subcutaneous, Q8H  losartan, 50 mg, Oral, Q24H   And  hydroCHLOROthiazide, 12.5 mg, Oral, Q24H  insulin lispro, 2-7 Units, Subcutaneous, 4x Daily AC & at Bedtime  oxybutynin XL, 5 mg, Oral, Daily  pantoprazole, 40 mg, Intravenous, Q12H  sodium chloride, 10 mL, Intravenous, Q12H           Assessment:  Precordial pain in setting of abnormal stress test with small sized mildly severe area of ischemia in inferior wall  Diabetes mellitus  Essential hypertension  Sleep apnea, noncompliant      Recommendations:  Continue current GDMT.  Discussed left heart catheterization to be done tomorrow.  Patient expressed understanding and agreed.    I discussed the patients findings and my recommendations with patient and family    Basim Turpin PA-C  05/27/24  12:46 EDT  Electronically signed by Basim Turpin PA-C, 05/27/24, 12:46 PM EDT.     Patient seen and examined.  Chart reviewed.  Agree with patient's charting, assessment plan by MORGAN  On exam  Patient laying in bed in no distress, sensorimotor system are  intact  Mucous membranes are moist  Chest no wheezing or crackles  No rub gallop murmur, regular rate rhythm, no S3  Abdomen nontender nondistended  No edema  Assessment and plan  #1 cardiac.  Patient with abnormal stress test with inferior ischemia.  Patient scheduled for cath for Tuesday.  Continue current therapy.  Aggressive risk factor modification coronary disease to continue.  .Electronically signed by Pradip Fajardo MD, 05/27/24, 3:27 PM EDT.

## 2024-05-27 NOTE — PROGRESS NOTES
"    [facility] HOSPITALIST PROGRESS NOTE     Patient Identification:  Name:  Es Olivier  Age:  64 y.o.  Sex:  female  :  1960  MRN:  [id number]  Visit Number:  [id number]  ROOM: [address] [place] [address]     Primary Care Provider:  Delilah Pizarro MD    Length of stay in inpatient status:  2    Subjective     Chief Compliant:    Chief Complaint   Patient presents with    Chest Pain       History of Presenting Illness:    Patient denies any further chest pain. Awaits [facility] in AM. Denies any new concerns. No family bedside.     ROS:  Otherwise 10 point ROS negative other than documented above in HPI.     Objective     Current Hospital Meds:aspirin, 81 mg, Oral, Daily  atorvastatin, 20 mg, Oral, Nightly  buPROPion SR, 200 mg, Oral, BID  FLUoxetine, 40 mg, Oral, Daily  furosemide, 20 mg, Oral, Daily  heparin (porcine), 5,000 Units, Subcutaneous, Q8H  losartan, 50 mg, Oral, Q24H   And  hydroCHLOROthiazide, 12.5 mg, Oral, Q24H  insulin lispro, 2-7 Units, Subcutaneous, 4x Daily AC & at Bedtime  oxybutynin XL, 5 mg, Oral, Daily  pantoprazole, 40 mg, Intravenous, Q12H  sodium chloride, 10 mL, Intravenous, Q12H         Current Antimicrobial Therapy:  Anti-Infectives (From admission, onward)      None          Current Diuretic Therapy:  Diuretics (From admission, onward)      Ordered     Dose/Rate Route Frequency Start Stop    24 1122  furosemide (LASIX) tablet 20 mg        Ordering Provider: Mack Lam MD    20 mg Oral Daily 24 1200      24 1122  hydroCHLOROthiazide tablet 12.5 mg        Ordering Provider: Mack Lam MD   Placed in \"And\" Linked Group    12.5 mg Oral Every 24 Hours Scheduled 24 1200            ----------------------------------------------------------------------------------------------------------------------  Vital Signs:  Temp:  [98 °F (36.7 °C)-98.5 °F (36.9 °C)] 98.2 °F (36.8 °C)  Heart Rate:  [] 75  Resp:  [18] 18  BP: (106-144)/(51-67) 107/63  SpO2:  [98 %-99 " %] 99 %  on   ;   Device (Oxygen Therapy): room air  Body mass index is 37.74 kg/m².    Wt Readings from Last 3 Encounters:   05/27/24 106 kg (233 lb 12.8 oz)   07/22/22 133 kg (294 lb)   07/08/22 132 kg (292 lb)     Intake & Output (last 3 days)         05/24 0701 05/25 0700 05/25 0701 05/26 0700 05/26 0701 05/27 0700 05/27 0701 05/28 0700    P.O.  480    Total Intake(mL/kg) 720 (6.8) 720 (6.9) 1560 (14.7) 480 (4.5)    Net +720 +720 +1560 +480            Urine Unmeasured Occurrence 6 x 4 x 3 x     Stool Unmeasured Occurrence 0 x             Diet: Regular/House; Fluid Consistency: Thin (IDDSI 0)  ----------------------------------------------------------------------------------------------------------------------  Physical exam:  Constitutional:  Well-developed and well-nourished.  No respiratory distress.      HENT:  Head:  Normocephalic and atraumatic.  Mouth:  Moist mucous membranes.    Eyes:  Conjunctivae and EOM are normal. No scleral icterus.    Neck:  Neck supple.  No JVD present.    Cardiovascular:  Normal rate, regular rhythm and normal heart sounds with no murmur.  Pulmonary/Chest:  No respiratory distress, no wheezes, no crackles, with normal breath sounds and good air movement.  Abdominal:  Soft.  Bowel sounds are normal.  No distension and no tenderness.   Musculoskeletal:  No edema, no tenderness, and no deformity.  No red or swollen joints anywhere.    Neurological:  Alert and oriented to person, place, and time.  No cranial nerve deficit.  No tongue deviation.  No facial droop.  No slurred speech.   Skin:  Skin is warm and dry. No rash noted. No pallor.   Peripheral vascular:  Pulses in all 4 extremities with no clubbing, no cyanosis, no edema.  ----------------------------------------------------------------------------------------------------------------------  Tele:   "  ----------------------------------------------------------------------------------------------------------------------  Results from last 7 days   Lab Units 05/26/24  0103 05/25/24  0148 05/24/24  0117   WBC 10*3/mm3 6.44 6.75 7.26   HEMOGLOBIN g/dL 12.1 12.3 12.1   HEMATOCRIT % 37.9 36.6 36.4   MCV fL 94.0 92.7 92.4   MCHC g/dL 31.9 33.6 33.2   PLATELETS 10*3/mm3 281 283 287         Results from last 7 days   Lab Units 05/26/24  0103 05/25/24  1356 05/25/24  0148 05/24/24  0117   SODIUM mmol/L 140  --  139 140   POTASSIUM mmol/L 4.2 3.8 3.3* 3.5   CHLORIDE mmol/L 105  --  101 102   CO2 mmol/L 26.1  --  26.2 26.3   BUN mg/dL 19  --  18 18   CREATININE mg/dL 1.04*  --  1.01* 0.86   CALCIUM mg/dL 8.8  --  8.7 9.1   GLUCOSE mg/dL 156*  --  134* 134*   ALBUMIN g/dL 3.7  --  3.8 3.6   BILIRUBIN mg/dL 0.3  --  0.2 0.3   ALK PHOS U/L 58  --  54 53   AST (SGOT) U/L 16  --  13 13   ALT (SGPT) U/L 12  --  10 9   Estimated Creatinine Clearance: 68.2 mL/min (A) (by C-G formula based on SCr of 1.04 mg/dL (H)).  No results found for: \"AMMONIA\"  Results from last 7 days   Lab Units 05/23/24  1252 05/23/24  1054   HSTROP T ng/L 15* 13             Glucose   Date/Time Value Ref Range Status   05/27/2024 1100 188 (H) 70 - 130 mg/dL Final   05/27/2024 0646 126 70 - 130 mg/dL Final   05/26/2024 1911 183 (H) 70 - 130 mg/dL Final   05/26/2024 1635 189 (H) 70 - 130 mg/dL Final   05/26/2024 1112 131 (H) 70 - 130 mg/dL Final   05/26/2024 0652 125 70 - 130 mg/dL Final   05/25/2024 1933 147 (H) 70 - 130 mg/dL Final   05/25/2024 1702 187 (H) 70 - 130 mg/dL Final     Lab Results   Component Value Date    TSH 8.590 (H) 02/25/2022    FREET4 1.13 02/25/2022     No results found for: \"PREGTESTUR\", \"PREGSERUM\", \"HCG\", \"HCGQUANT\"  Pain Management Panel  More data may exist         Latest Ref Rng & Units 4/6/2016 4/7/2014   Pain Management Panel   Amphetamine, Urine Qual Negative Negative  Negative    Barbiturates Screen, Urine Negative Negative  " "Negative    Benzodiazepine Screen, Urine Negative Negative  Negative    Cocaine Screen, Urine Negative Negative  Negative    Methadone Screen , Urine Negative Negative  Negative      Brief Urine Lab Results       None          No results found for: \"BLOODCX\"  No results found for: \"URINECX\"  No results found for: \"WOUNDCX\"  No results found for: \"STOOLCX\"  No results found for: \"RESPCX\"  No results found for: \"AFBCX\"        I have personally looked at the labs and they are summarized above.  ----------------------------------------------------------------------------------------------------------------------  Detailed radiology reports for the last 24 hours:    Imaging Results (Last 24 Hours)       ** No results found for the last 24 hours. **          Assessment & Plan      #Atypical Chest Pain, Acute Coronary Syndrome ruled out  #Hypertension/Hyperlipidemia/Coronary Artery Disease  - Labs showed CBC normal, CMP normal, HS Troponins 13->15  - EKG showed normal sinus rhythm, possible old anterior infarct  - Echocardiogram showed LVEF 56-60%, diastolic dysfunction   - Stress test showed intermediate study, showed small sized, mildly severe area of ischemia in inferior wall  - Cardiology consulted and following, plan for [facility] on Tuesday  - Continue Aspirin 81, statin  - Continue ARB, Hydrochlorothiazide, oral lasix, titrate as indicated. Hypertension well controlled.   - Monit on telemetry, strict I/O's, daily weights, trend heart rate and blood pressure     #Gastroesophageal Reflux Disease/History H Pylori  - Continue IV PPI twice daily, General Surgery consulted with tentative plans for EGD as outpatient at this time with no current symptoms      #Dependent Diabetes Mellitus Type II, unknown control, unknown complications  - Holding home oral agents, continue FSBG and SSI, add long acting as indicated.     #Bladder Stone  - Noted on CT, no obstruction or renal failure noted; Follow up outpatient Urology    "   #Obstructive Sleep Apnea   - Non-adherent with home CPAP  - PCP will need to refer for updated outpatient sleep study     #Anxiety/Depression  - Continue home regimen     #Former Smoker  - Recommended cessation, nicotine replacement therapy as needed     #Morbid Obesity by BMI  - Body mass index is 47.33 kg/m².; complicates all aspects of care     F: Oral  E: Monitor & Replace as needed   N: Diet: Regular/House; Fluid Consistency: Thin (IDDSI 0)   Diet: Cardiac; Healthy Heart (2-3 Na+); Fluid Consistency: Thin (IDDSI 0)   PPx: SQH  Code Status (Patient has no pulse and is not breathing): CPR  Medical Interventions (Patient has pulse or is breathing): Full Support     Dispo: Pending clinical improvement, [facility] Tuesday        VTE Prophylaxis:   Mechanical Order History:        Ordered        05/24/24 0747  Place Sequential Compression Device  Once            05/24/24 0747  Maintain Sequential Compression Device  Continuous                          Pharmalogical Order History:        Ordered     Dose Route Frequency Stop    05/23/24 1700  heparin (porcine) 5000 UNIT/ML injection 5,000 Units         5,000 Units SC Every 8 Hours Scheduled --                    Jair Valdes MD  [facility] Hospitalist  05/27/24  14:59 EDT

## 2024-05-28 ENCOUNTER — READMISSION MANAGEMENT (OUTPATIENT)
Dept: CALL CENTER | Facility: HOSPITAL | Age: 64
End: 2024-05-28
Payer: MEDICARE

## 2024-05-28 VITALS
TEMPERATURE: 98.1 F | DIASTOLIC BLOOD PRESSURE: 56 MMHG | WEIGHT: 231.7 LBS | HEART RATE: 74 BPM | BODY MASS INDEX: 37.24 KG/M2 | OXYGEN SATURATION: 99 % | SYSTOLIC BLOOD PRESSURE: 116 MMHG | RESPIRATION RATE: 18 BRPM | HEIGHT: 66 IN

## 2024-05-28 PROBLEM — R07.9 CHEST PAIN: Status: RESOLVED | Noted: 2024-05-23 | Resolved: 2024-05-28

## 2024-05-28 LAB
ANION GAP SERPL CALCULATED.3IONS-SCNC: 10.5 MMOL/L (ref 5–15)
BUN SERPL-MCNC: 20 MG/DL (ref 8–23)
BUN/CREAT SERPL: 15.5 (ref 7–25)
CALCIUM SPEC-SCNC: 9.4 MG/DL (ref 8.6–10.5)
CHLORIDE SERPL-SCNC: 99 MMOL/L (ref 98–107)
CHOLEST SERPL-MCNC: 150 MG/DL (ref 0–200)
CO2 SERPL-SCNC: 27.5 MMOL/L (ref 22–29)
CREAT SERPL-MCNC: 1.29 MG/DL (ref 0.57–1)
DEPRECATED RDW RBC AUTO: 42.5 FL (ref 37–54)
EGFRCR SERPLBLD CKD-EPI 2021: 46.7 ML/MIN/1.73
ERYTHROCYTE [DISTWIDTH] IN BLOOD BY AUTOMATED COUNT: 12.3 % (ref 12.3–15.4)
GLUCOSE BLDC GLUCOMTR-MCNC: 157 MG/DL (ref 70–130)
GLUCOSE BLDC GLUCOMTR-MCNC: 162 MG/DL (ref 70–130)
GLUCOSE BLDC GLUCOMTR-MCNC: 193 MG/DL (ref 70–130)
GLUCOSE BLDC GLUCOMTR-MCNC: 230 MG/DL (ref 70–130)
GLUCOSE SERPL-MCNC: 162 MG/DL (ref 65–99)
HBA1C MFR BLD: 6.1 % (ref 4.8–5.6)
HCT VFR BLD AUTO: 42.5 % (ref 34–46.6)
HDLC SERPL-MCNC: 61 MG/DL (ref 40–60)
HGB BLD-MCNC: 14 G/DL (ref 12–15.9)
LDLC SERPL CALC-MCNC: 67 MG/DL (ref 0–100)
LDLC/HDLC SERPL: 1.04 {RATIO}
MCH RBC QN AUTO: 30.9 PG (ref 26.6–33)
MCHC RBC AUTO-ENTMCNC: 32.9 G/DL (ref 31.5–35.7)
MCV RBC AUTO: 93.8 FL (ref 79–97)
PLATELET # BLD AUTO: 320 10*3/MM3 (ref 140–450)
PMV BLD AUTO: 10.4 FL (ref 6–12)
POTASSIUM SERPL-SCNC: 3.8 MMOL/L (ref 3.5–5.2)
RBC # BLD AUTO: 4.53 10*6/MM3 (ref 3.77–5.28)
SODIUM SERPL-SCNC: 137 MMOL/L (ref 136–145)
TRIGL SERPL-MCNC: 129 MG/DL (ref 0–150)
TSH SERPL DL<=0.05 MIU/L-ACNC: 4.47 UIU/ML (ref 0.27–4.2)
VLDLC SERPL-MCNC: 22 MG/DL (ref 5–40)
WBC NRBC COR # BLD AUTO: 7.41 10*3/MM3 (ref 3.4–10.8)

## 2024-05-28 PROCEDURE — 84443 ASSAY THYROID STIM HORMONE: CPT | Performed by: NURSE PRACTITIONER

## 2024-05-28 PROCEDURE — 63710000001 INSULIN LISPRO (HUMAN) PER 5 UNITS

## 2024-05-28 PROCEDURE — 4A023N7 MEASUREMENT OF CARDIAC SAMPLING AND PRESSURE, LEFT HEART, PERCUTANEOUS APPROACH: ICD-10-PCS | Performed by: INTERNAL MEDICINE

## 2024-05-28 PROCEDURE — C1894 INTRO/SHEATH, NON-LASER: HCPCS | Performed by: INTERNAL MEDICINE

## 2024-05-28 PROCEDURE — 83036 HEMOGLOBIN GLYCOSYLATED A1C: CPT | Performed by: NURSE PRACTITIONER

## 2024-05-28 PROCEDURE — C1887 CATHETER, GUIDING: HCPCS | Performed by: INTERNAL MEDICINE

## 2024-05-28 PROCEDURE — 25010000002 HEPARIN (PORCINE) PER 1000 UNITS: Performed by: INTERNAL MEDICINE

## 2024-05-28 PROCEDURE — B2111ZZ FLUOROSCOPY OF MULTIPLE CORONARY ARTERIES USING LOW OSMOLAR CONTRAST: ICD-10-PCS | Performed by: INTERNAL MEDICINE

## 2024-05-28 PROCEDURE — 25510000001 IOPAMIDOL PER 1 ML: Performed by: INTERNAL MEDICINE

## 2024-05-28 PROCEDURE — 80048 BASIC METABOLIC PNL TOTAL CA: CPT | Performed by: NURSE PRACTITIONER

## 2024-05-28 PROCEDURE — 80061 LIPID PANEL: CPT | Performed by: NURSE PRACTITIONER

## 2024-05-28 PROCEDURE — 93458 L HRT ARTERY/VENTRICLE ANGIO: CPT | Performed by: INTERNAL MEDICINE

## 2024-05-28 PROCEDURE — 25810000003 SODIUM CHLORIDE 0.9 % SOLUTION: Performed by: INTERNAL MEDICINE

## 2024-05-28 PROCEDURE — C1769 GUIDE WIRE: HCPCS | Performed by: INTERNAL MEDICINE

## 2024-05-28 PROCEDURE — 82948 REAGENT STRIP/BLOOD GLUCOSE: CPT

## 2024-05-28 PROCEDURE — 85027 COMPLETE CBC AUTOMATED: CPT | Performed by: NURSE PRACTITIONER

## 2024-05-28 PROCEDURE — B2151ZZ FLUOROSCOPY OF LEFT HEART USING LOW OSMOLAR CONTRAST: ICD-10-PCS | Performed by: INTERNAL MEDICINE

## 2024-05-28 PROCEDURE — 63710000001 INSULIN LISPRO (HUMAN) PER 5 UNITS: Performed by: INTERNAL MEDICINE

## 2024-05-28 RX ORDER — VERAPAMIL HYDROCHLORIDE 2.5 MG/ML
INJECTION, SOLUTION INTRAVENOUS
Status: DISCONTINUED | OUTPATIENT
Start: 2024-05-28 | End: 2024-05-28 | Stop reason: HOSPADM

## 2024-05-28 RX ORDER — METOPROLOL SUCCINATE 25 MG/1
12.5 TABLET, EXTENDED RELEASE ORAL
Status: DISCONTINUED | OUTPATIENT
Start: 2024-05-28 | End: 2024-05-29 | Stop reason: HOSPADM

## 2024-05-28 RX ORDER — METOPROLOL SUCCINATE 25 MG/1
12.5 TABLET, EXTENDED RELEASE ORAL
Qty: 15 TABLET | Refills: 0 | Status: SHIPPED | OUTPATIENT
Start: 2024-05-28 | End: 2024-06-27

## 2024-05-28 RX ORDER — PANTOPRAZOLE SODIUM 40 MG/1
40 TABLET, DELAYED RELEASE ORAL DAILY
Qty: 30 TABLET | Refills: 0 | Status: SHIPPED | OUTPATIENT
Start: 2024-05-28 | End: 2024-06-27

## 2024-05-28 RX ORDER — SODIUM CHLORIDE 9 MG/ML
INJECTION, SOLUTION INTRAVENOUS
Status: DISCONTINUED | OUTPATIENT
Start: 2024-05-28 | End: 2024-05-28

## 2024-05-28 RX ORDER — NITROGLYCERIN 0.4 MG/1
0.4 TABLET SUBLINGUAL
Status: DISCONTINUED | OUTPATIENT
Start: 2024-05-28 | End: 2024-05-29 | Stop reason: HOSPADM

## 2024-05-28 RX ORDER — LIDOCAINE HYDROCHLORIDE 20 MG/ML
INJECTION, SOLUTION INFILTRATION; PERINEURAL
Status: DISCONTINUED | OUTPATIENT
Start: 2024-05-28 | End: 2024-05-28 | Stop reason: HOSPADM

## 2024-05-28 RX ORDER — HEPARIN SODIUM 1000 [USP'U]/ML
INJECTION, SOLUTION INTRAVENOUS; SUBCUTANEOUS
Status: DISCONTINUED | OUTPATIENT
Start: 2024-05-28 | End: 2024-05-28 | Stop reason: HOSPADM

## 2024-05-28 RX ORDER — ASPIRIN 81 MG/1
81 TABLET ORAL DAILY
Qty: 30 TABLET | Refills: 0 | Status: SHIPPED | OUTPATIENT
Start: 2024-05-29 | End: 2024-06-28

## 2024-05-28 RX ORDER — SODIUM CHLORIDE 9 MG/ML
100 INJECTION, SOLUTION INTRAVENOUS CONTINUOUS
Status: ACTIVE | OUTPATIENT
Start: 2024-05-28 | End: 2024-05-28

## 2024-05-28 RX ORDER — ACETAMINOPHEN 325 MG/1
650 TABLET ORAL EVERY 4 HOURS PRN
Status: DISCONTINUED | OUTPATIENT
Start: 2024-05-28 | End: 2024-05-29 | Stop reason: HOSPADM

## 2024-05-28 RX ADMIN — FLUOXETINE HYDROCHLORIDE 40 MG: 20 CAPSULE ORAL at 10:01

## 2024-05-28 RX ADMIN — HEPARIN SODIUM 5000 UNITS: 5000 INJECTION INTRAVENOUS; SUBCUTANEOUS at 15:04

## 2024-05-28 RX ADMIN — BUPROPION HYDROCHLORIDE 200 MG: 100 TABLET, EXTENDED RELEASE ORAL at 10:00

## 2024-05-28 RX ADMIN — PANTOPRAZOLE SODIUM 40 MG: 40 INJECTION, POWDER, LYOPHILIZED, FOR SOLUTION INTRAVENOUS at 20:16

## 2024-05-28 RX ADMIN — SODIUM CHLORIDE 100 ML/HR: 9 INJECTION, SOLUTION INTRAVENOUS at 16:29

## 2024-05-28 RX ADMIN — LOSARTAN POTASSIUM 50 MG: 50 TABLET, FILM COATED ORAL at 10:01

## 2024-05-28 RX ADMIN — PANTOPRAZOLE SODIUM 40 MG: 40 INJECTION, POWDER, LYOPHILIZED, FOR SOLUTION INTRAVENOUS at 08:41

## 2024-05-28 RX ADMIN — METOPROLOL SUCCINATE 12.5 MG: 25 TABLET, EXTENDED RELEASE ORAL at 18:56

## 2024-05-28 RX ADMIN — BUPROPION HYDROCHLORIDE 200 MG: 100 TABLET, EXTENDED RELEASE ORAL at 20:17

## 2024-05-28 RX ADMIN — ATORVASTATIN CALCIUM 20 MG: 20 TABLET, FILM COATED ORAL at 20:17

## 2024-05-28 RX ADMIN — HYDROCHLOROTHIAZIDE 12.5 MG: 25 TABLET ORAL at 10:01

## 2024-05-28 RX ADMIN — INSULIN LISPRO 2 UNITS: 100 INJECTION, SOLUTION INTRAVENOUS; SUBCUTANEOUS at 20:16

## 2024-05-28 RX ADMIN — OXYBUTYNIN CHLORIDE 5 MG: 5 TABLET, EXTENDED RELEASE ORAL at 10:01

## 2024-05-28 RX ADMIN — ASPIRIN 81 MG: 81 TABLET, COATED ORAL at 10:01

## 2024-05-28 RX ADMIN — FUROSEMIDE 20 MG: 20 TABLET ORAL at 10:01

## 2024-05-28 RX ADMIN — INSULIN LISPRO 2 UNITS: 100 INJECTION, SOLUTION INTRAVENOUS; SUBCUTANEOUS at 17:04

## 2024-05-28 RX ADMIN — Medication 10 ML: at 08:41

## 2024-05-28 RX ADMIN — INSULIN LISPRO 3 UNITS: 100 INJECTION, SOLUTION INTRAVENOUS; SUBCUTANEOUS at 11:47

## 2024-05-28 NOTE — DISCHARGE SUMMARY
[facility] HOSPITALISTS DISCHARGE SUMMARY    Patient Identification:  Name:  Es Olivier  Age:  64 y.o.  Sex:  female  :  1960  MRN:  [id number]  Visit Number:  [id number]    Date of Admission: 2024  Date of Discharge:  2024    PCP: Delilah Pizarro MD    DISCHARGE DIAGNOSIS  #Atypical Chest Pain, Acute Coronary Syndrome ruled out  #Hypertension/Hyperlipidemia/Coronary Artery Disease  #Gastroesophageal Reflux Disease/History H Pylori  #Insulin dependent Diabetes Mellitus Type II  #Bladder Stone  #Obstructive Sleep Apnea   #Anxiety/Depression  #Former Smoker  #Morbid Obesity by BMI    CONSULTS   Cardiology   General surgery     PROCEDURES PERFORMED  [facility] :  ASSESSMENT and PLAN:  1.  40-50% mid LAD lesion which does not appear to be hemodynamically significant.  (RFR was attempted however due to technical difficulties unable to do so)  2.  Luminal irregularities in LCx.  3.  30-40% proximal RCA lesion.  4.  At present patient does not have any exertional chest pain.  Treat her with medical therapy.  5.  If she has further exertional chest pain then LAD lesion will be assessed with RFR to assess hemodynamic significance and if warranted PCI can be done at that time.          HOSPITAL COURSE  Patient is a 64 y.o. female presented on  to [facility] complaining of chest pain.  Please see the admitting history and physical for further details.      Ms. Olivier is our 63 yo F with hx BMI PMH Arthritis, Depression, Fatty Liver, History H Pylori, GERD, Obstructive Sleep Apnea, Diabetes Mellitus Type II, Hypertension, Hyperlipidemia, Coronary Artery Disease who presented chest pain. HS Troponins 13->15. EKG showed normal sinus rhythm, possible old anterior infarct. CXR showed no acute cardiopulmonary processes. Emergency room provider gave Aspirin 324. Hospital Medicine consulted for admission. Patient endorses chest pain earlier in the week when drinking coffee, notes  has had H pylori before on testing but never treated. Echocardiogram showed LVEF 56-60%, diastolic dysfunction. Stress test showed intermediate study, showed small sized, mildly severe area of ischemia in inferior wall. LHC performed with nonobstructive disease as above. Cardiology recommending medical treatment. ASA, statin. BB started. Patient to f/u with PCP, surgery, cardiology.     VITAL SIGNS:  Temp:  [97.8 °F (36.6 °C)-98.3 °F (36.8 °C)] 98.3 °F (36.8 °C)  Heart Rate:  [77-92] 84  Resp:  [18] 18  BP: (101-128)/(53-82) 123/69  SpO2:  [92 %-99 %] 98 %  on  Flow (L/min):  [2] 2;   Device (Oxygen Therapy): nasal cannula with ETCO2    Body mass index is 37.4 kg/m².  Wt Readings from Last 3 Encounters:   05/28/24 105 kg (231 lb 11.2 oz)   07/22/22 133 kg (294 lb)   07/08/22 132 kg (292 lb)       PHYSICAL EXAM:  Constitutional:  Well-developed and well-nourished.  No respiratory distress.      HENT:  Head:  Normocephalic and atraumatic.  Mouth:  Moist mucous membranes.    Eyes:  Conjunctivae and EOM are normal.  Pupils are equal, round, and reactive to light.  No scleral icterus.    Cardiovascular:  Normal rate, regular rhythm and normal heart sounds with no murmur.  Pulmonary/Chest:  No respiratory distress, no wheezes, no crackles, with normal breath sounds and good air movement.  Abdominal:  Soft.  Bowel sounds are normal.  No distension and no tenderness.   Musculoskeletal:  No edema, no tenderness, and no deformity.  No red or swollen joints anywhere.    Neurological:  Alert and oriented to person, place, and time.  No gross neurological deficit.   Skin:  Skin is warm and dry. No rash noted. No pallor.   Peripheral vascular:  Strong pulses in all 4 extremities with no clubbing, no cyanosis, no edema.    DISCHARGE DISPOSITION   Stable    DISCHARGE MEDICATIONS:     Discharge Medications        New Medications        Instructions Start Date   aspirin 81 MG EC tablet   81 mg, Oral, Daily   Start Date: May 29,  2024     metoprolol succinate XL 25 MG 24 hr tablet  Commonly known as: TOPROL-XL   12.5 mg, Oral, Every 24 Hours Scheduled      pantoprazole 40 MG EC tablet  Commonly known as: PROTONIX   40 mg, Oral, Daily             Continue These Medications        Instructions Start Date   buPROPion  MG 12 hr tablet  Commonly known as: WELLBUTRIN SR   200 mg, Oral, 2 Times Daily      Cyanocobalamin 1000 MCG/ML kit   1,000 mcg, Injection, Every 30 Days      cyclobenzaprine 10 MG tablet  Commonly known as: FLEXERIL   10 mg, Oral, 3 Times Daily PRN      FLUoxetine 40 MG capsule  Commonly known as: PROzac   40 mg, Oral, Daily      furosemide 20 MG tablet  Commonly known as: LASIX   20 mg, Oral, Daily      hydrOXYzine 10 MG tablet  Commonly known as: ATARAX   10 mg, Oral, 3 Times Daily PRN      insulin regular 100 UNIT/ML injection  Commonly known as: humuLIN R,novoLIN R   10 Units, Subcutaneous, 3 Times Daily Before Meals      losartan-hydrochlorothiazide 50-12.5 MG per tablet  Commonly known as: HYZAAR   1 tablet, Oral, Daily      metFORMIN  MG 24 hr tablet  Commonly known as: GLUCOPHAGE-XR   2,000 mg, Oral, Nightly      ondansetron ODT 4 MG disintegrating tablet  Commonly known as: ZOFRAN-ODT   4 mg, Translingual, Every 6 Hours PRN      oxybutynin XL 5 MG 24 hr tablet  Commonly known as: DITROPAN-XL   5 mg, Oral, Daily      Semaglutide (1 MG/DOSE) 4 MG/3ML solution pen-injector  Commonly known as: OZEMPIC   1 mg, Subcutaneous, Weekly      simvastatin 40 MG tablet  Commonly known as: ZOCOR   40 mg, Oral, Nightly                  Follow-up Information       Delilah Pizarro MD Follow up in 1 week(s).    Specialty: Geriatric Medicine  Why: Reccomend outpatient sleep study  Contact information:  [address]  [facility] [address]  [phone]               Delilah Pizarro MD .    Specialty: Geriatric Medicine  Contact information:  [address] [place] [address]  [phone]               [facility]  CARDIOLOGY Follow up in 1 month(s).    Specialty: Cardiology  Contact information:  [address] [facility] [id number] [phone]             Khang Shore MD Follow up.    Specialty: General Surgery  Why: Possible EGD  Contact information:  [address] [address]  [phone]                              TEST  RESULTS PENDING AT DISCHARGE       CODE STATUS  Code Status and Medical Interventions:   Ordered at: 05/23/24 2205     Code Status (Patient has no pulse and is not breathing):    CPR (Attempt to Resuscitate)     Medical Interventions (Patient has pulse or is breathing):    Full Support       Jair Valdes MD  [facility]  05/28/24  17:58 EDT    Please note that this discharge summary required more than 30 minutes to complete.

## 2024-05-28 NOTE — PLAN OF CARE
Goal Outcome Evaluation:                     Pt currently resting in bed. No s/s of acute distress noted at this time. No complaints verbalized at this time. Patient has been NPO since midnight for HC in AM. Plan of care ongoing.

## 2024-05-28 NOTE — PLAN OF CARE
Goal Outcome Evaluation:   Pt resting comfortably in bed. Heart cath completed this shift. VSS. No acute s/s of distress noted.

## 2024-05-28 NOTE — CONSULTS
"Diabetes Education  Assessment/Teaching    Patient Name:  Es Olivier  YOB: 1960  MRN: [id number]  Admit Date:  5/23/2024      Assessment Date:  5/28/2024  Flowsheet Row Most Recent Value   General Information     Height 167.6 cm (66\")   Weight 105 kg (231 lb 11.2 oz)   Weight Method Bed scale   Pregnancy Assessment    Diabetes History    Education Preferences    Nutrition Information    Assessment Topics    DM Goals             Flowsheet Row Most Recent Value   DM Education Needs    Meter Has own   Frequency of Testing PRN   Medication Insulin   Problem Solving Hypoglycemia, Hyperglycemia, Sick days, Signs, Symptoms, Treatment   Reducing Risks Cardiovascular, Immunizations, Foot care, Dental exam, Eye exam, Blood pressure, Lipids, A1C testing, Neuropathy, Retinopathy   Healthy Eating Basic meal plan provided   Physical Activity Walking   Physical Activity Frequency Occasionally   Healthy Coping Appropriate   Discharge Plan Follow-up with PCP   Motivation Moderate   Teaching Method Explanation, Discussion, Handouts   Patient Response Verbalized understanding              Other Comments:  A1C 6.1 Patient was educated and received AADE7 and ADA handouts on diet, activity, checking blood glucose, taking medication as prescribed, checking feet daily and S/S of hypo/hyperglycemia. Patient was educated on sick rule days. Patient had no questions or concerns. Please re-consult or call for concerns or questions. Thank you.        Electronically signed by:  Sanaz Izaguirre RN  05/28/24 12:23 EDT  "

## 2024-05-29 NOTE — OUTREACH NOTE
Prep Survey      Flowsheet Row Responses   [facility] facility patient discharged from? [facility]   Is LACE score < 7 ? No   Eligibility Readm Mgmt   Discharge diagnosis Chest pain-Left heart cath this visit   Does the patient have one of the following disease processes/diagnoses(primary or secondary)? Other   Does the patient have Home health ordered? No   Is there a DME ordered? No   Prep survey completed? Yes            GILDARDO ESPOSITO - Registered Nurse

## 2024-05-29 NOTE — PAYOR COMM NOTE
"CONTACT:  ROSEANN DOE MSN, RN  UTILIZATION MANAGEMENT DEPT.  [facility]  [address] [facility], [address]  PHONE:  [phone]  FAX: [phone]    Patient discharged to home on 5/28/24    Ref # [id number]    Jenn Muro (64 y.o. Female)       Date of Birth   1960    Social Security Number       Address   [address] [place] [address]    Home Phone   [phone]    MRN   [id number]       Judaism   Scientologist    Marital Status                               Admission Date   5/23/24    Admission Type   Emergency    Admitting Provider   Mack Lam MD    Attending Provider       Department, Room/Bed   [facility], 3302/2S       Discharge Date   5/28/2024    Discharge Disposition   Home or Self Care    Discharge Destination                                 Attending Provider: (none)   Allergies: Mobic [Meloxicam], Lisinopril    Isolation: None   Infection: None   Code Status: Prior    Ht: 167.6 cm (66\")   Wt: 105 kg (231 lb 11.2 oz)    Admission Cmt: None   Principal Problem: Chest pain [R07.9]                   Active Insurance as of 5/23/2024       Primary Coverage       Payor Plan Insurance Group Employer/Plan Group    HUMANA MEDICARE REPLACEMENT HUMANA MED ADV HMO [id number]       Payor Plan Address Payor Plan Phone Number Payor Plan Fax Number Effective Dates    [address] [address] [phone]  5/1/2024 - None Entered    [facility] [address]         Subscriber Name Subscriber Birth Date Member ID       JENN MURO 1960 [id number]                     Emergency Contacts        (Rel.) Home Phone Work Phone Mobile Phone    Brynn Mane (Daughter) [phone] -- --    Mikki Muro (Daughter) [phone] -- [phone]                 Discharge Summary        Jair Valdes MD at 05/28/24 [address] [place] [address] HOSPITALISTS DISCHARGE SUMMARY    Patient Identification:  Name:  Jenn Muro  Age:  64 y.o.  Sex:  " female  :  1960  MRN:  [id number]  Visit Number:  [id number]    Date of Admission: 2024  Date of Discharge:  2024    PCP: Delilah Pizarro MD    DISCHARGE DIAGNOSIS  #Atypical Chest Pain, Acute Coronary Syndrome ruled out  #Hypertension/Hyperlipidemia/Coronary Artery Disease  #Gastroesophageal Reflux Disease/History H Pylori  #Insulin dependent Diabetes Mellitus Type II  #Bladder Stone  #Obstructive Sleep Apnea   #Anxiety/Depression  #Former Smoker  #Morbid Obesity by BMI    CONSULTS   Cardiology   General surgery     PROCEDURES PERFORMED  [facility] :  ASSESSMENT and PLAN:  1.  40-50% mid LAD lesion which does not appear to be hemodynamically significant.  (RFR was attempted however due to technical difficulties unable to do so)  2.  Luminal irregularities in LCx.  3.  30-40% proximal RCA lesion.  4.  At present patient does not have any exertional chest pain.  Treat her with medical therapy.  5.  If she has further exertional chest pain then LAD lesion will be assessed with RFR to assess hemodynamic significance and if warranted PCI can be done at that time.          HOSPITAL COURSE  Patient is a 64 y.o. female presented on  to [facility] complaining of chest pain.  Please see the admitting history and physical for further details.      Ms. Olivier is our 65 yo F with hx BMI PMH Arthritis, Depression, Fatty Liver, History H Pylori, GERD, Obstructive Sleep Apnea, Diabetes Mellitus Type II, Hypertension, Hyperlipidemia, Coronary Artery Disease who presented chest pain. HS Troponins 13->15. EKG showed normal sinus rhythm, possible old anterior infarct. CXR showed no acute cardiopulmonary processes. Emergency room provider gave Aspirin 324. Hospital Medicine consulted for admission. Patient endorses chest pain earlier in the week when drinking coffee, notes has had H pylori before on testing but never treated. Echocardiogram showed LVEF 56-60%, diastolic dysfunction. Stress test showed  intermediate study, showed small sized, mildly severe area of ischemia in inferior wall. LHC performed with nonobstructive disease as above. Cardiology recommending medical treatment. ASA, statin. BB started. Patient to f/u with PCP, surgery, cardiology.     VITAL SIGNS:  Temp:  [97.8 °F (36.6 °C)-98.3 °F (36.8 °C)] 98.3 °F (36.8 °C)  Heart Rate:  [77-92] 84  Resp:  [18] 18  BP: (101-128)/(53-82) 123/69  SpO2:  [92 %-99 %] 98 %  on  Flow (L/min):  [2] 2;   Device (Oxygen Therapy): nasal cannula with ETCO2    Body mass index is 37.4 kg/m².  Wt Readings from Last 3 Encounters:   05/28/24 105 kg (231 lb 11.2 oz)   07/22/22 133 kg (294 lb)   07/08/22 132 kg (292 lb)       PHYSICAL EXAM:  Constitutional:  Well-developed and well-nourished.  No respiratory distress.      HENT:  Head:  Normocephalic and atraumatic.  Mouth:  Moist mucous membranes.    Eyes:  Conjunctivae and EOM are normal.  Pupils are equal, round, and reactive to light.  No scleral icterus.    Cardiovascular:  Normal rate, regular rhythm and normal heart sounds with no murmur.  Pulmonary/Chest:  No respiratory distress, no wheezes, no crackles, with normal breath sounds and good air movement.  Abdominal:  Soft.  Bowel sounds are normal.  No distension and no tenderness.   Musculoskeletal:  No edema, no tenderness, and no deformity.  No red or swollen joints anywhere.    Neurological:  Alert and oriented to person, place, and time.  No gross neurological deficit.   Skin:  Skin is warm and dry. No rash noted. No pallor.   Peripheral vascular:  Strong pulses in all 4 extremities with no clubbing, no cyanosis, no edema.    DISCHARGE DISPOSITION   Stable    DISCHARGE MEDICATIONS:     Discharge Medications        New Medications        Instructions Start Date   aspirin 81 MG EC tablet   81 mg, Oral, Daily   Start Date: May 29, 2024     metoprolol succinate XL 25 MG 24 hr tablet  Commonly known as: TOPROL-XL   12.5 mg, Oral, Every 24 Hours Scheduled       pantoprazole 40 MG EC tablet  Commonly known as: PROTONIX   40 mg, Oral, Daily             Continue These Medications        Instructions Start Date   buPROPion  MG 12 hr tablet  Commonly known as: WELLBUTRIN SR   200 mg, Oral, 2 Times Daily      Cyanocobalamin 1000 MCG/ML kit   1,000 mcg, Injection, Every 30 Days      cyclobenzaprine 10 MG tablet  Commonly known as: FLEXERIL   10 mg, Oral, 3 Times Daily PRN      FLUoxetine 40 MG capsule  Commonly known as: PROzac   40 mg, Oral, Daily      furosemide 20 MG tablet  Commonly known as: LASIX   20 mg, Oral, Daily      hydrOXYzine 10 MG tablet  Commonly known as: ATARAX   10 mg, Oral, 3 Times Daily PRN      insulin regular 100 UNIT/ML injection  Commonly known as: humuLIN R,novoLIN R   10 Units, Subcutaneous, 3 Times Daily Before Meals      losartan-hydrochlorothiazide 50-12.5 MG per tablet  Commonly known as: HYZAAR   1 tablet, Oral, Daily      metFORMIN  MG 24 hr tablet  Commonly known as: GLUCOPHAGE-XR   2,000 mg, Oral, Nightly      ondansetron ODT 4 MG disintegrating tablet  Commonly known as: ZOFRAN-ODT   4 mg, Translingual, Every 6 Hours PRN      oxybutynin XL 5 MG 24 hr tablet  Commonly known as: DITROPAN-XL   5 mg, Oral, Daily      Semaglutide (1 MG/DOSE) 4 MG/3ML solution pen-injector  Commonly known as: OZEMPIC   1 mg, Subcutaneous, Weekly      simvastatin 40 MG tablet  Commonly known as: ZOCOR   40 mg, Oral, Nightly                  Follow-up Information       Delilah Pizarro MD Follow up in 1 week(s).    Specialty: Geriatric Medicine  Why: Reccomend outpatient sleep study  Contact information:  [address]  [facility] [address]  [phone]               Dleilah Pizarro MD .    Specialty: Geriatric Medicine  Contact information:  [address]  [facility] [address]  [phone]               [facility] CARDIOLOGY Follow up in 1 month(s).    Specialty: Cardiology  Contact information:  [address] [facility]  [address]  [phone]             Khang Shore MD Follow up.    Specialty: General Surgery  Why: Possible EGD  Contact information:  [address]  [place] [address]  [place] [address]  [phone]                              TEST  RESULTS PENDING AT DISCHARGE       CODE STATUS  Code Status and Medical Interventions:   Ordered at: 05/23/24 3000     Code Status (Patient has no pulse and is not breathing):    CPR (Attempt to Resuscitate)     Medical Interventions (Patient has pulse or is breathing):    Full Support       Jair Valdes MD  [facility]  05/28/24  17:58 EDT    Please note that this discharge summary required more than 30 minutes to complete.      Electronically signed by Jair Valdes MD at 05/28/24 9105

## 2024-05-29 NOTE — PLAN OF CARE
Goal Outcome Evaluation:   Patient to be discharged at this time

## 2024-06-12 ENCOUNTER — READMISSION MANAGEMENT (OUTPATIENT)
Dept: CALL CENTER | Facility: HOSPITAL | Age: 64
End: 2024-06-12
Payer: MEDICARE

## 2024-06-12 NOTE — OUTREACH NOTE
Medical Week 2 Survey      Flowsheet Row Responses   [facility] patient discharged from? [facility]   Does the patient have one of the following disease processes/diagnoses(primary or secondary)? Other   Week 2 attempt successful? No   Unsuccessful attempts Attempt 1            Darrick MONTILLA - Registered Nurse

## 2024-06-18 ENCOUNTER — READMISSION MANAGEMENT (OUTPATIENT)
Dept: CALL CENTER | Facility: HOSPITAL | Age: 64
End: 2024-06-18
Payer: MEDICARE

## 2024-06-18 NOTE — OUTREACH NOTE
Medical Week 2 Survey      Flowsheet Row Responses   [facility] patient discharged from? [facility]   Does the patient have one of the following disease processes/diagnoses(primary or secondary)? Other   Week 2 attempt successful? No   Unsuccessful attempts Attempt 2  [spoke with jr who requests we call patient.  No answer at patient's number.]            GILDARDO ESPOSITO - Registered Nurse

## 2024-06-19 ENCOUNTER — OFFICE VISIT (OUTPATIENT)
Dept: SURGERY | Facility: CLINIC | Age: 64
End: 2024-06-19
Payer: MEDICARE

## 2024-06-19 VITALS — BODY MASS INDEX: 37.12 KG/M2 | HEIGHT: 66 IN | WEIGHT: 231 LBS

## 2024-06-19 DIAGNOSIS — K21.9 GASTROESOPHAGEAL REFLUX DISEASE, UNSPECIFIED WHETHER ESOPHAGITIS PRESENT: ICD-10-CM

## 2024-06-19 DIAGNOSIS — R13.19 ESOPHAGEAL DYSPHAGIA: Primary | ICD-10-CM

## 2024-06-19 RX ORDER — DOCUSATE SODIUM 250 MG
250 CAPSULE ORAL 2 TIMES DAILY PRN
Qty: 60 CAPSULE | Refills: 1 | Status: SHIPPED | OUTPATIENT
Start: 2024-06-19

## 2024-06-19 NOTE — PROGRESS NOTES
"Subjective   Es Olivier is a 64 y.o. female is being seen for consultation today at the request of Delilah Pizarro MD    Es Olivier is a 64 y.o. female History of Present Illness  With GERD and dysphagia.  She has recently initiated Ozempic.  The patient has been on PPI therapy.  Body mass index 37.2.  Food such as rice seems to stick in the lower esophagus but does move through.      Past Medical History:   Diagnosis Date    Arthritis     Biliary dyskinesia     abnormal HIDA 2018 w/ EF 31%, symptomatic w/ N/V    Closed fracture of proximal end of left femur 2022    Depression     DM2 (diabetes mellitus, type 2)     dx , on insulin >5 years, A1c 7, associated neuropathy, no other complications    Dyspepsia     Dyspnea on exertion     Elevated cholesterol     Fatigue     Fatty liver     dx on CT     GERD (gastroesophageal reflux disease)     Heart disease     w/ cardiac clearance 2019, negative stress test 10/2017, EF 65%    Hiatal hernia     \"small\" noted on UGI     History of Helicobacter pylori infection     treated remotely    Hyperlipidemia     Hypertension     Joint pain     Morbid obesity with BMI of 45.0-49.9, adult     Sleep apnea     formerly on a device, no longer using, says just doesn't need it    Urinary incontinence     no meds    Vertigo     Vitamin D deficiency        Family History   Problem Relation Age of Onset    Breast cancer Sister         20s    Cancer Sister     Breast cancer Sister         60s    Bone cancer Mother         60    Osteoarthritis Mother     Diabetes Father     Heart attack Father     Heart disease Father     Heart disease Brother        Social History     Socioeconomic History    Marital status:    Tobacco Use    Smoking status: Former     Current packs/day: 0.00     Types: Cigarettes     Start date:      Quit date:      Years since quittin.4    Smokeless tobacco: Never   Vaping Use    Vaping status: Never Used   Substance " "and Sexual Activity    Alcohol use: Not Currently    Drug use: No    Sexual activity: Defer       Past Surgical History:   Procedure Laterality Date    BLADDER SURGERY  2013    \"tack\", uncomplicated, but unsuccessful    CARDIAC CATHETERIZATION N/A 5/28/2024    Procedure: Left Heart Cath;  Surgeon: Rusty Lamb MD;  Location: [facility] CATH INVASIVE LOCATION;  Service: Cardiology;  Laterality: N/A;    CATARACT EXTRACTION Left     COLONOSCOPY  2015    unremarkable    DILATATION AND CURETTAGE  1987    ENDOSCOPY      INCISION AND DRAINAGE HIP Left 6/8/2022    Procedure: LEFT HIP LAVAGE AND WOUND VAC PLACEMENT;  Surgeon: Jarrod Leung MD;  Location: [facility] OR;  Service: Orthopedics;  Laterality: Left;    ORIF HIP FRACTURE Left 02/26/2022    Procedure: Left hip Open reduction and internal fixation.;  Surgeon: Jarrod Leung MD;  Location: [facility] OR;  Service: Orthopedics;  Laterality: Left;    TONSILLECTOMY  1984       Review of Systems   Constitutional:  Negative for activity change, appetite change, chills and fever.   HENT:  Positive for trouble swallowing. Negative for sore throat.    Eyes:  Negative for visual disturbance.   Respiratory:  Negative for cough and shortness of breath.    Cardiovascular:  Negative for chest pain and palpitations.   Gastrointestinal:  Negative for abdominal distention, abdominal pain, blood in stool, constipation, diarrhea, nausea and vomiting.   Endocrine: Negative for cold intolerance and heat intolerance.   Genitourinary:  Negative for dysuria.   Musculoskeletal:  Negative for joint swelling.   Skin:  Negative for color change, rash and wound.   Allergic/Immunologic: Negative for immunocompromised state.   Neurological:  Negative for dizziness, seizures, weakness and headaches.   Hematological:  Negative for adenopathy. Does not bruise/bleed easily.   Psychiatric/Behavioral:  Negative for agitation and confusion.          Ht 167.6 cm (66\")   Wt 105 kg (231 lb)   LMP  (LMP " Unknown)   BMI 37.28 kg/m²   Objective   Physical Exam  Constitutional:       Appearance: She is well-developed.   HENT:      Head: Normocephalic and atraumatic.   Eyes:      Conjunctiva/sclera: Conjunctivae normal.      Pupils: Pupils are equal, round, and reactive to light.   Neck:      Thyroid: No thyromegaly.      Vascular: No JVD.      Trachea: No tracheal deviation.   Cardiovascular:      Rate and Rhythm: Normal rate and regular rhythm.      Heart sounds: No murmur heard.     No friction rub. No gallop.   Pulmonary:      Effort: Pulmonary effort is normal.      Breath sounds: Normal breath sounds.   Abdominal:      General: There is no distension.      Palpations: Abdomen is soft. There is no hepatomegaly or splenomegaly.      Tenderness: There is no abdominal tenderness.      Hernia: No hernia is present.   Musculoskeletal:         General: No deformity. Normal range of motion.      Cervical back: Neck supple.   Skin:     General: Skin is warm and dry.   Neurological:      Mental Status: She is alert and oriented to person, place, and time.               Assessment   Diagnoses and all orders for this visit:    1. Esophageal dysphagia (Primary)  -     FL ESOPHAGRAM SINGLE CONTRAST    2. Gastroesophageal reflux disease, unspecified whether esophagitis present    Other orders  -     docusate sodium (COLACE) 250 MG capsule; Take 1 capsule by mouth 2 (Two) Times a Day As Needed for Constipation.  Dispense: 60 capsule; Refill: 1      Es Olivier is a 64 y.o. female with dysphagia of uncertain etiology though Ozempic may be a component given her history of GERD which may be exacerbated by her use of Ozempic.  Patient will continue PPI therapy and undergo esophagram.  Follow-up after imaging.  Due to chronic constipation she will also be initiated on bowel regimen with stool softener twice daily.                    This document has been electronically signed by Khang Shore MD   June 19, 2024 17:12  EDT

## 2024-06-26 ENCOUNTER — READMISSION MANAGEMENT (OUTPATIENT)
Dept: CALL CENTER | Facility: HOSPITAL | Age: 64
End: 2024-06-26
Payer: MEDICARE

## 2024-06-26 NOTE — OUTREACH NOTE
Medical Week 3 Survey      Flowsheet Row Responses   [facility] patient discharged from? [facility]   Does the patient have one of the following disease processes/diagnoses(primary or secondary)? Other   Week 3 attempt successful? Yes   Call start time 1055   Call end time 1056   Discharge diagnosis Chest pain-Left heart cath this visit   Is the patient taking all medications as directed (includes completed medication regime)? Yes   Comments regarding appointments seen surgeon on 6/19   Does the patient have a primary care provider?  Yes   Has the patient kept scheduled appointments due by today? Yes   Has home health visited the patient within 72 hours of discharge? N/A   Psychosocial issues? No   Did the patient receive a copy of their discharge instructions? Yes   What is the patient's perception of their health status since discharge? Improving   Is the patient/caregiver able to teach back signs and symptoms related to disease process for when to call PCP? Yes   Is the patient/caregiver able to teach back signs and symptoms related to disease process for when to call 911? Yes   Is the patient/caregiver able to teach back the hierarchy of who to call/visit for symptoms/problems? PCP, Specialist, Home health nurse, Urgent Care, ED, 911 Yes   Week 3 Call Completed? Yes   Graduated Yes   Is the patient interested in additional calls from an ambulatory ? No   Would this patient benefit from a Referral to Amb Social Work? No   Graduated/Revoked comments Doing well, brief call.   Call end time 1056            Naheed GROSS - Registered Nurse

## 2024-07-02 ENCOUNTER — HOSPITAL ENCOUNTER (OUTPATIENT)
Dept: GENERAL RADIOLOGY | Facility: HOSPITAL | Age: 64
Discharge: HOME | End: 2024-07-02
Admitting: SURGERY
Payer: MEDICARE

## 2024-07-02 PROCEDURE — 74220 X-RAY XM ESOPHAGUS 1CNTRST: CPT

## 2024-07-05 ENCOUNTER — OFFICE VISIT (OUTPATIENT)
Dept: CARDIOLOGY | Facility: CLINIC | Age: 64
End: 2024-07-05
Payer: MEDICARE

## 2024-07-05 VITALS
OXYGEN SATURATION: 97 % | BODY MASS INDEX: 37.28 KG/M2 | DIASTOLIC BLOOD PRESSURE: 78 MMHG | HEIGHT: 66 IN | SYSTOLIC BLOOD PRESSURE: 118 MMHG | WEIGHT: 232 LBS | HEART RATE: 84 BPM

## 2024-07-05 DIAGNOSIS — I10 PRIMARY HYPERTENSION: Primary | ICD-10-CM

## 2024-07-05 DIAGNOSIS — E78.2 MIXED HYPERLIPIDEMIA: ICD-10-CM

## 2024-07-05 DIAGNOSIS — I25.10 CORONARY ARTERY DISEASE INVOLVING NATIVE CORONARY ARTERY OF NATIVE HEART WITHOUT ANGINA PECTORIS: Chronic | ICD-10-CM

## 2024-07-05 NOTE — PROGRESS NOTES
"Chief Complaint  Follow-up (Hospital follow up , no new symptoms )    Subjective          Es Olivier presents to [facility] CARDIOLOGY for follow up.    History of Present Illness    Es Olivier was last seen 5/27/2024 during her hospitalization.   She underwent cardiac catheterization on 5/28/2024 for chest pain and abnormal nuclear stress test.  She was noted to have nonobstructive coronary artery disease with a 40 to 50% tubular mid LAD lesion, luminal irregularities in the LCx, and a 30 to 40% proximal RCA lesion.  No intervention was obtained.    At today's visit Ms. Olivier reports that she is doing well.  She denies any chest pain, palpitations, shortness of breath or dyspnea on exertion.  She reports medication compliance.    Objective     Vital Signs:   /78 (BP Location: Left arm, Patient Position: Sitting, Cuff Size: Adult)   Pulse 84   Ht 167.6 cm (66\")   Wt 105 kg (232 lb)   SpO2 97%   BMI 37.45 kg/m²       Physical Exam  Vitals reviewed.   Constitutional:       Appearance: Normal appearance. She is well-developed.   Cardiovascular:      Rate and Rhythm: Normal rate and regular rhythm.      Heart sounds: No murmur heard.     No friction rub. No gallop.   Pulmonary:      Effort: Pulmonary effort is normal. No respiratory distress.      Breath sounds: Normal breath sounds. No wheezing or rales.   Skin:     General: Skin is warm and dry.   Neurological:      Mental Status: She is alert and oriented to person, place, and time.   Psychiatric:         Mood and Affect: Mood normal.         Behavior: Behavior normal.          Result Review :     CMP          5/25/2024    01:48 5/25/2024    13:56 5/26/2024    01:03 5/28/2024    07:45   CMP   Glucose 134   156  162    BUN 18   19  20    Creatinine 1.01   1.04  1.29    EGFR 62.7   60.5  46.7    Sodium 139   140  137    Potassium 3.3  3.8  4.2  3.8    Chloride 101   105  99    Calcium 8.7   8.8  9.4    Total Protein 5.9   6.0   "   Albumin 3.8   3.7     Globulin 2.1   2.3     Total Bilirubin 0.2   0.3     Alkaline Phosphatase 54   58     AST (SGOT) 13   16     ALT (SGPT) 10   12     Albumin/Globulin Ratio 1.8   1.6     BUN/Creatinine Ratio 17.8   18.3  15.5    Anion Gap 11.8   8.9  10.5      CBC          5/25/2024    01:48 5/26/2024    01:03 5/28/2024    07:45   CBC   WBC 6.75  6.44  7.41    RBC 3.95  4.03  4.53    Hemoglobin 12.3  12.1  14.0    Hematocrit 36.6  37.9  42.5    MCV 92.7  94.0  93.8    MCH 31.1  30.0  30.9    MCHC 33.6  31.9  32.9    RDW 12.5  12.3  12.3    Platelets 283  281  320      Lipid Panel          5/28/2024    07:45   Lipid Panel   Total Cholesterol 150    Triglycerides 129    HDL Cholesterol 61    VLDL Cholesterol 22    LDL Cholesterol  67    LDL/HDL Ratio 1.04              Most recent echocardiogram  Results for orders placed during the hospital encounter of 05/23/24    Adult Transthoracic Echo Complete W/ Cont if Necessary Per Protocol    Interpretation Summary    Left ventricular systolic function is normal. Calculated left ventricular EF = 59.4% Left ventricular ejection fraction appears to be 56 - 60%.    Left ventricular diastolic function is consistent with (grade I) impaired relaxation.    Estimated right ventricular systolic pressure from tricuspid regurgitation is normal (<35 mmHg).      Most recent Stress Test  Results for orders placed during the hospital encounter of 05/23/24    Stress Test With Myocardial Perfusion One Day    Interpretation Summary    Impressions are consistent with an intermediate risk study.    Left ventricular ejection fraction is normal.    Myocardial perfusion imaging indicates a small-sized, mildly severe area of ischemia located in the inferior wall.    TID 1.11.    Findings consistent with a normal ECG stress test.       Most recent Cardiac Cath  Results for orders placed during the hospital encounter of 05/23/24    Cardiac Catheterization/Vascular Study    Conclusion  Images  from the original result were not included.  CARDIAC CATHETERIZATION / INTERVENTION REPORT        DATE OF PROCEDURE: 5/28/2024      INDICATION FOR PROCEDURE: chest pain and abnormal Stress test.    PRE PROCEDURE DIAGNOSIS:  1.  Chest pain  2.  Abnormal stress test.      POST PROCEDURE DIAGNOSIS:  40-50% tubular mid LAD lesion.  Luminal irregularities in LCx  30-40% proximal RCA lesion  LVEF 55-60%.  LVEDP 17 mmHg.      PROCEDURE PERFORMED:  1. Selective Coronary Angiogram  2. Left heart catheretization  3. Left Ventriculography      DESCRIPTION O PROCEDURE:  Prior to the procedure risk, benefits and possible alternative were discussed with the patient and informed consent was obtained. Patient was brought to cardiac cath lab table in post absorbtive state. Patient was prepped and drape in usual sterile fashion. IV Versed and Fentanyl was used for moderate sedation. 2% Lidocaine was used for topical anesthesia.    R radial arterial site was prepped and a micropuncture needle was used to access the artery and a 5 F slender sheath was placed. 2.5 mg of Verapamil and 200 mcg of NTG was given through the sheath.    RCA was engaged with JR4 catheter and angiograms in multiple views.  Left main was engaged with a JL 3.5 catheter and angiograms were in multiple views.  Pigtail was used to cross the LV, pressures were obtained, LV gram was performed using a power injector with 30 mL contrast at 10 mL/s injection.  Pigtail catheter was flushed with saline pullback under continuous pressure monitoring there is no gradient across the aortic valve.    During diagnostic angiogram patient was noted to have a 30-40% proximal RCA lesion we attempted dual RFR however due to technical difficulties unable to do so.  Angiographically did not appear significant.  Therefore further attempt was aborted.    At the completion of procedure, all catheters were removed.  Hemostasis successfully obtained using a transradial band.      CORONARY  FINDINGS:  LM: Is a large calibre vessel , normal take off from left cusp, divides into LAD and Lcx.  Left main is free of disease.    LAD: Medium to large caliber vessel.  There is a 40-50% tubular lesion in the mid LAD.  Distal LAD is free of disease distally wrapping around the apex.  D1 is a medium to small caliber vessel without significant lesion    LCX: Moderate calibre vessel, consist primarily of an obtuse marginal.  Mild luminal irregularities.    RCA: Large calibre, dominant artery, normal take off from right cusp.  30-40% lesion at the proximal segment.  Rest of RCA has luminal irregularities.  Small RPDA and RPL A.      Left Ventriculography: LV systolic function was normal with visual estimated EF of 55-60. No wall motion abnormalities. No significant mitral regurgitation noted.    Left heart catheterization.  LV systolic pressure 137 mmHg LVEDP: 17 mmHg  No gradient across the aortic valve on pull back.    MISCELLANEOUS:  Clinical frailty: 2- Well  ASA: 2=2- Mild to moderate systemic disease, medially well controlled, with no functional limitation  Mallampati: Class 2=2- Able to visualize the soft palate, fauces, uvula.  The anterior & posterior tonsilar pillars are hidden by the tongue.  Face to face mdoerate conscious  sedation time : None  EBL: Less than 10cc    COMPLICATIONS : None      ASSESSMENT and PLAN:  1.  40-50% mid LAD lesion which does not appear to be hemodynamically significant.  (RFR was attempted however due to technical difficulties unable to do so)  2.  Luminal irregularities in LCx.  3.  30-40% proximal RCA lesion.  4.  At present patient does not have any exertional chest pain.  Treat her with medical therapy.  5.  If she has further exertional chest pain then LAD lesion will be assessed with RFR to assess hemodynamic significance and if warranted PCI can be done at that time.        Rusty Lamb MD  05/28/24  16:04 EDT      Current Outpatient Medications   Medication Sig Dispense  Refill    buPROPion SR (WELLBUTRIN SR) 200 MG 12 hr tablet Take 1 tablet by mouth 2 (Two) Times a Day.      Cyanocobalamin 1000 MCG/ML kit Inject 1 mL as directed Every 30 (Thirty) Days.      cyclobenzaprine (FLEXERIL) 10 MG tablet Take 1 tablet by mouth 3 (Three) Times a Day As Needed for Muscle Spasms.      docusate sodium (COLACE) 250 MG capsule Take 1 capsule by mouth 2 (Two) Times a Day As Needed for Constipation. 60 capsule 1    FLUoxetine (PROzac) 40 MG capsule Take 1 capsule by mouth Daily.      furosemide (LASIX) 20 MG tablet Take 1 tablet by mouth Daily.      hydrOXYzine (ATARAX) 10 MG tablet Take 1 tablet by mouth 3 (Three) Times a Day As Needed for Anxiety.      insulin regular (humuLIN R,novoLIN R) 100 UNIT/ML injection Inject 10 Units under the skin into the appropriate area as directed 3 (Three) Times a Day Before Meals.      losartan-hydrochlorothiazide (HYZAAR) 50-12.5 MG per tablet Take 1 tablet by mouth Daily.      metFORMIN ER (GLUCOPHAGE-XR) 500 MG 24 hr tablet Take 4 tablets by mouth Every Night.      metoprolol succinate XL (TOPROL-XL) 25 MG 24 hr tablet Take 0.5 tablets by mouth Daily for 30 days. 15 tablet 0    ondansetron ODT (ZOFRAN-ODT) 4 MG disintegrating tablet Place 1 tablet on the tongue Every 6 (Six) Hours As Needed for Nausea or Vomiting.      oxybutynin XL (DITROPAN-XL) 5 MG 24 hr tablet Take 1 tablet by mouth Daily.      Semaglutide, 1 MG/DOSE, (OZEMPIC) 4 MG/3ML solution pen-injector Inject 1 mg under the skin into the appropriate area as directed 1 (One) Time Per Week.      simvastatin (ZOCOR) 40 MG tablet Take 1 tablet by mouth Every Night.       No current facility-administered medications for this visit.            Assessment and Plan    Problem List Items Addressed This Visit          Cardiac and Vasculature    Hypertension - Primary (Chronic)    Hyperlipidemia (Chronic)    Non obstructive coronary artery disease (Chronic)           Follow Up     Medications were reviewed  with the patient.    Nonobstructive ASCVD/CAD is stable.  Continue simvastatin, metoprolol succinate, and aspirin.    HTN is controlled.     Continue simvastatin for dyslipidemia.  LDL 67.    Discussed aggressive risk factor modification including diet, exercise, and medication adherence.    Return in about 1 year (around 7/5/2025).    Patient was given instructions and counseling regarding her condition or for health maintenance advice. Please see specific information pulled into the AVS if appropriate.

## 2024-07-10 ENCOUNTER — OFFICE VISIT (OUTPATIENT)
Dept: SURGERY | Facility: CLINIC | Age: 64
End: 2024-07-10
Payer: MEDICARE

## 2024-07-10 VITALS — BODY MASS INDEX: 37.28 KG/M2 | WEIGHT: 232 LBS | HEIGHT: 66 IN

## 2024-07-10 DIAGNOSIS — R13.19 ESOPHAGEAL DYSPHAGIA: Primary | ICD-10-CM

## 2024-07-10 PROCEDURE — 99212 OFFICE O/P EST SF 10 MIN: CPT | Performed by: SURGERY

## 2024-07-10 PROCEDURE — 1159F MED LIST DOCD IN RCRD: CPT | Performed by: SURGERY

## 2024-07-10 PROCEDURE — 1160F RVW MEDS BY RX/DR IN RCRD: CPT | Performed by: SURGERY

## 2024-07-10 NOTE — PROGRESS NOTES
"Subjective   Es Olivier is a 64 y.o. female is being seen for consultation today at the request of Delilah Pizarro MD    Es Olivier is a 64 y.o. female History of Present Illness  With GERD and dysphagia.  She has recently initiated Ozempic.  The patient has been on PPI therapy.  Body mass index 37.2.  Food such as rice seems to stick in the lower esophagus but does move through.  Follow-upCurrent symptoms include no chest pain, no confusion, no dizziness, no nausea, no palpitations, no shortness of breath, no weakness, no headaches, no abdominal pain, no cough and no sore throat.       Past Medical History:   Diagnosis Date    Arthritis     Biliary dyskinesia     abnormal HIDA 11/2018 w/ EF 31%, symptomatic w/ N/V    Closed fracture of proximal end of left femur 02/25/2022    Coronary artery disease involving native coronary artery of native heart without angina pectoris 7/5/2024    Depression     DM2 (diabetes mellitus, type 2)     dx 1994, on insulin >5 years, A1c 7, associated neuropathy, no other complications    Dyspepsia     Dyspnea on exertion     Elevated cholesterol     Fatigue     Fatty liver     dx on CT 2016    GERD (gastroesophageal reflux disease)     Heart disease     w/ cardiac clearance 4/2019, negative stress test 10/2017, EF 65%    Hiatal hernia     \"small\" noted on UGI 2014    History of Helicobacter pylori infection     treated remotely    Hyperlipidemia     Hypertension     Joint pain     Morbid obesity with BMI of 45.0-49.9, adult     Sleep apnea     formerly on a device, no longer using, says just doesn't need it    Urinary incontinence     no meds    Vertigo     Vitamin D deficiency        Family History   Problem Relation Age of Onset    Breast cancer Sister         20s    Cancer Sister     Breast cancer Sister         60s    Bone cancer Mother         60    Osteoarthritis Mother     Diabetes Father     Heart attack Father     Heart disease Father     Heart disease Brother  " "      Social History     Socioeconomic History    Marital status:    Tobacco Use    Smoking status: Former     Current packs/day: 0.00     Types: Cigarettes     Start date:      Quit date:      Years since quittin.5    Smokeless tobacco: Never   Vaping Use    Vaping status: Never Used   Substance and Sexual Activity    Alcohol use: Not Currently    Drug use: No    Sexual activity: Defer       Past Surgical History:   Procedure Laterality Date    BLADDER SURGERY      \"tack\", uncomplicated, but unsuccessful    CARDIAC CATHETERIZATION N/A 2024    Procedure: Left Heart Cath;  Surgeon: Rusty Lamb MD;  Location: [facility] CATH INVASIVE LOCATION;  Service: Cardiology;  Laterality: N/A;    CATARACT EXTRACTION Left     COLONOSCOPY      unremarkable    DILATATION AND CURETTAGE  1987    ENDOSCOPY      INCISION AND DRAINAGE HIP Left 2022    Procedure: LEFT HIP LAVAGE AND WOUND VAC PLACEMENT;  Surgeon: Jarrod Leung MD;  Location: [facility];  Service: Orthopedics;  Laterality: Left;    ORIF HIP FRACTURE Left 2022    Procedure: Left hip Open reduction and internal fixation.;  Surgeon: Jarrod Leung MD;  Location: [facility];  Service: Orthopedics;  Laterality: Left;    TONSILLECTOMY         Review of Systems   Constitutional:  Negative for activity change, appetite change, chills and fever.   HENT:  Positive for trouble swallowing. Negative for sore throat.    Eyes:  Negative for visual disturbance.   Respiratory:  Negative for cough and shortness of breath.    Cardiovascular:  Negative for chest pain and palpitations.   Gastrointestinal:  Negative for abdominal distention, abdominal pain, blood in stool, constipation, diarrhea, nausea and vomiting.   Endocrine: Negative for cold intolerance and heat intolerance.   Genitourinary:  Negative for dysuria.   Musculoskeletal:  Negative for joint swelling.   Skin:  Negative for color change, rash and wound.   Allergic/Immunologic: " "Negative for immunocompromised state.   Neurological:  Negative for dizziness, seizures, weakness and headaches.   Hematological:  Negative for adenopathy. Does not bruise/bleed easily.   Psychiatric/Behavioral:  Negative for agitation and confusion.          Ht 167.6 cm (66\")   Wt 105 kg (232 lb)   LMP  (LMP Unknown)   BMI 37.45 kg/m²   Objective   Physical Exam  Constitutional:       Appearance: She is well-developed.   HENT:      Head: Normocephalic and atraumatic.   Eyes:      Conjunctiva/sclera: Conjunctivae normal.      Pupils: Pupils are equal, round, and reactive to light.   Neck:      Thyroid: No thyromegaly.      Vascular: No JVD.      Trachea: No tracheal deviation.   Cardiovascular:      Rate and Rhythm: Normal rate and regular rhythm.      Heart sounds: No murmur heard.     No friction rub. No gallop.   Pulmonary:      Effort: Pulmonary effort is normal.      Breath sounds: Normal breath sounds.   Abdominal:      General: There is no distension.      Palpations: Abdomen is soft. There is no hepatomegaly or splenomegaly.      Tenderness: There is no abdominal tenderness.      Hernia: No hernia is present.   Musculoskeletal:         General: No deformity. Normal range of motion.      Cervical back: Neck supple.   Skin:     General: Skin is warm and dry.   Neurological:      Mental Status: She is alert and oriented to person, place, and time.               Assessment   Diagnoses and all orders for this visit:    1. Esophageal dysphagia (Primary)  -     Case Request; Standing  -     Case Request    Other orders  -     Follow Anesthesia Guidelines / Protocol; Future  -     Follow Anesthesia Guidelines / Protocol; Standing  -     Verify / Perform Chlorhexidine Skin Prep; Standing  -     Obtain Informed Consent; Future  -     Provide NPO Instructions to Patient; Future  -     Chlorhexidine Skin Prep; Future  -     Place Sequential Compression Device; Standing  -     Maintain Sequential Compression Device; " Dieudonne Olivier is a 64 y.o. female with dysphagia of uncertain etiology though Ozempic may be a component given her history of GERD which may be exacerbated by her use of Ozempic.  Patient will continue PPI therapy esophagram shows mild reflux with small hiatal hernia but no strictures or other abnormalities.  Patient will undergo EGD                  This document has been electronically signed by Khang Shore MD   July 10, 2024 14:33 EDT

## 2024-07-10 NOTE — H&P (VIEW-ONLY)
"Subjective   Es Olivier is a 64 y.o. female is being seen for consultation today at the request of Delilah Pizarro MD    Es Olivier is a 64 y.o. female History of Present Illness  With GERD and dysphagia.  She has recently initiated Ozempic.  The patient has been on PPI therapy.  Body mass index 37.2.  Food such as rice seems to stick in the lower esophagus but does move through.  Follow-upCurrent symptoms include no chest pain, no confusion, no dizziness, no nausea, no palpitations, no shortness of breath, no weakness, no headaches, no abdominal pain, no cough and no sore throat.       Past Medical History:   Diagnosis Date    Arthritis     Biliary dyskinesia     abnormal HIDA 11/2018 w/ EF 31%, symptomatic w/ N/V    Closed fracture of proximal end of left femur 02/25/2022    Coronary artery disease involving native coronary artery of native heart without angina pectoris 7/5/2024    Depression     DM2 (diabetes mellitus, type 2)     dx 1994, on insulin >5 years, A1c 7, associated neuropathy, no other complications    Dyspepsia     Dyspnea on exertion     Elevated cholesterol     Fatigue     Fatty liver     dx on CT 2016    GERD (gastroesophageal reflux disease)     Heart disease     w/ cardiac clearance 4/2019, negative stress test 10/2017, EF 65%    Hiatal hernia     \"small\" noted on UGI 2014    History of Helicobacter pylori infection     treated remotely    Hyperlipidemia     Hypertension     Joint pain     Morbid obesity with BMI of 45.0-49.9, adult     Sleep apnea     formerly on a device, no longer using, says just doesn't need it    Urinary incontinence     no meds    Vertigo     Vitamin D deficiency        Family History   Problem Relation Age of Onset    Breast cancer Sister         20s    Cancer Sister     Breast cancer Sister         60s    Bone cancer Mother         60    Osteoarthritis Mother     Diabetes Father     Heart attack Father     Heart disease Father     Heart disease Brother  " "      Social History     Socioeconomic History    Marital status:    Tobacco Use    Smoking status: Former     Current packs/day: 0.00     Types: Cigarettes     Start date:      Quit date:      Years since quittin.5    Smokeless tobacco: Never   Vaping Use    Vaping status: Never Used   Substance and Sexual Activity    Alcohol use: Not Currently    Drug use: No    Sexual activity: Defer       Past Surgical History:   Procedure Laterality Date    BLADDER SURGERY      \"tack\", uncomplicated, but unsuccessful    CARDIAC CATHETERIZATION N/A 2024    Procedure: Left Heart Cath;  Surgeon: Rusty Lamb MD;  Location: [facility] CATH INVASIVE LOCATION;  Service: Cardiology;  Laterality: N/A;    CATARACT EXTRACTION Left     COLONOSCOPY      unremarkable    DILATATION AND CURETTAGE  1987    ENDOSCOPY      INCISION AND DRAINAGE HIP Left 2022    Procedure: LEFT HIP LAVAGE AND WOUND VAC PLACEMENT;  Surgeon: Jarrod Leung MD;  Location: [facility];  Service: Orthopedics;  Laterality: Left;    ORIF HIP FRACTURE Left 2022    Procedure: Left hip Open reduction and internal fixation.;  Surgeon: Jarrod Leung MD;  Location: [facility];  Service: Orthopedics;  Laterality: Left;    TONSILLECTOMY         Review of Systems   Constitutional:  Negative for activity change, appetite change, chills and fever.   HENT:  Positive for trouble swallowing. Negative for sore throat.    Eyes:  Negative for visual disturbance.   Respiratory:  Negative for cough and shortness of breath.    Cardiovascular:  Negative for chest pain and palpitations.   Gastrointestinal:  Negative for abdominal distention, abdominal pain, blood in stool, constipation, diarrhea, nausea and vomiting.   Endocrine: Negative for cold intolerance and heat intolerance.   Genitourinary:  Negative for dysuria.   Musculoskeletal:  Negative for joint swelling.   Skin:  Negative for color change, rash and wound.   Allergic/Immunologic: " "Negative for immunocompromised state.   Neurological:  Negative for dizziness, seizures, weakness and headaches.   Hematological:  Negative for adenopathy. Does not bruise/bleed easily.   Psychiatric/Behavioral:  Negative for agitation and confusion.          Ht 167.6 cm (66\")   Wt 105 kg (232 lb)   LMP  (LMP Unknown)   BMI 37.45 kg/m²   Objective   Physical Exam  Constitutional:       Appearance: She is well-developed.   HENT:      Head: Normocephalic and atraumatic.   Eyes:      Conjunctiva/sclera: Conjunctivae normal.      Pupils: Pupils are equal, round, and reactive to light.   Neck:      Thyroid: No thyromegaly.      Vascular: No JVD.      Trachea: No tracheal deviation.   Cardiovascular:      Rate and Rhythm: Normal rate and regular rhythm.      Heart sounds: No murmur heard.     No friction rub. No gallop.   Pulmonary:      Effort: Pulmonary effort is normal.      Breath sounds: Normal breath sounds.   Abdominal:      General: There is no distension.      Palpations: Abdomen is soft. There is no hepatomegaly or splenomegaly.      Tenderness: There is no abdominal tenderness.      Hernia: No hernia is present.   Musculoskeletal:         General: No deformity. Normal range of motion.      Cervical back: Neck supple.   Skin:     General: Skin is warm and dry.   Neurological:      Mental Status: She is alert and oriented to person, place, and time.               Assessment   Diagnoses and all orders for this visit:    1. Esophageal dysphagia (Primary)  -     Case Request; Standing  -     Case Request    Other orders  -     Follow Anesthesia Guidelines / Protocol; Future  -     Follow Anesthesia Guidelines / Protocol; Standing  -     Verify / Perform Chlorhexidine Skin Prep; Standing  -     Obtain Informed Consent; Future  -     Provide NPO Instructions to Patient; Future  -     Chlorhexidine Skin Prep; Future  -     Place Sequential Compression Device; Standing  -     Maintain Sequential Compression Device; " Dieudonne Olivier is a 64 y.o. female with dysphagia of uncertain etiology though Ozempic may be a component given her history of GERD which may be exacerbated by her use of Ozempic.  Patient will continue PPI therapy esophagram shows mild reflux with small hiatal hernia but no strictures or other abnormalities.  Patient will undergo EGD                  This document has been electronically signed by Khang Shore MD   July 10, 2024 14:33 EDT

## 2024-07-18 ENCOUNTER — HOSPITAL ENCOUNTER (OUTPATIENT)
Facility: HOSPITAL | Age: 64
Setting detail: HOSPITAL OUTPATIENT SURGERY
Discharge: HOME | End: 2024-07-18
Attending: SURGERY | Admitting: SURGERY
Payer: MEDICARE

## 2024-07-18 ENCOUNTER — ANESTHESIA (OUTPATIENT)
Dept: PERIOP | Facility: HOSPITAL | Age: 64
End: 2024-07-18
Payer: MEDICARE

## 2024-07-18 ENCOUNTER — ANESTHESIA EVENT (OUTPATIENT)
Dept: PERIOP | Facility: HOSPITAL | Age: 64
End: 2024-07-18
Payer: MEDICARE

## 2024-07-18 VITALS
HEART RATE: 71 BPM | DIASTOLIC BLOOD PRESSURE: 69 MMHG | WEIGHT: 230 LBS | TEMPERATURE: 97 F | BODY MASS INDEX: 36.96 KG/M2 | RESPIRATION RATE: 18 BRPM | SYSTOLIC BLOOD PRESSURE: 115 MMHG | OXYGEN SATURATION: 98 % | HEIGHT: 66 IN

## 2024-07-18 LAB — GLUCOSE BLDC GLUCOMTR-MCNC: 104 MG/DL (ref 70–130)

## 2024-07-18 PROCEDURE — 82948 REAGENT STRIP/BLOOD GLUCOSE: CPT

## 2024-07-18 PROCEDURE — 25010000002 PROPOFOL 200 MG/20ML EMULSION: Performed by: NURSE ANESTHETIST, CERTIFIED REGISTERED

## 2024-07-18 PROCEDURE — 25810000003 LACTATED RINGERS PER 1000 ML: Performed by: ANESTHESIOLOGY

## 2024-07-18 RX ORDER — SODIUM CHLORIDE 0.9 % (FLUSH) 0.9 %
10 SYRINGE (ML) INJECTION AS NEEDED
Status: DISCONTINUED | OUTPATIENT
Start: 2024-07-18 | End: 2024-07-18 | Stop reason: HOSPADM

## 2024-07-18 RX ORDER — SODIUM CHLORIDE 9 MG/ML
40 INJECTION, SOLUTION INTRAVENOUS AS NEEDED
Status: DISCONTINUED | OUTPATIENT
Start: 2024-07-18 | End: 2024-07-18 | Stop reason: HOSPADM

## 2024-07-18 RX ORDER — SODIUM CHLORIDE, SODIUM LACTATE, POTASSIUM CHLORIDE, CALCIUM CHLORIDE 600; 310; 30; 20 MG/100ML; MG/100ML; MG/100ML; MG/100ML
125 INJECTION, SOLUTION INTRAVENOUS ONCE
Status: COMPLETED | OUTPATIENT
Start: 2024-07-18 | End: 2024-07-18

## 2024-07-18 RX ORDER — ONDANSETRON 2 MG/ML
4 INJECTION INTRAMUSCULAR; INTRAVENOUS AS NEEDED
Status: DISCONTINUED | OUTPATIENT
Start: 2024-07-18 | End: 2024-07-18 | Stop reason: HOSPADM

## 2024-07-18 RX ORDER — MIDAZOLAM HYDROCHLORIDE 1 MG/ML
1 INJECTION INTRAMUSCULAR; INTRAVENOUS
Status: DISCONTINUED | OUTPATIENT
Start: 2024-07-18 | End: 2024-07-18 | Stop reason: HOSPADM

## 2024-07-18 RX ORDER — PROPOFOL 10 MG/ML
INJECTION, EMULSION INTRAVENOUS AS NEEDED
Status: DISCONTINUED | OUTPATIENT
Start: 2024-07-18 | End: 2024-07-18 | Stop reason: SURG

## 2024-07-18 RX ORDER — SODIUM CHLORIDE, SODIUM LACTATE, POTASSIUM CHLORIDE, CALCIUM CHLORIDE 600; 310; 30; 20 MG/100ML; MG/100ML; MG/100ML; MG/100ML
100 INJECTION, SOLUTION INTRAVENOUS ONCE AS NEEDED
Status: DISCONTINUED | OUTPATIENT
Start: 2024-07-18 | End: 2024-07-18 | Stop reason: HOSPADM

## 2024-07-18 RX ORDER — MEPERIDINE HYDROCHLORIDE 25 MG/ML
12.5 INJECTION INTRAMUSCULAR; INTRAVENOUS; SUBCUTANEOUS
Status: DISCONTINUED | OUTPATIENT
Start: 2024-07-18 | End: 2024-07-18 | Stop reason: HOSPADM

## 2024-07-18 RX ORDER — IPRATROPIUM BROMIDE AND ALBUTEROL SULFATE 2.5; .5 MG/3ML; MG/3ML
3 SOLUTION RESPIRATORY (INHALATION) ONCE AS NEEDED
Status: DISCONTINUED | OUTPATIENT
Start: 2024-07-18 | End: 2024-07-18 | Stop reason: HOSPADM

## 2024-07-18 RX ORDER — SODIUM CHLORIDE 0.9 % (FLUSH) 0.9 %
10 SYRINGE (ML) INJECTION EVERY 12 HOURS SCHEDULED
Status: DISCONTINUED | OUTPATIENT
Start: 2024-07-18 | End: 2024-07-18 | Stop reason: HOSPADM

## 2024-07-18 RX ADMIN — PROPOFOL 120 MG: 10 INJECTION, EMULSION INTRAVENOUS at 07:47

## 2024-07-18 RX ADMIN — SODIUM CHLORIDE, POTASSIUM CHLORIDE, SODIUM LACTATE AND CALCIUM CHLORIDE: 600; 310; 30; 20 INJECTION, SOLUTION INTRAVENOUS at 07:43

## 2024-07-18 NOTE — ANESTHESIA PREPROCEDURE EVALUATION
Anesthesia Evaluation     Patient summary reviewed and Nursing notes reviewed   history of anesthetic complications:   NPO Solid Status: > 8 hours  NPO Liquid Status: > 8 hours           Airway   Mallampati: II  TM distance: >3 FB  Neck ROM: full  Dental    (+) partials    Pulmonary     breath sounds clear to auscultation  (+) ,shortness of breath, sleep apnea  Cardiovascular   Exercise tolerance: good (4-7 METS)    Patient on routine beta blocker and Beta blocker given within 24 hours of surgery  Rhythm: regular  Rate: normal    (+) hypertension, CAD, hyperlipidemia      Neuro/Psych  (+) dizziness/light headedness, psychiatric history  GI/Hepatic/Renal/Endo    (+) obesity, morbid obesity, hiatal hernia, GERD, liver disease fatty liver disease, diabetes mellitus    Musculoskeletal (-) negative ROS    Abdominal   (+) obese    Abdomen: soft.   Substance History - negative use     OB/GYN negative ob/gyn ROS         Other - negative ROS                         Anesthesia Plan    ASA 3     general     intravenous induction     Anesthetic plan, risks, benefits, and alternatives have been provided, discussed and informed consent has been obtained with: patient.    Use of blood products discussed with  Consented to blood products.    Plan discussed with CRNA.        CODE STATUS:

## 2024-07-18 NOTE — ANESTHESIA POSTPROCEDURE EVALUATION
Patient: Es Olivier    Procedure Summary       Date: 07/18/24 Room / Location: [facility] [address] [place] [address] COR OR    Anesthesia Start: 0743 Anesthesia Stop: 0750    Procedure: ESOPHAGOGASTRODUODENOSCOPY WITH ANESTHESIA (Esophagus) Diagnosis:       Esophageal dysphagia      (Esophageal dysphagia [R13.19])    Surgeons: Khang Shore MD Provider: Macho Tovar DO    Anesthesia Type: general ASA Status: 3            Anesthesia Type: general    Vitals  Vitals Value Taken Time   /69 07/18/24 0821   Temp 97 °F (36.1 °C) 07/18/24 0751   Pulse 71 07/18/24 0821   Resp 18 07/18/24 0821   SpO2 98 % 07/18/24 0821           Anesthesia Post Evaluation

## 2024-08-06 ENCOUNTER — OFFICE VISIT (OUTPATIENT)
Dept: SURGERY | Facility: CLINIC | Age: 64
End: 2024-08-06
Payer: MEDICARE

## 2024-08-06 VITALS — BODY MASS INDEX: 36.96 KG/M2 | WEIGHT: 230 LBS | HEIGHT: 66 IN

## 2024-08-06 DIAGNOSIS — K44.9 HIATAL HERNIA: Primary | ICD-10-CM

## 2024-08-06 PROCEDURE — 1159F MED LIST DOCD IN RCRD: CPT | Performed by: SURGERY

## 2024-08-06 PROCEDURE — 99212 OFFICE O/P EST SF 10 MIN: CPT | Performed by: SURGERY

## 2024-08-06 PROCEDURE — 1160F RVW MEDS BY RX/DR IN RCRD: CPT | Performed by: SURGERY

## 2024-08-06 RX ORDER — METOCLOPRAMIDE 10 MG/1
10 TABLET ORAL 2 TIMES DAILY
Qty: 60 TABLET | Refills: 1 | Status: SHIPPED | OUTPATIENT
Start: 2024-08-06 | End: 2024-10-05

## 2024-08-06 NOTE — PROGRESS NOTES
Subjective   Es Olivier is a 64 y.o. female  is here today for follow-up.         Es Olivier is a 64 y.o. female here for follow-up after EGD.  She has abdominal discomfort and symptoms persist.  On examination at the time of EGD the patient had a large residual food bolus likely secondary to some element of gastroparesis from her Ozempic.  History of Present Illness         Physical Exam  Abdomen soft nontender nondistended  Physical Exam              Assessment     Diagnoses and all orders for this visit:    1. Hiatal hernia (Primary)    Other orders  -     metoclopramide (REGLAN) 10 MG tablet; Take 1 tablet by mouth 2 (Two) Times a Day for 60 days.  Dispense: 60 tablet; Refill: 1      Es Olivier is a 64 y.o. female with abdominal discomfort likely due to some element of gastric emptying dysfunction from her Ozempic.  She would like to continue Ozempic and we will add Reglan in an attempt to improve gastric motility.  Assessment & Plan          This document has been electronically signed by Khang Shore MD   August 6, 2024 14:28 EDT    Patient or patient representative verbalized consent for the use of Ambient Listening during the visit with  Khang Shore MD for chart documentation. 8/6/2024  14:29 EDT

## 2024-08-12 ENCOUNTER — HOSPITAL ENCOUNTER (EMERGENCY)
Facility: HOSPITAL | Age: 64
Discharge: HOME | End: 2024-08-12
Attending: EMERGENCY MEDICINE | Admitting: EMERGENCY MEDICINE
Payer: MEDICARE

## 2024-08-12 VITALS
SYSTOLIC BLOOD PRESSURE: 122 MMHG | RESPIRATION RATE: 16 BRPM | OXYGEN SATURATION: 96 % | WEIGHT: 225 LBS | BODY MASS INDEX: 36.16 KG/M2 | TEMPERATURE: 97.7 F | HEIGHT: 66 IN | HEART RATE: 64 BPM | DIASTOLIC BLOOD PRESSURE: 70 MMHG

## 2024-08-12 DIAGNOSIS — E16.2 HYPOGLYCEMIA: Primary | ICD-10-CM

## 2024-08-12 LAB
ALBUMIN SERPL-MCNC: 4 G/DL (ref 3.5–5.2)
ALBUMIN/GLOB SERPL: 1.6 G/DL
ALP SERPL-CCNC: 78 U/L (ref 39–117)
ALT SERPL W P-5'-P-CCNC: 12 U/L (ref 1–33)
ANION GAP SERPL CALCULATED.3IONS-SCNC: 13 MMOL/L (ref 5–15)
AST SERPL-CCNC: 13 U/L (ref 1–32)
BASOPHILS # BLD AUTO: 0.08 10*3/MM3 (ref 0–0.2)
BASOPHILS NFR BLD AUTO: 0.8 % (ref 0–1.5)
BILIRUB SERPL-MCNC: <0.2 MG/DL (ref 0–1.2)
BUN SERPL-MCNC: 24 MG/DL (ref 8–23)
BUN/CREAT SERPL: 24.5 (ref 7–25)
CALCIUM SPEC-SCNC: 8.8 MG/DL (ref 8.6–10.5)
CHLORIDE SERPL-SCNC: 104 MMOL/L (ref 98–107)
CO2 SERPL-SCNC: 25 MMOL/L (ref 22–29)
CREAT SERPL-MCNC: 0.98 MG/DL (ref 0.57–1)
DEPRECATED RDW RBC AUTO: 42.9 FL (ref 37–54)
EGFRCR SERPLBLD CKD-EPI 2021: 65 ML/MIN/1.73
EOSINOPHIL # BLD AUTO: 0.32 10*3/MM3 (ref 0–0.4)
EOSINOPHIL NFR BLD AUTO: 3.2 % (ref 0.3–6.2)
ERYTHROCYTE [DISTWIDTH] IN BLOOD BY AUTOMATED COUNT: 12.2 % (ref 12.3–15.4)
GLOBULIN UR ELPH-MCNC: 2.5 GM/DL
GLUCOSE BLDC GLUCOMTR-MCNC: 106 MG/DL (ref 70–130)
GLUCOSE BLDC GLUCOMTR-MCNC: 119 MG/DL (ref 70–130)
GLUCOSE BLDC GLUCOMTR-MCNC: 65 MG/DL (ref 70–130)
GLUCOSE BLDC GLUCOMTR-MCNC: 98 MG/DL (ref 70–130)
GLUCOSE SERPL-MCNC: 48 MG/DL (ref 65–99)
HBA1C MFR BLD: 5.8 % (ref 4.8–5.6)
HCT VFR BLD AUTO: 37.6 % (ref 34–46.6)
HGB BLD-MCNC: 12.5 G/DL (ref 12–15.9)
HOLD SPECIMEN: NORMAL
HOLD SPECIMEN: NORMAL
IMM GRANULOCYTES # BLD AUTO: 0.06 10*3/MM3 (ref 0–0.05)
IMM GRANULOCYTES NFR BLD AUTO: 0.6 % (ref 0–0.5)
LYMPHOCYTES # BLD AUTO: 2.62 10*3/MM3 (ref 0.7–3.1)
LYMPHOCYTES NFR BLD AUTO: 26.3 % (ref 19.6–45.3)
MCH RBC QN AUTO: 31.5 PG (ref 26.6–33)
MCHC RBC AUTO-ENTMCNC: 33.2 G/DL (ref 31.5–35.7)
MCV RBC AUTO: 94.7 FL (ref 79–97)
MONOCYTES # BLD AUTO: 1 10*3/MM3 (ref 0.1–0.9)
MONOCYTES NFR BLD AUTO: 10 % (ref 5–12)
NEUTROPHILS NFR BLD AUTO: 5.9 10*3/MM3 (ref 1.7–7)
NEUTROPHILS NFR BLD AUTO: 59.1 % (ref 42.7–76)
NRBC BLD AUTO-RTO: 0 /100 WBC (ref 0–0.2)
PLATELET # BLD AUTO: 341 10*3/MM3 (ref 140–450)
PMV BLD AUTO: 10.1 FL (ref 6–12)
POTASSIUM SERPL-SCNC: 3.8 MMOL/L (ref 3.5–5.2)
PROT SERPL-MCNC: 6.5 G/DL (ref 6–8.5)
QT INTERVAL: 426 MS
QTC INTERVAL: 456 MS
RBC # BLD AUTO: 3.97 10*6/MM3 (ref 3.77–5.28)
SODIUM SERPL-SCNC: 142 MMOL/L (ref 136–145)
WBC NRBC COR # BLD AUTO: 9.98 10*3/MM3 (ref 3.4–10.8)
WHOLE BLOOD HOLD COAG: NORMAL
WHOLE BLOOD HOLD SPECIMEN: NORMAL

## 2024-08-12 PROCEDURE — 93010 ELECTROCARDIOGRAM REPORT: CPT | Performed by: INTERNAL MEDICINE

## 2024-08-12 PROCEDURE — 82948 REAGENT STRIP/BLOOD GLUCOSE: CPT

## 2024-08-12 PROCEDURE — 96360 HYDRATION IV INFUSION INIT: CPT

## 2024-08-12 PROCEDURE — 83036 HEMOGLOBIN GLYCOSYLATED A1C: CPT | Performed by: EMERGENCY MEDICINE

## 2024-08-12 PROCEDURE — 93005 ELECTROCARDIOGRAM TRACING: CPT | Performed by: EMERGENCY MEDICINE

## 2024-08-12 PROCEDURE — 25810000003 DEXTROSE 5 % AND SODIUM CHLORIDE 0.9 % 5-0.9 % SOLUTION: Performed by: EMERGENCY MEDICINE

## 2024-08-12 PROCEDURE — 99283 EMERGENCY DEPT VISIT LOW MDM: CPT

## 2024-08-12 PROCEDURE — 80053 COMPREHEN METABOLIC PANEL: CPT | Performed by: EMERGENCY MEDICINE

## 2024-08-12 PROCEDURE — 85025 COMPLETE CBC W/AUTO DIFF WBC: CPT | Performed by: EMERGENCY MEDICINE

## 2024-08-12 RX ORDER — DEXTROSE MONOHYDRATE AND SODIUM CHLORIDE 5; .9 G/100ML; G/100ML
125 INJECTION, SOLUTION INTRAVENOUS CONTINUOUS
Status: DISCONTINUED | OUTPATIENT
Start: 2024-08-12 | End: 2024-08-12 | Stop reason: HOSPADM

## 2024-08-12 RX ORDER — SODIUM CHLORIDE 0.9 % (FLUSH) 0.9 %
10 SYRINGE (ML) INJECTION AS NEEDED
Status: DISCONTINUED | OUTPATIENT
Start: 2024-08-12 | End: 2024-08-12 | Stop reason: HOSPADM

## 2024-08-12 RX ADMIN — DEXTROSE MONOHYDRATE AND SODIUM CHLORIDE 125 ML/HR: 5; .9 INJECTION, SOLUTION INTRAVENOUS at 02:17

## 2024-08-12 NOTE — ED PROVIDER NOTES
"Subjective   History of Present Illness  Patient is a 64-year-old female with diabetes who presents with a chief complaint of her blood sugar dropping down into the 50s today and tonight.  She states that when this happens she gets clammy, sweaty and lightheaded.  She states this morning she took her usual Ozempic, Novolin R, Actos and metformin but then she never did think to eat until her blood sugar started dropping at about 4 PM.  She states she had a cheese sandwich at 4 PM, states that at 7 PM she had some roast beef, mashed potatoes and corn.  She states that she is not able to eat large amounts at all secondary to the Ozempic, states that what ever she eats stays in her stomach for a very long time and causes her to feel uncomfortably full.  She states that sometimes her heart feels like it is racing.  She does not have that sensation now.  She has had no fever, chills, chest pain, shortness of breath, vomiting, diarrhea, syncope or near syncope, focal numbness or weakness, other symptoms or other complaints.      Review of Systems   All other systems reviewed and are negative.      Past Medical History:   Diagnosis Date    Arthritis     Biliary dyskinesia     abnormal HIDA 11/2018 w/ EF 31%, symptomatic w/ N/V    Closed fracture of proximal end of left femur 02/25/2022    Coronary artery disease involving native coronary artery of native heart without angina pectoris 7/5/2024    Depression     DM2 (diabetes mellitus, type 2)     dx 1994, on insulin >5 years, A1c 7, associated neuropathy, no other complications    Dyspepsia     Dyspnea on exertion     Elevated cholesterol     Fatigue     Fatty liver     dx on CT 2016    GERD (gastroesophageal reflux disease)     Heart disease     w/ cardiac clearance 4/2019, negative stress test 10/2017, EF 65%    Hiatal hernia     \"small\" noted on UGI 2014    History of Helicobacter pylori infection     treated remotely    Hyperlipidemia     Hypertension     Joint pain     " "Morbid obesity with BMI of 45.0-49.9, adult     Sleep apnea     formerly on a device, no longer using, says just doesn't need it    Urinary incontinence     no meds    Vertigo     Vitamin D deficiency        Allergies   Allergen Reactions    Mobic [Meloxicam] GI Intolerance    Lisinopril Cough       Past Surgical History:   Procedure Laterality Date    BLADDER SURGERY      \"tack\", uncomplicated, but unsuccessful    CARDIAC CATHETERIZATION N/A 2024    Procedure: Left Heart Cath;  Surgeon: Rusty Lamb MD;  Location: [facility] CATH INVASIVE LOCATION;  Service: Cardiology;  Laterality: N/A;    CATARACT EXTRACTION Left     COLONOSCOPY      unremarkable    DILATATION AND CURETTAGE  1987    ENDOSCOPY      ENDOSCOPY N/A 2024    Procedure: ESOPHAGOGASTRODUODENOSCOPY WITH ANESTHESIA;  Surgeon: Khang Shore MD;  Location: [facility] OR;  Service: Gastroenterology;  Laterality: N/A;    INCISION AND DRAINAGE HIP Left 2022    Procedure: LEFT HIP LAVAGE AND WOUND VAC PLACEMENT;  Surgeon: Jarrod Leung MD;  Location: [facility] OR;  Service: Orthopedics;  Laterality: Left;    ORIF HIP FRACTURE Left 2022    Procedure: Left hip Open reduction and internal fixation.;  Surgeon: Jarrod Leung MD;  Location: [facility] OR;  Service: Orthopedics;  Laterality: Left;    TONSILLECTOMY         Family History   Problem Relation Age of Onset    Breast cancer Sister         20s    Cancer Sister     Breast cancer Sister         60s    Bone cancer Mother         60    Osteoarthritis Mother     Diabetes Father     Heart attack Father     Heart disease Father     Heart disease Brother        Social History     Socioeconomic History    Marital status:    Tobacco Use    Smoking status: Former     Current packs/day: 0.00     Types: Cigarettes     Start date:      Quit date:      Years since quittin.6    Smokeless tobacco: Never   Vaping Use    Vaping status: Never Used   Substance and Sexual " Activity    Alcohol use: Not Currently    Drug use: No    Sexual activity: Defer           Objective   Physical Exam  Vitals and nursing note reviewed.   Constitutional:       General: She is not in acute distress.     Appearance: She is well-developed. She is not toxic-appearing or diaphoretic.   HENT:      Head: Normocephalic and atraumatic.   Eyes:      General: No scleral icterus.     Pupils: Pupils are equal, round, and reactive to light.   Neck:      Trachea: No tracheal deviation.   Cardiovascular:      Rate and Rhythm: Normal rate and regular rhythm.   Pulmonary:      Effort: Pulmonary effort is normal. No respiratory distress.      Breath sounds: Normal breath sounds.   Abdominal:      General: Bowel sounds are normal.      Palpations: Abdomen is soft.      Tenderness: There is no abdominal tenderness. There is no guarding or rebound.   Musculoskeletal:         General: No tenderness. Normal range of motion.      Cervical back: Normal range of motion and neck supple. No rigidity or tenderness.      Right lower leg: No edema.      Left lower leg: No edema.   Skin:     General: Skin is dry.      Capillary Refill: Capillary refill takes less than 2 seconds.      Comments: Skin is mildly pale, slightly cool to touch, dry.   Neurological:      General: No focal deficit present.      Mental Status: She is alert and oriented to person, place, and time.      GCS: GCS eye subscore is 4. GCS verbal subscore is 5. GCS motor subscore is 6.      Motor: No abnormal muscle tone.   Psychiatric:         Mood and Affect: Mood normal.         Behavior: Behavior normal.         Procedures  Results for orders placed or performed during the hospital encounter of 08/12/24   Comprehensive Metabolic Panel    Specimen: Blood   Result Value Ref Range    Glucose 48 (C) 65 - 99 mg/dL    BUN 24 (H) 8 - 23 mg/dL    Creatinine 0.98 0.57 - 1.00 mg/dL    Sodium 142 136 - 145 mmol/L    Potassium 3.8 3.5 - 5.2 mmol/L    Chloride 104 98 - 107  mmol/L    CO2 25.0 22.0 - 29.0 mmol/L    Calcium 8.8 8.6 - 10.5 mg/dL    Total Protein 6.5 6.0 - 8.5 g/dL    Albumin 4.0 3.5 - 5.2 g/dL    ALT (SGPT) 12 1 - 33 U/L    AST (SGOT) 13 1 - 32 U/L    Alkaline Phosphatase 78 39 - 117 U/L    Total Bilirubin <0.2 0.0 - 1.2 mg/dL    Globulin 2.5 gm/dL    A/G Ratio 1.6 g/dL    BUN/Creatinine Ratio 24.5 7.0 - 25.0    Anion Gap 13.0 5.0 - 15.0 mmol/L    eGFR 65.0 >60.0 mL/min/1.73   Hemoglobin A1c    Specimen: Blood   Result Value Ref Range    Hemoglobin A1C 5.80 (H) 4.80 - 5.60 %   CBC Auto Differential    Specimen: Blood   Result Value Ref Range    WBC 9.98 3.40 - 10.80 10*3/mm3    RBC 3.97 3.77 - 5.28 10*6/mm3    Hemoglobin 12.5 12.0 - 15.9 g/dL    Hematocrit 37.6 34.0 - 46.6 %    MCV 94.7 79.0 - 97.0 fL    MCH 31.5 26.6 - 33.0 pg    MCHC 33.2 31.5 - 35.7 g/dL    RDW 12.2 (L) 12.3 - 15.4 %    RDW-SD 42.9 37.0 - 54.0 fl    MPV 10.1 6.0 - 12.0 fL    Platelets 341 140 - 450 10*3/mm3    Neutrophil % 59.1 42.7 - 76.0 %    Lymphocyte % 26.3 19.6 - 45.3 %    Monocyte % 10.0 5.0 - 12.0 %    Eosinophil % 3.2 0.3 - 6.2 %    Basophil % 0.8 0.0 - 1.5 %    Immature Grans % 0.6 (H) 0.0 - 0.5 %    Neutrophils, Absolute 5.90 1.70 - 7.00 10*3/mm3    Lymphocytes, Absolute 2.62 0.70 - 3.10 10*3/mm3    Monocytes, Absolute 1.00 (H) 0.10 - 0.90 10*3/mm3    Eosinophils, Absolute 0.32 0.00 - 0.40 10*3/mm3    Basophils, Absolute 0.08 0.00 - 0.20 10*3/mm3    Immature Grans, Absolute 0.06 (H) 0.00 - 0.05 10*3/mm3    nRBC 0.0 0.0 - 0.2 /100 WBC   POC Glucose Once    Specimen: Blood   Result Value Ref Range    Glucose 65 (L) 70 - 130 mg/dL   POC Glucose Once    Specimen: Blood   Result Value Ref Range    Glucose 98 70 - 130 mg/dL   POC Glucose Once    Specimen: Blood   Result Value Ref Range    Glucose 119 70 - 130 mg/dL   POC Glucose Once    Specimen: Blood   Result Value Ref Range    Glucose 106 70 - 130 mg/dL   ECG 12 Lead QT Measurement   Result Value Ref Range    QT Interval 426 ms    QTC Interval  456 ms   Green Top (Gel)   Result Value Ref Range    Extra Tube Hold for add-ons.    Lavender Top   Result Value Ref Range    Extra Tube hold for add-on    Gold Top - SST   Result Value Ref Range    Extra Tube Hold for add-ons.    Light Blue Top   Result Value Ref Range    Extra Tube Hold for add-ons.                 ED Course  ED Course as of 08/12/24 0507   Mon Aug 12, 2024   0223 EKG shows sinus rhythm, rate 69.  TN interval 184, QRS duration 82, QTc 456 ms.  No apparent acute ischemia.  No evidence for STEMI. [CM]   0242 Patient has had a turkey sandwich, applesauce, shreyas crackers and 2 orange juices.  Blood sugar just now was 98.  She also has D5 normal saline running at 125 cc an hour. [CM]   0327 Nursing is to turn the IV fluids off.  We are going to check her sugar now and again 1 hour after stopping the fluids.  Patient voices she feels a lot better, she feels completely normal and she would like to go home.  Skin color is much improved, no longer clammy or pale.  She appears comfortable and well now.  We have to make sure that she can keep her sugar up before she can be discharged. [CM]   0505 Blood sugars 106 now.  Patient feels well, asymptomatic, wants to go home.  She advises that she will use more food before she goes to bed.  Patient discharged home in care of her .  Advised close follow-up with her primary care provider.  She will return to the emergency department right away if symptoms worsen or any problems. [CM]      ED Course User Index  [CM] Venancio Brito MD                                             Medical Decision Making  Amount and/or Complexity of Data Reviewed  Labs: ordered.  ECG/medicine tests: ordered.    Risk  Prescription drug management.        Final diagnoses:   Hypoglycemia       ED Disposition  ED Disposition       ED Disposition   Discharge    Condition   Stable    Comment   --               Delilah Pizarro MD  [address] [place]  [address]  [phone]    Go to   1 to 2 days    [facility] EMERGENCY DEPARTMENT  [address] [facility] [address] [phone]  Go to   If symptoms worsen         Medication List      No changes were made to your prescriptions during this visit.       Please note that portions of this note were completed with a voice recognition program.        Venancio Brito MD  08/12/24 [id number]

## 2024-08-12 NOTE — DISCHARGE INSTRUCTIONS
Home in care of .  Watch your blood sugar closely.  Please eat something else before you go to bed this morning.  See Dr. Pizarro in the office in 1 to 2 days.  Return to the emergency department right away if symptoms worsen or any problems.

## 2024-08-12 NOTE — ED NOTES
Provided patient a turkey sandwich box, shreyas crackers, apple sauce, and orange juice at this time.

## 2024-08-22 ENCOUNTER — PATIENT OUTREACH (OUTPATIENT)
Dept: CASE MANAGEMENT | Facility: OTHER | Age: 64
End: 2024-08-22
Payer: MEDICARE

## 2024-08-22 NOTE — OUTREACH NOTE
AMBULATORY CASE MANAGEMENT NOTE    Names and Relationships of Patient/Support Persons: Contact: Es Olivier; Relationship: Self -     Patient Outreach    Patient Name: Es Olivier   Encounter Date 08/22/2024      Program: HRCM Encounter Provider: Juanita STEWART RN-[facility]   [place] Active  Type of Outreach:  Telephonic     PURPOSE OF OUTREACH: ACO/HRCM follow-up for ED visit      Patient presented at [facility] Emergency Department on 08/12/24 with complaint of low blood sugar while on Ozempic.  Patient was treated and discharged to home to follow as outpatient. Clinical impression noted as Hypoglycemia  Per review of clinical notes patient's blood glucose was 65 at presentation.      Medication changes noted at discharge include: None    Recommended follow up: Home in care of . Watch your blood sugar closely. Please eat something else before you go to bed this morning. See Dr. Pizarro in the office in 1 to 2 days. Return to the emergency department right away if symptoms worsen or any problems.     -----------    Patient reported being at baseline with no further incidence of low blood glucose.  Per her report, she has successfully lost more than 90 lbs while on Ozempic.  Per her report, she seldom has negative side effects while using the medication.     A1C Last 3 Results          5/28/2024    07:45 8/12/2024    02:06   HGBA1C Last 3 Results   Hemoglobin A1C 6.10  5.80      Future Appointments         Provider Department Center    8/27/2024 10:40 AM Khang Shore MD [facility]    9/3/2024 10:30 AM Khang Shore MD [facility]    7/3/2025 1:00 PM Tabitha Carr APRN [facility] CARDIOLOGY COR              Education Documentation  Hypoglycemia Identification and Treatment, taught by Juanita Hutson RN at 8/22/2024  2:48 PM.  Learner: Patient  Readiness:  Acceptance  Method: Explanation  Response: Verbalizes Understanding    Blood Glucose Monitoring, taught by Juanita Hutson RN at 8/22/2024  2:48 PM.  Learner: Patient  Readiness: Acceptance  Method: Explanation  Response: Verbalizes Understanding    A1C Goal, taught by Juanita Hutson RN at 8/22/2024  2:48 PM.  Learner: Patient  Readiness: Acceptance  Method: Explanation  Response: Verbalizes Understanding          Juanita STEWART  Ambulatory Case Management    8/22/2024, 14:48 EDT

## 2024-08-27 ENCOUNTER — TELEPHONE (OUTPATIENT)
Dept: SURGERY | Facility: CLINIC | Age: 64
End: 2024-08-27

## 2024-08-27 ENCOUNTER — TRANSCRIBE ORDERS (OUTPATIENT)
Dept: ADMINISTRATIVE | Facility: HOSPITAL | Age: 64
End: 2024-08-27
Payer: MEDICARE

## 2024-08-27 DIAGNOSIS — M81.0 AGE-RELATED OSTEOPOROSIS WITHOUT CURRENT PATHOLOGICAL FRACTURE: ICD-10-CM

## 2024-08-27 DIAGNOSIS — Z12.31 VISIT FOR SCREENING MAMMOGRAM: Primary | ICD-10-CM

## 2024-09-16 ENCOUNTER — HOSPITAL ENCOUNTER (OUTPATIENT)
Dept: BONE DENSITY | Facility: HOSPITAL | Age: 64
Discharge: HOME | End: 2024-09-16
Payer: MEDICARE

## 2024-09-16 ENCOUNTER — HOSPITAL ENCOUNTER (OUTPATIENT)
Dept: MAMMOGRAPHY | Facility: HOSPITAL | Age: 64
Discharge: HOME | End: 2024-09-16
Payer: MEDICARE

## 2024-09-16 DIAGNOSIS — Z12.31 VISIT FOR SCREENING MAMMOGRAM: ICD-10-CM

## 2024-09-16 DIAGNOSIS — M81.0 AGE-RELATED OSTEOPOROSIS WITHOUT CURRENT PATHOLOGICAL FRACTURE: ICD-10-CM

## 2024-09-16 PROCEDURE — 77063 BREAST TOMOSYNTHESIS BI: CPT

## 2024-09-16 PROCEDURE — 77080 DXA BONE DENSITY AXIAL: CPT

## 2024-09-16 PROCEDURE — 77080 DXA BONE DENSITY AXIAL: CPT | Performed by: RADIOLOGY

## 2024-09-16 PROCEDURE — 77067 SCR MAMMO BI INCL CAD: CPT

## 2024-09-16 PROCEDURE — 77063 BREAST TOMOSYNTHESIS BI: CPT | Performed by: RADIOLOGY

## 2024-09-16 PROCEDURE — 77067 SCR MAMMO BI INCL CAD: CPT | Performed by: RADIOLOGY

## 2024-09-25 ENCOUNTER — OFFICE VISIT (OUTPATIENT)
Dept: SURGERY | Facility: CLINIC | Age: 64
End: 2024-09-25
Payer: MEDICARE

## 2024-09-25 VITALS — BODY MASS INDEX: 36.16 KG/M2 | WEIGHT: 225 LBS | HEIGHT: 66 IN

## 2024-09-25 DIAGNOSIS — Z12.11 COLON CANCER SCREENING: Primary | ICD-10-CM

## 2024-09-25 PROCEDURE — 1160F RVW MEDS BY RX/DR IN RCRD: CPT | Performed by: SURGERY

## 2024-09-25 PROCEDURE — 1159F MED LIST DOCD IN RCRD: CPT | Performed by: SURGERY

## 2024-09-25 PROCEDURE — S0260 H&P FOR SURGERY: HCPCS | Performed by: SURGERY

## 2024-09-25 RX ORDER — SODIUM, POTASSIUM,MAG SULFATES 17.5-3.13G
1 SOLUTION, RECONSTITUTED, ORAL ORAL EVERY 12 HOURS
Qty: 354 ML | Refills: 0 | Status: SHIPPED | OUTPATIENT
Start: 2024-09-25

## 2024-10-17 ENCOUNTER — DOCUMENTATION (OUTPATIENT)
Dept: SURGERY | Facility: CLINIC | Age: 64
End: 2024-10-17
Payer: MEDICARE

## 2024-10-17 NOTE — PROGRESS NOTES
Patient called and stated that she is unable to do her surgery at this time. However, she will call our office once she is ready to reschedule surgery.

## 2024-11-27 ENCOUNTER — TRANSCRIBE ORDERS (OUTPATIENT)
Dept: ADMINISTRATIVE | Facility: HOSPITAL | Age: 64
End: 2024-11-27
Payer: MEDICARE

## 2024-11-27 DIAGNOSIS — R01.1 CARDIAC MURMUR: Primary | ICD-10-CM

## 2024-12-16 ENCOUNTER — HOSPITAL ENCOUNTER (OUTPATIENT)
Facility: HOSPITAL | Age: 64
Discharge: HOME | End: 2024-12-16
Admitting: INTERNAL MEDICINE
Payer: MEDICARE

## 2024-12-16 DIAGNOSIS — R01.1 CARDIAC MURMUR: ICD-10-CM

## 2024-12-16 PROCEDURE — 93306 TTE W/DOPPLER COMPLETE: CPT

## 2024-12-17 LAB
BH CV ECHO MEAS - AO MAX PG: 8.8 MMHG
BH CV ECHO MEAS - AO MEAN PG: 4.9 MMHG
BH CV ECHO MEAS - AO ROOT DIAM: 3 CM
BH CV ECHO MEAS - AO V2 MAX: 148 CM/SEC
BH CV ECHO MEAS - AO V2 VTI: 28.3 CM
BH CV ECHO MEAS - EDV(MOD-SP4): 67.3 ML
BH CV ECHO MEAS - EF(MOD-SP4): 69.8 %
BH CV ECHO MEAS - ESV(MOD-SP4): 20.3 ML
BH CV ECHO MEAS - IVS/LVPW: 0.68 CM
BH CV ECHO MEAS - IVSD: 1.03 CM
BH CV ECHO MEAS - LA DIMENSION: 3.5 CM
BH CV ECHO MEAS - LAT PEAK E' VEL: 9.1 CM/SEC
BH CV ECHO MEAS - LV DIASTOLIC VOL/BSA (35-75): 32 CM2
BH CV ECHO MEAS - LV MAX PG: 4.1 MMHG
BH CV ECHO MEAS - LV MEAN PG: 2.4 MMHG
BH CV ECHO MEAS - LV SYSTOLIC VOL/BSA (12-30): 9.6 CM2
BH CV ECHO MEAS - LV V1 MAX: 101 CM/SEC
BH CV ECHO MEAS - LV V1 VTI: 19.9 CM
BH CV ECHO MEAS - LVIDD: 3.4 CM
BH CV ECHO MEAS - LVIDS: 2.7 CM
BH CV ECHO MEAS - LVOT DIAM: 1.96 CM
BH CV ECHO MEAS - LVPWD: 1.52 CM
BH CV ECHO MEAS - MED PEAK E' VEL: 9.5 CM/SEC
BH CV ECHO MEAS - MR MAX PG: 19.8 MMHG
BH CV ECHO MEAS - MR MAX VEL: 213 CM/SEC
BH CV ECHO MEAS - PA ACC TIME: 0.14 SEC
BH CV ECHO MEAS - RAP SYSTOLE: 10 MMHG
BH CV ECHO MEAS - RVSP: 12.6 MMHG
BH CV ECHO MEAS - SV(MOD-SP4): 47 ML
BH CV ECHO MEAS - SVI(MOD-SP4): 22.3 ML/M2
BH CV ECHO MEAS - TAPSE (>1.6): 1.69 CM
BH CV ECHO MEAS - TR MAX PG: 2.6 MMHG
BH CV ECHO MEAS - TR MAX VEL: 71.3 CM/SEC

## 2025-07-03 ENCOUNTER — OFFICE VISIT (OUTPATIENT)
Dept: CARDIOLOGY | Facility: CLINIC | Age: 65
End: 2025-07-03
Payer: MEDICARE

## 2025-07-03 VITALS
BODY MASS INDEX: 32.14 KG/M2 | HEART RATE: 82 BPM | DIASTOLIC BLOOD PRESSURE: 68 MMHG | WEIGHT: 200 LBS | OXYGEN SATURATION: 98 % | SYSTOLIC BLOOD PRESSURE: 104 MMHG | HEIGHT: 66 IN

## 2025-07-03 DIAGNOSIS — I25.10 CORONARY ARTERY DISEASE INVOLVING NATIVE CORONARY ARTERY OF NATIVE HEART WITHOUT ANGINA PECTORIS: Chronic | ICD-10-CM

## 2025-07-03 DIAGNOSIS — I10 PRIMARY HYPERTENSION: Chronic | ICD-10-CM

## 2025-07-03 DIAGNOSIS — E78.2 MIXED HYPERLIPIDEMIA: Primary | Chronic | ICD-10-CM

## 2025-07-03 PROCEDURE — 3078F DIAST BP <80 MM HG: CPT | Performed by: NURSE PRACTITIONER

## 2025-07-03 PROCEDURE — 99214 OFFICE O/P EST MOD 30 MIN: CPT | Performed by: NURSE PRACTITIONER

## 2025-07-03 PROCEDURE — 3074F SYST BP LT 130 MM HG: CPT | Performed by: NURSE PRACTITIONER

## 2025-07-03 PROCEDURE — 1160F RVW MEDS BY RX/DR IN RCRD: CPT | Performed by: NURSE PRACTITIONER

## 2025-07-03 PROCEDURE — 1159F MED LIST DOCD IN RCRD: CPT | Performed by: NURSE PRACTITIONER

## 2025-07-03 RX ORDER — TIRZEPATIDE 5 MG/.5ML
INJECTION, SOLUTION SUBCUTANEOUS
COMMUNITY
Start: 2025-05-10

## 2025-07-03 RX ORDER — PANTOPRAZOLE SODIUM 40 MG/1
TABLET, DELAYED RELEASE ORAL
COMMUNITY
Start: 2025-06-10

## 2025-07-03 RX ORDER — ROSUVASTATIN CALCIUM 20 MG/1
20 TABLET, COATED ORAL NIGHTLY
COMMUNITY
Start: 2025-06-16

## 2025-07-03 RX ORDER — ASPIRIN 81 MG/1
81 TABLET ORAL DAILY
COMMUNITY

## 2025-07-03 RX ORDER — METOPROLOL SUCCINATE 25 MG/1
25 TABLET, EXTENDED RELEASE ORAL
Start: 2025-07-03 | End: 2025-08-02

## 2025-07-03 RX ORDER — ERGOCALCIFEROL 1.25 MG/1
50000 CAPSULE, LIQUID FILLED ORAL
COMMUNITY

## 2025-07-03 NOTE — PROGRESS NOTES
"Chief Complaint  Follow-up (Patient states sometimes she gets SOB )    Subjective          Es Olivier presents to [facility] for follow up.    History of Present Illness  History of Present Illness  The patient is a 65-year-old female who follows up in the clinic with nonobstructive coronary artery disease, hypertension, and dyslipidemia. She was last seen in the clinic on 07/05/2024 and was stable at that time. No changes were made to her medications. She presents today for follow-up care.    She reports experiencing shortness of breath intermittently over the past few weeks, which lasts for approximately 10 to 15 seconds. This symptom is not consistently present but occurs occasionally when she ascends from a room or while doing laundry. She does not experience palpitations, swelling in her feet or ankles, or chest pain.    Her current medication regimen includes rosuvastatin 20 mg at night, losartan with hydrochlorothiazide, daily furosemide, and metoprolol succinate. She is also on Mounjaro, which has resulted in a weight loss of 105 pounds. However, this medication sometimes induces nausea, for which she takes Zofran. She reports eating even when not hungry and finds Mounjaro helpful in controlling her appetite. She undergoes lab tests every three months but has not discussed her recent lipid panel results with her doctor.             Objective     Vital Signs:   /68   Pulse 82   Ht 167.6 cm (66\")   Wt 90.7 kg (200 lb)   SpO2 98%   BMI 32.28 kg/m²       Physical Exam  Vitals reviewed.   Constitutional:       Appearance: She is well-developed. She is obese.   Cardiovascular:      Rate and Rhythm: Normal rate and regular rhythm.      Heart sounds: No murmur heard.     No friction rub. No gallop.   Pulmonary:      Effort: Pulmonary effort is normal. No respiratory distress.      Breath sounds: Normal breath sounds. No wheezing or rales.   Skin:     General: Skin is warm " and dry.   Neurological:      Mental Status: She is alert and oriented to person, place, and time.   Psychiatric:         Mood and Affect: Mood normal.         Behavior: Behavior normal.          Result Review :   The following data was reviewed by me 07/03/25 at 12:59 EDT: cardiac and lab studies as detailed below.    WBC   Date Value Ref Range Status   08/12/2024 9.98 3.40 - 10.80 10*3/mm3 Final     Hemoglobin   Date Value Ref Range Status   08/12/2024 12.5 12.0 - 15.9 g/dL Final     Hematocrit   Date Value Ref Range Status   08/12/2024 37.6 34.0 - 46.6 % Final     MCV   Date Value Ref Range Status   08/12/2024 94.7 79.0 - 97.0 fL Final     MCH   Date Value Ref Range Status   08/12/2024 31.5 26.6 - 33.0 pg Final     Platelets   Date Value Ref Range Status   08/12/2024 341 140 - 450 10*3/mm3 Final     Glucose   Date Value Ref Range Status   08/12/2024 48 (C) 65 - 99 mg/dL Final     BUN   Date Value Ref Range Status   08/12/2024 24 (H) 8 - 23 mg/dL Final     Creatinine   Date Value Ref Range Status   08/12/2024 0.98 0.57 - 1.00 mg/dL Final     Sodium   Date Value Ref Range Status   08/12/2024 142 136 - 145 mmol/L Final     Potassium   Date Value Ref Range Status   08/12/2024 3.8 3.5 - 5.2 mmol/L Final     Chloride   Date Value Ref Range Status   08/12/2024 104 98 - 107 mmol/L Final     CO2   Date Value Ref Range Status   08/12/2024 25.0 22.0 - 29.0 mmol/L Final     Calcium   Date Value Ref Range Status   08/12/2024 8.8 8.6 - 10.5 mg/dL Final     Total Protein   Date Value Ref Range Status   08/12/2024 6.5 6.0 - 8.5 g/dL Final     Albumin   Date Value Ref Range Status   08/12/2024 4.0 3.5 - 5.2 g/dL Final     ALT (SGPT)   Date Value Ref Range Status   08/12/2024 12 1 - 33 U/L Final     AST (SGOT)   Date Value Ref Range Status   08/12/2024 13 1 - 32 U/L Final     Alkaline Phosphatase   Date Value Ref Range Status   08/12/2024 78 39 - 117 U/L Final     Total Bilirubin   Date Value Ref Range Status   08/12/2024 <0.2 0.0  - 1.2 mg/dL Final     Anion Gap   Date Value Ref Range Status   08/12/2024 13.0 5.0 - 15.0 mmol/L Final     eGFR   Date Value Ref Range Status   08/12/2024 65.0 >60.0 mL/min/1.73 Final     Total Cholesterol   Date Value Ref Range Status   05/28/2024 150 0 - 200 mg/dL Final     Triglycerides   Date Value Ref Range Status   05/28/2024 129 0 - 150 mg/dL Final     HDL Cholesterol   Date Value Ref Range Status   05/28/2024 61 (H) 40 - 60 mg/dL Final     LDL Cholesterol    Date Value Ref Range Status   05/28/2024 67 0 - 100 mg/dL Final     LDL/HDL Ratio   Date Value Ref Range Status   05/28/2024 1.04  Final     Hemoglobin A1C   Date Value Ref Range Status   08/12/2024 5.80 (H) 4.80 - 5.60 % Final   05/28/2024 6.10 (H) 4.80 - 5.60 % Final   06/08/2022 6.50 (H) 4.80 - 5.60 % Final     Magnesium   Date Value Ref Range Status   03/25/2022 2.2 1.6 - 2.4 mg/dL Final       Procedures    Most recent echocardiogram  Results for orders placed during the hospital encounter of 12/16/24    Adult Transthoracic Echo Complete W/ Cont if Necessary Per Protocol 12/17/2024 10:44 PM    Interpretation Summary    Normal left ventricular cavity size and wall thickness noted. All left ventricular wall segments contract normally    Left ventricular ejection fraction appears to be 61 - 65%.    Left ventricular diastolic function is consistent with (grade I) impaired relaxation.    The aortic valve is structurally normal with no stenosis present. Mild to moderate aortic valve regurgitation is present.    The mitral valve is structurally normal with no significant stenosis present. Trace mitral valve regurgitation is present.    There is no evidence of pericardial effusion.      Most recent Stress Test  Results for orders placed during the hospital encounter of 05/23/24    Stress Test With Myocardial Perfusion One Day 05/24/2024  6:34 PM    Interpretation Summary    Impressions are consistent with an intermediate risk study.    Left ventricular  ejection fraction is normal.    Myocardial perfusion imaging indicates a small-sized, mildly severe area of ischemia located in the inferior wall.    TID 1.11.    Findings consistent with a normal ECG stress test.       Most recent Cardiac Cath  Results for orders placed during the hospital encounter of 05/23/24    Cardiac Catheterization/Vascular Study 05/28/2024  4:15 PM    Conclusion  Images from the original result were not included.  CARDIAC CATHETERIZATION / INTERVENTION REPORT        DATE OF PROCEDURE: 5/28/2024      INDICATION FOR PROCEDURE: chest pain and abnormal Stress test.    PRE PROCEDURE DIAGNOSIS:  1.  Chest pain  2.  Abnormal stress test.      POST PROCEDURE DIAGNOSIS:  40-50% tubular mid LAD lesion.  Luminal irregularities in LCx  30-40% proximal RCA lesion  LVEF 55-60%.  LVEDP 17 mmHg.      PROCEDURE PERFORMED:  1. Selective Coronary Angiogram  2. Left heart catheretization  3. Left Ventriculography      DESCRIPTION O PROCEDURE:  Prior to the procedure risk, benefits and possible alternative were discussed with the patient and informed consent was obtained. Patient was brought to cardiac cath lab table in post absorbtive state. Patient was prepped and drape in usual sterile fashion. IV Versed and Fentanyl was used for moderate sedation. 2% Lidocaine was used for topical anesthesia.    R radial arterial site was prepped and a micropuncture needle was used to access the artery and a 5 F slender sheath was placed. 2.5 mg of Verapamil and 200 mcg of NTG was given through the sheath.    RCA was engaged with JR4 catheter and angiograms in multiple views.  Left main was engaged with a JL 3.5 catheter and angiograms were in multiple views.  Pigtail was used to cross the LV, pressures were obtained, LV gram was performed using a power injector with 30 mL contrast at 10 mL/s injection.  Pigtail catheter was flushed with saline pullback under continuous pressure monitoring there is no gradient across the  aortic valve.    During diagnostic angiogram patient was noted to have a 30-40% proximal RCA lesion we attempted dual RFR however due to technical difficulties unable to do so.  Angiographically did not appear significant.  Therefore further attempt was aborted.    At the completion of procedure, all catheters were removed.  Hemostasis successfully obtained using a transradial band.      CORONARY FINDINGS:  LM: Is a large calibre vessel , normal take off from left cusp, divides into LAD and Lcx.  Left main is free of disease.    LAD: Medium to large caliber vessel.  There is a 40-50% tubular lesion in the mid LAD.  Distal LAD is free of disease distally wrapping around the apex.  D1 is a medium to small caliber vessel without significant lesion    LCX: Moderate calibre vessel, consist primarily of an obtuse marginal.  Mild luminal irregularities.    RCA: Large calibre, dominant artery, normal take off from right cusp.  30-40% lesion at the proximal segment.  Rest of RCA has luminal irregularities.  Small RPDA and RPL A.      Left Ventriculography: LV systolic function was normal with visual estimated EF of 55-60. No wall motion abnormalities. No significant mitral regurgitation noted.    Left heart catheterization.  LV systolic pressure 137 mmHg LVEDP: 17 mmHg  No gradient across the aortic valve on pull back.    MISCELLANEOUS:  Clinical frailty: 2- Well  ASA: 2=2- Mild to moderate systemic disease, medially well controlled, with no functional limitation  Mallampati: Class 2=2- Able to visualize the soft palate, fauces, uvula.  The anterior & posterior tonsilar pillars are hidden by the tongue.  Face to face mdoerate conscious  sedation time : None  EBL: Less than 10cc    COMPLICATIONS : None      ASSESSMENT and PLAN:  1.  40-50% mid LAD lesion which does not appear to be hemodynamically significant.  (RFR was attempted however due to technical difficulties unable to do so)  2.  Luminal irregularities in LCx.  3.   30-40% proximal RCA lesion.  4.  At present patient does not have any exertional chest pain.  Treat her with medical therapy.  5.  If she has further exertional chest pain then LAD lesion will be assessed with RFR to assess hemodynamic significance and if warranted PCI can be done at that time.        Rusty Lamb MD  05/28/24  16:04 EDT               Current Outpatient Medications   Medication Sig Dispense Refill    aspirin 81 MG EC tablet Take 1 tablet by mouth Daily.      buPROPion SR (WELLBUTRIN SR) 200 MG 12 hr tablet Take 1 tablet by mouth 2 (Two) Times a Day.      Cyanocobalamin 1000 MCG/ML kit Inject 1 mL as directed Every 30 (Thirty) Days.      cyclobenzaprine (FLEXERIL) 10 MG tablet Take 1 tablet by mouth 3 (Three) Times a Day As Needed for Muscle Spasms.      docusate sodium (COLACE) 250 MG capsule Take 1 capsule by mouth 2 (Two) Times a Day As Needed for Constipation. 60 capsule 1    FLUoxetine (PROzac) 40 MG capsule Take 1 capsule by mouth Daily.      furosemide (LASIX) 20 MG tablet Take 1 tablet by mouth Daily.      hydrOXYzine (ATARAX) 10 MG tablet Take 1 tablet by mouth 3 (Three) Times a Day As Needed for Anxiety.      insulin regular (humuLIN R,novoLIN R) 100 UNIT/ML injection Inject 10 Units under the skin into the appropriate area as directed 3 (Three) Times a Day Before Meals.      losartan-hydrochlorothiazide (HYZAAR) 50-12.5 MG per tablet Take 1 tablet by mouth Daily.      metFORMIN ER (GLUCOPHAGE-XR) 500 MG 24 hr tablet Take 4 tablets by mouth Every Night.      metoprolol succinate XL (TOPROL-XL) 25 MG 24 hr tablet Take 1 tablet by mouth Daily for 30 days.      Mounjaro 5 MG/0.5ML solution auto-injector       ondansetron ODT (ZOFRAN-ODT) 4 MG disintegrating tablet Place 1 tablet on the tongue Every 6 (Six) Hours As Needed for Nausea or Vomiting.      oxybutynin XL (DITROPAN-XL) 5 MG 24 hr tablet Take 1 tablet by mouth Daily.      pantoprazole (PROTONIX) 40 MG EC tablet       rosuvastatin  (CRESTOR) 20 MG tablet Take 1 tablet by mouth Every Night.      sodium-potassium-magnesium sulfates (Suprep Bowel Prep Kit) 17.5-3.13-1.6 GM/177ML solution oral solution Take 1 bottle by mouth Every 12 (Twelve) Hours. 354 mL 0    vitamin D (ERGOCALCIFEROL) 1.25 MG (83563 UT) capsule capsule Take 1 capsule by mouth.       No current facility-administered medications for this visit.               Problem List Items Addressed This Visit          Cardiac and Vasculature    Hypertension (Chronic)    Relevant Medications    metoprolol succinate XL (TOPROL-XL) 25 MG 24 hr tablet    Other Relevant Orders    Adult Transthoracic Echo Limited W/ Cont if Necessary Per Protocol    Hyperlipidemia - Primary (Chronic)    Relevant Medications    rosuvastatin (CRESTOR) 20 MG tablet    Other Relevant Orders    Adult Transthoracic Echo Limited W/ Cont if Necessary Per Protocol    Non obstructive coronary artery disease (Chronic)    Relevant Medications    metoprolol succinate XL (TOPROL-XL) 25 MG 24 hr tablet    Other Relevant Orders    Adult Transthoracic Echo Limited W/ Cont if Necessary Per Protocol       Assessment & Plan  1. Nonobstructive coronary artery disease:  - Reports shortness of breath for the past couple of weeks, lasting 10-15 seconds, particularly when climbing stairs or doing laundry.  - No associated palpitations, chest pain, or swelling in the feet or ankles.  - Last echocardiogram, done six months ago, showed normal cardiac function with moderate aortic valve regurgitation and trace mitral valve regurgitation.  - Order echocardiogram to reassess cardiac function and valve status.  - Hospital will contact to schedule the test.  - Results will be communicated via phone call.    2. Hypertension:  - Currently taking losartan with hydrochlorothiazide and metoprolol succinate.  - Continue current medication regimen.    3. Dyslipidemia:  - Taking rosuvastatin 20 mg at night.  - Request lipid panel from primary care  physician to ensure levels are at goal.    Follow-up  - Follow up in 1 year.         Follow Up     Return in about 1 year (around 7/3/2026).    Patient was given instructions and counseling regarding her condition or for health maintenance advice. Please see specific information pulled into the AVS if appropriate.     Patient or patient representative verbalized consent for the use of Ambient Listening during the visit with  JAMMIE Muro for chart documentation. 7/3/2025  13:21 EDT

## 2025-07-23 ENCOUNTER — EXTERNAL PBMM DATA (OUTPATIENT)
Dept: PHARMACY | Facility: OTHER | Age: 65
End: 2025-07-23
Payer: MEDICARE

## (undated) DEVICE — Device

## (undated) DEVICE — GLV SURG SENSICARE PI ORTHO SZ7.5 LF STRL

## (undated) DEVICE — 6F .070 JR 4 100CM: Brand: CORDIS

## (undated) DEVICE — TWIST DRILL FOR COLE RADIOLUCENT                                    DRILL 4.0MM: Brand: RUSSELL-TAYLOR

## (undated) DEVICE — PK BASIC 70

## (undated) DEVICE — A2000 MULTI-USE SYRINGE KIT, P/N 701277-003KIT CONTENTS: 100ML CONTRAST RESERVOIR AND TUBING WITH CONTRAST SPIKE AND CLAMP: Brand: A2000 MULTI-USE SYRINGE KIT

## (undated) DEVICE — SUT MNCRYL 2/0 RB1 27IN UD Y266H

## (undated) DEVICE — INTENDED FOR TISSUE SEPARATION, AND OTHER PROCEDURES THAT REQUIRE A SHARP SURGICAL BLADE TO PUNCTURE OR CUT.: Brand: BARD-PARKER ® STAINLESS STEEL BLADES

## (undated) DEVICE — ADHS LIQ MASTISOL 2/3ML

## (undated) DEVICE — DBD-DRAPE,LAP,CHOLE,W/TROUGHS,STERILE: Brand: MEDLINE

## (undated) DEVICE — BNDG ELAS CO-FLEX SLF ADHR 4IN5YD LF STRL

## (undated) DEVICE — SHEET,DRAPE,53X77,STERILE: Brand: MEDLINE

## (undated) DEVICE — 3.2MM X 343MM BRAD POINT TIP                                    THREADED GUIDE PIN: Brand: TRIGEN

## (undated) DEVICE — TR BAND RADIAL ARTERY COMPRESSION DEVICE: Brand: TR BAND

## (undated) DEVICE — DRSNG PAD ABD 8X10IN STRL

## (undated) DEVICE — APPL CHLORAPREP HI/LITE 26ML ORNG

## (undated) DEVICE — COPILOT BLEEDBACK CONTROL VALVE: Brand: COPILOT

## (undated) DEVICE — CATH VENT MIV RADL PIG LNG TIP 5F 110CM

## (undated) DEVICE — TRAXI EXTENDER (BMI>50 - USED WITH PH-25): Brand: TRAXI® PANNICULUS RETRACTOR EXTENDER

## (undated) DEVICE — DRAPE,U/ SHT,SPLIT,PLAS,STERIL: Brand: MEDLINE

## (undated) DEVICE — BIT DRL 3FLUT QC NDL PT 4.2X145MM STRL

## (undated) DEVICE — ST EXT IV SMRTSTE 2VLV FIX M LL 6ML 41

## (undated) DEVICE — DRSNG WND GZ CURAD OIL EMULSION 3X8IN LF STRL 1PK

## (undated) DEVICE — ANTIBACTERIAL UNDYED BRAIDED (POLYGLACTIN 910), SYNTHETIC ABSORBABLE SUTURE: Brand: COATED VICRYL

## (undated) DEVICE — MODEL AT P65, P/N 701554-001KIT CONTENTS: HAND CONTROLLER, 3-WAY HIGH-PRESSURE STOPCOCK WITH ROTATING END AND PREMIUM HIGH-PRESSURE TUBING: Brand: ANGIOTOUCH® KIT

## (undated) DEVICE — DRSNG SURESITE WNDW 4X4.5

## (undated) DEVICE — Device: Brand: OMNIWIRE PRESSURE GUIDE WIRE

## (undated) DEVICE — DRAPE, RADIAL, STERILE: Brand: MEDLINE

## (undated) DEVICE — KT VLV HEMO MAP ACC PLS LG/BORE MTL/INTRO W/TORQ/DEV

## (undated) DEVICE — DRAPE,EXTREMITY,89X128,STERILE: Brand: MEDLINE

## (undated) DEVICE — PROSPERA TOPDRAW CONTOUR NPWT KIT: Brand: DEROYAL

## (undated) DEVICE — PROXIMATE RH ROTATING HEAD SKIN STAPLERS (35 WIDE) CONTAINS 35 STAINLESS STEEL STAPLES: Brand: PROXIMATE

## (undated) DEVICE — GW FOR TROCH NAIL 3.2X400MM

## (undated) DEVICE — LN FLTR ORL/NASL MICROSTREAM NONINTUB A/LNG

## (undated) DEVICE — HOLDER: Brand: DEROYAL

## (undated) DEVICE — PATIENT RETURN ELECTRODE, SINGLE-USE, CONTACT QUALITY MONITORING, ADULT, WITH 9FT CORD, FOR PATIENTS WEIGING OVER 33LBS. (15KG): Brand: MEGADYNE

## (undated) DEVICE — 6619 2 PTNT ISO SYS INCISE AREA&LT;(&GT;&&LT;)&GT;P: Brand: STERI-DRAPE™ IOBAN™ 2

## (undated) DEVICE — PK CATH CARD 70

## (undated) DEVICE — 3M™ IOBAN™ 2 ANTIMICROBIAL INCISE DRAPE 6650EZ: Brand: IOBAN™ 2

## (undated) DEVICE — CATH F5 INF JL 3.5 100CM: Brand: INFINITI

## (undated) DEVICE — THE BITE BLOCK MAXI, LATEX FREE STRAP IS USED TO PROTECT THE ENDOSCOPE INSERTION TUBE FROM BEING BITTEN BY THE PATIENT.

## (undated) DEVICE — ST INF PRI SMRTSTE 20DRP 2VLV 24ML 117

## (undated) DEVICE — GW INQW FIX/CORE PTFE J/3MM .035 260CM

## (undated) DEVICE — 4.0MM LONG AO PILOT DRILL: Brand: TRIGEN

## (undated) DEVICE — RADIAL RUNWAY TOP PADS: Brand: RADIAL RUNWAY TOP PADS

## (undated) DEVICE — CATH F5 INF JR 4 100CM: Brand: INFINITI

## (undated) DEVICE — GLIDESHEATH SLENDER STAINLESS STEEL KIT: Brand: GLIDESHEATH SLENDER

## (undated) DEVICE — DISPOSABLE EXUDATE CANISTER: Brand: DEROYAL

## (undated) DEVICE — ADULT DISPOSABLE SINGLE-PATIENT USE PULSE OXIMETER SENSOR: Brand: NONIN